# Patient Record
Sex: FEMALE | Race: WHITE | Employment: OTHER | URBAN - METROPOLITAN AREA
[De-identification: names, ages, dates, MRNs, and addresses within clinical notes are randomized per-mention and may not be internally consistent; named-entity substitution may affect disease eponyms.]

---

## 2017-02-06 ENCOUNTER — HOSPITAL ENCOUNTER (OUTPATIENT)
Dept: PAIN MANAGEMENT | Age: 52
Discharge: HOME | End: 2017-02-06
Payer: MEDICAID

## 2017-02-06 VITALS
SYSTOLIC BLOOD PRESSURE: 132 MMHG | DIASTOLIC BLOOD PRESSURE: 81 MMHG | BODY MASS INDEX: 35.82 KG/M2 | HEIGHT: 65 IN | TEMPERATURE: 97.4 F | WEIGHT: 215 LBS | HEART RATE: 76 BPM | RESPIRATION RATE: 16 BRPM | OXYGEN SATURATION: 99 %

## 2017-02-06 DIAGNOSIS — M54.9 BACKACHE, UNSPECIFIED: ICD-10-CM

## 2017-02-06 PROCEDURE — 2580000003 HC RX 258: Performed by: ANESTHESIOLOGY

## 2017-02-06 PROCEDURE — 64635 DESTROY LUMB/SAC FACET JNT: CPT

## 2017-02-06 PROCEDURE — 64636 DESTROY L/S FACET JNT ADDL: CPT

## 2017-02-06 PROCEDURE — 6360000002 HC RX W HCPCS: Performed by: ANESTHESIOLOGY

## 2017-02-06 ASSESSMENT — PAIN - FUNCTIONAL ASSESSMENT: PAIN_FUNCTIONAL_ASSESSMENT: 0-10

## 2017-02-23 ENCOUNTER — HOSPITAL ENCOUNTER (OUTPATIENT)
Dept: PAIN MANAGEMENT | Age: 52
Discharge: HOME | End: 2017-02-23
Payer: MEDICAID

## 2017-02-23 VITALS
SYSTOLIC BLOOD PRESSURE: 165 MMHG | DIASTOLIC BLOOD PRESSURE: 100 MMHG | TEMPERATURE: 97.9 F | RESPIRATION RATE: 12 BRPM | HEART RATE: 70 BPM

## 2017-02-23 PROCEDURE — 99214 OFFICE O/P EST MOD 30 MIN: CPT

## 2017-02-23 RX ORDER — MORPHINE SULFATE 15 MG/1
15 TABLET, FILM COATED, EXTENDED RELEASE ORAL EVERY 12 HOURS SCHEDULED
Qty: 60 TABLET | Refills: 0 | Status: SHIPPED | OUTPATIENT
Start: 2017-02-24 | End: 2017-03-21 | Stop reason: SDUPTHER

## 2017-02-23 RX ORDER — GABAPENTIN 300 MG/1
300 CAPSULE ORAL 3 TIMES DAILY
Qty: 90 CAPSULE | Refills: 0 | Status: SHIPPED | OUTPATIENT
Start: 2017-02-23 | End: 2017-03-21 | Stop reason: SDUPTHER

## 2017-02-23 RX ORDER — TRAMADOL HYDROCHLORIDE 50 MG/1
50 TABLET ORAL 2 TIMES DAILY
Qty: 60 TABLET | Refills: 0 | Status: SHIPPED | OUTPATIENT
Start: 2017-02-24 | End: 2017-03-21 | Stop reason: SDUPTHER

## 2017-02-23 RX ORDER — TIZANIDINE 4 MG/1
4 TABLET ORAL NIGHTLY
Qty: 30 TABLET | Refills: 0 | Status: SHIPPED | OUTPATIENT
Start: 2017-02-24 | End: 2017-03-21 | Stop reason: SDUPTHER

## 2017-02-23 ASSESSMENT — PAIN DESCRIPTION - PROGRESSION: CLINICAL_PROGRESSION: GRADUALLY IMPROVING

## 2017-02-23 ASSESSMENT — PAIN SCALES - GENERAL: PAINLEVEL_OUTOF10: 6

## 2017-02-23 ASSESSMENT — PAIN DESCRIPTION - DESCRIPTORS: DESCRIPTORS: ACHING;DULL

## 2017-02-23 ASSESSMENT — PAIN DESCRIPTION - LOCATION: LOCATION: BACK

## 2017-02-23 ASSESSMENT — PAIN DESCRIPTION - PAIN TYPE: TYPE: CHRONIC PAIN

## 2017-02-23 ASSESSMENT — PAIN DESCRIPTION - ORIENTATION: ORIENTATION: LOWER;RIGHT

## 2017-03-21 ENCOUNTER — HOSPITAL ENCOUNTER (OUTPATIENT)
Dept: PAIN MANAGEMENT | Age: 52
Discharge: HOME | End: 2017-03-21
Payer: MEDICAID

## 2017-03-21 DIAGNOSIS — M54.16 LUMBAR RADICULOPATHY: Primary | Chronic | ICD-10-CM

## 2017-03-21 PROCEDURE — 99214 OFFICE O/P EST MOD 30 MIN: CPT

## 2017-03-21 PROCEDURE — 99213 OFFICE O/P EST LOW 20 MIN: CPT

## 2017-03-21 RX ORDER — MORPHINE SULFATE 15 MG/1
15 TABLET, FILM COATED, EXTENDED RELEASE ORAL EVERY 12 HOURS SCHEDULED
Qty: 60 TABLET | Refills: 0 | Status: SHIPPED | OUTPATIENT
Start: 2017-03-26 | End: 2017-04-18 | Stop reason: SDUPTHER

## 2017-03-21 RX ORDER — TIZANIDINE 4 MG/1
4 TABLET ORAL NIGHTLY
Qty: 30 TABLET | Refills: 0 | Status: SHIPPED | OUTPATIENT
Start: 2017-03-26 | End: 2017-04-18 | Stop reason: SDUPTHER

## 2017-03-21 RX ORDER — GABAPENTIN 300 MG/1
300 CAPSULE ORAL 3 TIMES DAILY
Qty: 90 CAPSULE | Refills: 0 | Status: SHIPPED | OUTPATIENT
Start: 2017-03-26 | End: 2017-04-18 | Stop reason: SDUPTHER

## 2017-03-21 RX ORDER — TRAMADOL HYDROCHLORIDE 50 MG/1
50 TABLET ORAL 2 TIMES DAILY
Qty: 60 TABLET | Refills: 0 | Status: SHIPPED | OUTPATIENT
Start: 2017-03-26 | End: 2017-04-18 | Stop reason: SDUPTHER

## 2017-04-18 ENCOUNTER — HOSPITAL ENCOUNTER (OUTPATIENT)
Dept: PAIN MANAGEMENT | Age: 52
Discharge: HOME | End: 2017-04-18
Payer: MEDICAID

## 2017-04-18 VITALS
TEMPERATURE: 97.5 F | SYSTOLIC BLOOD PRESSURE: 180 MMHG | RESPIRATION RATE: 12 BRPM | DIASTOLIC BLOOD PRESSURE: 94 MMHG | HEART RATE: 85 BPM

## 2017-04-18 DIAGNOSIS — M54.16 LUMBAR RADICULOPATHY: Primary | Chronic | ICD-10-CM

## 2017-04-18 PROCEDURE — 99214 OFFICE O/P EST MOD 30 MIN: CPT

## 2017-04-18 RX ORDER — TIZANIDINE 4 MG/1
4 TABLET ORAL NIGHTLY
Qty: 30 TABLET | Refills: 0 | Status: SHIPPED | OUTPATIENT
Start: 2017-04-26 | End: 2017-04-18 | Stop reason: SDUPTHER

## 2017-04-18 RX ORDER — GABAPENTIN 300 MG/1
300 CAPSULE ORAL 3 TIMES DAILY
Qty: 90 CAPSULE | Refills: 0 | Status: SHIPPED | OUTPATIENT
Start: 2017-04-26 | End: 2017-05-22 | Stop reason: SDUPTHER

## 2017-04-18 RX ORDER — MORPHINE SULFATE 15 MG/1
15 TABLET, FILM COATED, EXTENDED RELEASE ORAL EVERY 12 HOURS SCHEDULED
Qty: 60 TABLET | Refills: 0 | Status: SHIPPED | OUTPATIENT
Start: 2017-04-26 | End: 2017-05-22 | Stop reason: SDUPTHER

## 2017-04-18 RX ORDER — GABAPENTIN 300 MG/1
300 CAPSULE ORAL 3 TIMES DAILY
Qty: 90 CAPSULE | Refills: 0 | Status: SHIPPED | OUTPATIENT
Start: 2017-04-26 | End: 2017-04-18 | Stop reason: SDUPTHER

## 2017-04-18 RX ORDER — TIZANIDINE 4 MG/1
4 TABLET ORAL NIGHTLY
Qty: 30 TABLET | Refills: 0 | Status: SHIPPED | OUTPATIENT
Start: 2017-04-26 | End: 2017-05-22 | Stop reason: SDUPTHER

## 2017-04-18 RX ORDER — TRAMADOL HYDROCHLORIDE 50 MG/1
50 TABLET ORAL 2 TIMES DAILY
Qty: 60 TABLET | Refills: 0 | Status: SHIPPED | OUTPATIENT
Start: 2017-04-26 | End: 2017-05-22 | Stop reason: SDUPTHER

## 2017-04-18 ASSESSMENT — PAIN SCALES - GENERAL: PAINLEVEL_OUTOF10: 6

## 2017-04-18 ASSESSMENT — ENCOUNTER SYMPTOMS
COUGH: 0
BACK PAIN: 1
CONSTIPATION: 0
SHORTNESS OF BREATH: 0

## 2017-04-18 ASSESSMENT — PAIN DESCRIPTION - LOCATION: LOCATION: BACK;LEG

## 2017-04-18 ASSESSMENT — PAIN DESCRIPTION - PROGRESSION: CLINICAL_PROGRESSION: GRADUALLY WORSENING

## 2017-04-18 ASSESSMENT — PAIN DESCRIPTION - ORIENTATION: ORIENTATION: RIGHT

## 2017-04-18 ASSESSMENT — PAIN DESCRIPTION - PAIN TYPE: TYPE: CHRONIC PAIN

## 2017-04-18 ASSESSMENT — PAIN DESCRIPTION - FREQUENCY: FREQUENCY: CONTINUOUS

## 2017-05-22 ENCOUNTER — HOSPITAL ENCOUNTER (OUTPATIENT)
Dept: PAIN MANAGEMENT | Age: 52
Discharge: HOME | End: 2017-05-22
Payer: MEDICAID

## 2017-05-22 VITALS
DIASTOLIC BLOOD PRESSURE: 85 MMHG | BODY MASS INDEX: 35.82 KG/M2 | HEIGHT: 65 IN | HEART RATE: 87 BPM | WEIGHT: 215 LBS | TEMPERATURE: 97.9 F | SYSTOLIC BLOOD PRESSURE: 136 MMHG | RESPIRATION RATE: 18 BRPM

## 2017-05-22 DIAGNOSIS — M54.16 LUMBAR RADICULOPATHY: Primary | Chronic | ICD-10-CM

## 2017-05-22 PROCEDURE — 99214 OFFICE O/P EST MOD 30 MIN: CPT

## 2017-05-22 RX ORDER — GABAPENTIN 300 MG/1
300 CAPSULE ORAL 3 TIMES DAILY
Qty: 90 CAPSULE | Refills: 0 | Status: SHIPPED | OUTPATIENT
Start: 2017-05-26 | End: 2017-06-20 | Stop reason: SDUPTHER

## 2017-05-22 RX ORDER — TRAMADOL HYDROCHLORIDE 50 MG/1
50 TABLET ORAL 2 TIMES DAILY
Qty: 60 TABLET | Refills: 0 | Status: SHIPPED | OUTPATIENT
Start: 2017-05-26 | End: 2017-06-20 | Stop reason: SDUPTHER

## 2017-05-22 RX ORDER — TIZANIDINE 4 MG/1
4 TABLET ORAL NIGHTLY
Qty: 30 TABLET | Refills: 0 | Status: SHIPPED | OUTPATIENT
Start: 2017-05-26 | End: 2017-06-20 | Stop reason: SDUPTHER

## 2017-05-22 RX ORDER — MORPHINE SULFATE 15 MG/1
15 TABLET, FILM COATED, EXTENDED RELEASE ORAL EVERY 12 HOURS SCHEDULED
Qty: 60 TABLET | Refills: 0 | Status: SHIPPED | OUTPATIENT
Start: 2017-05-26 | End: 2017-06-20 | Stop reason: SDUPTHER

## 2017-05-22 RX ORDER — TIZANIDINE 4 MG/1
4 TABLET ORAL NIGHTLY
Qty: 30 TABLET | Refills: 0 | Status: SHIPPED | OUTPATIENT
Start: 2017-05-26 | End: 2017-05-22 | Stop reason: SDUPTHER

## 2017-05-22 ASSESSMENT — PAIN DESCRIPTION - PROGRESSION: CLINICAL_PROGRESSION: GRADUALLY WORSENING

## 2017-05-22 ASSESSMENT — PAIN DESCRIPTION - FREQUENCY: FREQUENCY: CONTINUOUS

## 2017-05-22 ASSESSMENT — PAIN DESCRIPTION - DESCRIPTORS: DESCRIPTORS: CONSTANT;ACHING;SHOOTING;STABBING

## 2017-05-22 ASSESSMENT — ENCOUNTER SYMPTOMS
BACK PAIN: 1
SHORTNESS OF BREATH: 0
CONSTIPATION: 0
COUGH: 0

## 2017-05-22 ASSESSMENT — PAIN DESCRIPTION - ONSET: ONSET: ON-GOING

## 2017-05-22 ASSESSMENT — PAIN DESCRIPTION - PAIN TYPE: TYPE: CHRONIC PAIN

## 2017-05-22 ASSESSMENT — PAIN SCALES - GENERAL: PAINLEVEL_OUTOF10: 8

## 2017-05-22 ASSESSMENT — PAIN DESCRIPTION - ORIENTATION: ORIENTATION: RIGHT;LOWER;LEFT

## 2017-05-22 ASSESSMENT — PAIN DESCRIPTION - LOCATION: LOCATION: BACK;LEG

## 2017-06-19 ENCOUNTER — HOSPITAL ENCOUNTER (OUTPATIENT)
Dept: PAIN MANAGEMENT | Age: 52
Discharge: HOME | End: 2017-06-19
Payer: MEDICAID

## 2017-06-19 VITALS
DIASTOLIC BLOOD PRESSURE: 57 MMHG | TEMPERATURE: 98.2 F | OXYGEN SATURATION: 98 % | BODY MASS INDEX: 35.82 KG/M2 | HEART RATE: 74 BPM | RESPIRATION RATE: 18 BRPM | HEIGHT: 65 IN | WEIGHT: 215 LBS | SYSTOLIC BLOOD PRESSURE: 138 MMHG

## 2017-06-19 DIAGNOSIS — M54.9 BACKACHE, UNSPECIFIED: ICD-10-CM

## 2017-06-19 PROCEDURE — 6360000002 HC RX W HCPCS

## 2017-06-19 PROCEDURE — 64495 INJ PARAVERT F JNT L/S 3 LEV: CPT

## 2017-06-19 PROCEDURE — 64494 INJ PARAVERT F JNT L/S 2 LEV: CPT

## 2017-06-19 PROCEDURE — 2580000003 HC RX 258: Performed by: ANESTHESIOLOGY

## 2017-06-19 PROCEDURE — 64493 INJ PARAVERT F JNT L/S 1 LEV: CPT

## 2017-06-19 PROCEDURE — 6360000002 HC RX W HCPCS: Performed by: ANESTHESIOLOGY

## 2017-06-19 PROCEDURE — 2500000003 HC RX 250 WO HCPCS

## 2017-06-19 PROCEDURE — 2500000003 HC RX 250 WO HCPCS: Performed by: ANESTHESIOLOGY

## 2017-06-19 RX ORDER — MIDAZOLAM HYDROCHLORIDE 1 MG/ML
0.5 INJECTION INTRAMUSCULAR; INTRAVENOUS 3 TIMES DAILY PRN
Status: DISCONTINUED | OUTPATIENT
Start: 2017-06-19 | End: 2017-06-20 | Stop reason: HOSPADM

## 2017-06-19 RX ORDER — BUSPIRONE HYDROCHLORIDE 10 MG/1
10 TABLET ORAL 2 TIMES DAILY
COMMUNITY
End: 2021-01-13

## 2017-06-19 RX ORDER — SODIUM CHLORIDE, SODIUM LACTATE, POTASSIUM CHLORIDE, CALCIUM CHLORIDE 600; 310; 30; 20 MG/100ML; MG/100ML; MG/100ML; MG/100ML
500 INJECTION, SOLUTION INTRAVENOUS CONTINUOUS
Status: DISCONTINUED | OUTPATIENT
Start: 2017-06-19 | End: 2017-06-20 | Stop reason: HOSPADM

## 2017-06-19 RX ORDER — LIDOCAINE HYDROCHLORIDE 10 MG/ML
0.5 INJECTION, SOLUTION EPIDURAL; INFILTRATION; INTRACAUDAL; PERINEURAL ONCE
Status: COMPLETED | OUTPATIENT
Start: 2017-06-19 | End: 2017-06-19

## 2017-06-19 RX ADMIN — LIDOCAINE HYDROCHLORIDE 0.5 ML: 10 INJECTION, SOLUTION EPIDURAL; INFILTRATION; INTRACAUDAL; PERINEURAL at 08:11

## 2017-06-19 RX ADMIN — MIDAZOLAM HYDROCHLORIDE 0.5 MG: 1 INJECTION, SOLUTION INTRAMUSCULAR; INTRAVENOUS at 08:25

## 2017-06-19 RX ADMIN — MIDAZOLAM HYDROCHLORIDE 1 MG: 1 INJECTION, SOLUTION INTRAMUSCULAR; INTRAVENOUS at 08:21

## 2017-06-19 RX ADMIN — SODIUM CHLORIDE, POTASSIUM CHLORIDE, SODIUM LACTATE AND CALCIUM CHLORIDE 500 ML: 600; 310; 30; 20 INJECTION, SOLUTION INTRAVENOUS at 08:11

## 2017-06-19 ASSESSMENT — PAIN - FUNCTIONAL ASSESSMENT
PAIN_FUNCTIONAL_ASSESSMENT: 0-10
PAIN_FUNCTIONAL_ASSESSMENT: 0-10

## 2017-06-19 ASSESSMENT — PAIN DESCRIPTION - DESCRIPTORS: DESCRIPTORS: CONSTANT;STABBING;BURNING

## 2017-06-19 ASSESSMENT — PAIN SCALES - GENERAL: PAINLEVEL_OUTOF10: 0

## 2017-06-20 ENCOUNTER — HOSPITAL ENCOUNTER (OUTPATIENT)
Dept: PAIN MANAGEMENT | Age: 52
Discharge: HOME | End: 2017-06-20
Payer: MEDICAID

## 2017-06-20 VITALS
TEMPERATURE: 98.6 F | DIASTOLIC BLOOD PRESSURE: 85 MMHG | RESPIRATION RATE: 12 BRPM | HEART RATE: 67 BPM | SYSTOLIC BLOOD PRESSURE: 136 MMHG

## 2017-06-20 DIAGNOSIS — M54.16 LUMBAR RADICULOPATHY: Primary | Chronic | ICD-10-CM

## 2017-06-20 PROCEDURE — 99213 OFFICE O/P EST LOW 20 MIN: CPT

## 2017-06-20 PROCEDURE — 80307 DRUG TEST PRSMV CHEM ANLYZR: CPT

## 2017-06-20 PROCEDURE — 99214 OFFICE O/P EST MOD 30 MIN: CPT

## 2017-06-20 RX ORDER — TRAMADOL HYDROCHLORIDE 50 MG/1
50 TABLET ORAL 2 TIMES DAILY
Qty: 60 TABLET | Refills: 0 | Status: SHIPPED | OUTPATIENT
Start: 2017-06-25 | End: 2017-07-21 | Stop reason: SDUPTHER

## 2017-06-20 RX ORDER — TIZANIDINE 4 MG/1
4 TABLET ORAL NIGHTLY
Qty: 30 TABLET | Refills: 0 | Status: SHIPPED | OUTPATIENT
Start: 2017-06-25 | End: 2017-07-21 | Stop reason: SDUPTHER

## 2017-06-20 RX ORDER — MORPHINE SULFATE 15 MG/1
15 TABLET, FILM COATED, EXTENDED RELEASE ORAL EVERY 12 HOURS SCHEDULED
Qty: 60 TABLET | Refills: 0 | Status: SHIPPED | OUTPATIENT
Start: 2017-06-25 | End: 2017-07-21 | Stop reason: SDUPTHER

## 2017-06-20 RX ORDER — GABAPENTIN 300 MG/1
300 CAPSULE ORAL 3 TIMES DAILY
Qty: 90 CAPSULE | Refills: 0 | Status: SHIPPED | OUTPATIENT
Start: 2017-06-25 | End: 2017-07-21 | Stop reason: SDUPTHER

## 2017-06-20 ASSESSMENT — ENCOUNTER SYMPTOMS: BACK PAIN: 1

## 2017-06-20 ASSESSMENT — PAIN DESCRIPTION - ORIENTATION: ORIENTATION: RIGHT

## 2017-06-20 ASSESSMENT — PAIN DESCRIPTION - PAIN TYPE: TYPE: CHRONIC PAIN

## 2017-06-20 ASSESSMENT — PAIN SCALES - GENERAL: PAINLEVEL_OUTOF10: 7

## 2017-06-20 ASSESSMENT — PAIN DESCRIPTION - DESCRIPTORS: DESCRIPTORS: CONSTANT;ACHING;DISCOMFORT

## 2017-06-20 ASSESSMENT — PAIN DESCRIPTION - LOCATION: LOCATION: BACK

## 2017-06-23 LAB
6-ACETYLMORPHINE, UR: NOT DETECTED
7-AMINOCLONAZEPAM, URINE: NOT DETECTED
ALPHA-OH-ALPRAZ, URINE: NOT DETECTED
ALPRAZOLAM, URINE: NOT DETECTED
AMPHETAMINES, URINE: PRESENT
BARBITURATES, URINE: NOT DETECTED
BENZOYLECGONINE, UR: NOT DETECTED
BUPRENORPHINE URINE: NOT DETECTED
CARISOPRODOL, UR: NOT DETECTED
CLONAZEPAM, URINE: NOT DETECTED
CODEINE, URINE: NOT DETECTED
CREATININE URINE: 131.1 MG/DL (ref 20–400)
DIAZEPAM, URINE: NOT DETECTED
EER PAIN MGT DRUG PANEL, HIGH RES/EMIT U: NORMAL
ETHYL GLUCURONIDE UR: NOT DETECTED
FENTANYL URINE: NOT DETECTED
HYDROCODONE, URINE: NOT DETECTED
HYDROMORPHONE, URINE: PRESENT
LORAZEPAM, URINE: NOT DETECTED
MARIJUANA METAB, UR: NOT DETECTED
MDA, UR: NOT DETECTED
MDEA, EVE, UR: NOT DETECTED
MDMA URINE: NOT DETECTED
MEPERIDINE METAB, UR: NOT DETECTED
METHADONE, URINE: NOT DETECTED
METHAMPHETAMINE, URINE: NOT DETECTED
METHYLPHENIDATE: NOT DETECTED
MIDAZOLAM, URINE: NOT DETECTED
MORPHINE URINE: PRESENT
NORBUPRENORPHINE, URINE: NOT DETECTED
NORDIAZEPAM, URINE: NOT DETECTED
NORFENTANYL, URINE: NOT DETECTED
NORHYDROCODONE, URINE: NOT DETECTED
NOROXYCODONE, URINE: NOT DETECTED
NOROXYMORPHONE, URINE: NOT DETECTED
OXAZEPAM, URINE: NOT DETECTED
OXYCODONE URINE: NOT DETECTED
OXYMORPHONE, URINE: NOT DETECTED
PAIN MGT DRUG PANEL, HI RES, UR: NORMAL
PCP,URINE: NOT DETECTED
PHENTERMINE, UR: NOT DETECTED
PROPOXYPHENE, URINE: NOT DETECTED
TAPENTADOL, URINE: NOT DETECTED
TAPENTADOL-O-SULFATE, URINE: NOT DETECTED
TEMAZEPAM, URINE: NOT DETECTED
TRAMADOL, URINE: PRESENT
ZOLPIDEM, URINE: NOT DETECTED

## 2017-07-07 ENCOUNTER — HOSPITAL ENCOUNTER (OUTPATIENT)
Dept: PAIN MANAGEMENT | Age: 52
Discharge: HOME | End: 2017-07-07
Payer: MEDICAID

## 2017-07-07 VITALS
BODY MASS INDEX: 35.82 KG/M2 | WEIGHT: 215 LBS | OXYGEN SATURATION: 99 % | TEMPERATURE: 98.8 F | DIASTOLIC BLOOD PRESSURE: 79 MMHG | SYSTOLIC BLOOD PRESSURE: 134 MMHG | HEART RATE: 63 BPM | HEIGHT: 65 IN | RESPIRATION RATE: 12 BRPM

## 2017-07-07 DIAGNOSIS — M54.6 ACUTE RIGHT-SIDED THORACIC BACK PAIN: ICD-10-CM

## 2017-07-07 DIAGNOSIS — G58.8 INTERCOSTAL NEURALGIA: ICD-10-CM

## 2017-07-07 PROCEDURE — 6360000002 HC RX W HCPCS

## 2017-07-07 PROCEDURE — 2500000003 HC RX 250 WO HCPCS: Performed by: ANESTHESIOLOGY

## 2017-07-07 PROCEDURE — 2580000003 HC RX 258: Performed by: ANESTHESIOLOGY

## 2017-07-07 PROCEDURE — 6360000002 HC RX W HCPCS: Performed by: ANESTHESIOLOGY

## 2017-07-07 PROCEDURE — 64635 DESTROY LUMB/SAC FACET JNT: CPT

## 2017-07-07 PROCEDURE — 64636 DESTROY L/S FACET JNT ADDL: CPT

## 2017-07-07 PROCEDURE — 2500000003 HC RX 250 WO HCPCS

## 2017-07-07 RX ORDER — SODIUM CHLORIDE, SODIUM LACTATE, POTASSIUM CHLORIDE, CALCIUM CHLORIDE 600; 310; 30; 20 MG/100ML; MG/100ML; MG/100ML; MG/100ML
500 INJECTION, SOLUTION INTRAVENOUS CONTINUOUS
Status: DISCONTINUED | OUTPATIENT
Start: 2017-07-07 | End: 2017-07-08 | Stop reason: HOSPADM

## 2017-07-07 RX ORDER — FENTANYL CITRATE 50 UG/ML
25 INJECTION, SOLUTION INTRAMUSCULAR; INTRAVENOUS
Status: DISCONTINUED | OUTPATIENT
Start: 2017-07-07 | End: 2017-07-08 | Stop reason: HOSPADM

## 2017-07-07 RX ORDER — LIDOCAINE HYDROCHLORIDE 10 MG/ML
5 INJECTION, SOLUTION EPIDURAL; INFILTRATION; INTRACAUDAL; PERINEURAL ONCE
Status: COMPLETED | OUTPATIENT
Start: 2017-07-07 | End: 2017-07-07

## 2017-07-07 RX ORDER — MIDAZOLAM HYDROCHLORIDE 1 MG/ML
0.5 INJECTION INTRAMUSCULAR; INTRAVENOUS
Status: COMPLETED | OUTPATIENT
Start: 2017-07-07 | End: 2017-07-07

## 2017-07-07 RX ADMIN — MIDAZOLAM HYDROCHLORIDE 0.5 MG: 1 INJECTION, SOLUTION INTRAMUSCULAR; INTRAVENOUS at 09:48

## 2017-07-07 RX ADMIN — LIDOCAINE HYDROCHLORIDE 5 ML: 10 INJECTION, SOLUTION EPIDURAL; INFILTRATION; INTRACAUDAL; PERINEURAL at 09:32

## 2017-07-07 RX ADMIN — FENTANYL CITRATE 25 MCG: 50 INJECTION, SOLUTION INTRAMUSCULAR; INTRAVENOUS at 09:49

## 2017-07-07 RX ADMIN — SODIUM CHLORIDE, POTASSIUM CHLORIDE, SODIUM LACTATE AND CALCIUM CHLORIDE 500 ML: 600; 310; 30; 20 INJECTION, SOLUTION INTRAVENOUS at 09:32

## 2017-07-07 RX ADMIN — MIDAZOLAM HYDROCHLORIDE 1 MG: 1 INJECTION, SOLUTION INTRAMUSCULAR; INTRAVENOUS at 09:41

## 2017-07-07 RX ADMIN — FENTANYL CITRATE 50 MCG: 50 INJECTION, SOLUTION INTRAMUSCULAR; INTRAVENOUS at 09:41

## 2017-07-07 ASSESSMENT — PAIN SCALES - GENERAL: PAINLEVEL_OUTOF10: 8

## 2017-07-07 ASSESSMENT — PAIN DESCRIPTION - FREQUENCY: FREQUENCY: CONTINUOUS

## 2017-07-07 ASSESSMENT — PAIN - FUNCTIONAL ASSESSMENT
PAIN_FUNCTIONAL_ASSESSMENT: 0-10
PAIN_FUNCTIONAL_ASSESSMENT: 0-10

## 2017-07-07 ASSESSMENT — PAIN DESCRIPTION - PAIN TYPE: TYPE: CHRONIC PAIN

## 2017-07-07 ASSESSMENT — PAIN DESCRIPTION - ONSET: ONSET: ON-GOING

## 2017-07-07 ASSESSMENT — PAIN DESCRIPTION - PROGRESSION: CLINICAL_PROGRESSION: GRADUALLY IMPROVING

## 2017-07-07 ASSESSMENT — PAIN DESCRIPTION - LOCATION: LOCATION: BACK

## 2017-07-07 ASSESSMENT — PAIN DESCRIPTION - ORIENTATION: ORIENTATION: RIGHT;LOWER

## 2017-07-21 ENCOUNTER — HOSPITAL ENCOUNTER (OUTPATIENT)
Dept: PAIN MANAGEMENT | Age: 52
Discharge: HOME | End: 2017-07-21
Payer: MEDICAID

## 2017-07-21 VITALS
HEART RATE: 88 BPM | TEMPERATURE: 98.1 F | RESPIRATION RATE: 20 BRPM | DIASTOLIC BLOOD PRESSURE: 90 MMHG | SYSTOLIC BLOOD PRESSURE: 159 MMHG

## 2017-07-21 DIAGNOSIS — M54.16 LUMBAR RADICULOPATHY: Primary | Chronic | ICD-10-CM

## 2017-07-21 PROCEDURE — 99214 OFFICE O/P EST MOD 30 MIN: CPT

## 2017-07-21 RX ORDER — TIZANIDINE 4 MG/1
4 TABLET ORAL NIGHTLY
Qty: 30 TABLET | Refills: 0 | Status: SHIPPED | OUTPATIENT
Start: 2017-07-26 | End: 2017-08-22 | Stop reason: SDUPTHER

## 2017-07-21 RX ORDER — TRAMADOL HYDROCHLORIDE 50 MG/1
50 TABLET ORAL 2 TIMES DAILY
Qty: 60 TABLET | Refills: 0 | Status: SHIPPED | OUTPATIENT
Start: 2017-07-26 | End: 2017-08-22 | Stop reason: ALTCHOICE

## 2017-07-21 RX ORDER — MORPHINE SULFATE 15 MG/1
15 TABLET, FILM COATED, EXTENDED RELEASE ORAL EVERY 12 HOURS SCHEDULED
Qty: 60 TABLET | Refills: 0 | Status: SHIPPED | OUTPATIENT
Start: 2017-07-26 | End: 2017-08-22 | Stop reason: SDUPTHER

## 2017-07-21 RX ORDER — GABAPENTIN 300 MG/1
300 CAPSULE ORAL 3 TIMES DAILY
Qty: 90 CAPSULE | Refills: 0 | Status: SHIPPED | OUTPATIENT
Start: 2017-07-26 | End: 2017-08-22 | Stop reason: SDUPTHER

## 2017-07-21 ASSESSMENT — ENCOUNTER SYMPTOMS
CONSTIPATION: 0
SHORTNESS OF BREATH: 0
COUGH: 0
BACK PAIN: 1

## 2017-07-21 ASSESSMENT — PAIN DESCRIPTION - ORIENTATION: ORIENTATION: LOWER

## 2017-07-21 ASSESSMENT — PAIN DESCRIPTION - ONSET: ONSET: ON-GOING

## 2017-07-21 ASSESSMENT — PAIN DESCRIPTION - PROGRESSION: CLINICAL_PROGRESSION: GRADUALLY WORSENING

## 2017-07-21 ASSESSMENT — PAIN DESCRIPTION - FREQUENCY: FREQUENCY: CONTINUOUS

## 2017-07-21 ASSESSMENT — PAIN SCALES - GENERAL: PAINLEVEL_OUTOF10: 9

## 2017-07-21 ASSESSMENT — PAIN DESCRIPTION - DESCRIPTORS: DESCRIPTORS: CONSTANT;SQUEEZING

## 2017-07-21 ASSESSMENT — PAIN DESCRIPTION - PAIN TYPE: TYPE: CHRONIC PAIN

## 2017-07-21 ASSESSMENT — PAIN DESCRIPTION - LOCATION: LOCATION: BACK

## 2017-08-22 ENCOUNTER — HOSPITAL ENCOUNTER (OUTPATIENT)
Dept: PAIN MANAGEMENT | Age: 52
Discharge: HOME | End: 2017-08-22
Payer: MEDICAID

## 2017-08-22 VITALS
RESPIRATION RATE: 16 BRPM | DIASTOLIC BLOOD PRESSURE: 94 MMHG | OXYGEN SATURATION: 98 % | BODY MASS INDEX: 36.65 KG/M2 | TEMPERATURE: 98.1 F | HEART RATE: 83 BPM | HEIGHT: 65 IN | WEIGHT: 220 LBS | SYSTOLIC BLOOD PRESSURE: 152 MMHG

## 2017-08-22 PROCEDURE — 99214 OFFICE O/P EST MOD 30 MIN: CPT

## 2017-08-22 RX ORDER — MORPHINE SULFATE 15 MG/1
15 TABLET, FILM COATED, EXTENDED RELEASE ORAL EVERY 12 HOURS SCHEDULED
Qty: 60 TABLET | Refills: 0 | Status: SHIPPED | OUTPATIENT
Start: 2017-08-24 | End: 2017-09-19 | Stop reason: SDUPTHER

## 2017-08-22 RX ORDER — TIZANIDINE 4 MG/1
4 TABLET ORAL NIGHTLY
Qty: 30 TABLET | Refills: 0 | Status: SHIPPED | OUTPATIENT
Start: 2017-08-24 | End: 2017-09-19 | Stop reason: SDUPTHER

## 2017-08-22 RX ORDER — GABAPENTIN 300 MG/1
300 CAPSULE ORAL 3 TIMES DAILY
Qty: 90 CAPSULE | Refills: 0 | Status: CANCELLED | OUTPATIENT
Start: 2017-08-24 | End: 2017-09-23

## 2017-08-22 RX ORDER — GABAPENTIN 300 MG/1
300 CAPSULE ORAL 3 TIMES DAILY
Qty: 90 CAPSULE | Refills: 0 | Status: SHIPPED | OUTPATIENT
Start: 2017-08-24 | End: 2017-09-19 | Stop reason: SDUPTHER

## 2017-08-22 RX ORDER — TIZANIDINE 4 MG/1
4 TABLET ORAL NIGHTLY
Qty: 30 TABLET | Refills: 0 | Status: CANCELLED | OUTPATIENT
Start: 2017-08-24 | End: 2017-09-23

## 2017-08-22 ASSESSMENT — PAIN DESCRIPTION - ORIENTATION: ORIENTATION: LOWER

## 2017-08-22 ASSESSMENT — PAIN DESCRIPTION - PROGRESSION: CLINICAL_PROGRESSION: NOT CHANGED

## 2017-08-22 ASSESSMENT — PAIN DESCRIPTION - FREQUENCY: FREQUENCY: CONTINUOUS

## 2017-08-22 ASSESSMENT — PAIN DESCRIPTION - PAIN TYPE: TYPE: CHRONIC PAIN

## 2017-08-22 ASSESSMENT — PAIN DESCRIPTION - LOCATION: LOCATION: BACK;BUTTOCKS

## 2017-08-22 ASSESSMENT — PAIN SCALES - GENERAL: PAINLEVEL_OUTOF10: 7

## 2017-09-11 ENCOUNTER — HOSPITAL ENCOUNTER (OUTPATIENT)
Dept: PAIN MANAGEMENT | Age: 52
Discharge: HOME | End: 2017-09-11
Payer: MEDICAID

## 2017-09-11 VITALS
TEMPERATURE: 97.1 F | RESPIRATION RATE: 16 BRPM | HEIGHT: 65 IN | BODY MASS INDEX: 36.65 KG/M2 | OXYGEN SATURATION: 97 % | SYSTOLIC BLOOD PRESSURE: 132 MMHG | WEIGHT: 220 LBS | HEART RATE: 83 BPM | DIASTOLIC BLOOD PRESSURE: 72 MMHG

## 2017-09-11 DIAGNOSIS — G89.29 CHRONIC BACK PAIN, UNSPECIFIED BACK LOCATION, UNSPECIFIED BACK PAIN LATERALITY: ICD-10-CM

## 2017-09-11 DIAGNOSIS — M54.9 CHRONIC BACK PAIN, UNSPECIFIED BACK LOCATION, UNSPECIFIED BACK PAIN LATERALITY: ICD-10-CM

## 2017-09-11 PROCEDURE — 6360000002 HC RX W HCPCS

## 2017-09-11 PROCEDURE — 62323 NJX INTERLAMINAR LMBR/SAC: CPT | Performed by: ANESTHESIOLOGY

## 2017-09-11 PROCEDURE — 2580000003 HC RX 258: Performed by: ANESTHESIOLOGY

## 2017-09-11 PROCEDURE — 62323 NJX INTERLAMINAR LMBR/SAC: CPT

## 2017-09-11 PROCEDURE — 6360000002 HC RX W HCPCS: Performed by: ANESTHESIOLOGY

## 2017-09-11 PROCEDURE — 2500000003 HC RX 250 WO HCPCS

## 2017-09-11 PROCEDURE — 2500000003 HC RX 250 WO HCPCS: Performed by: ANESTHESIOLOGY

## 2017-09-11 RX ORDER — FENTANYL CITRATE 50 UG/ML
25 INJECTION, SOLUTION INTRAMUSCULAR; INTRAVENOUS
Status: DISCONTINUED | OUTPATIENT
Start: 2017-09-11 | End: 2017-09-12 | Stop reason: HOSPADM

## 2017-09-11 RX ORDER — SODIUM CHLORIDE, SODIUM LACTATE, POTASSIUM CHLORIDE, CALCIUM CHLORIDE 600; 310; 30; 20 MG/100ML; MG/100ML; MG/100ML; MG/100ML
500 INJECTION, SOLUTION INTRAVENOUS CONTINUOUS
Status: DISCONTINUED | OUTPATIENT
Start: 2017-09-11 | End: 2017-09-12 | Stop reason: HOSPADM

## 2017-09-11 RX ORDER — MIDAZOLAM HYDROCHLORIDE 1 MG/ML
0.5 INJECTION INTRAMUSCULAR; INTRAVENOUS
Status: DISCONTINUED | OUTPATIENT
Start: 2017-09-11 | End: 2017-09-12 | Stop reason: HOSPADM

## 2017-09-11 RX ORDER — LIDOCAINE HYDROCHLORIDE 10 MG/ML
5 INJECTION, SOLUTION EPIDURAL; INFILTRATION; INTRACAUDAL; PERINEURAL ONCE
Status: COMPLETED | OUTPATIENT
Start: 2017-09-11 | End: 2017-09-11

## 2017-09-11 RX ADMIN — MIDAZOLAM HYDROCHLORIDE 0.5 MG: 1 INJECTION, SOLUTION INTRAMUSCULAR; INTRAVENOUS at 10:42

## 2017-09-11 RX ADMIN — FENTANYL CITRATE 25 MCG: 50 INJECTION INTRAMUSCULAR; INTRAVENOUS at 10:42

## 2017-09-11 RX ADMIN — SODIUM CHLORIDE, POTASSIUM CHLORIDE, SODIUM LACTATE AND CALCIUM CHLORIDE 500 ML: 600; 310; 30; 20 INJECTION, SOLUTION INTRAVENOUS at 10:30

## 2017-09-11 RX ADMIN — LIDOCAINE HYDROCHLORIDE 5 ML: 10 INJECTION, SOLUTION EPIDURAL; INFILTRATION; INTRACAUDAL; PERINEURAL at 10:30

## 2017-09-11 ASSESSMENT — PAIN SCALES - GENERAL
PAINLEVEL_OUTOF10: 0
PAINLEVEL_OUTOF10: 0

## 2017-09-11 ASSESSMENT — PAIN - FUNCTIONAL ASSESSMENT
PAIN_FUNCTIONAL_ASSESSMENT: 0-10
PAIN_FUNCTIONAL_ASSESSMENT: 0-10

## 2017-09-11 ASSESSMENT — PAIN DESCRIPTION - DESCRIPTORS: DESCRIPTORS: STABBING;SHARP

## 2017-09-19 ENCOUNTER — HOSPITAL ENCOUNTER (OUTPATIENT)
Dept: PAIN MANAGEMENT | Age: 52
Discharge: HOME | End: 2017-09-19
Payer: MEDICAID

## 2017-09-19 VITALS
WEIGHT: 220 LBS | RESPIRATION RATE: 16 BRPM | BODY MASS INDEX: 36.65 KG/M2 | HEART RATE: 67 BPM | HEIGHT: 65 IN | TEMPERATURE: 98.1 F | DIASTOLIC BLOOD PRESSURE: 80 MMHG | SYSTOLIC BLOOD PRESSURE: 142 MMHG

## 2017-09-19 DIAGNOSIS — G89.29 CHRONIC LOW BACK PAIN WITH SCIATICA, SCIATICA LATERALITY UNSPECIFIED, UNSPECIFIED BACK PAIN LATERALITY: ICD-10-CM

## 2017-09-19 DIAGNOSIS — M54.16 LUMBAR RADICULOPATHY: Primary | Chronic | ICD-10-CM

## 2017-09-19 DIAGNOSIS — M54.40 CHRONIC LOW BACK PAIN WITH SCIATICA, SCIATICA LATERALITY UNSPECIFIED, UNSPECIFIED BACK PAIN LATERALITY: ICD-10-CM

## 2017-09-19 PROCEDURE — 99214 OFFICE O/P EST MOD 30 MIN: CPT

## 2017-09-19 PROCEDURE — 99213 OFFICE O/P EST LOW 20 MIN: CPT | Performed by: NURSE PRACTITIONER

## 2017-09-19 PROCEDURE — 99213 OFFICE O/P EST LOW 20 MIN: CPT

## 2017-09-19 RX ORDER — MORPHINE SULFATE 15 MG/1
15 TABLET, FILM COATED, EXTENDED RELEASE ORAL EVERY 12 HOURS SCHEDULED
Qty: 60 TABLET | Refills: 0 | Status: SHIPPED | OUTPATIENT
Start: 2017-09-23 | End: 2017-10-20 | Stop reason: SDUPTHER

## 2017-09-19 RX ORDER — GABAPENTIN 300 MG/1
300 CAPSULE ORAL 3 TIMES DAILY
Qty: 90 CAPSULE | Refills: 0 | Status: SHIPPED | OUTPATIENT
Start: 2017-09-23 | End: 2017-10-20 | Stop reason: SDUPTHER

## 2017-09-19 RX ORDER — TIZANIDINE 4 MG/1
4 TABLET ORAL NIGHTLY
Qty: 30 TABLET | Refills: 0 | Status: SHIPPED | OUTPATIENT
Start: 2017-09-23 | End: 2017-10-20 | Stop reason: SDUPTHER

## 2017-09-19 ASSESSMENT — PAIN DESCRIPTION - PAIN TYPE: TYPE: CHRONIC PAIN

## 2017-09-19 ASSESSMENT — ENCOUNTER SYMPTOMS
CONSTIPATION: 0
SHORTNESS OF BREATH: 0
BACK PAIN: 1
COUGH: 0
BOWEL INCONTINENCE: 0

## 2017-09-19 ASSESSMENT — PAIN DESCRIPTION - ONSET: ONSET: ON-GOING

## 2017-09-19 ASSESSMENT — PAIN DESCRIPTION - PROGRESSION: CLINICAL_PROGRESSION: GRADUALLY IMPROVING

## 2017-09-19 ASSESSMENT — PAIN DESCRIPTION - FREQUENCY: FREQUENCY: CONTINUOUS

## 2017-09-19 ASSESSMENT — PAIN SCALES - GENERAL: PAINLEVEL_OUTOF10: 5

## 2017-09-19 ASSESSMENT — PAIN DESCRIPTION - ORIENTATION: ORIENTATION: LOWER;RIGHT;LEFT

## 2017-09-19 ASSESSMENT — PAIN DESCRIPTION - DESCRIPTORS: DESCRIPTORS: SQUEEZING

## 2017-09-19 ASSESSMENT — PAIN DESCRIPTION - LOCATION: LOCATION: BACK;HIP

## 2017-10-20 ENCOUNTER — HOSPITAL ENCOUNTER (OUTPATIENT)
Dept: PAIN MANAGEMENT | Age: 52
Discharge: HOME | End: 2017-10-20
Payer: MEDICAID

## 2017-10-20 VITALS
SYSTOLIC BLOOD PRESSURE: 162 MMHG | TEMPERATURE: 98.4 F | RESPIRATION RATE: 16 BRPM | DIASTOLIC BLOOD PRESSURE: 105 MMHG | HEART RATE: 99 BPM

## 2017-10-20 DIAGNOSIS — M54.16 LUMBAR RADICULOPATHY: Primary | Chronic | ICD-10-CM

## 2017-10-20 DIAGNOSIS — G89.29 CHRONIC MIDLINE LOW BACK PAIN WITHOUT SCIATICA: ICD-10-CM

## 2017-10-20 DIAGNOSIS — M54.50 CHRONIC MIDLINE LOW BACK PAIN WITHOUT SCIATICA: ICD-10-CM

## 2017-10-20 PROCEDURE — 80307 DRUG TEST PRSMV CHEM ANLYZR: CPT

## 2017-10-20 PROCEDURE — 99214 OFFICE O/P EST MOD 30 MIN: CPT | Performed by: NURSE PRACTITIONER

## 2017-10-20 PROCEDURE — 99215 OFFICE O/P EST HI 40 MIN: CPT

## 2017-10-20 RX ORDER — GABAPENTIN 300 MG/1
300 CAPSULE ORAL 3 TIMES DAILY
Qty: 90 CAPSULE | Refills: 0 | Status: SHIPPED | OUTPATIENT
Start: 2017-10-23 | End: 2017-11-17 | Stop reason: SDUPTHER

## 2017-10-20 RX ORDER — TIZANIDINE 4 MG/1
4 TABLET ORAL NIGHTLY
Qty: 30 TABLET | Refills: 0 | Status: SHIPPED | OUTPATIENT
Start: 2017-10-23 | End: 2017-11-17 | Stop reason: SDUPTHER

## 2017-10-20 RX ORDER — MORPHINE SULFATE 15 MG/1
15 TABLET, FILM COATED, EXTENDED RELEASE ORAL EVERY 12 HOURS SCHEDULED
Qty: 60 TABLET | Refills: 0 | Status: SHIPPED | OUTPATIENT
Start: 2017-10-23 | End: 2017-11-17 | Stop reason: SDUPTHER

## 2017-10-20 ASSESSMENT — PAIN DESCRIPTION - ORIENTATION: ORIENTATION: LOWER;MID

## 2017-10-20 ASSESSMENT — PAIN DESCRIPTION - DESCRIPTORS: DESCRIPTORS: ACHING;STABBING;CONSTANT

## 2017-10-20 ASSESSMENT — ENCOUNTER SYMPTOMS
SHORTNESS OF BREATH: 0
BACK PAIN: 1
BOWEL INCONTINENCE: 0
COUGH: 0
CONSTIPATION: 0

## 2017-10-20 ASSESSMENT — PAIN DESCRIPTION - LOCATION: LOCATION: BACK;SHOULDER

## 2017-10-20 ASSESSMENT — PAIN SCALES - GENERAL: PAINLEVEL_OUTOF10: 4

## 2017-10-20 ASSESSMENT — PAIN DESCRIPTION - FREQUENCY: FREQUENCY: CONTINUOUS

## 2017-10-20 ASSESSMENT — PAIN DESCRIPTION - PROGRESSION: CLINICAL_PROGRESSION: NOT CHANGED

## 2017-10-20 ASSESSMENT — PAIN DESCRIPTION - PAIN TYPE: TYPE: CHRONIC PAIN

## 2017-10-20 NOTE — PROGRESS NOTES
Patient is here today to review medication contract. Chief Complaint: back pain    PMH: Pt had a sebaceous cyst removed from the mid thoracic spine several years ago and reports intermittent pain in this area that radiates into lumbar and down legs at times. She was referred to the pain clinic by Dr. Myesha Gomez in December of 2015 for epidural steroid injections and RFA. The most recent duramorph injection was done 10/31/16 and the patient reported up to 50% relief for three weeks. She had right lumbar RFA on 7/7/2017 reports 85% ongoing relief in legs but low back pain is increasing. Dr. Monalisa Burns has told her she needs surgery but she is not ready for this yet. Back Pain   This is a chronic problem. The current episode started more than 1 year ago. The problem occurs constantly. The problem is unchanged. The pain is present in the lumbar spine and thoracic spine. The quality of the pain is described as aching and stabbing. Radiates to: into buttocks. The pain is at a severity of 4/10. The pain is moderate. The pain is the same all the time. The symptoms are aggravated by standing and sitting. Pertinent negatives include no bladder incontinence, bowel incontinence, chest pain or fever. She has tried heat, ice, bed rest, analgesics and muscle relaxant (RFA, BESSIE) for the symptoms. The treatment provided mild relief. Patient denies any new neurological symptoms. No bowel or bladder incontinence, no weakness, and no falling. Pill count: appropriate    Morphine equivalent: 30    Controlled Substances Monitoring:     Attestation: The Prescription Monitoring Report for this patient was reviewed today. (Ken Griffiths, CNP)  Documentation: Possible medication side effects, risk of tolerance and/or dependence, and alternative treatments discussed., No signs of potential drug abuse or diversion identified., Existing medication contract. , Random urine drug screen sent today.  Ken Aye, ranitidine (ZANTAC) 150 MG tablet, Take 150 mg by mouth, Disp: , Rfl:     amphetamine-dextroamphetamine (ADDERALL) 20 MG tablet, Take 30 mg by mouth daily , Disp: , Rfl:     lurasidone (LATUDA) 20 MG TABS tablet, Take 40 mg by mouth daily , Disp: , Rfl:     celecoxib (CELEBREX) 200 MG capsule, Take 200 mg by mouth 2 times daily , Disp: , Rfl:     pantoprazole sodium (PROTONIX) 40 MG PACK packet, Take 40 mg by mouth every morning (before breakfast), Disp: , Rfl:     rosuvastatin (CRESTOR) 10 MG tablet, Take 10 mg by mouth daily, Disp: , Rfl:     metoprolol (LOPRESSOR) 25 MG tablet, Take 25 mg by mouth 2 times daily, Disp: , Rfl:     Family History   Problem Relation Age of Onset    Other Mother      ulcers    Heart Disease Father     High Blood Pressure Father     Other Father      blind, pacemaker       Social History     Social History    Marital status:      Spouse name: N/A    Number of children: N/A    Years of education: N/A     Occupational History    Not on file. Social History Main Topics    Smoking status: Current Every Day Smoker    Smokeless tobacco: Never Used    Alcohol use Not on file    Drug use: Unknown    Sexual activity: Not on file     Other Topics Concern    Not on file     Social History Narrative    No narrative on file       Review of Systems:  Review of Systems   Constitution: Negative for chills and fever. Cardiovascular: Negative for chest pain and irregular heartbeat. Respiratory: Negative for cough and shortness of breath. Musculoskeletal: Positive for back pain. Gastrointestinal: Negative for bowel incontinence and constipation. Genitourinary: Negative for bladder incontinence. Neurological: Negative for disturbances in coordination and loss of balance.          Physical Exam:  BP (!) 162/105   Pulse 99   Temp 98.4 °F (36.9 °C)   Resp 16     Physical Exam   Constitutional: She is oriented to person, place, and time and well-developed, well-nourished, and in no distress. HENT:   Head: Normocephalic. Eyes: EOM are normal.   Neck: Normal range of motion. Pulmonary/Chest: Effort normal.   Musculoskeletal:        Lumbar back: She exhibits decreased range of motion, tenderness and pain. Neurological: She is alert and oriented to person, place, and time. Skin: Skin is warm and dry. Psychiatric: Affect normal.       Record/Diagnostics Review:    MRI Thoracic spine 2016 -      The vertebral body vascular malformation (hemangioma) involves the        left side of the T1 vertebral body and pedicle. No evidence for        pathologic fracture. There are multilevel changes of degenerative        disc disease. Some posterior broad-based disc bulging and disc        desiccation is present at T2-T3 without focal posterior disc        herniation, spinal stenosis, nor neural foraminal narrowing. Minimal        posterior broad-based disc bulging is present at T7-T8 without focal        posterior disc herniation, spinal stenosis, nor neural foraminal        narrowing. Similar findings are noted at T8-T9 and T9-T10, again        without focal posterior disc herniation, spinal stenosis, nor neural        foraminal narrowing. Schmorl's nodes are noted involving multiple        endplates throughout the mid and lower thoracic spine. No bone marrow        edema/fracture at any level. There is no spinal stenosis nor neural        foraminal compromise at any level. There is no intradural mass. The        thoracic cord appears within normal limits in morphology and signal        characteristics. No paravertebral soft tissue mass. Incidental note        is made of another vertebral body vascular malformation        posterolaterally involving L1 vertebral body. MRI Lumbar spine 2016-     1. Diffuse disc degenerative disease is seen in the lumbar spine,        worse at L5-S1.  Mild facet arthropathy seen in the lower lumbar     Assessment:  Problem List Items Addressed This Visit     Lumbar radiculopathy - Primary (Chronic)    Relevant Medications    tiZANidine (ZANAFLEX) 4 MG tablet (Start on 10/23/2017)    gabapentin (NEURONTIN) 300 MG capsule (Start on 10/23/2017)    Chronic back pain    Relevant Medications    morphine (MS CONTIN) 15 MG extended release tablet (Start on 10/23/2017)    tiZANidine (ZANAFLEX) 4 MG tablet (Start on 10/23/2017)    gabapentin (NEURONTIN) 300 MG capsule (Start on 10/23/2017)      Other Visit Diagnoses    None. Treatment Plan:  DISCUSSION: Treatment options discussed with patient and all questions answered to patient's satisfaction. TREATMENT OPTIONS:   Repeat MILA - last one was positive for tramadol that is no longer prescribed  Continue current medication  Satisfactory pain management plan. Medications help with personal goals and self-care needs. Follow up appointment scheduled.

## 2017-10-23 LAB
6-ACETYLMORPHINE, UR: NOT DETECTED
7-AMINOCLONAZEPAM, URINE: NOT DETECTED
ALPHA-OH-ALPRAZ, URINE: NOT DETECTED
ALPRAZOLAM, URINE: NOT DETECTED
AMPHETAMINES, URINE: PRESENT
BARBITURATES, URINE: NOT DETECTED
BENZOYLECGONINE, UR: NOT DETECTED
BUPRENORPHINE URINE: NOT DETECTED
CARISOPRODOL, UR: NOT DETECTED
CLONAZEPAM, URINE: NOT DETECTED
CODEINE, URINE: NOT DETECTED
CREATININE URINE: 22.5 MG/DL (ref 20–400)
DIAZEPAM, URINE: NOT DETECTED
EER PAIN MGT DRUG PANEL, HIGH RES/EMIT U: NORMAL
ETHYL GLUCURONIDE UR: NOT DETECTED
FENTANYL URINE: NOT DETECTED
HYDROCODONE, URINE: NOT DETECTED
HYDROMORPHONE, URINE: NOT DETECTED
LORAZEPAM, URINE: NOT DETECTED
MARIJUANA METAB, UR: NOT DETECTED
MDA, UR: NOT DETECTED
MDEA, EVE, UR: NOT DETECTED
MDMA URINE: NOT DETECTED
MEPERIDINE METAB, UR: NOT DETECTED
METHADONE, URINE: NOT DETECTED
METHAMPHETAMINE, URINE: NOT DETECTED
METHYLPHENIDATE: NOT DETECTED
MIDAZOLAM, URINE: NOT DETECTED
MORPHINE URINE: PRESENT
NORBUPRENORPHINE, URINE: NOT DETECTED
NORDIAZEPAM, URINE: NOT DETECTED
NORFENTANYL, URINE: NOT DETECTED
NORHYDROCODONE, URINE: NOT DETECTED
NOROXYCODONE, URINE: NOT DETECTED
NOROXYMORPHONE, URINE: NOT DETECTED
OXAZEPAM, URINE: NOT DETECTED
OXYCODONE URINE: NOT DETECTED
OXYMORPHONE, URINE: NOT DETECTED
PAIN MGT DRUG PANEL, HI RES, UR: NORMAL
PCP,URINE: NOT DETECTED
PHENTERMINE, UR: NOT DETECTED
PROPOXYPHENE, URINE: NOT DETECTED
TAPENTADOL, URINE: NOT DETECTED
TAPENTADOL-O-SULFATE, URINE: NOT DETECTED
TEMAZEPAM, URINE: NOT DETECTED
TRAMADOL, URINE: NOT DETECTED
ZOLPIDEM, URINE: NOT DETECTED

## 2017-11-17 ENCOUNTER — HOSPITAL ENCOUNTER (OUTPATIENT)
Dept: PAIN MANAGEMENT | Age: 52
Discharge: HOME | End: 2017-11-17
Payer: MEDICAID

## 2017-11-17 VITALS
HEART RATE: 71 BPM | WEIGHT: 233 LBS | DIASTOLIC BLOOD PRESSURE: 87 MMHG | TEMPERATURE: 98.1 F | BODY MASS INDEX: 38.82 KG/M2 | RESPIRATION RATE: 16 BRPM | SYSTOLIC BLOOD PRESSURE: 148 MMHG | HEIGHT: 65 IN

## 2017-11-17 DIAGNOSIS — G89.29 CHRONIC BILATERAL LOW BACK PAIN WITHOUT SCIATICA: Primary | ICD-10-CM

## 2017-11-17 DIAGNOSIS — M54.50 CHRONIC BILATERAL LOW BACK PAIN WITHOUT SCIATICA: Primary | ICD-10-CM

## 2017-11-17 PROCEDURE — 99214 OFFICE O/P EST MOD 30 MIN: CPT

## 2017-11-17 PROCEDURE — 99213 OFFICE O/P EST LOW 20 MIN: CPT | Performed by: NURSE PRACTITIONER

## 2017-11-17 RX ORDER — GABAPENTIN 300 MG/1
300 CAPSULE ORAL 3 TIMES DAILY
Qty: 90 CAPSULE | Refills: 0 | Status: SHIPPED | OUTPATIENT
Start: 2017-11-19 | End: 2017-12-12 | Stop reason: SDUPTHER

## 2017-11-17 RX ORDER — MORPHINE SULFATE 15 MG/1
15 TABLET, FILM COATED, EXTENDED RELEASE ORAL EVERY 12 HOURS SCHEDULED
Qty: 60 TABLET | Refills: 0 | Status: SHIPPED | OUTPATIENT
Start: 2017-11-19 | End: 2017-12-12 | Stop reason: SDUPTHER

## 2017-11-17 RX ORDER — TIZANIDINE 4 MG/1
4 TABLET ORAL NIGHTLY
Qty: 30 TABLET | Refills: 0 | Status: SHIPPED | OUTPATIENT
Start: 2017-11-19 | End: 2017-12-12 | Stop reason: SDUPTHER

## 2017-11-17 ASSESSMENT — ENCOUNTER SYMPTOMS
SHORTNESS OF BREATH: 0
CONSTIPATION: 0
BACK PAIN: 1
COUGH: 0

## 2017-11-17 NOTE — PROGRESS NOTES
5 doses, Disp: 5 capsule, Rfl: 0    Multiple Vitamins-Minerals (THERAPEUTIC MULTIVITAMIN-MINERALS) tablet, Take 1 tablet by mouth daily, Disp: , Rfl:     Biotin 5000 MCG CAPS, Take by mouth, Disp: , Rfl:     ranitidine (ZANTAC) 150 MG tablet, Take 150 mg by mouth, Disp: , Rfl:     amphetamine-dextroamphetamine (ADDERALL) 20 MG tablet, Take 30 mg by mouth daily , Disp: , Rfl:     lurasidone (LATUDA) 20 MG TABS tablet, Take 40 mg by mouth daily , Disp: , Rfl:     celecoxib (CELEBREX) 200 MG capsule, Take 200 mg by mouth 2 times daily , Disp: , Rfl:     pantoprazole sodium (PROTONIX) 40 MG PACK packet, Take 40 mg by mouth every morning (before breakfast), Disp: , Rfl:     rosuvastatin (CRESTOR) 10 MG tablet, Take 10 mg by mouth daily, Disp: , Rfl:     metoprolol (LOPRESSOR) 25 MG tablet, Take 50 mg by mouth 2 times daily , Disp: , Rfl:     Family History   Problem Relation Age of Onset    Other Mother      ulcers    Heart Disease Father     High Blood Pressure Father     Other Father      blind, pacemaker       Social History     Social History    Marital status:      Spouse name: N/A    Number of children: N/A    Years of education: N/A     Occupational History    Not on file. Social History Main Topics    Smoking status: Current Every Day Smoker    Smokeless tobacco: Never Used    Alcohol use Not on file    Drug use: Unknown    Sexual activity: Not on file     Other Topics Concern    Not on file     Social History Narrative    No narrative on file       Review of Systems:  Review of Systems   Constitution: Negative for chills and fever. Cardiovascular: Negative for chest pain and irregular heartbeat. Respiratory: Negative for cough and shortness of breath. Musculoskeletal: Positive for back pain. Gastrointestinal: Negative for constipation. Neurological: Positive for numbness. Negative for disturbances in coordination and loss of balance.        Physical Exam:  BP (!)

## 2017-12-12 ENCOUNTER — HOSPITAL ENCOUNTER (OUTPATIENT)
Dept: PAIN MANAGEMENT | Age: 52
Discharge: HOME | End: 2017-12-12
Payer: MEDICARE

## 2017-12-12 VITALS
TEMPERATURE: 98.1 F | RESPIRATION RATE: 16 BRPM | DIASTOLIC BLOOD PRESSURE: 64 MMHG | SYSTOLIC BLOOD PRESSURE: 135 MMHG | HEART RATE: 77 BPM

## 2017-12-12 DIAGNOSIS — M54.50 CHRONIC BILATERAL LOW BACK PAIN WITHOUT SCIATICA: Primary | ICD-10-CM

## 2017-12-12 DIAGNOSIS — G89.29 CHRONIC BILATERAL LOW BACK PAIN WITHOUT SCIATICA: Primary | ICD-10-CM

## 2017-12-12 PROCEDURE — 99213 OFFICE O/P EST LOW 20 MIN: CPT | Performed by: ANESTHESIOLOGY

## 2017-12-12 PROCEDURE — 99213 OFFICE O/P EST LOW 20 MIN: CPT

## 2017-12-12 PROCEDURE — 99214 OFFICE O/P EST MOD 30 MIN: CPT

## 2017-12-12 RX ORDER — GABAPENTIN 300 MG/1
300 CAPSULE ORAL 3 TIMES DAILY
Qty: 90 CAPSULE | Refills: 0 | Status: SHIPPED | OUTPATIENT
Start: 2017-12-19 | End: 2018-01-15 | Stop reason: SDUPTHER

## 2017-12-12 RX ORDER — MORPHINE SULFATE 15 MG/1
15 TABLET, FILM COATED, EXTENDED RELEASE ORAL EVERY 12 HOURS SCHEDULED
Qty: 60 TABLET | Refills: 0 | Status: SHIPPED | OUTPATIENT
Start: 2017-12-19 | End: 2018-01-15 | Stop reason: SDUPTHER

## 2017-12-12 RX ORDER — TIZANIDINE 4 MG/1
4 TABLET ORAL NIGHTLY
Qty: 30 TABLET | Refills: 0 | Status: SHIPPED | OUTPATIENT
Start: 2017-12-12 | End: 2018-01-15 | Stop reason: SDUPTHER

## 2017-12-12 ASSESSMENT — PAIN SCALES - GENERAL: PAINLEVEL_OUTOF10: 5

## 2017-12-12 ASSESSMENT — PAIN DESCRIPTION - PAIN TYPE: TYPE: CHRONIC PAIN

## 2017-12-12 ASSESSMENT — PAIN DESCRIPTION - LOCATION: LOCATION: BACK;HIP;SHOULDER

## 2017-12-12 ASSESSMENT — PAIN DESCRIPTION - ORIENTATION: ORIENTATION: RIGHT;LEFT;LOWER

## 2017-12-12 ASSESSMENT — PAIN DESCRIPTION - DESCRIPTORS: DESCRIPTORS: ACHING;CONSTANT;STABBING

## 2017-12-12 ASSESSMENT — PAIN DESCRIPTION - FREQUENCY: FREQUENCY: CONTINUOUS

## 2018-01-15 ENCOUNTER — HOSPITAL ENCOUNTER (OUTPATIENT)
Dept: PAIN MANAGEMENT | Age: 53
Discharge: HOME | End: 2018-01-15
Payer: MEDICARE

## 2018-01-15 VITALS
TEMPERATURE: 98.1 F | WEIGHT: 204 LBS | HEART RATE: 68 BPM | SYSTOLIC BLOOD PRESSURE: 128 MMHG | DIASTOLIC BLOOD PRESSURE: 75 MMHG | HEIGHT: 65 IN | RESPIRATION RATE: 18 BRPM | BODY MASS INDEX: 33.99 KG/M2

## 2018-01-15 DIAGNOSIS — G89.29 CHRONIC BILATERAL LOW BACK PAIN WITHOUT SCIATICA: ICD-10-CM

## 2018-01-15 DIAGNOSIS — M54.50 CHRONIC BILATERAL LOW BACK PAIN WITHOUT SCIATICA: ICD-10-CM

## 2018-01-15 DIAGNOSIS — Z51.81 MEDICATION MONITORING ENCOUNTER: ICD-10-CM

## 2018-01-15 DIAGNOSIS — M54.16 LUMBAR RADICULOPATHY: Primary | Chronic | ICD-10-CM

## 2018-01-15 PROCEDURE — 99213 OFFICE O/P EST LOW 20 MIN: CPT | Performed by: NURSE PRACTITIONER

## 2018-01-15 PROCEDURE — 99214 OFFICE O/P EST MOD 30 MIN: CPT

## 2018-01-15 RX ORDER — GABAPENTIN 300 MG/1
300 CAPSULE ORAL 3 TIMES DAILY
Qty: 90 CAPSULE | Refills: 0 | Status: SHIPPED | OUTPATIENT
Start: 2018-01-15 | End: 2018-02-23 | Stop reason: SDUPTHER

## 2018-01-15 RX ORDER — TIZANIDINE 4 MG/1
4 TABLET ORAL NIGHTLY
Qty: 30 TABLET | Refills: 0 | Status: SHIPPED | OUTPATIENT
Start: 2018-01-15 | End: 2018-02-23 | Stop reason: SDUPTHER

## 2018-01-15 RX ORDER — MORPHINE SULFATE 15 MG/1
15 TABLET, FILM COATED, EXTENDED RELEASE ORAL EVERY 12 HOURS SCHEDULED
Qty: 60 TABLET | Refills: 0 | Status: SHIPPED | OUTPATIENT
Start: 2018-01-27 | End: 2018-02-23 | Stop reason: SDUPTHER

## 2018-01-15 RX ORDER — PANTOPRAZOLE SODIUM 40 MG/1
40 TABLET, DELAYED RELEASE ORAL 2 TIMES DAILY
COMMUNITY
Start: 2018-01-10

## 2018-01-15 ASSESSMENT — ENCOUNTER SYMPTOMS
COUGH: 0
BACK PAIN: 1
SHORTNESS OF BREATH: 0
CONSTIPATION: 0

## 2018-01-15 NOTE — PROGRESS NOTES
Patient is here today to review medication contract. Chief Complaint:  Low back pain    PMH    Pt had a sebaceous cyst removed from the mid thoracic spine several years ago and reports intermittent pain in this area that radiates into lumbar and down legs at times. She was referred to the pain clinic by Dr. Dereck Sanchez in December of 2015 for epidural steroid injections and RFA. She had right lumbar RFA on 7/7/2017 reports ongoing relief in legs but low back pain is increasing. The most recent duramorph injection was done 9/11/17 and the patient reported up to 50% relief for three weeks. Dr. Taylor Vincent has told her she needs surgery but she is not ready for this yet. Dr. Otis Coon d/c tramadol in August. She feels her pain has increased since that time and she is not sleeping well. She was seen in the ER  for increased back pain and given toradol injection and referral to rheumatology but has not made appt yet. HPI:   Back Pain   This is a chronic problem. The current episode started more than 1 year ago. The problem occurs constantly. The problem is unchanged. The pain is present in the lumbar spine and gluteal (bilateral hips). The quality of the pain is described as burning and shooting. The pain radiates to the right thigh. The pain is at a severity of 6/10. The pain is moderate. The pain is worse during the night. The symptoms are aggravated by position, bending, standing, twisting, lying down and sitting (any prolonged activity). Stiffness is present all day. Associated symptoms include tingling. Pertinent negatives include no chest pain or fever. She has tried analgesics, bed rest, heat and ice for the symptoms. The treatment provided mild relief. Patient denies any new neurological symptoms. No bowel or bladder incontinence, no weakness, and no falling. Review of [place] does not show any aberrant prescription behavior. Medication is helping the patient stay active.  Patient denies any side effects and normal.   Skin: Skin is warm and dry. Psychiatric: Affect normal.       Record/Diagnostics Review:    Last jaydon Oct  and was appropriate    MRI Lumbar 2016 -      Findings:  The vertebral heights are normal. Moderate to severe        degree of for disc degenerative disease is seen in the entire lumbar        spine. There is no evidence of spondylolisthesis. There are reactive        changes are seen in the adjacent endplates from the degenerative        arthritis. There is no evidence of an acute bony pathology.              At L1-L2 and L2-L3, there is no evidence of disc herniation or spinal        stenosis. Note foramina are patent.              At L3-L4, there is no evidence of disc herniation. There is a mild        degree of central canal narrowing. Neural foramina are patent.              At L4-L5, there is no evidence of disc herniation, spinal stenosis or        narrowing of the neural foramina. Mild facet arthropathy seen        bilaterally.              At L5-S1, there is some minimal disc bulging without evidence of        spinal stenosis or narrowing of the neural foramina.              There is disc desiccation at all levels in the lumbar region. Conus        is normal in position and caliber. No intrathecal signal abnormality        is identified.              IMPRESSION:         1. Diffuse disc degenerative disease is seen in the lumbar spine,        worse at L5-S1. Mild facet arthropathy seen in the lower lumbar        +-------------------+                     spine. There is mild degree of spinal stenosis at the level of L4-L5.       2. No evidence of disc herniation or narrowing of the neural foramina        in the lumbar region.       3.  No evidence of malalignment.     Assessment:  Problem List Items Addressed This Visit     Lumbar radiculopathy - Primary (Chronic)    Relevant Medications    gabapentin (NEURONTIN) 300 MG capsule    morphine (MS CONTIN) 15 MG extended release tablet (Start on

## 2018-02-23 ENCOUNTER — HOSPITAL ENCOUNTER (OUTPATIENT)
Dept: PAIN MANAGEMENT | Age: 53
Discharge: HOME | End: 2018-02-23
Payer: MEDICARE

## 2018-02-23 VITALS
HEIGHT: 65 IN | BODY MASS INDEX: 33.99 KG/M2 | DIASTOLIC BLOOD PRESSURE: 63 MMHG | HEART RATE: 68 BPM | WEIGHT: 204 LBS | TEMPERATURE: 98 F | SYSTOLIC BLOOD PRESSURE: 136 MMHG | RESPIRATION RATE: 20 BRPM

## 2018-02-23 DIAGNOSIS — M54.12 CERVICAL RADICULOPATHY: ICD-10-CM

## 2018-02-23 DIAGNOSIS — G89.29 CHRONIC BILATERAL LOW BACK PAIN WITHOUT SCIATICA: ICD-10-CM

## 2018-02-23 DIAGNOSIS — Z51.81 MEDICATION MONITORING ENCOUNTER: ICD-10-CM

## 2018-02-23 DIAGNOSIS — M54.16 LUMBAR RADICULOPATHY: Primary | Chronic | ICD-10-CM

## 2018-02-23 DIAGNOSIS — M54.50 CHRONIC BILATERAL LOW BACK PAIN WITHOUT SCIATICA: ICD-10-CM

## 2018-02-23 PROCEDURE — 99214 OFFICE O/P EST MOD 30 MIN: CPT | Performed by: NURSE PRACTITIONER

## 2018-02-23 PROCEDURE — 99214 OFFICE O/P EST MOD 30 MIN: CPT

## 2018-02-23 RX ORDER — MORPHINE SULFATE 15 MG/1
15 TABLET, FILM COATED, EXTENDED RELEASE ORAL EVERY 12 HOURS SCHEDULED
Qty: 60 TABLET | Refills: 0 | Status: SHIPPED | OUTPATIENT
Start: 2018-02-26 | End: 2018-03-27 | Stop reason: SDUPTHER

## 2018-02-23 RX ORDER — TIZANIDINE 4 MG/1
4 TABLET ORAL NIGHTLY
Qty: 30 TABLET | Refills: 0 | Status: SHIPPED | OUTPATIENT
Start: 2018-02-26 | End: 2018-03-27 | Stop reason: SDUPTHER

## 2018-02-23 RX ORDER — GABAPENTIN 300 MG/1
300 CAPSULE ORAL 3 TIMES DAILY
Qty: 90 CAPSULE | Refills: 0 | Status: SHIPPED | OUTPATIENT
Start: 2018-02-26 | End: 2018-03-27 | Stop reason: SDUPTHER

## 2018-02-23 ASSESSMENT — ENCOUNTER SYMPTOMS
CONSTIPATION: 0
SHORTNESS OF BREATH: 0
BACK PAIN: 1
COUGH: 0

## 2018-02-23 NOTE — PROGRESS NOTES
reviewed today. Joie Collet, APRN)    Possible medication side effects, risk of tolerance/dependence & alternative treatments discussed., Obtaining appropriate analgesic effect of treatment., No signs of potential drug abuse or diversion identified. Joie Collet, APRN)              Existing medication contract. Joie Collet, APRN)    Review of [place]ARRS does not show any aberrant prescription behavior. Medication is helping the patient stay active. Patient denies any side effects and reports adequate analgesia. No sign of misuse/abuse. Past Medical History:   Diagnosis Date    ADHD (attention deficit hyperactivity disorder)     Bipolar 1 disorder (HCC)     Depression with anxiety     GERD (gastroesophageal reflux disease)     Heel spur     left    Hyperlipidemia     Hypertension     PTSD (post-traumatic stress disorder)     Tremor     Trouble swallowing        Past Surgical History:   Procedure Laterality Date    CARPAL TUNNEL RELEASE Bilateral     HYSTERECTOMY      NERVE BLOCK  08/01/2016    duramorph 1mg  celestone 9mg    NERVE BLOCK  10/31/2016    duramorph epidural steroid block decadron 7.5 mg durmoprh 1.5 mg    NERVE BLOCK Left     NO STEROID, LEFT MBNB# 1    NERVE BLOCK Left 01/16/2017    LT MBNB #2   celestone 6mg    NERVE BLOCK Left 02/06/2017    LT lumbar RF    kenalog 40    NERVE BLOCK Right 06/19/2017    right lumbar mbnb #1    NERVE BLOCK  07/07/2017    right radiofrequency kenolog 40mg    NERVE BLOCK  09/11/2017    duramorph 1mg & celestone 9 mg    TONSILLECTOMY         No Known Allergies      Current Outpatient Prescriptions:     pantoprazole (PROTONIX) 40 MG tablet, TAKE 1 TABLET BY MOUTH EVERY DAY, Disp: , Rfl:     gabapentin (NEURONTIN) 300 MG capsule, Take 1 capsule by mouth 3 times daily for 30 days. , Disp: 90 capsule, Rfl: 0    morphine (MS CONTIN) 15 MG extended release tablet, Take 1 tablet by mouth every 12 hours for 30 days. , Disp: 60 tablet, Rfl: 0    busPIRone (BUSPAR) 10 MG tablet, Take 10 mg by mouth 2 times daily, Disp: , Rfl:     zolpidem (AMBIEN) 10 MG tablet, Take 10 mg by mouth nightly as needed for Sleep, Disp: , Rfl:     tamsulosin (FLOMAX) 0.4 MG capsule, Take 1 capsule by mouth daily for 5 doses, Disp: 5 capsule, Rfl: 0    Multiple Vitamins-Minerals (THERAPEUTIC MULTIVITAMIN-MINERALS) tablet, Take 1 tablet by mouth daily, Disp: , Rfl:     Biotin 5000 MCG CAPS, Take by mouth, Disp: , Rfl:     ranitidine (ZANTAC) 150 MG tablet, Take 150 mg by mouth, Disp: , Rfl:     amphetamine-dextroamphetamine (ADDERALL) 20 MG tablet, Take 30 mg by mouth daily , Disp: , Rfl:     lurasidone (LATUDA) 20 MG TABS tablet, Take 40 mg by mouth daily , Disp: , Rfl:     celecoxib (CELEBREX) 200 MG capsule, Take 200 mg by mouth 2 times daily , Disp: , Rfl:     rosuvastatin (CRESTOR) 10 MG tablet, Take 10 mg by mouth daily, Disp: , Rfl:     metoprolol (LOPRESSOR) 25 MG tablet, Take 50 mg by mouth 2 times daily , Disp: , Rfl:     Family History   Problem Relation Age of Onset    Other Mother      ulcers    Heart Disease Father     High Blood Pressure Father     Other Father      blind, pacemaker       Social History     Social History    Marital status:      Spouse name: N/A    Number of children: N/A    Years of education: N/A     Occupational History    Not on file. Social History Main Topics    Smoking status: Current Every Day Smoker    Smokeless tobacco: Never Used    Alcohol use Not on file    Drug use: Unknown    Sexual activity: Not on file     Other Topics Concern    Not on file     Social History Narrative    No narrative on file       Review of Systems:  Review of Systems   Constitution: Positive for weakness. Negative for chills and fever. Cardiovascular: Negative for chest pain. Respiratory: Negative for cough and shortness of breath. Musculoskeletal: Positive for back pain. Gastrointestinal: Negative for constipation.

## 2018-03-27 ENCOUNTER — HOSPITAL ENCOUNTER (OUTPATIENT)
Dept: PAIN MANAGEMENT | Age: 53
Discharge: HOME | End: 2018-03-27
Payer: MEDICARE

## 2018-03-27 VITALS
DIASTOLIC BLOOD PRESSURE: 70 MMHG | RESPIRATION RATE: 18 BRPM | TEMPERATURE: 98.1 F | SYSTOLIC BLOOD PRESSURE: 128 MMHG | HEART RATE: 67 BPM

## 2018-03-27 DIAGNOSIS — G89.29 CHRONIC BILATERAL LOW BACK PAIN WITHOUT SCIATICA: ICD-10-CM

## 2018-03-27 DIAGNOSIS — M54.50 CHRONIC BILATERAL LOW BACK PAIN WITHOUT SCIATICA: ICD-10-CM

## 2018-03-27 DIAGNOSIS — M54.16 LUMBAR RADICULOPATHY: Primary | Chronic | ICD-10-CM

## 2018-03-27 DIAGNOSIS — Z51.81 MEDICATION MONITORING ENCOUNTER: ICD-10-CM

## 2018-03-27 DIAGNOSIS — M54.12 CERVICAL RADICULOPATHY: ICD-10-CM

## 2018-03-27 PROCEDURE — 99214 OFFICE O/P EST MOD 30 MIN: CPT

## 2018-03-27 PROCEDURE — 99213 OFFICE O/P EST LOW 20 MIN: CPT

## 2018-03-27 PROCEDURE — 99213 OFFICE O/P EST LOW 20 MIN: CPT | Performed by: NURSE PRACTITIONER

## 2018-03-27 RX ORDER — MORPHINE SULFATE 15 MG/1
15 TABLET, FILM COATED, EXTENDED RELEASE ORAL EVERY 12 HOURS SCHEDULED
Qty: 60 TABLET | Refills: 0 | Status: SHIPPED | OUTPATIENT
Start: 2018-03-28 | End: 2018-04-25 | Stop reason: SDUPTHER

## 2018-03-27 RX ORDER — GABAPENTIN 300 MG/1
300 CAPSULE ORAL 3 TIMES DAILY
Qty: 90 CAPSULE | Refills: 0 | Status: SHIPPED | OUTPATIENT
Start: 2018-03-28 | End: 2018-04-25 | Stop reason: SDUPTHER

## 2018-03-27 RX ORDER — TIZANIDINE 4 MG/1
4 TABLET ORAL NIGHTLY
Qty: 30 TABLET | Refills: 0 | Status: SHIPPED | OUTPATIENT
Start: 2018-03-28 | End: 2018-04-25 | Stop reason: SDUPTHER

## 2018-03-27 ASSESSMENT — PAIN DESCRIPTION - FREQUENCY: FREQUENCY: CONTINUOUS

## 2018-03-27 ASSESSMENT — PAIN DESCRIPTION - PROGRESSION: CLINICAL_PROGRESSION: NOT CHANGED

## 2018-03-27 ASSESSMENT — ENCOUNTER SYMPTOMS
CONSTIPATION: 0
SHORTNESS OF BREATH: 0
COUGH: 0
BACK PAIN: 1

## 2018-03-27 ASSESSMENT — PAIN DESCRIPTION - ORIENTATION: ORIENTATION: LOWER

## 2018-03-27 ASSESSMENT — PAIN DESCRIPTION - PAIN TYPE: TYPE: CHRONIC PAIN

## 2018-03-27 ASSESSMENT — PAIN SCALES - GENERAL: PAINLEVEL_OUTOF10: 8

## 2018-03-27 ASSESSMENT — PAIN DESCRIPTION - LOCATION: LOCATION: BACK

## 2018-03-27 ASSESSMENT — PAIN DESCRIPTION - ONSET: ONSET: ON-GOING

## 2018-03-27 ASSESSMENT — PAIN DESCRIPTION - DESCRIPTORS: DESCRIPTORS: ACHING;CONSTANT;STABBING;BURNING

## 2018-03-27 NOTE — BH NOTE
pantoprazole (PROTONIX) 40 MG tablet TAKE 1 TABLET BY MOUTH EVERY DAY 1/10/18   Historical Provider, MD   busPIRone (BUSPAR) 10 MG tablet Take 10 mg by mouth 2 times daily    Historical Provider, MD   zolpidem (AMBIEN) 10 MG tablet Take 10 mg by mouth nightly as needed for Sleep    Historical Provider, MD   tamsulosin (FLOMAX) 0.4 MG capsule Take 1 capsule by mouth daily for 5 doses 1/18/17 2/23/18  Nida Fuller MD   Multiple Vitamins-Minerals (THERAPEUTIC MULTIVITAMIN-MINERALS) tablet Take 1 tablet by mouth daily    Historical Provider, MD   Biotin 5000 MCG CAPS Take by mouth    Historical Provider, MD   ranitidine (ZANTAC) 150 MG tablet Take 150 mg by mouth 5/10/16 2/23/18  Historical Provider, MD   amphetamine-dextroamphetamine (ADDERALL) 20 MG tablet Take 30 mg by mouth daily     Historical Provider, MD   lurasidone (LATUDA) 20 MG TABS tablet Take 40 mg by mouth daily     Historical Provider, MD   celecoxib (CELEBREX) 200 MG capsule Take 200 mg by mouth 2 times daily     Historical Provider, MD   rosuvastatin (CRESTOR) 10 MG tablet Take 10 mg by mouth daily    Historical Provider, MD   metoprolol (LOPRESSOR) 25 MG tablet Take 50 mg by mouth 2 times daily     Historical Provider, MD       5) Other substance Use:  Reported today: Described alcohol consumption as occurring rarely; twice per year, 2 drinks. Reported that last alcohol consumption was in December. Patient reported use of nicotine products, current use is: 1/2 pack per day. Caffeine use is 2 cups per day. Patient denied current illicit drug use. Patient denied history of alcohol or drug-related issues. Patient denied history of alcohol or drug-related treatment. The patient states that they do not run out of pain medication early at the end of the month. Patient does not report a desire to take medication in higher doses or more frequently than prescribed, compulsive use, loss of control, and continued use despite harm.   Review of file

## 2018-03-27 NOTE — PLAN OF CARE
Candy Cote was seen for  a follow-up evaluation to determine level of care and assess for mental health concerns. 1. Level of Care will  remain at medium (yellow) due to history of compliance, no reported history of substance use concerns. She is engaged in psychiatry services, we discussed re-initiating counseling. 2. She should be scheduled for their next followup appointment in 3 months. If compliance continues and mental health is stable/improves then plan to lower level of risk at next appt.

## 2018-03-27 NOTE — PROGRESS NOTES
Patient is here today to review medication contract. Chief Complaint: back pain    PMH: Pt had a sebaceous cyst removed from the mid thoracic spine several years ago and reports intermittent pain in this area that radiates into lumbar and down legs at times. She was referred to the pain clinic by Dr. Misael Guidry in December of 2015 for epidural steroid injections and RFA.  She had right lumbar RFA on 7/7/2017 reports ongoing relief in legs but low back pain is increasing. The most recent duramorph injection was done 9/11/17 and the patient reported up to 50% relief for three weeks. Dr. Bernie Baez has told her she needs surgery but she is not ready for this yet. Dr. Jem Yu d/c tramadol in August. She feels her pain has increased since that time and she is not sleeping well. She reports increased weakness to right arm and feels right hand is weaker. EMG was ordered at last visit but patient has not completed yet. Back Pain   This is a chronic problem. The current episode started more than 1 year ago. The problem occurs constantly. The problem is unchanged. The pain is present in the lumbar spine (left hip). The quality of the pain is described as burning and aching (sharp). Radiates to: and in left hip. The pain is at a severity of 8/10. The pain is moderate. The pain is worse during the day. The symptoms are aggravated by standing and sitting. Stiffness is present all day. Associated symptoms include numbness and weakness. Pertinent negatives include no chest pain or fever. Risk factors include obesity, menopause, sedentary lifestyle and lack of exercise. She has tried analgesics, NSAIDs, ice, heat and home exercises for the symptoms. The treatment provided mild relief. Patient denies any new neurological symptoms. No bowel or bladder incontinence, no weakness, and no falling. Pill count: appropriate    Morphine equivalent: 30    Controlled Substances Monitoring:      The Prescription Monitoring Report for this patient was reviewed today. (Catherine العراقي CNP)  Possible medication side effects, risk of tolerance/dependence & alternative treatments discussed., No signs of potential drug abuse or diversion identified. Catherine العراقي CNP)  Existing medication contract. (Catherine العراقي CNP)    Past Medical History:   Diagnosis Date    ADHD (attention deficit hyperactivity disorder)     Bipolar 1 disorder ([facility] [address]     Depression with anxiety     GERD (gastroesophageal reflux disease)     Heel spur     left    Hyperlipidemia     Hypertension     PTSD (post-traumatic stress disorder)     Tremor     Trouble swallowing        Past Surgical History:   Procedure Laterality Date    CARPAL TUNNEL RELEASE Bilateral     HYSTERECTOMY      NERVE BLOCK  08/01/2016    duramorph 1mg  celestone 9mg    NERVE BLOCK  10/31/2016    duramorph epidural steroid block decadron 7.5 mg durmoprh 1.5 mg    NERVE BLOCK Left     NO STEROID, LEFT MBNB# 1    NERVE BLOCK Left 01/16/2017    LT MBNB #2   celestone 6mg    NERVE BLOCK Left 02/06/2017    LT lumbar RF    kenalog 40    NERVE BLOCK Right 06/19/2017    right lumbar mbnb #1    NERVE BLOCK  07/07/2017    right radiofrequency kenolog 40mg    NERVE BLOCK  09/11/2017    duramorph 1mg & celestone 9 mg    TONSILLECTOMY         No Known Allergies      Current Outpatient Prescriptions:     gabapentin (NEURONTIN) 300 MG capsule, Take 1 capsule by mouth 3 times daily for 30 days. , Disp: 90 capsule, Rfl: 0    morphine (MS CONTIN) 15 MG extended release tablet, Take 1 tablet by mouth every 12 hours for 30 days. , Disp: 60 tablet, Rfl: 0    tiZANidine (ZANAFLEX) 4 MG tablet, Take 1 tablet by mouth nightly, Disp: 30 tablet, Rfl: 0    pantoprazole (PROTONIX) 40 MG tablet, TAKE 1 TABLET BY MOUTH EVERY DAY, Disp: , Rfl:     busPIRone (BUSPAR) 10 MG tablet, Take 10 mg by mouth 2 times daily, Disp: , Rfl:     zolpidem (AMBIEN) 10 MG tablet, Take 10 mg by mouth nightly as medication is effectively managing pain, we will continue current medications without changes. As always, we encourage daily stretching and strengthening exercises, and recommend minimizing use of pain medications unless patient cannot get through daily activities due to pain.   Follow up appointment made for 4 weeks

## 2018-04-25 ENCOUNTER — HOSPITAL ENCOUNTER (OUTPATIENT)
Dept: PAIN MANAGEMENT | Age: 53
Discharge: HOME | End: 2018-04-25
Payer: MEDICARE

## 2018-04-25 VITALS
RESPIRATION RATE: 18 BRPM | HEIGHT: 65 IN | TEMPERATURE: 99 F | DIASTOLIC BLOOD PRESSURE: 71 MMHG | BODY MASS INDEX: 33.99 KG/M2 | WEIGHT: 204 LBS | HEART RATE: 61 BPM | SYSTOLIC BLOOD PRESSURE: 147 MMHG

## 2018-04-25 DIAGNOSIS — Z51.81 MEDICATION MONITORING ENCOUNTER: ICD-10-CM

## 2018-04-25 DIAGNOSIS — M54.16 LUMBAR RADICULOPATHY: Chronic | ICD-10-CM

## 2018-04-25 DIAGNOSIS — M54.50 CHRONIC BILATERAL LOW BACK PAIN WITHOUT SCIATICA: Primary | ICD-10-CM

## 2018-04-25 DIAGNOSIS — G89.29 CHRONIC BILATERAL LOW BACK PAIN WITHOUT SCIATICA: Primary | ICD-10-CM

## 2018-04-25 PROCEDURE — 99215 OFFICE O/P EST HI 40 MIN: CPT

## 2018-04-25 PROCEDURE — 99213 OFFICE O/P EST LOW 20 MIN: CPT | Performed by: ANESTHESIOLOGY

## 2018-04-25 PROCEDURE — 80307 DRUG TEST PRSMV CHEM ANLYZR: CPT

## 2018-04-25 RX ORDER — MORPHINE SULFATE 15 MG/1
15 TABLET, FILM COATED, EXTENDED RELEASE ORAL EVERY 12 HOURS SCHEDULED
Qty: 60 TABLET | Refills: 0 | Status: SHIPPED | OUTPATIENT
Start: 2018-04-26 | End: 2018-05-22 | Stop reason: SDUPTHER

## 2018-04-25 RX ORDER — GABAPENTIN 300 MG/1
300 CAPSULE ORAL 3 TIMES DAILY
Qty: 90 CAPSULE | Refills: 0 | Status: SHIPPED | OUTPATIENT
Start: 2018-04-25 | End: 2018-05-22 | Stop reason: SDUPTHER

## 2018-04-25 RX ORDER — TIZANIDINE 4 MG/1
4 TABLET ORAL NIGHTLY
Qty: 30 TABLET | Refills: 0 | Status: SHIPPED | OUTPATIENT
Start: 2018-04-25 | End: 2018-05-22 | Stop reason: SDUPTHER

## 2018-04-25 ASSESSMENT — PAIN SCALES - GENERAL: PAINLEVEL_OUTOF10: 8

## 2018-04-25 ASSESSMENT — PAIN DESCRIPTION - PAIN TYPE: TYPE: CHRONIC PAIN

## 2018-04-25 ASSESSMENT — PAIN DESCRIPTION - LOCATION: LOCATION: BACK;HIP;LEG

## 2018-04-25 ASSESSMENT — PAIN DESCRIPTION - PROGRESSION: CLINICAL_PROGRESSION: GRADUALLY WORSENING

## 2018-04-25 ASSESSMENT — PAIN DESCRIPTION - ORIENTATION: ORIENTATION: RIGHT;LEFT

## 2018-04-25 ASSESSMENT — PAIN DESCRIPTION - ONSET: ONSET: ON-GOING

## 2018-04-29 LAB
6-ACETYLMORPHINE, UR: NOT DETECTED
7-AMINOCLONAZEPAM, URINE: NOT DETECTED
ALPHA-OH-ALPRAZ, URINE: NOT DETECTED
ALPRAZOLAM, URINE: NOT DETECTED
AMPHETAMINES, URINE: PRESENT
BARBITURATES, URINE: NOT DETECTED
BENZOYLECGONINE, UR: NOT DETECTED
BUPRENORPHINE URINE: NOT DETECTED
CARISOPRODOL, UR: NOT DETECTED
CLONAZEPAM, URINE: NOT DETECTED
CODEINE, URINE: NOT DETECTED
CREATININE URINE: 131.1 MG/DL (ref 20–400)
DIAZEPAM, URINE: NOT DETECTED
EER PAIN MGT DRUG PANEL, HIGH RES/EMIT U: NORMAL
ETHYL GLUCURONIDE UR: NOT DETECTED
FENTANYL URINE: NOT DETECTED
HYDROCODONE, URINE: NOT DETECTED
HYDROMORPHONE, URINE: NOT DETECTED
LORAZEPAM, URINE: NOT DETECTED
MARIJUANA METAB, UR: NOT DETECTED
MDA, UR: NOT DETECTED
MDEA, EVE, UR: NOT DETECTED
MDMA URINE: NOT DETECTED
MEPERIDINE METAB, UR: NOT DETECTED
METHADONE, URINE: NOT DETECTED
METHAMPHETAMINE, URINE: NOT DETECTED
METHYLPHENIDATE: NOT DETECTED
MIDAZOLAM, URINE: NOT DETECTED
MORPHINE URINE: PRESENT
NORBUPRENORPHINE, URINE: NOT DETECTED
NORDIAZEPAM, URINE: NOT DETECTED
NORFENTANYL, URINE: NOT DETECTED
NORHYDROCODONE, URINE: NOT DETECTED
NOROXYCODONE, URINE: NOT DETECTED
NOROXYMORPHONE, URINE: NOT DETECTED
OXAZEPAM, URINE: NOT DETECTED
OXYCODONE URINE: NOT DETECTED
OXYMORPHONE, URINE: NOT DETECTED
PAIN MGT DRUG PANEL, HI RES, UR: NORMAL
PCP,URINE: NOT DETECTED
PHENTERMINE, UR: NOT DETECTED
PROPOXYPHENE, URINE: NOT DETECTED
TAPENTADOL, URINE: NOT DETECTED
TAPENTADOL-O-SULFATE, URINE: NOT DETECTED
TEMAZEPAM, URINE: NOT DETECTED
TRAMADOL, URINE: NOT DETECTED
ZOLPIDEM, URINE: NOT DETECTED

## 2018-05-22 ENCOUNTER — HOSPITAL ENCOUNTER (OUTPATIENT)
Dept: GENERAL RADIOLOGY | Age: 53
Discharge: HOME | End: 2018-05-24
Payer: MEDICARE

## 2018-05-22 ENCOUNTER — HOSPITAL ENCOUNTER (OUTPATIENT)
Dept: PAIN MANAGEMENT | Age: 53
Discharge: HOME | End: 2018-05-22
Payer: MEDICARE

## 2018-05-22 ENCOUNTER — HOSPITAL ENCOUNTER (OUTPATIENT)
Age: 53
Discharge: HOME | End: 2018-05-24
Payer: MEDICARE

## 2018-05-22 VITALS
TEMPERATURE: 98 F | DIASTOLIC BLOOD PRESSURE: 80 MMHG | RESPIRATION RATE: 14 BRPM | SYSTOLIC BLOOD PRESSURE: 136 MMHG | HEART RATE: 70 BPM

## 2018-05-22 DIAGNOSIS — M54.16 LUMBAR RADICULOPATHY: Primary | Chronic | ICD-10-CM

## 2018-05-22 DIAGNOSIS — N39.42 URINARY INCONTINENCE WITHOUT SENSORY AWARENESS: ICD-10-CM

## 2018-05-22 DIAGNOSIS — Z51.81 MEDICATION MONITORING ENCOUNTER: ICD-10-CM

## 2018-05-22 DIAGNOSIS — M54.12 CERVICAL RADICULOPATHY: ICD-10-CM

## 2018-05-22 DIAGNOSIS — M54.50 CHRONIC BILATERAL LOW BACK PAIN WITHOUT SCIATICA: ICD-10-CM

## 2018-05-22 DIAGNOSIS — G89.29 CHRONIC BILATERAL LOW BACK PAIN WITHOUT SCIATICA: ICD-10-CM

## 2018-05-22 PROCEDURE — 99214 OFFICE O/P EST MOD 30 MIN: CPT | Performed by: NURSE PRACTITIONER

## 2018-05-22 PROCEDURE — 73502 X-RAY EXAM HIP UNI 2-3 VIEWS: CPT

## 2018-05-22 PROCEDURE — 99214 OFFICE O/P EST MOD 30 MIN: CPT

## 2018-05-22 RX ORDER — GABAPENTIN 300 MG/1
300 CAPSULE ORAL 3 TIMES DAILY
Qty: 90 CAPSULE | Refills: 0 | Status: SHIPPED | OUTPATIENT
Start: 2018-05-25 | End: 2018-06-22 | Stop reason: SDUPTHER

## 2018-05-22 RX ORDER — MORPHINE SULFATE 15 MG/1
15 TABLET, FILM COATED, EXTENDED RELEASE ORAL EVERY 12 HOURS SCHEDULED
Qty: 60 TABLET | Refills: 0 | Status: SHIPPED | OUTPATIENT
Start: 2018-05-25 | End: 2018-06-22 | Stop reason: SDUPTHER

## 2018-05-22 RX ORDER — TIZANIDINE 4 MG/1
4 TABLET ORAL NIGHTLY
Qty: 30 TABLET | Refills: 0 | Status: SHIPPED | OUTPATIENT
Start: 2018-05-25 | End: 2018-06-22 | Stop reason: SDUPTHER

## 2018-05-22 ASSESSMENT — ENCOUNTER SYMPTOMS
BACK PAIN: 1
COUGH: 0
CONSTIPATION: 0
SHORTNESS OF BREATH: 0
BOWEL INCONTINENCE: 0

## 2018-06-22 ENCOUNTER — HOSPITAL ENCOUNTER (OUTPATIENT)
Dept: PAIN MANAGEMENT | Age: 53
Discharge: HOME | End: 2018-06-22
Payer: MEDICARE

## 2018-06-22 DIAGNOSIS — M54.50 CHRONIC BILATERAL LOW BACK PAIN WITHOUT SCIATICA: ICD-10-CM

## 2018-06-22 DIAGNOSIS — M54.12 CERVICAL RADICULOPATHY: Primary | ICD-10-CM

## 2018-06-22 DIAGNOSIS — G89.29 CHRONIC BILATERAL LOW BACK PAIN WITHOUT SCIATICA: ICD-10-CM

## 2018-06-22 DIAGNOSIS — M54.16 LUMBAR RADICULOPATHY: Chronic | ICD-10-CM

## 2018-06-22 DIAGNOSIS — Z51.81 MEDICATION MONITORING ENCOUNTER: ICD-10-CM

## 2018-06-22 PROCEDURE — 99214 OFFICE O/P EST MOD 30 MIN: CPT

## 2018-06-22 PROCEDURE — 99213 OFFICE O/P EST LOW 20 MIN: CPT | Performed by: NURSE PRACTITIONER

## 2018-06-22 RX ORDER — MORPHINE SULFATE 15 MG/1
15 TABLET, FILM COATED, EXTENDED RELEASE ORAL EVERY 12 HOURS SCHEDULED
Qty: 60 TABLET | Refills: 0 | Status: SHIPPED | OUTPATIENT
Start: 2018-06-22 | End: 2018-07-20 | Stop reason: SDUPTHER

## 2018-06-22 RX ORDER — GABAPENTIN 300 MG/1
300 CAPSULE ORAL 3 TIMES DAILY
Qty: 90 CAPSULE | Refills: 0 | Status: SHIPPED | OUTPATIENT
Start: 2018-06-22 | End: 2018-07-20 | Stop reason: SDUPTHER

## 2018-06-22 RX ORDER — TIZANIDINE 4 MG/1
4 TABLET ORAL NIGHTLY
Qty: 30 TABLET | Refills: 0 | Status: SHIPPED | OUTPATIENT
Start: 2018-06-22 | End: 2018-07-20 | Stop reason: SDUPTHER

## 2018-06-22 ASSESSMENT — ENCOUNTER SYMPTOMS
BACK PAIN: 1
CONSTIPATION: 0
COUGH: 0
SHORTNESS OF BREATH: 0

## 2018-07-20 ENCOUNTER — HOSPITAL ENCOUNTER (OUTPATIENT)
Dept: PAIN MANAGEMENT | Age: 53
Discharge: HOME | End: 2018-07-20
Payer: MEDICARE

## 2018-07-20 VITALS
DIASTOLIC BLOOD PRESSURE: 68 MMHG | HEART RATE: 62 BPM | TEMPERATURE: 98 F | RESPIRATION RATE: 16 BRPM | SYSTOLIC BLOOD PRESSURE: 140 MMHG

## 2018-07-20 DIAGNOSIS — Z51.81 MEDICATION MONITORING ENCOUNTER: ICD-10-CM

## 2018-07-20 DIAGNOSIS — M54.16 LUMBAR RADICULOPATHY: Primary | Chronic | ICD-10-CM

## 2018-07-20 DIAGNOSIS — G89.29 CHRONIC BILATERAL LOW BACK PAIN WITHOUT SCIATICA: ICD-10-CM

## 2018-07-20 DIAGNOSIS — M54.50 CHRONIC BILATERAL LOW BACK PAIN WITHOUT SCIATICA: ICD-10-CM

## 2018-07-20 PROBLEM — N39.42 URINARY INCONTINENCE WITHOUT SENSORY AWARENESS: Status: RESOLVED | Noted: 2018-05-22 | Resolved: 2018-07-20

## 2018-07-20 PROCEDURE — 99214 OFFICE O/P EST MOD 30 MIN: CPT

## 2018-07-20 PROCEDURE — 99213 OFFICE O/P EST LOW 20 MIN: CPT | Performed by: NURSE PRACTITIONER

## 2018-07-20 RX ORDER — TIZANIDINE 4 MG/1
4 TABLET ORAL NIGHTLY
Qty: 30 TABLET | Refills: 0 | Status: SHIPPED | OUTPATIENT
Start: 2018-07-22 | End: 2018-08-16 | Stop reason: SDUPTHER

## 2018-07-20 RX ORDER — GABAPENTIN 300 MG/1
300 CAPSULE ORAL 3 TIMES DAILY
Qty: 90 CAPSULE | Refills: 0 | Status: SHIPPED | OUTPATIENT
Start: 2018-07-22 | End: 2018-08-16 | Stop reason: SDUPTHER

## 2018-07-20 RX ORDER — MORPHINE SULFATE 15 MG/1
15 TABLET, FILM COATED, EXTENDED RELEASE ORAL EVERY 12 HOURS SCHEDULED
Qty: 60 TABLET | Refills: 0 | Status: SHIPPED | OUTPATIENT
Start: 2018-07-22 | End: 2018-08-16 | Stop reason: SDUPTHER

## 2018-07-20 ASSESSMENT — ENCOUNTER SYMPTOMS
BACK PAIN: 1
BOWEL INCONTINENCE: 0
SHORTNESS OF BREATH: 0
COUGH: 0
CONSTIPATION: 0

## 2018-08-16 ENCOUNTER — HOSPITAL ENCOUNTER (OUTPATIENT)
Dept: PAIN MANAGEMENT | Age: 53
Discharge: HOME | End: 2018-08-16
Payer: MEDICARE

## 2018-08-16 VITALS
WEIGHT: 225 LBS | RESPIRATION RATE: 16 BRPM | DIASTOLIC BLOOD PRESSURE: 81 MMHG | SYSTOLIC BLOOD PRESSURE: 154 MMHG | HEART RATE: 63 BPM | BODY MASS INDEX: 37.49 KG/M2 | HEIGHT: 65 IN | TEMPERATURE: 98.7 F

## 2018-08-16 DIAGNOSIS — M54.50 CHRONIC BILATERAL LOW BACK PAIN WITHOUT SCIATICA: ICD-10-CM

## 2018-08-16 DIAGNOSIS — Z51.81 MEDICATION MONITORING ENCOUNTER: ICD-10-CM

## 2018-08-16 DIAGNOSIS — M54.16 LUMBAR RADICULOPATHY: Primary | Chronic | ICD-10-CM

## 2018-08-16 DIAGNOSIS — G89.29 CHRONIC BILATERAL LOW BACK PAIN WITHOUT SCIATICA: ICD-10-CM

## 2018-08-16 PROCEDURE — 99214 OFFICE O/P EST MOD 30 MIN: CPT | Performed by: NURSE PRACTITIONER

## 2018-08-16 PROCEDURE — 99214 OFFICE O/P EST MOD 30 MIN: CPT

## 2018-08-16 RX ORDER — MORPHINE SULFATE 15 MG/1
15 TABLET, FILM COATED, EXTENDED RELEASE ORAL EVERY 12 HOURS SCHEDULED
Qty: 60 TABLET | Refills: 0 | Status: SHIPPED | OUTPATIENT
Start: 2018-08-21 | End: 2018-09-18 | Stop reason: SDUPTHER

## 2018-08-16 RX ORDER — GABAPENTIN 300 MG/1
300 CAPSULE ORAL 3 TIMES DAILY
Qty: 90 CAPSULE | Refills: 0 | Status: SHIPPED | OUTPATIENT
Start: 2018-08-21 | End: 2018-09-18 | Stop reason: SDUPTHER

## 2018-08-16 RX ORDER — TIZANIDINE 4 MG/1
4 TABLET ORAL NIGHTLY
Qty: 30 TABLET | Refills: 0 | Status: SHIPPED | OUTPATIENT
Start: 2018-08-21 | End: 2018-09-18 | Stop reason: SDUPTHER

## 2018-08-16 ASSESSMENT — ENCOUNTER SYMPTOMS
CONSTIPATION: 0
SHORTNESS OF BREATH: 0
BACK PAIN: 1
COUGH: 0

## 2018-08-16 NOTE — PROGRESS NOTES
Patient is here today to review medication contract. Chief Complaint:  Back pain    PMH    Pt had a sebaceous cyst removed from the mid thoracic spine several years ago and reports intermittent pain in this area that radiates into lumbar and down legs at times. She was referred to the pain clinic by Dr. Dulce Dent in December of 2015 for epidural steroid injections and RFA.  She had right lumbar RFA on 7/7/2017 reports ongoing relief in legs but low back pain is increasing. The most recent duramorph injection was done 9/11/17 and the patient reported up to 50% relief for three weeks. Dr. Bharat Cardoso has told her she needs surgery but she is not ready for this yet. She reports increased weakness to right arm and feels right hand is weaker. EMG is scheduled for the end of August. PT  was ordered but pt has not scheduled yet stating she lost the order. Referral reprinted today. Lumbar MRI was ordered due to new onset of bladder incontinence and increased weakness in her left leg pt states she had to cancel the appt and has not called to resched. Number given today for pt to call. She also reports worsening left hip pain- XR shows moderate osteoarthritis pt instructed to f/u with PCP if ortho referral needed. HPI:     Back Pain   This is a chronic problem. The current episode started more than 1 year ago. The problem occurs constantly. The problem has been gradually worsening since onset. The pain is present in the lumbar spine and gluteal (worst in left side). The quality of the pain is described as aching, burning, stabbing, shooting and cramping. The pain radiates to the left foot and right knee. The pain is at a severity of 8/10. The pain is moderate. The pain is the same all the time. The symptoms are aggravated by bending, sitting, standing and lying down (walking). Stiffness is present in the morning. Pertinent negatives include no chest pain or fever.  She has tried analgesics, ice, muscle relaxant, home exercises and walking (aquatic therapy ordered- needs to start) for the symptoms. The treatment provided mild relief. Patient denies any new neurological symptoms. No bowel or bladder incontinence, no weakness, and no falling. Pill count: appropriate    Morphine equivalent dose as reported on OARRS:30    Attestation: The Prescription Monitoring Report for this patient was reviewed today. GISELLE Perea CNP)  Documentation: Possible medication side effects, risk of tolerance/dependence & alternative treatments discussed., Obtaining appropriate analgesic effect of treatment., No signs of potential drug abuse or diversion identified. GISELLE Perea CNP)  Medication Contracts: Existing medication contract. GISELLE Perea CNP)  Review of [place]AR[place] does not show any aberrant prescription behavior. Medication is helping the patient stay active. Patient denies any side effects and reports adequate analgesia. No sign of misuse/abuse.     Past Medical History:   Diagnosis Date    ADHD (attention deficit hyperactivity disorder)     Bipolar 1 disorder (HCC)     Depression with anxiety     GERD (gastroesophageal reflux disease)     Heel spur     left    Hyperlipidemia     Hypertension     Left hip pain     PTSD (post-traumatic stress disorder)     Tremor     Trouble swallowing     Weight gain        Past Surgical History:   Procedure Laterality Date    CARPAL TUNNEL RELEASE Bilateral     HYSTERECTOMY      NERVE BLOCK  08/01/2016    duramorph 1mg  celestone 9mg    NERVE BLOCK  10/31/2016    duramorph epidural steroid block decadron 7.5 mg durmoprh 1.5 mg    NERVE BLOCK Left     NO STEROID, LEFT MBNB# 1    NERVE BLOCK Left 01/16/2017    LT MBNB #2   celestone 6mg    NERVE BLOCK Left 02/06/2017    LT lumbar RF    kenalog 40    NERVE BLOCK Right 06/19/2017    right lumbar mbnb #1    NERVE BLOCK  07/07/2017    right radiofrequency kenolog 40mg    NERVE BLOCK  09/11/2017    duramorph 1mg & celestone 9 mg    TONSILLECTOMY         No Known Allergies      Current Outpatient Prescriptions:     gabapentin (NEURONTIN) 300 MG capsule, Take 1 capsule by mouth 3 times daily for 30 days. ., Disp: 90 capsule, Rfl: 0    morphine (MS CONTIN) 15 MG extended release tablet, Take 1 tablet by mouth every 12 hours for 30 days. ., Disp: 60 tablet, Rfl: 0    tiZANidine (ZANAFLEX) 4 MG tablet, Take 1 tablet by mouth nightly, Disp: 30 tablet, Rfl: 0    pantoprazole (PROTONIX) 40 MG tablet, TAKE 1 TABLET BY MOUTH EVERY DAY, Disp: , Rfl:     busPIRone (BUSPAR) 10 MG tablet, Take 10 mg by mouth 2 times daily, Disp: , Rfl:     zolpidem (AMBIEN) 10 MG tablet, Take 10 mg by mouth nightly as needed for Sleep, Disp: , Rfl:     Multiple Vitamins-Minerals (THERAPEUTIC MULTIVITAMIN-MINERALS) tablet, Take 1 tablet by mouth daily, Disp: , Rfl:     Biotin 5000 MCG CAPS, Take by mouth, Disp: , Rfl:     amphetamine-dextroamphetamine (ADDERALL) 20 MG tablet, Take 30 mg by mouth daily , Disp: , Rfl:     lurasidone (LATUDA) 20 MG TABS tablet, Take 40 mg by mouth daily , Disp: , Rfl:     celecoxib (CELEBREX) 200 MG capsule, Take 200 mg by mouth 3 times daily , Disp: , Rfl:     rosuvastatin (CRESTOR) 10 MG tablet, Take 10 mg by mouth daily, Disp: , Rfl:     metoprolol (LOPRESSOR) 25 MG tablet, Take 50 mg by mouth 2 times daily , Disp: , Rfl:     ranitidine (ZANTAC) 150 MG tablet, Take 150 mg by mouth, Disp: , Rfl:     Family History   Problem Relation Age of Onset    Other Mother         ulcers    Heart Disease Father     High Blood Pressure Father     Other Father         blind, pacemaker       Social History     Social History    Marital status:      Spouse name: N/A    Number of children: N/A    Years of education: N/A     Occupational History    Not on file.      Social History Main Topics    Smoking status: Current Every Day Smoker    Smokeless tobacco: Never Used    Alcohol use Not on file    Drug use: Unknown    Sexual activity: Not on file     Other Topics Concern    Not on file     Social History Narrative    No narrative on file       Review of Systems:  Review of Systems   Constitution: Negative for chills and fever. Cardiovascular: Negative for chest pain. Respiratory: Negative for cough and shortness of breath. Musculoskeletal: Positive for arthritis, back pain, joint pain, muscle cramps, myalgias and stiffness. Gastrointestinal: Negative for constipation. Physical Exam:  BP (!) 154/81   Pulse 63   Temp 98.7 °F (37.1 °C) (Oral)   Resp 16   Ht 5' 5\" (1.651 m)   Wt 225 lb (102.1 kg)   BMI 37.44 kg/m²     Physical Exam   Constitutional: She is oriented to person, place, and time and well-developed, well-nourished, and in no distress. Cardiovascular: Normal rate. Pulmonary/Chest: Effort normal.   Musculoskeletal: Normal range of motion. Neurological: She is alert and oriented to person, place, and time. Gait normal.   Skin: Skin is warm and dry. Psychiatric: Affect normal.       Record/Diagnostics Review:    Last jaydon April and was appropriate    XR Lumbar 2017 -   FINDINGS:3 views.  Multilevel endplate suspect right renal calculus.  Spurring.  No acute fracture nor listhesis.  Facet osteoarthritis L5-S1.     IMPRESSION:   Multilevel osteoarthritis without evidence of fracture nor listhesis       Assessment:    Problem List Items Addressed This Visit     Lumbar radiculopathy - Primary (Chronic)    Medication monitoring encounter          Treatment Plan:  DISCUSSION: Treatment options discussed with patient and all questions answered to patient's satisfaction. Patient relates current medications are helping the pain. Patient reports taking pain medications as prescribed, denies obtaining medications from different sources and denies use of illegal drugs. Patient denies side effects from medications like nausea, vomiting, constipation or drowsiness.  Patient reports current activities of daily living are possible due to medications and would like to continue them. As always, we encourage daily stretching and strengthening exercises, and recommend minimizing use of pain medications unless patient cannot get through daily activities due to pain. Discussed with the patient the effect the patients medical condition and opioid medication may have on the patients ability to safely operate a vehicle. Pt verbalized understanding. TREATMENT OPTIONS:     Contract requirements met. Continue opioid therapy. Script written for MS ER tizanidine and gabapentin  Pt lost order for aquatic PT and was reprinted today  Pt had MRI ordered but had to cancel, given number to call and reschedule  Pt is medium risk with stress care.  Due next month  for next evaluation  Follow up appointment made for 4 weeks

## 2018-08-27 ENCOUNTER — HOSPITAL ENCOUNTER (OUTPATIENT)
Dept: NEUROLOGY | Age: 53
Discharge: HOME | End: 2018-08-27
Payer: MEDICARE

## 2018-08-27 PROCEDURE — 95886 MUSC TEST DONE W/N TEST COMP: CPT | Performed by: PHYSICAL MEDICINE & REHABILITATION

## 2018-08-27 PROCEDURE — 95908 NRV CNDJ TST 3-4 STUDIES: CPT | Performed by: PHYSICAL MEDICINE & REHABILITATION

## 2018-08-29 DIAGNOSIS — M54.12 CERVICAL RADICULOPATHY: ICD-10-CM

## 2018-09-06 ENCOUNTER — HOSPITAL ENCOUNTER (OUTPATIENT)
Dept: MRI IMAGING | Age: 53
Discharge: HOME | End: 2018-09-08
Payer: MEDICARE

## 2018-09-06 DIAGNOSIS — M54.50 CHRONIC BILATERAL LOW BACK PAIN WITHOUT SCIATICA: ICD-10-CM

## 2018-09-06 DIAGNOSIS — M54.16 LUMBAR RADICULOPATHY: Chronic | ICD-10-CM

## 2018-09-06 DIAGNOSIS — N39.42 URINARY INCONTINENCE WITHOUT SENSORY AWARENESS: ICD-10-CM

## 2018-09-06 DIAGNOSIS — G89.29 CHRONIC BILATERAL LOW BACK PAIN WITHOUT SCIATICA: ICD-10-CM

## 2018-09-06 PROCEDURE — 72148 MRI LUMBAR SPINE W/O DYE: CPT

## 2018-09-11 NOTE — PROGRESS NOTES
Discussed MRI with Dr. Enoch Le. He would like her to have ESR/CRP levels checked before her visit next week. Called pt and left voicemail for her to call the office.

## 2018-09-18 ENCOUNTER — HOSPITAL ENCOUNTER (OUTPATIENT)
Dept: PAIN MANAGEMENT | Age: 53
Discharge: HOME | End: 2018-09-18
Payer: MEDICARE

## 2018-09-18 ENCOUNTER — HOSPITAL ENCOUNTER (OUTPATIENT)
Age: 53
Discharge: HOME | End: 2018-09-18
Payer: MEDICARE

## 2018-09-18 VITALS
TEMPERATURE: 98.5 F | BODY MASS INDEX: 39.94 KG/M2 | HEART RATE: 71 BPM | DIASTOLIC BLOOD PRESSURE: 63 MMHG | SYSTOLIC BLOOD PRESSURE: 147 MMHG | RESPIRATION RATE: 18 BRPM | WEIGHT: 240 LBS

## 2018-09-18 DIAGNOSIS — G89.29 CHRONIC BILATERAL LOW BACK PAIN WITHOUT SCIATICA: ICD-10-CM

## 2018-09-18 DIAGNOSIS — M54.50 CHRONIC BILATERAL LOW BACK PAIN WITHOUT SCIATICA: ICD-10-CM

## 2018-09-18 DIAGNOSIS — M47.817 LUMBOSACRAL SPONDYLOSIS WITHOUT MYELOPATHY: Chronic | ICD-10-CM

## 2018-09-18 DIAGNOSIS — M54.16 LUMBAR RADICULOPATHY: Chronic | ICD-10-CM

## 2018-09-18 DIAGNOSIS — Z51.81 MEDICATION MONITORING ENCOUNTER: ICD-10-CM

## 2018-09-18 DIAGNOSIS — M54.16 LEFT LUMBAR RADICULITIS: Chronic | ICD-10-CM

## 2018-09-18 LAB
C-REACTIVE PROTEIN: 4.8 MG/L (ref 0–5)
SEDIMENTATION RATE, ERYTHROCYTE: 15 MM (ref 0–20)

## 2018-09-18 PROCEDURE — 36415 COLL VENOUS BLD VENIPUNCTURE: CPT

## 2018-09-18 PROCEDURE — 99214 OFFICE O/P EST MOD 30 MIN: CPT

## 2018-09-18 PROCEDURE — 85651 RBC SED RATE NONAUTOMATED: CPT

## 2018-09-18 PROCEDURE — 86140 C-REACTIVE PROTEIN: CPT

## 2018-09-18 PROCEDURE — 99213 OFFICE O/P EST LOW 20 MIN: CPT

## 2018-09-18 PROCEDURE — 99214 OFFICE O/P EST MOD 30 MIN: CPT | Performed by: ANESTHESIOLOGY

## 2018-09-18 RX ORDER — TIZANIDINE 4 MG/1
4 TABLET ORAL NIGHTLY
Qty: 30 TABLET | Refills: 0 | Status: SHIPPED | OUTPATIENT
Start: 2018-09-20 | End: 2018-10-18 | Stop reason: SDUPTHER

## 2018-09-18 RX ORDER — MORPHINE SULFATE 15 MG/1
15 TABLET, FILM COATED, EXTENDED RELEASE ORAL EVERY 12 HOURS SCHEDULED
Qty: 60 TABLET | Refills: 0 | Status: SHIPPED | OUTPATIENT
Start: 2018-09-27 | End: 2018-10-18 | Stop reason: SDUPTHER

## 2018-09-18 RX ORDER — GABAPENTIN 300 MG/1
300 CAPSULE ORAL 3 TIMES DAILY
Qty: 90 CAPSULE | Refills: 0 | Status: SHIPPED | OUTPATIENT
Start: 2018-09-20 | End: 2018-10-18 | Stop reason: SDUPTHER

## 2018-09-18 ASSESSMENT — ENCOUNTER SYMPTOMS
BLURRED VISION: 0
COUGH: 0

## 2018-09-18 ASSESSMENT — PAIN DESCRIPTION - LOCATION: LOCATION: BACK

## 2018-09-18 ASSESSMENT — PAIN DESCRIPTION - ORIENTATION: ORIENTATION: LOWER

## 2018-09-18 ASSESSMENT — PAIN DESCRIPTION - PROGRESSION: CLINICAL_PROGRESSION: NOT CHANGED

## 2018-09-18 ASSESSMENT — PAIN DESCRIPTION - ONSET: ONSET: ON-GOING

## 2018-09-18 ASSESSMENT — PAIN DESCRIPTION - FREQUENCY: FREQUENCY: CONTINUOUS

## 2018-09-18 ASSESSMENT — PAIN DESCRIPTION - PAIN TYPE: TYPE: CHRONIC PAIN

## 2018-09-18 ASSESSMENT — PAIN SCALES - GENERAL: PAINLEVEL_OUTOF10: 6

## 2018-09-18 ASSESSMENT — PAIN DESCRIPTION - DESCRIPTORS: DESCRIPTORS: CONSTANT;ACHING;SHARP;DULL

## 2018-09-18 NOTE — BH NOTE
Alverto Velasquez was seen today for a follow-up appointment as part of the protocol here at the [facility] for medication contract patients. The focus of today's session was an evaluation of mood disorder symptoms, potential for suicide and potential for chemical dependency or abuse. Diagnosis code:     Psych code: F32.9    FINDINGS:     1) The patient arrived on time for appointment and was cooperative and good eye contact. Overall affect was irritable/depressed. The patient did demonstrate pain behaviors during the session. She was A & O x3.      2) Patient reports current pain level to be 6/10 pain . Patient reports usual pain level is 6/10. Pain is exacerbated on an ascending scale of 0-10, by walking, sitting and standing She states pain is relieved by : heat, certain positions, resting. Yung Brooke 3) Patient states current mood is: \"horrible\" depressed, irritable and anxious. Patient reported feelings of helplessness, hopelessness, and worthlessness. Patient denied current suicidal thoughts. She does not report issues related to sleep. The patient does not report issues related to appetite. Further stated that they receive social support from friend and son and that they find enjoyment in time away, to self. Caring for father with cancer, high stress. During this session, the PROMIS Emotional Distress--Depression--Short Form  interview was administered to further assess for depression symptoms    Please rate the following statements. In the past SEVEN (7) DAYS, how much have you been bothered by. .. 1.I felt worthless. Always (5). 2.I felt that I had nothing to look forward to. Always (5). 3.I felt helpless. Always (5). 4.I felt sad. Sometimes(3). 5.I felt like a failure. Always (5). 6.I felt depressed. Always (5). 7.I felt unhappy. Always (5). 8.I felt hopeless. Always (5). Based on patient's responses, depression severity is rated as Severe. Pain medication is  helpful.   Patient reports functioning is improved by medication. 4) Current Medications:   Prior to Admission medications    Medication Sig Start Date End Date Taking? Authorizing Provider   gabapentin (NEURONTIN) 300 MG capsule Take 1 capsule by mouth 3 times daily for 30 days. . 8/21/18 9/20/18  GISELLE Jack CNP   morphine (MS CONTIN) 15 MG extended release tablet Take 1 tablet by mouth every 12 hours for 30 days. . 8/21/18 9/20/18  GISELLE Jack CNP   tiZANidine (ZANAFLEX) 4 MG tablet Take 1 tablet by mouth nightly 8/21/18 9/20/18  GISELLE Jack CNP   pantoprazole (PROTONIX) 40 MG tablet TAKE 1 TABLET BY MOUTH EVERY DAY 1/10/18   Historical Provider, MD   busPIRone (BUSPAR) 10 MG tablet Take 10 mg by mouth 2 times daily    Historical Provider, MD   zolpidem (AMBIEN) 10 MG tablet Take 10 mg by mouth nightly as needed for Sleep    Historical Provider, MD   Multiple Vitamins-Minerals (THERAPEUTIC MULTIVITAMIN-MINERALS) tablet Take 1 tablet by mouth daily    Historical Provider, MD   Biotin 5000 MCG CAPS Take by mouth    Historical Provider, MD   ranitidine (ZANTAC) 150 MG tablet Take 150 mg by mouth 5/10/16 7/20/18  Historical Provider, MD   amphetamine-dextroamphetamine (ADDERALL) 20 MG tablet Take 30 mg by mouth daily     Historical Provider, MD   lurasidone (LATUDA) 20 MG TABS tablet Take 40 mg by mouth daily     Historical Provider, MD   celecoxib (CELEBREX) 200 MG capsule Take 200 mg by mouth 3 times daily     Historical Provider, MD   rosuvastatin (CRESTOR) 10 MG tablet Take 10 mg by mouth daily    Historical Provider, MD   metoprolol (LOPRESSOR) 25 MG tablet Take 50 mg by mouth 2 times daily     Historical Provider, MD       5) Other substance Use:  Reported today: Described alcohol consumption as occurring rarely; twice per year, 2 drinks. Reported that last alcohol consumption was in December. Patient reported use of nicotine products, current use is: 1 pack per day. Caffeine use is 2 cups per day. Patient denied current illicit drug use. Patient denied history of alcohol or drug-related issues. Patient denied history of alcohol or drug-related treatment. The patient states that they do not run out of pain medication early at the end of the month. Patient does not report a desire to take medication in higher doses or more frequently than prescribed, compulsive use, loss of control, and continued use despite harm. Review of file for past 12 months shows appropriate pill counts and MILA. 6) She reports that they do currently engage in counseling services; sees psychiatrist at [facility] every few months (1.5 years); counseling in past. Feels like needs medication adjustment but psychiatrist not making changes. Further they denied a history of mental health treatment and denied a history of psychiatric hospitalization. 7) Given the information at hand and it should be noted that this evaluation is based only on the patient's self-report. She will be assigned at a care level of medium (yellow) due to overall history of compliance and no reported history of substance use; history of mental health concerns and on-going severe depression. Her high depression levels, lack of agency, and feeling pain is not well managed is concerning. It should be noted that the above is provided by Pt. Self report. RECOMMENDATIONS:      1)  She should be scheduled for their next followup appointment in 3 months. 2) Continue with psychiatry, consider counseling.                Electronically signed by Kamala Naranjo, PhD on 9/18/2018 at 11:24 AM

## 2018-09-18 NOTE — PROGRESS NOTES
[facility]  Patient Pain Assessment  Contract update    Primary Care Physician: GISELLE Dey - CNP    Chief complaint: low back pain  Chief Complaint   Patient presents with    Lower Back Pain   . HISTORY OF PRESENT ILLNESS:  Syed Mcnamara is 47 y.o. female with    HPI    - Chronic lower back pain located in the lumbosacral area across midline affecting both sides left more than right, onset several years ago symptoms have progressively worsened over years. She describes her lower back pain as constant aching throbbing stabbing pain sensation that aggravates with daily physical activity. Patient has been seen in this clinic for 3 years for lower back pain. In past she had lumbar medial branch nerve block and lumbar radiofrequency ablation with good relief of pain. - Patient had a recent aggravation of her left-sided lower back pain with radiation down left leg over outer thigh to the knee level and just below the knee level. Associated with intermittent left leg numbness and paresthesia. She had a recent lumbar spine MRI  The patient is a to review the imaging. Medication management  Patient is currently managed with gabapentin and I tizanidine and MS Contin.   Today she is due for a refill  Patient is taking the medication as prescribed  Urine toxicology of been consistent with prescription  No sign or symptoms of any aberrancy       Current Pain Assessment  Pain Assessment  Pain Assessment: 0-10  Pain Level: 6  Pain Type: Chronic pain  Pain Location: Back  Pain Orientation: Lower  Pain Radiating Towards: pain low back left leg past knee  Pain Descriptors: Constant, Aching, Sharp, Dull  Pain Frequency: Continuous  Pain Onset: On-going  Clinical Progression: Not changed  Effect of Pain on Daily Activities: walking sitting and standing makes the pain worse  Pain Intervention(s): Medication (see eMar), Heat applied, Cold applied (walks)  POSS Score (Patient Ctrl morphine (MS CONTIN) 15 MG extended release tablet Take 1 tablet by mouth every 12 hours for 30 days. . 60 tablet 0    tiZANidine (ZANAFLEX) 4 MG tablet Take 1 tablet by mouth nightly 30 tablet 0    pantoprazole (PROTONIX) 40 MG tablet TAKE 1 TABLET BY MOUTH EVERY DAY      busPIRone (BUSPAR) 10 MG tablet Take 10 mg by mouth 2 times daily      zolpidem (AMBIEN) 10 MG tablet Take 10 mg by mouth nightly as needed for Sleep      Multiple Vitamins-Minerals (THERAPEUTIC MULTIVITAMIN-MINERALS) tablet Take 1 tablet by mouth daily      ranitidine (ZANTAC) 150 MG tablet Take 150 mg by mouth      amphetamine-dextroamphetamine (ADDERALL) 20 MG tablet Take 30 mg by mouth daily       lurasidone (LATUDA) 20 MG TABS tablet Take 40 mg by mouth daily       rosuvastatin (CRESTOR) 10 MG tablet Take 10 mg by mouth daily      metoprolol (LOPRESSOR) 25 MG tablet Take 50 mg by mouth 2 times daily       Biotin 5000 MCG CAPS Take by mouth      celecoxib (CELEBREX) 200 MG capsule Take 200 mg by mouth 3 times daily        No current facility-administered medications for this encounter. Allergies  Patient has no known allergies. Family History  family history includes Heart Disease in her father; High Blood Pressure in her father; Other in her father and mother. Social History  Social History     Social History    Marital status:      Spouse name: N/A    Number of children: N/A    Years of education: N/A     Social History Main Topics    Smoking status: Current Every Day Smoker    Smokeless tobacco: Never Used    Alcohol use Not on file    Drug use: Unknown    Sexual activity: Not on file     Other Topics Concern    Not on file     Social History Narrative    No narrative on file      has no drug history on file. REVIEW OF SYSTEMS:  Review of Systems   Constitutional: Negative for fever. Eyes: Negative for blurred vision. Respiratory: Negative for cough.     Cardiovascular: Negative for chest pain. Skin: Negative for rash. Objective:  General Appearance:  Uncomfortable and in pain. Vital signs: (most recent): Blood pressure (!) 147/63, pulse 71, temperature 98.5 °F (36.9 °C), temperature source Oral, resp. rate 18, weight 240 lb (108.9 kg), not currently breastfeeding. Vital signs are normal.  No fever. HEENT: Normal HEENT exam.    Lungs:  Normal effort and normal respiratory rate. Heart: Normal rate. Neurological: Patient is alert and oriented to person, place and time. Musculoskeletal exam: not examined. Neurological exam reveals alert, oriented, normal speech, no focal findings or movement disorder noted, neck supple without rigidity, cranial nerves II through XII intact, motor and sensory grossly normal bilaterally. Assessment & Plan     DX:  1. Lumbar spondylosis without myelopathy  2. Lumbar facet arthropathy  3. Left lumbar radiculitis  4.   Chronic use of opioid for therapeutic purpose      - MRI lumbar spine July 2018  Report was reviewed  Images were reviewed independently  Findings were discussed with patient  Pertinent findings include multilevel lumbar facet arthropathy  Left-sided lumbar foraminal narrowing at L3-L4 level and bilateral moderate to severe neural foraminal narrowing at L5-S1    Orders Placed This Encounter   Procedures    Ambulatory referral to Physical Therapy     Referral Priority:   Routine     Referral Type:   Physical Medicine     Requested Specialty:   Physical Therapy     Number of Visits Requested:   1    OR INJECT ANES/STEROID FORAMEN LUMBAR/SACRAL W IMG GUIDE ,1 LEVEL     Left L3 and L5 BESSIE     Standing Status:   Future     Standing Expiration Date:   12/18/2018    OR INJ DX/THER AGNT PARAVERT FACET JOINT, LUMBAR/SAC, 1ST LEVEL     BILATERAL LUMBAR FACET STEROID INJECTION L2/3, L3/4 AND L4/5     Standing Status:   Future     Standing Expiration Date:   12/18/2018     Orders Placed This Encounter   Medications    gabapentin

## 2018-10-02 ENCOUNTER — HOSPITAL ENCOUNTER (OUTPATIENT)
Dept: PAIN MANAGEMENT | Age: 53
Discharge: HOME | End: 2018-10-02
Payer: MEDICARE

## 2018-10-02 VITALS
TEMPERATURE: 98.1 F | OXYGEN SATURATION: 96 % | HEART RATE: 74 BPM | DIASTOLIC BLOOD PRESSURE: 70 MMHG | RESPIRATION RATE: 14 BRPM | SYSTOLIC BLOOD PRESSURE: 134 MMHG

## 2018-10-02 DIAGNOSIS — M54.16 LUMBAR RADICULOPATHY: ICD-10-CM

## 2018-10-02 DIAGNOSIS — Z51.81 MEDICATION MONITORING ENCOUNTER: ICD-10-CM

## 2018-10-02 DIAGNOSIS — M47.817 LUMBOSACRAL SPONDYLOSIS WITHOUT MYELOPATHY: ICD-10-CM

## 2018-10-02 DIAGNOSIS — M54.16 LEFT LUMBAR RADICULITIS: ICD-10-CM

## 2018-10-02 PROCEDURE — 6360000002 HC RX W HCPCS

## 2018-10-02 PROCEDURE — 6360000004 HC RX CONTRAST MEDICATION

## 2018-10-02 PROCEDURE — 2500000003 HC RX 250 WO HCPCS

## 2018-10-02 PROCEDURE — 6360000002 HC RX W HCPCS: Performed by: ANESTHESIOLOGY

## 2018-10-02 PROCEDURE — 99152 MOD SED SAME PHYS/QHP 5/>YRS: CPT | Performed by: ANESTHESIOLOGY

## 2018-10-02 PROCEDURE — 64483 NJX AA&/STRD TFRM EPI L/S 1: CPT

## 2018-10-02 PROCEDURE — 64484 NJX AA&/STRD TFRM EPI L/S EA: CPT | Performed by: ANESTHESIOLOGY

## 2018-10-02 PROCEDURE — 64484 NJX AA&/STRD TFRM EPI L/S EA: CPT

## 2018-10-02 PROCEDURE — 64483 NJX AA&/STRD TFRM EPI L/S 1: CPT | Performed by: ANESTHESIOLOGY

## 2018-10-02 RX ORDER — MIDAZOLAM HYDROCHLORIDE 1 MG/ML
INJECTION INTRAMUSCULAR; INTRAVENOUS
Status: COMPLETED | OUTPATIENT
Start: 2018-10-02 | End: 2018-10-02

## 2018-10-02 RX ORDER — FENTANYL CITRATE 50 UG/ML
INJECTION, SOLUTION INTRAMUSCULAR; INTRAVENOUS
Status: COMPLETED | OUTPATIENT
Start: 2018-10-02 | End: 2018-10-02

## 2018-10-02 RX ADMIN — FENTANYL CITRATE 50 MCG: 50 INJECTION INTRAMUSCULAR; INTRAVENOUS at 09:44

## 2018-10-02 RX ADMIN — MIDAZOLAM HYDROCHLORIDE 2 MG: 1 INJECTION, SOLUTION INTRAMUSCULAR; INTRAVENOUS at 09:45

## 2018-10-02 ASSESSMENT — PAIN - FUNCTIONAL ASSESSMENT
PAIN_FUNCTIONAL_ASSESSMENT: 0-10
PAIN_FUNCTIONAL_ASSESSMENT: 0-10

## 2018-10-16 ENCOUNTER — HOSPITAL ENCOUNTER (OUTPATIENT)
Dept: PAIN MANAGEMENT | Age: 53
Discharge: HOME | End: 2018-10-16
Payer: MEDICARE

## 2018-10-16 VITALS
OXYGEN SATURATION: 98 % | HEIGHT: 65 IN | RESPIRATION RATE: 16 BRPM | BODY MASS INDEX: 39.99 KG/M2 | DIASTOLIC BLOOD PRESSURE: 78 MMHG | WEIGHT: 240 LBS | TEMPERATURE: 97.7 F | SYSTOLIC BLOOD PRESSURE: 142 MMHG | HEART RATE: 65 BPM

## 2018-10-16 DIAGNOSIS — M54.16 LEFT LUMBAR RADICULITIS: ICD-10-CM

## 2018-10-16 DIAGNOSIS — M54.9 CHRONIC BACK PAIN, UNSPECIFIED BACK LOCATION, UNSPECIFIED BACK PAIN LATERALITY: ICD-10-CM

## 2018-10-16 DIAGNOSIS — M47.817 LUMBOSACRAL SPONDYLOSIS WITHOUT MYELOPATHY: ICD-10-CM

## 2018-10-16 DIAGNOSIS — G89.29 CHRONIC BACK PAIN, UNSPECIFIED BACK LOCATION, UNSPECIFIED BACK PAIN LATERALITY: ICD-10-CM

## 2018-10-16 DIAGNOSIS — Z51.81 MEDICATION MONITORING ENCOUNTER: ICD-10-CM

## 2018-10-16 PROCEDURE — 2500000003 HC RX 250 WO HCPCS

## 2018-10-16 PROCEDURE — 64494 INJ PARAVERT F JNT L/S 2 LEV: CPT

## 2018-10-16 PROCEDURE — 64493 INJ PARAVERT F JNT L/S 1 LEV: CPT | Performed by: ANESTHESIOLOGY

## 2018-10-16 PROCEDURE — 64495 INJ PARAVERT F JNT L/S 3 LEV: CPT

## 2018-10-16 PROCEDURE — 6360000002 HC RX W HCPCS: Performed by: ANESTHESIOLOGY

## 2018-10-16 PROCEDURE — 64493 INJ PARAVERT F JNT L/S 1 LEV: CPT

## 2018-10-16 PROCEDURE — 6360000004 HC RX CONTRAST MEDICATION

## 2018-10-16 PROCEDURE — 64494 INJ PARAVERT F JNT L/S 2 LEV: CPT | Performed by: ANESTHESIOLOGY

## 2018-10-16 PROCEDURE — 64483 NJX AA&/STRD TFRM EPI L/S 1: CPT

## 2018-10-16 PROCEDURE — 99152 MOD SED SAME PHYS/QHP 5/>YRS: CPT | Performed by: ANESTHESIOLOGY

## 2018-10-16 PROCEDURE — 64495 INJ PARAVERT F JNT L/S 3 LEV: CPT | Performed by: ANESTHESIOLOGY

## 2018-10-16 RX ORDER — MIDAZOLAM HYDROCHLORIDE 1 MG/ML
INJECTION INTRAMUSCULAR; INTRAVENOUS
Status: COMPLETED | OUTPATIENT
Start: 2018-10-16 | End: 2018-10-16

## 2018-10-16 RX ADMIN — MIDAZOLAM HYDROCHLORIDE 2 MG: 1 INJECTION, SOLUTION INTRAMUSCULAR; INTRAVENOUS at 09:21

## 2018-10-16 ASSESSMENT — PAIN DESCRIPTION - ORIENTATION: ORIENTATION: RIGHT;LEFT;LOWER

## 2018-10-16 ASSESSMENT — PAIN DESCRIPTION - PAIN TYPE: TYPE: CHRONIC PAIN

## 2018-10-16 ASSESSMENT — PAIN DESCRIPTION - ONSET: ONSET: ON-GOING

## 2018-10-16 ASSESSMENT — PAIN SCALES - GENERAL: PAINLEVEL_OUTOF10: 8

## 2018-10-16 ASSESSMENT — PAIN DESCRIPTION - PROGRESSION: CLINICAL_PROGRESSION: NOT CHANGED

## 2018-10-16 ASSESSMENT — PAIN - FUNCTIONAL ASSESSMENT: PAIN_FUNCTIONAL_ASSESSMENT: 0-10

## 2018-10-16 ASSESSMENT — PAIN DESCRIPTION - LOCATION: LOCATION: BACK;HIP;LEG

## 2018-10-16 ASSESSMENT — PAIN DESCRIPTION - FREQUENCY: FREQUENCY: CONTINUOUS

## 2018-10-16 NOTE — OP NOTE
Patient Name: Carolina Torres   YOB: 1965  Room/Bed: Room/bed info not found  Medical Record Number: [id number]  Date: 10/16/2018       Sedation/ Anesthesia Plan:   intravenous sedation   as needed. Medications Planned:   midazolam (Versed) / Fentanyl  Intravenously  as needed. Preoperative Diagnosis: Lumbar spondylosis w/o myelopathy or radiculopathy  Postoperative Diagnosis: Lumbar spondylosis w/o myelopathy or radiculopathy    Procedure Performed:  Bilateral Lumbar Medial Branch nerve Blocks at the transverse processes of L3, L4, L5 and sacral  ala under fluoroscopy guidance    Procedure: The Patient was seen in the preop area, chart was reviewed, informed consent was obtained. Patient was taken to procedure room and was placed in prone position. Vital signs were monitored through out the  Procedure. A time out was completed. The skin over the back was prepped and draped in sterile manner. The target point was marked at the junction of Transverse process and superior articular process at the target levels. Skin and deep tissues were anesthetized with 1 % lidocaine. A 25-gauge needlele was advanced to the target spots under fluoroscopy guidance in AP / Lateral and Oblique views. Then after negative aspiration contrast dye was injected with live fluoroscopy in AP views that showed  spread of the contrast with no epidural space and no vascular runoff or intrathecal spread. Finally 0.5 ml of treatment solution of 0.5% bupivacaine  was injected at each level. The needle was removed and a Band-Aid was placed over the needle  insertion site. The patient's vital signs remained stable and the patient tolerated the procedure well.       Post Procedure pain score in PACU was assessed with ambulation 1/10  Preprocedure pain score and lumbar area 6/10    Electronically signed by Rolanda Ruff MD on 10/16/2018 at 9:33 AM

## 2018-10-18 ENCOUNTER — HOSPITAL ENCOUNTER (OUTPATIENT)
Dept: PAIN MANAGEMENT | Age: 53
Discharge: HOME | End: 2018-10-18
Payer: MEDICARE

## 2018-10-18 VITALS
TEMPERATURE: 98.2 F | RESPIRATION RATE: 20 BRPM | HEART RATE: 68 BPM | SYSTOLIC BLOOD PRESSURE: 168 MMHG | DIASTOLIC BLOOD PRESSURE: 99 MMHG

## 2018-10-18 DIAGNOSIS — M54.50 CHRONIC BILATERAL LOW BACK PAIN WITHOUT SCIATICA: ICD-10-CM

## 2018-10-18 DIAGNOSIS — M54.16 LUMBAR RADICULOPATHY: Primary | Chronic | ICD-10-CM

## 2018-10-18 DIAGNOSIS — Z51.81 MEDICATION MONITORING ENCOUNTER: ICD-10-CM

## 2018-10-18 DIAGNOSIS — M54.12 CERVICAL RADICULOPATHY: ICD-10-CM

## 2018-10-18 DIAGNOSIS — G89.29 CHRONIC BILATERAL LOW BACK PAIN WITHOUT SCIATICA: ICD-10-CM

## 2018-10-18 DIAGNOSIS — M47.817 LUMBOSACRAL SPONDYLOSIS WITHOUT MYELOPATHY: Chronic | ICD-10-CM

## 2018-10-18 DIAGNOSIS — M54.16 LEFT LUMBAR RADICULITIS: Chronic | ICD-10-CM

## 2018-10-18 PROCEDURE — 99214 OFFICE O/P EST MOD 30 MIN: CPT | Performed by: NURSE PRACTITIONER

## 2018-10-18 PROCEDURE — 99214 OFFICE O/P EST MOD 30 MIN: CPT

## 2018-10-18 RX ORDER — MORPHINE SULFATE 15 MG/1
15 TABLET, FILM COATED, EXTENDED RELEASE ORAL EVERY 12 HOURS SCHEDULED
Qty: 60 TABLET | Refills: 0 | Status: SHIPPED | OUTPATIENT
Start: 2018-10-27 | End: 2018-11-20 | Stop reason: SDUPTHER

## 2018-10-18 RX ORDER — TIZANIDINE 4 MG/1
4 TABLET ORAL NIGHTLY
Qty: 30 TABLET | Refills: 0 | Status: SHIPPED | OUTPATIENT
Start: 2018-10-27 | End: 2018-11-20 | Stop reason: SDUPTHER

## 2018-10-18 RX ORDER — GABAPENTIN 300 MG/1
300 CAPSULE ORAL 3 TIMES DAILY
Qty: 90 CAPSULE | Refills: 0 | Status: SHIPPED | OUTPATIENT
Start: 2018-10-27 | End: 2018-11-20 | Stop reason: SDUPTHER

## 2018-10-18 ASSESSMENT — PAIN DESCRIPTION - ORIENTATION: ORIENTATION: LEFT

## 2018-10-18 ASSESSMENT — PAIN SCALES - GENERAL: PAINLEVEL_OUTOF10: 3

## 2018-10-18 ASSESSMENT — PAIN DESCRIPTION - PAIN TYPE: TYPE: CHRONIC PAIN

## 2018-10-18 ASSESSMENT — PAIN DESCRIPTION - ONSET: ONSET: ON-GOING

## 2018-10-18 ASSESSMENT — PAIN DESCRIPTION - DESCRIPTORS: DESCRIPTORS: BURNING;STABBING;CONSTANT

## 2018-10-18 ASSESSMENT — PAIN DESCRIPTION - LOCATION: LOCATION: BACK;LEG

## 2018-10-18 ASSESSMENT — ENCOUNTER SYMPTOMS: BACK PAIN: 1

## 2018-10-18 ASSESSMENT — PAIN DESCRIPTION - PROGRESSION: CLINICAL_PROGRESSION: GRADUALLY IMPROVING

## 2018-10-18 ASSESSMENT — PAIN DESCRIPTION - FREQUENCY: FREQUENCY: CONTINUOUS

## 2018-10-18 NOTE — PROGRESS NOTES
Patient is here today to review medication contract and follow up after Lumbar Medial Branch nerve Blocks at the transverse processes of L3, L4, L5     Chief Complaint: back pain    PMH: Pt had a sebaceous cyst removed from the mid thoracic spine several years ago and reports intermittent pain in this area that radiates into lumbar and down legs at times. She was referred to the pain clinic by Dr. Destinee Loaiza in December of 2015 for epidural steroid injections and RFA. She had left and right lumbar RFA last year with good relief. She had MBNB on 10/16/18 and reports ongoing relief today. She would like to schedule the RFA. Back Pain   This is a chronic problem. The current episode started more than 1 year ago. The problem occurs constantly. The problem has been gradually improving since onset. The pain is present in the lumbar spine. Radiates to: pain in left leg from hip down r/t arthritis. The pain is at a severity of 3/10. The pain is mild. The pain is worse during the day. The symptoms are aggravated by standing, sitting and lying down (walking). Stiffness is present all day. Pertinent negatives include no chest pain or fever. Risk factors include menopause and sedentary lifestyle. She has tried analgesics, ice, heat, NSAIDs and muscle relaxant for the symptoms. The treatment provided moderate relief. Patient denies any new neurological symptoms. Nobowel or bladder incontinence, no weakness, and no falling. Pill count: appropriate    Morphine equivalent: 30    Controlled Substances Monitoring:     RX Monitoring 10/18/2018   Attestation The Prescription Monitoring Report for this patient was reviewed today. Documentation Possible medication side effects, risk of tolerance/dependence & alternative treatments discussed. ;No signs of potential drug abuse or diversion identified. Chronic Pain -   Medication Contracts Existing medication contract.        Past Medical History:   Diagnosis Date    ADHD (attention deficit hyperactivity disorder)     Bipolar 1 disorder ([facility] [address]     Depression with anxiety     GERD (gastroesophageal reflux disease)     Heel spur     left    Hyperlipidemia     Hypertension     Left hip pain     PTSD (post-traumatic stress disorder)     Tremor     Trouble swallowing     Weight gain        Past Surgical History:   Procedure Laterality Date    CARPAL TUNNEL RELEASE Bilateral     HYSTERECTOMY      NERVE BLOCK  08/01/2016    duramorph 1mg  celestone 9mg    NERVE BLOCK  10/31/2016    duramorph epidural steroid block decadron 7.5 mg durmoprh 1.5 mg    NERVE BLOCK Left     NO STEROID, LEFT MBNB# 1    NERVE BLOCK Left 01/16/2017    LT MBNB #2   celestone 6mg    NERVE BLOCK Left 02/06/2017    LT lumbar RF    kenalog 40    NERVE BLOCK Right 06/19/2017    right lumbar mbnb #1    NERVE BLOCK  07/07/2017    right radiofrequency kenolog 40mg    NERVE BLOCK  09/11/2017    duramorph 1mg & celestone 9 mg    NERVE BLOCK Left 10/02/2018    LEFT LUMBAR EPIDURAL STEROID BLOCK DECADRON 10MG    NERVE BLOCK  10/16/2018    blanca mbnb, marcaine and xylocaine    TONSILLECTOMY         No Known Allergies      Current Outpatient Prescriptions:     gabapentin (NEURONTIN) 300 MG capsule, Take 1 capsule by mouth 3 times daily for 30 days. ., Disp: 90 capsule, Rfl: 0    tiZANidine (ZANAFLEX) 4 MG tablet, Take 1 tablet by mouth nightly, Disp: 30 tablet, Rfl: 0    morphine (MS CONTIN) 15 MG extended release tablet, Take 1 tablet by mouth every 12 hours for 30 days. [facility] Date: 9/27/18, Disp: 60 tablet, Rfl: 0    pantoprazole (PROTONIX) 40 MG tablet, TAKE 1 TABLET BY MOUTH EVERY DAY, Disp: , Rfl:     busPIRone (BUSPAR) 10 MG tablet, Take 10 mg by mouth 2 times daily, Disp: , Rfl:     zolpidem (AMBIEN) 10 MG tablet, Take 10 mg by mouth nightly as needed for Sleep, Disp: , Rfl:     Multiple Vitamins-Minerals (THERAPEUTIC MULTIVITAMIN-MINERALS) tablet, Take 1 tablet by mouth daily, canal stenosis. Moderate-to-severe bilateral neural foraminal stenosis. Last MILA 4/2018 - appropriate    Assessment:  Problem List Items Addressed This Visit     Lumbar radiculopathy - Primary (Chronic)    Relevant Medications    gabapentin (NEURONTIN) 300 MG capsule (Start on 10/27/2018)    tiZANidine (ZANAFLEX) 4 MG tablet (Start on 10/27/2018)    morphine (MS CONTIN) 15 MG extended release tablet (Start on 10/27/2018)    Left lumbar radiculitis (Chronic)    Relevant Medications    gabapentin (NEURONTIN) 300 MG capsule (Start on 10/27/2018)    tiZANidine (ZANAFLEX) 4 MG tablet (Start on 10/27/2018)    Lumbosacral spondylosis without myelopathy (Chronic)    Relevant Medications    tiZANidine (ZANAFLEX) 4 MG tablet (Start on 10/27/2018)    morphine (MS CONTIN) 15 MG extended release tablet (Start on 10/27/2018)    Chronic bilateral low back pain without sciatica    Relevant Medications    tiZANidine (ZANAFLEX) 4 MG tablet (Start on 10/27/2018)    morphine (MS CONTIN) 15 MG extended release tablet (Start on 10/27/2018)    Medication monitoring encounter    Relevant Medications    morphine (MS CONTIN) 15 MG extended release tablet (Start on 10/27/2018)    Cervical radiculopathy    Relevant Medications    gabapentin (NEURONTIN) 300 MG capsule (Start on 10/27/2018)    tiZANidine (ZANAFLEX) 4 MG tablet (Start on 10/27/2018)            Treatment Plan:  DISCUSSION: Treatment options discussed with patient and allquestions answered to patient's satisfaction. Patient relates current medications are helping the pain. Patient reports taking pain medications as prescribed, denies obtaining medications from different sources and denies use of illegal drugs. Patient denies side effects from medications like nausea, vomiting, constipation or drowsiness. Patient reports current activities of daily living are possible due to medications and would like to continue them.     As always, we encourage daily stretching and strengthening exercises, and recommend minimizing use of pain medications unless patient cannot get through daily activities due to pain. TREATMENT OPTIONS:   Contract requirements met. Continue opioid therapy. Script written for [facility]  Pt is medium risk with stress care. Due 12/2018 for next evaluation  Follow up appointment made for 4 weeks  Left Lumbar RFA  Pre-procedural instructions are reviewed and signed. Procedure is scheduled.

## 2018-10-19 ASSESSMENT — ENCOUNTER SYMPTOMS
SHORTNESS OF BREATH: 0
COUGH: 0
CONSTIPATION: 0

## 2018-10-30 ENCOUNTER — HOSPITAL ENCOUNTER (OUTPATIENT)
Dept: PAIN MANAGEMENT | Age: 53
Discharge: HOME | End: 2018-10-30
Payer: MEDICARE

## 2018-10-30 VITALS
TEMPERATURE: 98.1 F | SYSTOLIC BLOOD PRESSURE: 147 MMHG | DIASTOLIC BLOOD PRESSURE: 53 MMHG | RESPIRATION RATE: 16 BRPM | OXYGEN SATURATION: 99 % | HEART RATE: 73 BPM

## 2018-10-30 DIAGNOSIS — M47.817 LUMBOSACRAL SPONDYLOSIS WITHOUT MYELOPATHY: Chronic | ICD-10-CM

## 2018-10-30 DIAGNOSIS — M54.16 LEFT LUMBAR RADICULITIS: Primary | Chronic | ICD-10-CM

## 2018-10-30 DIAGNOSIS — G89.29 CHRONIC BACK PAIN, UNSPECIFIED BACK LOCATION, UNSPECIFIED BACK PAIN LATERALITY: ICD-10-CM

## 2018-10-30 DIAGNOSIS — M54.9 CHRONIC BACK PAIN, UNSPECIFIED BACK LOCATION, UNSPECIFIED BACK PAIN LATERALITY: ICD-10-CM

## 2018-10-30 PROCEDURE — 2500000003 HC RX 250 WO HCPCS

## 2018-10-30 PROCEDURE — 64636 DESTROY L/S FACET JNT ADDL: CPT

## 2018-10-30 PROCEDURE — 64635 DESTROY LUMB/SAC FACET JNT: CPT | Performed by: ANESTHESIOLOGY

## 2018-10-30 PROCEDURE — 99152 MOD SED SAME PHYS/QHP 5/>YRS: CPT | Performed by: ANESTHESIOLOGY

## 2018-10-30 PROCEDURE — 64635 DESTROY LUMB/SAC FACET JNT: CPT

## 2018-10-30 PROCEDURE — 64636 DESTROY L/S FACET JNT ADDL: CPT | Performed by: ANESTHESIOLOGY

## 2018-10-30 PROCEDURE — 6360000002 HC RX W HCPCS: Performed by: ANESTHESIOLOGY

## 2018-10-30 RX ORDER — MIDAZOLAM HYDROCHLORIDE 1 MG/ML
INJECTION INTRAMUSCULAR; INTRAVENOUS
Status: COMPLETED | OUTPATIENT
Start: 2018-10-30 | End: 2018-10-30

## 2018-10-30 RX ORDER — FENTANYL CITRATE 50 UG/ML
INJECTION, SOLUTION INTRAMUSCULAR; INTRAVENOUS
Status: COMPLETED | OUTPATIENT
Start: 2018-10-30 | End: 2018-10-30

## 2018-10-30 RX ADMIN — FENTANYL CITRATE 100 MCG: 50 INJECTION INTRAMUSCULAR; INTRAVENOUS at 10:13

## 2018-10-30 RX ADMIN — MIDAZOLAM HYDROCHLORIDE 2 MG: 1 INJECTION, SOLUTION INTRAMUSCULAR; INTRAVENOUS at 10:14

## 2018-10-30 ASSESSMENT — ENCOUNTER SYMPTOMS
GASTROINTESTINAL NEGATIVE: 1
BACK PAIN: 1
RESPIRATORY NEGATIVE: 1

## 2018-10-30 ASSESSMENT — PAIN - FUNCTIONAL ASSESSMENT
PAIN_FUNCTIONAL_ASSESSMENT: 0-10
PAIN_FUNCTIONAL_ASSESSMENT: 0-10

## 2018-10-30 NOTE — OP NOTE
Preoperative Diagnosis: Lumbar spondylosis w/o myelopathy, chronic low back pain  Postoperative Diagnosis: Lumbar spondylosis w/o myelopathy, chronic low back pain    Procedure Performed:  :Radiofrequency ablation of median branches at the Transverse processes of L3 / L4 / L5 and S1 on Left under fluoroscopic guidance with IV sedation. Procedure:    After starting an IV, the patient was taken to procedure room. The patient was placed in prone position and skin over the back was prepped and draped in sterile manner. Standard monitors were connected and vitals were monitored during the case and they remained stable during the procedure. A meaningful communication was kept up with the patient throughout the procedure. Then using fluoroscopy the junction of the transverse process of the target vertebra with the superior process of the facet joint was observed and the view was optimized. The skin and deep tissues were infiltrated with 2 ml of  1 % lidocaine. The RF canula with the 10 mm active tip was introduced through the skin wheal under fluoroscopy guidance such that the tip of the needle lies in the groove of the transverse process with the superior processes of the facet joint. Then a lateral and AP view of the lumbar spine was obtained to make sure the tip of needle is not in the neural foramen. Then electric impedence was checked to make sure it is acceptable. Then a sensory stimulus was applied at 50 Hz up to 0.5 volt and concordant pain symptoms were reproduced. Then a motor stimulus was applied at 2 Hz up to 2 volts or 3 x times the sensory stimulus and no motor stimulation was seen in lower extremities. Some multifidus stimulus was seen. Then after negative aspiration 1 ml of 4% lidocaine was injected through the needle at each level. The radiofrequency lesion was done at 85 degrees centigrade for 90 seconds.       For L5 median branch block the junction of the ala of  the sacrum with the

## 2018-10-30 NOTE — H&P
 ranitidine (ZANTAC) 150 MG tablet Take 150 mg by mouth       No current facility-administered medications for this encounter. Review of Systems   Constitutional: Negative. Respiratory: Negative. Cardiovascular: Negative. Gastrointestinal: Negative. Genitourinary: Negative. Musculoskeletal: Positive for back pain. Neurological: Negative. Psychiatric/Behavioral: Negative. Objective:  General Appearance:  Uncomfortable and in pain. Vital signs: (most recent): Blood pressure (!) 126/53, pulse 64, temperature 98.1 °F (36.7 °C), resp. rate 14, SpO2 96 %, not currently breastfeeding. Vital signs are normal.    HEENT: Normal HEENT exam.    Lungs:  Normal effort and normal respiratory rate. Heart: Normal rate. Extremities: Normal range of motion. Neurological: Patient is alert and oriented to person, place and time. Normal strength.          Assessment & Plan     DX:  Lumbar spondylosis without myelopathy or radiculopathy    PLAN:  Will proceed with left-sided lumbar radiofrequency ablation from L2-L5 nerve  Risk of procedure discussed with patient  Risk of sedation discussed with patient  Informed consent signed  ASA classification 2  Mallampati classification 3    Electronically signed by Linus Mosher MD on 10/30/2018 at 9:58 AM

## 2018-11-13 ENCOUNTER — HOSPITAL ENCOUNTER (OUTPATIENT)
Dept: PAIN MANAGEMENT | Age: 53
Discharge: HOME | End: 2018-11-13
Payer: MEDICARE

## 2018-11-13 VITALS
HEART RATE: 65 BPM | OXYGEN SATURATION: 97 % | WEIGHT: 240 LBS | BODY MASS INDEX: 39.99 KG/M2 | DIASTOLIC BLOOD PRESSURE: 77 MMHG | TEMPERATURE: 97.9 F | SYSTOLIC BLOOD PRESSURE: 141 MMHG | RESPIRATION RATE: 16 BRPM | HEIGHT: 65 IN

## 2018-11-13 DIAGNOSIS — M54.16 LEFT LUMBAR RADICULITIS: ICD-10-CM

## 2018-11-13 DIAGNOSIS — G89.29 CHRONIC BACK PAIN, UNSPECIFIED BACK LOCATION, UNSPECIFIED BACK PAIN LATERALITY: ICD-10-CM

## 2018-11-13 DIAGNOSIS — M54.9 CHRONIC BACK PAIN, UNSPECIFIED BACK LOCATION, UNSPECIFIED BACK PAIN LATERALITY: ICD-10-CM

## 2018-11-13 DIAGNOSIS — Z51.81 MEDICATION MONITORING ENCOUNTER: ICD-10-CM

## 2018-11-13 DIAGNOSIS — M47.817 LUMBOSACRAL SPONDYLOSIS WITHOUT MYELOPATHY: ICD-10-CM

## 2018-11-13 PROCEDURE — 2500000003 HC RX 250 WO HCPCS

## 2018-11-13 PROCEDURE — 99152 MOD SED SAME PHYS/QHP 5/>YRS: CPT | Performed by: ANESTHESIOLOGY

## 2018-11-13 PROCEDURE — 64636 DESTROY L/S FACET JNT ADDL: CPT

## 2018-11-13 PROCEDURE — 6360000002 HC RX W HCPCS: Performed by: ANESTHESIOLOGY

## 2018-11-13 PROCEDURE — 64636 DESTROY L/S FACET JNT ADDL: CPT | Performed by: ANESTHESIOLOGY

## 2018-11-13 PROCEDURE — 64635 DESTROY LUMB/SAC FACET JNT: CPT

## 2018-11-13 PROCEDURE — 64635 DESTROY LUMB/SAC FACET JNT: CPT | Performed by: ANESTHESIOLOGY

## 2018-11-13 RX ORDER — FENTANYL CITRATE 50 UG/ML
INJECTION, SOLUTION INTRAMUSCULAR; INTRAVENOUS
Status: COMPLETED | OUTPATIENT
Start: 2018-11-13 | End: 2018-11-13

## 2018-11-13 RX ORDER — MIDAZOLAM HYDROCHLORIDE 1 MG/ML
INJECTION INTRAMUSCULAR; INTRAVENOUS
Status: COMPLETED | OUTPATIENT
Start: 2018-11-13 | End: 2018-11-13

## 2018-11-13 RX ADMIN — MIDAZOLAM HYDROCHLORIDE 2 MG: 1 INJECTION, SOLUTION INTRAMUSCULAR; INTRAVENOUS at 10:14

## 2018-11-13 RX ADMIN — FENTANYL CITRATE 100 MCG: 50 INJECTION INTRAMUSCULAR; INTRAVENOUS at 10:14

## 2018-11-13 ASSESSMENT — PAIN - FUNCTIONAL ASSESSMENT
PAIN_FUNCTIONAL_ASSESSMENT: 0-10
PAIN_FUNCTIONAL_ASSESSMENT: 0-10

## 2018-11-13 ASSESSMENT — PAIN DESCRIPTION - DESCRIPTORS: DESCRIPTORS: ACHING;BURNING;CONSTANT;SQUEEZING

## 2018-11-20 ENCOUNTER — HOSPITAL ENCOUNTER (OUTPATIENT)
Dept: PAIN MANAGEMENT | Age: 53
Discharge: HOME | End: 2018-11-20
Payer: MEDICARE

## 2018-11-20 VITALS
RESPIRATION RATE: 16 BRPM | HEART RATE: 67 BPM | DIASTOLIC BLOOD PRESSURE: 67 MMHG | TEMPERATURE: 98.1 F | SYSTOLIC BLOOD PRESSURE: 138 MMHG

## 2018-11-20 DIAGNOSIS — M54.50 CHRONIC BILATERAL LOW BACK PAIN WITHOUT SCIATICA: Primary | ICD-10-CM

## 2018-11-20 DIAGNOSIS — Z51.81 MEDICATION MONITORING ENCOUNTER: ICD-10-CM

## 2018-11-20 DIAGNOSIS — G89.29 CHRONIC BILATERAL LOW BACK PAIN WITHOUT SCIATICA: Primary | ICD-10-CM

## 2018-11-20 DIAGNOSIS — M54.16 LEFT LUMBAR RADICULITIS: ICD-10-CM

## 2018-11-20 DIAGNOSIS — M54.16 LUMBAR RADICULOPATHY: Chronic | ICD-10-CM

## 2018-11-20 DIAGNOSIS — M47.817 LUMBOSACRAL SPONDYLOSIS WITHOUT MYELOPATHY: ICD-10-CM

## 2018-11-20 PROCEDURE — 80307 DRUG TEST PRSMV CHEM ANLYZR: CPT

## 2018-11-20 PROCEDURE — 99214 OFFICE O/P EST MOD 30 MIN: CPT

## 2018-11-20 PROCEDURE — 99214 OFFICE O/P EST MOD 30 MIN: CPT | Performed by: NURSE PRACTITIONER

## 2018-11-20 RX ORDER — TIZANIDINE 4 MG/1
4 TABLET ORAL NIGHTLY
Qty: 30 TABLET | Refills: 0 | Status: SHIPPED | OUTPATIENT
Start: 2018-11-27 | End: 2018-12-19 | Stop reason: SDUPTHER

## 2018-11-20 RX ORDER — GABAPENTIN 300 MG/1
300 CAPSULE ORAL 3 TIMES DAILY
Qty: 90 CAPSULE | Refills: 0 | Status: SHIPPED | OUTPATIENT
Start: 2018-11-27 | End: 2018-12-19 | Stop reason: SDUPTHER

## 2018-11-20 RX ORDER — MELOXICAM 15 MG/1
15 TABLET ORAL DAILY
Qty: 30 TABLET | Refills: 3 | Status: SHIPPED | OUTPATIENT
Start: 2018-11-27 | End: 2018-12-19

## 2018-11-20 RX ORDER — MORPHINE SULFATE 15 MG/1
15 TABLET, FILM COATED, EXTENDED RELEASE ORAL EVERY 12 HOURS SCHEDULED
Qty: 60 TABLET | Refills: 0 | Status: SHIPPED | OUTPATIENT
Start: 2018-11-27 | End: 2018-12-19 | Stop reason: SDUPTHER

## 2018-11-20 ASSESSMENT — ENCOUNTER SYMPTOMS
BACK PAIN: 1
COUGH: 0
SHORTNESS OF BREATH: 0
CONSTIPATION: 0

## 2018-11-20 NOTE — PROGRESS NOTES
Neurological: Negative for disturbances in coordination and loss of balance. Physical Exam:  /67   Pulse 67   Temp 98.1 °F (36.7 °C) (Oral)   Resp 16     Physical Exam   Constitutional: She is oriented to person, place, and time. HENT:   Head: Normocephalic. Eyes: EOM are normal.   Neck: Normal range of motion. Pulmonary/Chest: Effort normal.   Musculoskeletal: Normal range of motion. Lumbar back: She exhibits tenderness and pain. Neurological: She is alert and oriented to person, place, and time. Skin: Skin is warm and dry. Record/Diagnostics Review:    MRI Lumbar 2018 -     FINDINGS:  BONES/ALIGNMENT:     There are degenerative endplate changes throughout the lumbar spine.  There  is a small amount of fluid within L3-L4 intervertebral disc with  intervertebral disc height loss.  No significant endplate destruction is  identified.  There is 16 mm T1 and T2 hyperintense lesion within L1 vertebral  body which partially suppresses on FLAIR sequences.  This is most compatible  with hemangioma.     SPINAL CORD:     The conus terminates normally.     SOFT TISSUES:     The paraspinal structures are unremarkable.     L1-L2: Posterior disc osteophyte complex without significant spinal canal  stenosis or neural foraminal stenosis.  Bilateral facet degenerative changes.     L2-L3: Posterior disc osteophyte complex which extends into bilateral neural  foraminal stenosis. Bilateral facet degenerative changes. No significant  spinal canal stenosis or neural foraminal stenosis.     L3-L4: There is intervertebral disc height loss.  There is posterior disc  osteophyte complex which extends into bilateral neural foramina.  This is  slightly greater on the left.  Moderate bilateral facet degenerative changes.   Moderate left neural foraminal stenosis.  No significant right neural  foraminal stenosis.  Minimal spinal canal stenosis.     L4-L5: Posterior disc osteophyte complex which extends into

## 2018-11-23 LAB
6-ACETYLMORPHINE, UR: NOT DETECTED
7-AMINOCLONAZEPAM, URINE: NOT DETECTED
ALPHA-OH-ALPRAZ, URINE: NOT DETECTED
ALPRAZOLAM, URINE: NOT DETECTED
AMPHETAMINES, URINE: PRESENT
BARBITURATES, URINE: NOT DETECTED
BENZOYLECGONINE, UR: NOT DETECTED
BUPRENORPHINE URINE: NOT DETECTED
CARISOPRODOL, UR: NOT DETECTED
CLONAZEPAM, URINE: NOT DETECTED
CODEINE, URINE: NOT DETECTED
CREATININE URINE: 97.9 MG/DL (ref 20–400)
DIAZEPAM, URINE: NOT DETECTED
EER PAIN MGT DRUG PANEL, HIGH RES/EMIT U: NORMAL
ETHYL GLUCURONIDE UR: NOT DETECTED
FENTANYL URINE: NOT DETECTED
HYDROCODONE, URINE: NOT DETECTED
HYDROMORPHONE, URINE: NOT DETECTED
LORAZEPAM, URINE: NOT DETECTED
MARIJUANA METAB, UR: NOT DETECTED
MDA, UR: NOT DETECTED
MDEA, EVE, UR: NOT DETECTED
MDMA URINE: NOT DETECTED
MEPERIDINE METAB, UR: NOT DETECTED
METHADONE, URINE: NOT DETECTED
METHAMPHETAMINE, URINE: NOT DETECTED
METHYLPHENIDATE: NOT DETECTED
MIDAZOLAM, URINE: NOT DETECTED
MORPHINE URINE: PRESENT
NORBUPRENORPHINE, URINE: NOT DETECTED
NORDIAZEPAM, URINE: NOT DETECTED
NORFENTANYL, URINE: NOT DETECTED
NORHYDROCODONE, URINE: NOT DETECTED
NOROXYCODONE, URINE: NOT DETECTED
NOROXYMORPHONE, URINE: NOT DETECTED
OXAZEPAM, URINE: NOT DETECTED
OXYCODONE URINE: NOT DETECTED
OXYMORPHONE, URINE: NOT DETECTED
PAIN MGT DRUG PANEL, HI RES, UR: NORMAL
PCP,URINE: NOT DETECTED
PHENTERMINE, UR: NOT DETECTED
PROPOXYPHENE, URINE: NOT DETECTED
TAPENTADOL, URINE: NOT DETECTED
TAPENTADOL-O-SULFATE, URINE: NOT DETECTED
TEMAZEPAM, URINE: NOT DETECTED
TRAMADOL, URINE: NOT DETECTED
ZOLPIDEM, URINE: NOT DETECTED

## 2018-12-19 ENCOUNTER — HOSPITAL ENCOUNTER (OUTPATIENT)
Dept: PAIN MANAGEMENT | Age: 53
Discharge: HOME | End: 2018-12-19
Payer: MEDICARE

## 2018-12-19 VITALS
RESPIRATION RATE: 14 BRPM | HEART RATE: 75 BPM | SYSTOLIC BLOOD PRESSURE: 150 MMHG | TEMPERATURE: 98.9 F | DIASTOLIC BLOOD PRESSURE: 82 MMHG

## 2018-12-19 DIAGNOSIS — G89.29 CHRONIC BILATERAL LOW BACK PAIN WITHOUT SCIATICA: ICD-10-CM

## 2018-12-19 DIAGNOSIS — M47.817 LUMBOSACRAL SPONDYLOSIS WITHOUT MYELOPATHY: Chronic | ICD-10-CM

## 2018-12-19 DIAGNOSIS — M54.16 LUMBAR RADICULOPATHY: Chronic | ICD-10-CM

## 2018-12-19 DIAGNOSIS — M54.12 CERVICAL RADICULOPATHY: Primary | ICD-10-CM

## 2018-12-19 DIAGNOSIS — M54.50 CHRONIC BILATERAL LOW BACK PAIN WITHOUT SCIATICA: ICD-10-CM

## 2018-12-19 DIAGNOSIS — Z51.81 MEDICATION MONITORING ENCOUNTER: ICD-10-CM

## 2018-12-19 DIAGNOSIS — M54.16 LEFT LUMBAR RADICULITIS: Chronic | ICD-10-CM

## 2018-12-19 PROCEDURE — 99213 OFFICE O/P EST LOW 20 MIN: CPT | Performed by: NURSE PRACTITIONER

## 2018-12-19 PROCEDURE — 99214 OFFICE O/P EST MOD 30 MIN: CPT

## 2018-12-19 PROCEDURE — 99213 OFFICE O/P EST LOW 20 MIN: CPT

## 2018-12-19 RX ORDER — GABAPENTIN 300 MG/1
300 CAPSULE ORAL 3 TIMES DAILY
Qty: 90 CAPSULE | Refills: 0 | Status: SHIPPED | OUTPATIENT
Start: 2018-12-26 | End: 2019-01-22 | Stop reason: SDUPTHER

## 2018-12-19 RX ORDER — TIZANIDINE 4 MG/1
4 TABLET ORAL NIGHTLY
Qty: 30 TABLET | Refills: 0 | Status: SHIPPED | OUTPATIENT
Start: 2018-12-26 | End: 2019-01-22 | Stop reason: SDUPTHER

## 2018-12-19 RX ORDER — MORPHINE SULFATE 15 MG/1
15 TABLET, FILM COATED, EXTENDED RELEASE ORAL EVERY 12 HOURS SCHEDULED
Qty: 60 TABLET | Refills: 0 | Status: SHIPPED | OUTPATIENT
Start: 2018-12-26 | End: 2019-01-22 | Stop reason: SDUPTHER

## 2018-12-19 ASSESSMENT — ENCOUNTER SYMPTOMS
CONSTIPATION: 0
BOWEL INCONTINENCE: 0
BACK PAIN: 1
COUGH: 0
SHORTNESS OF BREATH: 0

## 2018-12-19 NOTE — BH NOTE
APRN - CNP   meloxicam (MOBIC) 15 MG tablet Take 1 tablet by mouth daily 11/27/18   Garrett Lam Meyers, APRN - CNP   pantoprazole (PROTONIX) 40 MG tablet TAKE 1 TABLET BY MOUTH EVERY DAY 1/10/18   Historical Provider, MD   busPIRone (BUSPAR) 10 MG tablet Take 10 mg by mouth 2 times daily    Historical Provider, MD   zolpidem (AMBIEN) 10 MG tablet Take 10 mg by mouth nightly as needed for Sleep    Historical Provider, MD   Multiple Vitamins-Minerals (THERAPEUTIC MULTIVITAMIN-MINERALS) tablet Take 1 tablet by mouth daily    Historical Provider, MD   Biotin 5000 MCG CAPS Take by mouth    Historical Provider, MD   ranitidine (ZANTAC) 150 MG tablet Take 150 mg by mouth 5/10/16 11/20/18  Historical Provider, MD   amphetamine-dextroamphetamine (ADDERALL) 20 MG tablet Take 30 mg by mouth daily     Historical Provider, MD   lurasidone (LATUDA) 20 MG TABS tablet Take 40 mg by mouth daily     Historical Provider, MD   rosuvastatin (CRESTOR) 10 MG tablet Take 10 mg by mouth daily    Historical Provider, MD   metoprolol (LOPRESSOR) 25 MG tablet Take 50 mg by mouth 2 times daily     Historical Provider, MD       5) Other substance Use:  Reported today: Described alcohol consumption as occurring rarely; twice per year, 2 drinks.  Reported that last alcohol consumption was in December 2017.  Patient reported use of nicotine products, current use is: 1 pack per day.  Caffeine use is 2 cups per day. Patient denied current illicit drug use. Patient denied history of alcohol or drug-related issues.  Patient denied history of alcohol or drug-related treatment.  The patient states that they do not run out of pain medication early at the end of the month. Patient does not report a desire to take medication in higher doses or more frequently than prescribed, compulsive use, loss of control, and continued use despite harm. Review of file for past 12 months shows appropriate pill counts and MILA.     6) She reports that they do currently

## 2019-01-22 ENCOUNTER — HOSPITAL ENCOUNTER (OUTPATIENT)
Dept: PAIN MANAGEMENT | Age: 54
Discharge: HOME | End: 2019-01-22
Payer: MEDICARE

## 2019-01-22 VITALS
RESPIRATION RATE: 14 BRPM | HEART RATE: 63 BPM | DIASTOLIC BLOOD PRESSURE: 87 MMHG | SYSTOLIC BLOOD PRESSURE: 166 MMHG | TEMPERATURE: 98.9 F

## 2019-01-22 DIAGNOSIS — M54.50 CHRONIC BILATERAL LOW BACK PAIN WITHOUT SCIATICA: Primary | ICD-10-CM

## 2019-01-22 DIAGNOSIS — M47.817 LUMBOSACRAL SPONDYLOSIS WITHOUT MYELOPATHY: Chronic | ICD-10-CM

## 2019-01-22 DIAGNOSIS — G89.29 CHRONIC BILATERAL LOW BACK PAIN WITHOUT SCIATICA: Primary | ICD-10-CM

## 2019-01-22 DIAGNOSIS — M54.16 LEFT LUMBAR RADICULITIS: Chronic | ICD-10-CM

## 2019-01-22 DIAGNOSIS — Z51.81 MEDICATION MONITORING ENCOUNTER: ICD-10-CM

## 2019-01-22 DIAGNOSIS — M54.16 LUMBAR RADICULOPATHY: Chronic | ICD-10-CM

## 2019-01-22 PROCEDURE — 99214 OFFICE O/P EST MOD 30 MIN: CPT

## 2019-01-22 PROCEDURE — 99213 OFFICE O/P EST LOW 20 MIN: CPT | Performed by: NURSE PRACTITIONER

## 2019-01-22 RX ORDER — MORPHINE SULFATE 15 MG/1
15 TABLET, FILM COATED, EXTENDED RELEASE ORAL EVERY 12 HOURS SCHEDULED
Qty: 60 TABLET | Refills: 0 | Status: SHIPPED | OUTPATIENT
Start: 2019-01-25 | End: 2019-02-19 | Stop reason: SDUPTHER

## 2019-01-22 RX ORDER — GABAPENTIN 300 MG/1
300 CAPSULE ORAL 2 TIMES DAILY
Qty: 60 CAPSULE | Refills: 0 | Status: SHIPPED | OUTPATIENT
Start: 2019-01-25 | End: 2019-02-19 | Stop reason: SDUPTHER

## 2019-01-22 RX ORDER — TIZANIDINE 4 MG/1
4 TABLET ORAL NIGHTLY
Qty: 30 TABLET | Refills: 0 | Status: SHIPPED | OUTPATIENT
Start: 2019-01-25 | End: 2019-02-19 | Stop reason: SDUPTHER

## 2019-01-22 ASSESSMENT — PAIN SCALES - GENERAL: PAINLEVEL_OUTOF10: 4

## 2019-01-22 ASSESSMENT — ENCOUNTER SYMPTOMS
COUGH: 0
SHORTNESS OF BREATH: 0
BACK PAIN: 1
CONSTIPATION: 0

## 2019-02-19 ENCOUNTER — HOSPITAL ENCOUNTER (OUTPATIENT)
Dept: PAIN MANAGEMENT | Age: 54
Discharge: HOME | End: 2019-02-19
Payer: MEDICARE

## 2019-02-19 VITALS
SYSTOLIC BLOOD PRESSURE: 151 MMHG | TEMPERATURE: 98 F | RESPIRATION RATE: 16 BRPM | HEART RATE: 70 BPM | DIASTOLIC BLOOD PRESSURE: 70 MMHG

## 2019-02-19 DIAGNOSIS — Z51.81 MEDICATION MONITORING ENCOUNTER: ICD-10-CM

## 2019-02-19 DIAGNOSIS — M47.817 LUMBOSACRAL SPONDYLOSIS WITHOUT MYELOPATHY: Chronic | ICD-10-CM

## 2019-02-19 DIAGNOSIS — M54.16 LEFT LUMBAR RADICULITIS: Chronic | ICD-10-CM

## 2019-02-19 DIAGNOSIS — M54.16 LUMBAR RADICULOPATHY: Chronic | ICD-10-CM

## 2019-02-19 DIAGNOSIS — M54.12 CERVICAL RADICULOPATHY: Primary | ICD-10-CM

## 2019-02-19 DIAGNOSIS — G89.29 CHRONIC BILATERAL LOW BACK PAIN WITHOUT SCIATICA: ICD-10-CM

## 2019-02-19 DIAGNOSIS — M54.50 CHRONIC BILATERAL LOW BACK PAIN WITHOUT SCIATICA: ICD-10-CM

## 2019-02-19 PROCEDURE — 99213 OFFICE O/P EST LOW 20 MIN: CPT | Performed by: NURSE PRACTITIONER

## 2019-02-19 PROCEDURE — 99214 OFFICE O/P EST MOD 30 MIN: CPT

## 2019-02-19 RX ORDER — GABAPENTIN 300 MG/1
300 CAPSULE ORAL 2 TIMES DAILY
Qty: 60 CAPSULE | Refills: 0 | Status: SHIPPED | OUTPATIENT
Start: 2019-02-24 | End: 2019-03-20 | Stop reason: SDUPTHER

## 2019-02-19 RX ORDER — MORPHINE SULFATE 15 MG/1
15 TABLET, FILM COATED, EXTENDED RELEASE ORAL EVERY 12 HOURS SCHEDULED
Qty: 60 TABLET | Refills: 0 | Status: SHIPPED | OUTPATIENT
Start: 2019-02-24 | End: 2019-03-20 | Stop reason: SDUPTHER

## 2019-02-19 RX ORDER — TIZANIDINE 4 MG/1
4 TABLET ORAL NIGHTLY
Qty: 30 TABLET | Refills: 0 | Status: SHIPPED | OUTPATIENT
Start: 2019-02-24 | End: 2019-03-20 | Stop reason: SDUPTHER

## 2019-02-19 ASSESSMENT — ENCOUNTER SYMPTOMS
COUGH: 0
BACK PAIN: 1
SHORTNESS OF BREATH: 0
CONSTIPATION: 0

## 2019-02-19 ASSESSMENT — PAIN DESCRIPTION - PAIN TYPE: TYPE: CHRONIC PAIN

## 2019-02-19 ASSESSMENT — PAIN DESCRIPTION - DESCRIPTORS: DESCRIPTORS: STABBING;CONSTANT;BURNING

## 2019-02-19 ASSESSMENT — PAIN DESCRIPTION - FREQUENCY: FREQUENCY: CONTINUOUS

## 2019-02-19 ASSESSMENT — PAIN DESCRIPTION - LOCATION: LOCATION: BACK;HIP

## 2019-02-19 ASSESSMENT — PAIN - FUNCTIONAL ASSESSMENT: PAIN_FUNCTIONAL_ASSESSMENT: PREVENTS OR INTERFERES SOME ACTIVE ACTIVITIES AND ADLS

## 2019-02-19 ASSESSMENT — PAIN SCALES - GENERAL: PAINLEVEL_OUTOF10: 3

## 2019-02-19 ASSESSMENT — PAIN DESCRIPTION - ORIENTATION: ORIENTATION: LEFT

## 2019-02-19 ASSESSMENT — PAIN DESCRIPTION - ONSET: ONSET: ON-GOING

## 2019-02-19 ASSESSMENT — PAIN DESCRIPTION - PROGRESSION: CLINICAL_PROGRESSION: NOT CHANGED

## 2019-03-20 ENCOUNTER — HOSPITAL ENCOUNTER (OUTPATIENT)
Dept: PAIN MANAGEMENT | Age: 54
Discharge: HOME | End: 2019-03-20
Payer: MEDICARE

## 2019-03-20 VITALS
DIASTOLIC BLOOD PRESSURE: 94 MMHG | TEMPERATURE: 98 F | SYSTOLIC BLOOD PRESSURE: 155 MMHG | HEART RATE: 64 BPM | RESPIRATION RATE: 16 BRPM

## 2019-03-20 DIAGNOSIS — M54.16 LEFT LUMBAR RADICULITIS: Chronic | ICD-10-CM

## 2019-03-20 DIAGNOSIS — M54.12 CERVICAL RADICULOPATHY: Primary | ICD-10-CM

## 2019-03-20 DIAGNOSIS — M54.50 CHRONIC BILATERAL LOW BACK PAIN WITHOUT SCIATICA: ICD-10-CM

## 2019-03-20 DIAGNOSIS — M54.16 LUMBAR RADICULOPATHY: Chronic | ICD-10-CM

## 2019-03-20 DIAGNOSIS — Z51.81 MEDICATION MONITORING ENCOUNTER: ICD-10-CM

## 2019-03-20 DIAGNOSIS — M47.817 LUMBOSACRAL SPONDYLOSIS WITHOUT MYELOPATHY: Chronic | ICD-10-CM

## 2019-03-20 DIAGNOSIS — G89.29 CHRONIC BILATERAL LOW BACK PAIN WITHOUT SCIATICA: ICD-10-CM

## 2019-03-20 DIAGNOSIS — M25.552 LEFT HIP PAIN: Chronic | ICD-10-CM

## 2019-03-20 PROCEDURE — 99214 OFFICE O/P EST MOD 30 MIN: CPT | Performed by: NURSE PRACTITIONER

## 2019-03-20 PROCEDURE — 99214 OFFICE O/P EST MOD 30 MIN: CPT

## 2019-03-20 RX ORDER — MORPHINE SULFATE 15 MG/1
15 TABLET, FILM COATED, EXTENDED RELEASE ORAL EVERY 12 HOURS SCHEDULED
Qty: 60 TABLET | Refills: 0 | Status: SHIPPED | OUTPATIENT
Start: 2019-03-26 | End: 2019-04-23 | Stop reason: SDUPTHER

## 2019-03-20 RX ORDER — GABAPENTIN 300 MG/1
300 CAPSULE ORAL 2 TIMES DAILY
Qty: 60 CAPSULE | Refills: 0 | Status: SHIPPED | OUTPATIENT
Start: 2019-03-26 | End: 2019-04-23 | Stop reason: SDUPTHER

## 2019-03-20 RX ORDER — HYDROXYZINE PAMOATE 50 MG/1
50 CAPSULE ORAL 2 TIMES DAILY
Status: ON HOLD | COMMUNITY
End: 2020-10-12

## 2019-03-20 RX ORDER — TIZANIDINE 4 MG/1
4 TABLET ORAL NIGHTLY
Qty: 30 TABLET | Refills: 0 | Status: SHIPPED | OUTPATIENT
Start: 2019-03-26 | End: 2019-04-23 | Stop reason: SDUPTHER

## 2019-03-20 ASSESSMENT — ENCOUNTER SYMPTOMS
COUGH: 0
SHORTNESS OF BREATH: 0
BACK PAIN: 1
CONSTIPATION: 0

## 2019-03-26 ENCOUNTER — HOSPITAL ENCOUNTER (OUTPATIENT)
Dept: PAIN MANAGEMENT | Age: 54
Discharge: HOME | End: 2019-03-26
Payer: MEDICARE

## 2019-03-26 VITALS
DIASTOLIC BLOOD PRESSURE: 54 MMHG | SYSTOLIC BLOOD PRESSURE: 130 MMHG | BODY MASS INDEX: 39.99 KG/M2 | TEMPERATURE: 98.1 F | RESPIRATION RATE: 16 BRPM | OXYGEN SATURATION: 96 % | HEIGHT: 65 IN | WEIGHT: 240 LBS | HEART RATE: 67 BPM

## 2019-03-26 DIAGNOSIS — M47.817 LUMBOSACRAL SPONDYLOSIS WITHOUT MYELOPATHY: ICD-10-CM

## 2019-03-26 DIAGNOSIS — Z51.81 MEDICATION MONITORING ENCOUNTER: ICD-10-CM

## 2019-03-26 DIAGNOSIS — M54.16 LEFT LUMBAR RADICULITIS: ICD-10-CM

## 2019-03-26 DIAGNOSIS — M25.551 HIP PAIN, RIGHT: ICD-10-CM

## 2019-03-26 DIAGNOSIS — M16.10 HIP ARTHRITIS: Chronic | ICD-10-CM

## 2019-03-26 PROCEDURE — 77002 NEEDLE LOCALIZATION BY XRAY: CPT | Performed by: ANESTHESIOLOGY

## 2019-03-26 PROCEDURE — 77002 NEEDLE LOCALIZATION BY XRAY: CPT

## 2019-03-26 PROCEDURE — 20610 DRAIN/INJ JOINT/BURSA W/O US: CPT | Performed by: ANESTHESIOLOGY

## 2019-03-26 PROCEDURE — 99152 MOD SED SAME PHYS/QHP 5/>YRS: CPT | Performed by: ANESTHESIOLOGY

## 2019-03-26 PROCEDURE — 6360000002 HC RX W HCPCS: Performed by: ANESTHESIOLOGY

## 2019-03-26 PROCEDURE — 2500000003 HC RX 250 WO HCPCS: Performed by: ANESTHESIOLOGY

## 2019-03-26 PROCEDURE — 20610 DRAIN/INJ JOINT/BURSA W/O US: CPT

## 2019-03-26 PROCEDURE — 2500000003 HC RX 250 WO HCPCS

## 2019-03-26 RX ORDER — BUPIVACAINE HYDROCHLORIDE AND EPINEPHRINE 5; 5 MG/ML; UG/ML
INJECTION, SOLUTION EPIDURAL; INTRACAUDAL; PERINEURAL
Status: COMPLETED | OUTPATIENT
Start: 2019-03-26 | End: 2019-03-26

## 2019-03-26 RX ORDER — MIDAZOLAM HYDROCHLORIDE 1 MG/ML
INJECTION INTRAMUSCULAR; INTRAVENOUS
Status: COMPLETED | OUTPATIENT
Start: 2019-03-26 | End: 2019-03-26

## 2019-03-26 RX ORDER — FENTANYL CITRATE 50 UG/ML
INJECTION, SOLUTION INTRAMUSCULAR; INTRAVENOUS
Status: COMPLETED | OUTPATIENT
Start: 2019-03-26 | End: 2019-03-26

## 2019-03-26 RX ORDER — DEXAMETHASONE SODIUM PHOSPHATE 10 MG/ML
INJECTION, SOLUTION INTRAMUSCULAR; INTRAVENOUS
Status: COMPLETED | OUTPATIENT
Start: 2019-03-26 | End: 2019-03-26

## 2019-03-26 RX ADMIN — MIDAZOLAM HYDROCHLORIDE 2 MG: 1 INJECTION, SOLUTION INTRAMUSCULAR; INTRAVENOUS at 11:36

## 2019-03-26 RX ADMIN — DEXAMETHASONE SODIUM PHOSPHATE 10 MG: 10 INJECTION, SOLUTION INTRAMUSCULAR; INTRAVENOUS at 11:36

## 2019-03-26 RX ADMIN — BUPIVACAINE HYDROCHLORIDE AND EPINEPHRINE BITARTRATE 1 ML: 5; .005 INJECTION, SOLUTION EPIDURAL; INTRACAUDAL; PERINEURAL at 11:36

## 2019-03-26 RX ADMIN — FENTANYL CITRATE 50 MCG: 50 INJECTION INTRAMUSCULAR; INTRAVENOUS at 11:35

## 2019-03-26 ASSESSMENT — PAIN - FUNCTIONAL ASSESSMENT
PAIN_FUNCTIONAL_ASSESSMENT: 0-10
PAIN_FUNCTIONAL_ASSESSMENT: 0-10
PAIN_FUNCTIONAL_ASSESSMENT: PREVENTS OR INTERFERES SOME ACTIVE ACTIVITIES AND ADLS

## 2019-04-23 ENCOUNTER — HOSPITAL ENCOUNTER (OUTPATIENT)
Dept: PAIN MANAGEMENT | Age: 54
Discharge: HOME | End: 2019-04-23
Payer: MEDICARE

## 2019-04-23 VITALS
TEMPERATURE: 98.3 F | SYSTOLIC BLOOD PRESSURE: 140 MMHG | RESPIRATION RATE: 16 BRPM | DIASTOLIC BLOOD PRESSURE: 76 MMHG | HEART RATE: 76 BPM

## 2019-04-23 DIAGNOSIS — M54.12 CERVICAL RADICULOPATHY: ICD-10-CM

## 2019-04-23 DIAGNOSIS — M54.16 LUMBAR RADICULOPATHY: Primary | Chronic | ICD-10-CM

## 2019-04-23 DIAGNOSIS — M54.50 CHRONIC BILATERAL LOW BACK PAIN WITHOUT SCIATICA: ICD-10-CM

## 2019-04-23 DIAGNOSIS — M54.16 LEFT LUMBAR RADICULITIS: Chronic | ICD-10-CM

## 2019-04-23 DIAGNOSIS — M25.552 LEFT HIP PAIN: Chronic | ICD-10-CM

## 2019-04-23 DIAGNOSIS — M47.817 LUMBOSACRAL SPONDYLOSIS WITHOUT MYELOPATHY: Chronic | ICD-10-CM

## 2019-04-23 DIAGNOSIS — M16.10 HIP ARTHRITIS: Chronic | ICD-10-CM

## 2019-04-23 DIAGNOSIS — G89.29 CHRONIC BILATERAL LOW BACK PAIN WITHOUT SCIATICA: ICD-10-CM

## 2019-04-23 DIAGNOSIS — Z51.81 MEDICATION MONITORING ENCOUNTER: ICD-10-CM

## 2019-04-23 PROCEDURE — 99213 OFFICE O/P EST LOW 20 MIN: CPT | Performed by: NURSE PRACTITIONER

## 2019-04-23 PROCEDURE — 99214 OFFICE O/P EST MOD 30 MIN: CPT

## 2019-04-23 RX ORDER — MORPHINE SULFATE 15 MG/1
15 TABLET, FILM COATED, EXTENDED RELEASE ORAL EVERY 12 HOURS SCHEDULED
Qty: 60 TABLET | Refills: 0 | Status: SHIPPED | OUTPATIENT
Start: 2019-04-29 | End: 2019-05-23 | Stop reason: SDUPTHER

## 2019-04-23 RX ORDER — TIZANIDINE 4 MG/1
4 TABLET ORAL NIGHTLY
Qty: 30 TABLET | Refills: 0 | Status: SHIPPED | OUTPATIENT
Start: 2019-04-29 | End: 2019-05-23 | Stop reason: SDUPTHER

## 2019-04-23 RX ORDER — GABAPENTIN 300 MG/1
300 CAPSULE ORAL 2 TIMES DAILY
Qty: 60 CAPSULE | Refills: 0 | Status: SHIPPED | OUTPATIENT
Start: 2019-04-29 | End: 2019-05-23 | Stop reason: SDUPTHER

## 2019-04-23 ASSESSMENT — ENCOUNTER SYMPTOMS
COUGH: 0
CONSTIPATION: 0
BACK PAIN: 1
SHORTNESS OF BREATH: 0

## 2019-04-23 NOTE — PROGRESS NOTES
Patient is here today to review medication contract. Chief Complaint: back pain    PMH: Pt had a sebaceous cyst removed from the mid thoracic spine several years ago and reports intermittent pain in this area that radiates into lumbar and down legs at times. She was referred to the pain clinic by Dr. Nemo Dennis in December of 2015 for epidural steroid injections and RFA. She had Radiofrequency ablation of median branches at the Transverse processes of L3 / L4 / L5 and S1 on Left on 10/30/18 and Radiofrequency ablation of median branches at the Transverse processes of L3 / L4 / L5 and S1 on Right on 11/13/18 and reports ongoing relief.  She had left hip injection with Dr. Duran Hightower on 3/26/19 and reports significant relief - \"I have no pain, only some stiffness\". Discussed PT but she is not interested at this time. Back Pain   This is a chronic problem. The current episode started more than 1 year ago. The problem occurs constantly. The problem has been rapidly improving since onset. The pain is present in the lumbar spine. The quality of the pain is described as stabbing and aching. The pain radiates to the left knee and right knee (right shin and top of right foot are numb). The pain is at a severity of 4/10. The pain is mild. The pain is worse during the day. The symptoms are aggravated by sitting, standing, position and lying down. Stiffness is present: left hip is stiff but pain is gone. Pertinent negatives include no chest pain or fever. Risk factors include menopause, obesity, lack of exercise and sedentary lifestyle. She has tried analgesics, heat and ice for the symptoms. The treatment provided moderate relief. Patient denies any new neurological symptoms. No bowel or bladder incontinence, no weakness, and no falling.     Pill count: appropriate    Morphine equivalent: 30    Controlled Substances Monitoring:     RX Monitoring 4/23/2019   Attestation The Prescription Monitoring Report for this patient was reviewed today. Acute Pain Prescriptions Prescription exceeds daily limit for a specific reason. See comments or note. ;Severe pain not adequately treated with lower dose. Chronic Pain Routine Monitoring Possible medication side effects, risk of tolerance/dependence & alternative treatments discussed. ;Obtaining appropriate analgesic effect of treatment. ;No signs of potential drug abuse or diversion identified: otherwise, see note documentation   Chronic Pain > 80 MEDD -       Past Medical History:   Diagnosis Date    ADHD (attention deficit hyperactivity disorder)     Bipolar 1 disorder ([facility])     Depression with anxiety     GERD (gastroesophageal reflux disease)     Heel spur     left    Hyperlipidemia     Hypertension     Left hip pain     PTSD (post-traumatic stress disorder)     Tremor     Trouble swallowing     Weight gain        Past Surgical History:   Procedure Laterality Date    CARPAL TUNNEL RELEASE Bilateral     HYSTERECTOMY      NERVE BLOCK  08/01/2016    duramorph 1mg  celestone 9mg    NERVE BLOCK  10/31/2016    duramorph epidural steroid block decadron 7.5 mg durmoprh 1.5 mg    NERVE BLOCK Left     NO STEROID, LEFT MBNB# 1    NERVE BLOCK Left 01/16/2017    LT MBNB #2   celestone 6mg    NERVE BLOCK Left 02/06/2017    LT lumbar RF    kenalog 40    NERVE BLOCK Right 06/19/2017    right lumbar mbnb #1    NERVE BLOCK  07/07/2017    right radiofrequency kenolog 40mg    NERVE BLOCK  09/11/2017    duramorph 1mg & celestone 9 mg    NERVE BLOCK Left 10/02/2018    LEFT LUMBAR EPIDURAL STEROID BLOCK DECADRON 10MG    NERVE BLOCK  10/16/2018    blanca mbnb, marcaine and xylocaine    NERVE BLOCK Left 10/30/2018    left lumbar rf- no steroid    NERVE BLOCK Right 11/13/2018    RT lumbar RFA no steroid    NERVE BLOCK  03/26/2019    left hip decadron 10mg, marcaine . 5 %    TONSILLECTOMY         No Known Allergies      Current Outpatient Medications:     hydrOXYzine (VISTARIL) 50 MG capsule, Take 50 mg by mouth 3 times daily as needed for Itching, Disp: , Rfl:     morphine (MS CONTIN) 15 MG extended release tablet, Take 1 tablet by mouth every 12 hours for 30 days. , Disp: 60 tablet, Rfl: 0    gabapentin (NEURONTIN) 300 MG capsule, Take 1 capsule by mouth 2 times daily for 30 days. , Disp: 60 capsule, Rfl: 0    tiZANidine (ZANAFLEX) 4 MG tablet, Take 1 tablet by mouth nightly, Disp: 30 tablet, Rfl: 0    pantoprazole (PROTONIX) 40 MG tablet, TAKE 1 TABLET BY MOUTH EVERY DAY, Disp: , Rfl:     busPIRone (BUSPAR) 10 MG tablet, Take 10 mg by mouth 2 times daily, Disp: , Rfl:     zolpidem (AMBIEN) 10 MG tablet, Take 10 mg by mouth nightly as needed for Sleep, Disp: , Rfl:     Multiple Vitamins-Minerals (THERAPEUTIC MULTIVITAMIN-MINERALS) tablet, Take 1 tablet by mouth daily, Disp: , Rfl:     Biotin 5000 MCG CAPS, Take by mouth, Disp: , Rfl:     ranitidine (ZANTAC) 150 MG tablet, Take 150 mg by mouth, Disp: , Rfl:     amphetamine-dextroamphetamine (ADDERALL) 20 MG tablet, Take 30 mg by mouth daily , Disp: , Rfl:     lurasidone (LATUDA) 20 MG TABS tablet, Take 40 mg by mouth daily , Disp: , Rfl:     rosuvastatin (CRESTOR) 10 MG tablet, Take 10 mg by mouth daily, Disp: , Rfl:     metoprolol (LOPRESSOR) 25 MG tablet, Take 50 mg by mouth 2 times daily , Disp: , Rfl:     Family History   Problem Relation Age of Onset    Other Mother         ulcers    Heart Disease Father     High Blood Pressure Father     Other Father         blind, pacemaker       Social History     Socioeconomic History    Marital status:      Spouse name: Not on file    Number of children: Not on file    Years of education: Not on file    Highest education level: Not on file   Occupational History    Not on file   Social Needs    Financial resource strain: Not on file    Food insecurity:     Worry: Not on file     Inability: Not on file    Transportation needs:     Medical: Not on file     Non-medical: Not on file   Tobacco Use    Smoking status: Current Every Day Smoker    Smokeless tobacco: Never Used   Substance and Sexual Activity    Alcohol use: Not on file    Drug use: Not on file    Sexual activity: Not on file   Lifestyle    Physical activity:     Days per week: Not on file     Minutes per session: Not on file    Stress: Not on file   Relationships    Social connections:     Talks on phone: Not on file     Gets together: Not on file     Attends [employer] service: Not on file     Active member of club or organization: Not on file     Attends meetings of clubs or organizations: Not on file     Relationship status: Not on file    Intimate partner violence:     Fear of current or ex partner: Not on file     Emotionally abused: Not on file     Physically abused: Not on file     Forced sexual activity: Not on file   Other Topics Concern    Not on file   Social History Narrative    Not on file       Review of Systems:  Review of Systems   Constitution: Negative for chills and fever. Cardiovascular: Negative for chest pain and irregular heartbeat. Respiratory: Negative for cough and shortness of breath. Musculoskeletal: Positive for back pain. Gastrointestinal: Negative for constipation. Neurological: Negative for disturbances in coordination and loss of balance. Physical Exam:  BP (!) 140/76   Pulse 76   Temp 98.3 °F (36.8 °C) (Oral)   Resp 16     Physical Exam   Constitutional: She is oriented to person, place, and time. HENT:   Head: Normocephalic. Eyes: EOM are normal.   Neck: Normal range of motion. Pulmonary/Chest: Effort normal.   Musculoskeletal: Normal range of motion. Lumbar back: She exhibits tenderness and pain. Neurological: She is alert and oriented to person, place, and time. Skin: Skin is warm and dry.        Record/Diagnostics Review:    Last jaydon 11/2018 and was appropriate     MRI Lumbar 2018 -      FINDINGS:  BONES/ALIGNMENT:     There are degenerative endplate changes throughout the lumbar spine.  There  is a small amount of fluid within L3-L4 intervertebral disc with  intervertebral disc height loss.  No significant endplate destruction is  identified.  There is 16 mm T1 and T2 hyperintense lesion within L1 vertebral  body which partially suppresses on FLAIR sequences.  This is most compatible  with hemangioma.     SPINAL CORD:     The conus terminates normally.     SOFT TISSUES:     The paraspinal structures are unremarkable.     L1-L2: Posterior disc osteophyte complex without significant spinal canal  stenosis or neural foraminal stenosis.  Bilateral facet degenerative changes.     L2-L3: Posterior disc osteophyte complex which extends into bilateral neural  foraminal stenosis. Bilateral facet degenerative changes. No significant  spinal canal stenosis or neural foraminal stenosis.     L3-L4: There is intervertebral disc height loss.  There is posterior disc  osteophyte complex which extends into bilateral neural foramina.  This is  slightly greater on the left.  Moderate bilateral facet degenerative changes. Moderate left neural foraminal stenosis.  No significant right neural  foraminal stenosis.  Minimal spinal canal stenosis.     L4-L5: Posterior disc osteophyte complex which extends into bilateral neural  foramina.  Moderate bilateral facet degenerative changes.  Mild ligamentum  flavum thickening.  Minimal spinal canal stenosis.  Mild-to-moderate right  neural foraminal stenosis, and no significant left neural foraminal stenosis.     L5-S1: Posterior disc osteophyte complex with mild bilateral facet  degenerative changes.  No significant spinal canal stenosis. Moderate-to-severe bilateral neural foraminal stenosis.         EXAMINATION:  2 VIEWS OF THE LEFT HIP     5/22/2018 3:41 pm     COMPARISON:  None.     HISTORY:  ORDERING SYSTEM PROVIDED HISTORY: Chronic bilateral low back pain without  sciatica     FINDINGS:  There is moderate narrowing and

## 2019-05-23 ENCOUNTER — HOSPITAL ENCOUNTER (OUTPATIENT)
Dept: PAIN MANAGEMENT | Age: 54
Discharge: HOME | End: 2019-05-23
Payer: MEDICARE

## 2019-05-23 VITALS — HEART RATE: 99 BPM | TEMPERATURE: 98.5 F | DIASTOLIC BLOOD PRESSURE: 92 MMHG | SYSTOLIC BLOOD PRESSURE: 171 MMHG

## 2019-05-23 DIAGNOSIS — Z51.81 MEDICATION MONITORING ENCOUNTER: ICD-10-CM

## 2019-05-23 DIAGNOSIS — M54.50 CHRONIC BILATERAL LOW BACK PAIN WITHOUT SCIATICA: ICD-10-CM

## 2019-05-23 DIAGNOSIS — M54.16 LUMBAR RADICULOPATHY: Primary | Chronic | ICD-10-CM

## 2019-05-23 DIAGNOSIS — M47.817 LUMBOSACRAL SPONDYLOSIS WITHOUT MYELOPATHY: ICD-10-CM

## 2019-05-23 DIAGNOSIS — G89.29 CHRONIC BILATERAL LOW BACK PAIN WITHOUT SCIATICA: ICD-10-CM

## 2019-05-23 PROCEDURE — 80307 DRUG TEST PRSMV CHEM ANLYZR: CPT

## 2019-05-23 PROCEDURE — 99214 OFFICE O/P EST MOD 30 MIN: CPT

## 2019-05-23 PROCEDURE — 99214 OFFICE O/P EST MOD 30 MIN: CPT | Performed by: NURSE PRACTITIONER

## 2019-05-23 RX ORDER — MORPHINE SULFATE 15 MG/1
15 TABLET, FILM COATED, EXTENDED RELEASE ORAL EVERY 12 HOURS SCHEDULED
Qty: 60 TABLET | Refills: 0 | Status: SHIPPED | OUTPATIENT
Start: 2019-05-29 | End: 2019-06-27 | Stop reason: SDUPTHER

## 2019-05-23 RX ORDER — GABAPENTIN 300 MG/1
300 CAPSULE ORAL 2 TIMES DAILY
Qty: 60 CAPSULE | Refills: 0 | Status: SHIPPED | OUTPATIENT
Start: 2019-05-23 | End: 2019-06-27 | Stop reason: SDUPTHER

## 2019-05-23 RX ORDER — BENZTROPINE MESYLATE 0.5 MG/1
TABLET ORAL
Refills: 1 | Status: ON HOLD | COMMUNITY
Start: 2019-05-14 | End: 2019-08-01 | Stop reason: ALTCHOICE

## 2019-05-23 RX ORDER — TIZANIDINE 4 MG/1
4 TABLET ORAL NIGHTLY
Qty: 30 TABLET | Refills: 0 | Status: SHIPPED | OUTPATIENT
Start: 2019-05-23 | End: 2019-06-27 | Stop reason: SDUPTHER

## 2019-05-23 ASSESSMENT — ENCOUNTER SYMPTOMS
SHORTNESS OF BREATH: 0
CONSTIPATION: 0
COUGH: 0
BACK PAIN: 1

## 2019-05-23 NOTE — PROGRESS NOTES
Patient is here today to review medication contract. Chief Complaint:  Back pain    PMH     Pt had a sebaceous cyst removed from the mid thoracic spine several years ago and reports intermittent pain in this area that radiates into lumbar and down legs at times. She was referred to the pain clinic by Dr. Nemo Dennis in December of 2015 for epidural steroid injections and RFA. She had Radiofrequency ablation of median branches at the Transverse processes of L3 / L4 / L5 and S1 on Left on 10/30/18 and Radiofrequency ablation of median branches at the Transverse processes of L3 / L4 / L5 and S1 on Right on 11/13/18 and reports ongoing relief.  She had left hip injection with Dr. Duran Hightower on 3/26/19 and reports significant relief - \"I have no pain, only some stiffness\". Discussed PT but she is not interested at this time. Back Pain   This is a chronic problem. The current episode started more than 1 year ago. The problem occurs constantly. The problem is unchanged. The pain is present in the lumbar spine and gluteal (left groin through the buttock). The quality of the pain is described as aching, burning and stabbing. The pain does not radiate. The pain is at a severity of 5/10. The pain is moderate. The pain is the same all the time. The symptoms are aggravated by position, lying down, sitting, standing, bending and twisting. Stiffness is present all day. Associated symptoms include leg pain and numbness. Pertinent negatives include no chest pain or fever. (Mild pain on occasion down back of both thighs. Numbness in right lower leg and foot.) Risk factors include obesity, lack of exercise, sedentary lifestyle and menopause. She has tried muscle relaxant, ice, heat and bed rest for the symptoms. The treatment provided moderate relief. Patient denies any new neurological symptoms. No bowel or bladder incontinence, no weakness, and no falling.     Pill count: not appropriate short 3 tabs  Morphine equivalent: 30    Attestation: The Prescription Monitoring Report for this patient was reviewed today. Ahswini Mallory, APRN - CNP)  Chronic Pain Routine Monitoring: Possible medication side effects, risk of tolerance/dependence & alternative treatments discussed., Obtaining appropriate analgesic effect of treatment., No signs of potential drug abuse or diversion identified: otherwise, see note documentation Ashwini Mallory, APRN - CNP)      Past Medical History:   Diagnosis Date    ADHD (attention deficit hyperactivity disorder)     Bipolar 1 disorder ([facility] [address]     Depression with anxiety     GERD (gastroesophageal reflux disease)     Heel spur     left    Hyperlipidemia     Hypertension     Left hip pain     PTSD (post-traumatic stress disorder)     Tremor     Trouble swallowing     Weight gain        Past Surgical History:   Procedure Laterality Date    CARPAL TUNNEL RELEASE Bilateral     HYSTERECTOMY      NERVE BLOCK  08/01/2016    duramorph 1mg  celestone 9mg    NERVE BLOCK  10/31/2016    duramorph epidural steroid block decadron 7.5 mg durmoprh 1.5 mg    NERVE BLOCK Left     NO STEROID, LEFT MBNB# 1    NERVE BLOCK Left 01/16/2017    LT MBNB #2   celestone 6mg    NERVE BLOCK Left 02/06/2017    LT lumbar RF    kenalog 40    NERVE BLOCK Right 06/19/2017    right lumbar mbnb #1    NERVE BLOCK  07/07/2017    right radiofrequency kenolog 40mg    NERVE BLOCK  09/11/2017    duramorph 1mg & celestone 9 mg    NERVE BLOCK Left 10/02/2018    LEFT LUMBAR EPIDURAL STEROID BLOCK DECADRON 10MG    NERVE BLOCK  10/16/2018    blanca mbnb, marcaine and xylocaine    NERVE BLOCK Left 10/30/2018    left lumbar rf- no steroid    NERVE BLOCK Right 11/13/2018    RT lumbar RFA no steroid    NERVE BLOCK  03/26/2019    left hip decadron 10mg, marcaine . 5 %    TONSILLECTOMY         No Known Allergies      Current Outpatient Medications:     morphine (MS CONTIN) 15 MG extended release tablet, Take 1 tablet by mouth every 12 hours for 30 days. , Disp: 60 tablet, Rfl: 0    gabapentin (NEURONTIN) 300 MG capsule, Take 1 capsule by mouth 2 times daily for 30 days. , Disp: 60 capsule, Rfl: 0    tiZANidine (ZANAFLEX) 4 MG tablet, Take 1 tablet by mouth nightly, Disp: 30 tablet, Rfl: 0    hydrOXYzine (VISTARIL) 50 MG capsule, Take 50 mg by mouth 3 times daily as needed for Itching, Disp: , Rfl:     pantoprazole (PROTONIX) 40 MG tablet, TAKE 1 TABLET BY MOUTH EVERY DAY, Disp: , Rfl:     busPIRone (BUSPAR) 10 MG tablet, Take 10 mg by mouth 2 times daily, Disp: , Rfl:     zolpidem (AMBIEN) 10 MG tablet, Take 10 mg by mouth nightly as needed for Sleep, Disp: , Rfl:     Multiple Vitamins-Minerals (THERAPEUTIC MULTIVITAMIN-MINERALS) tablet, Take 1 tablet by mouth daily, Disp: , Rfl:     Biotin 5000 MCG CAPS, Take by mouth, Disp: , Rfl:     ranitidine (ZANTAC) 150 MG tablet, Take 150 mg by mouth, Disp: , Rfl:     amphetamine-dextroamphetamine (ADDERALL) 20 MG tablet, Take 30 mg by mouth daily , Disp: , Rfl:     rosuvastatin (CRESTOR) 10 MG tablet, Take 10 mg by mouth daily, Disp: , Rfl:     metoprolol (LOPRESSOR) 25 MG tablet, Take 50 mg by mouth 2 times daily , Disp: , Rfl:     Family History   Problem Relation Age of Onset    Other Mother         ulcers    Heart Disease Father     High Blood Pressure Father     Other Father         blind, pacemaker       Social History     Socioeconomic History    Marital status:      Spouse name: Not on file    Number of children: Not on file    Years of education: Not on file    Highest education level: Not on file   Occupational History    Not on file   Social Needs    Financial resource strain: Not on file    Food insecurity:     Worry: Not on file     Inability: Not on file    Transportation needs:     Medical: Not on file     Non-medical: Not on file   Tobacco Use    Smoking status: Current Every Day Smoker    Smokeless tobacco: Never Used   Substance and Sexual Activity    Alcohol use: Not on file    Drug use: Not on file    Sexual activity: Not on file   Lifestyle    Physical activity:     Days per week: Not on file     Minutes per session: Not on file    Stress: Not on file   Relationships    Social connections:     Talks on phone: Not on file     Gets together: Not on file     Attends Denominational service: Not on file     Active member of club or organization: Not on file     Attends meetings of clubs or organizations: Not on file     Relationship status: Not on file    Intimate partner violence:     Fear of current or ex partner: Not on file     Emotionally abused: Not on file     Physically abused: Not on file     Forced sexual activity: Not on file   Other Topics Concern    Not on file   Social History Narrative    Not on file       Review of Systems:  Review of Systems   Constitution: Negative for chills and fever. Cardiovascular: Negative for chest pain. Respiratory: Negative for cough and shortness of breath. Musculoskeletal: Positive for back pain. Gastrointestinal: Negative for constipation. Neurological: Positive for numbness. Physical Exam:  BP (!) 167/99   Pulse 99   Temp 98.5 °F (36.9 °C) (Oral)     Physical Exam   Constitutional: She is oriented to person, place, and time. Cardiovascular: Normal rate. Pulmonary/Chest: Effort normal.   Musculoskeletal:        Left hip: She exhibits tenderness. Lumbar back: She exhibits decreased range of motion and tenderness. Antalgic gait    Neurological: She is alert and oriented to person, place, and time. Skin: Skin is warm and dry.        Record/Diagnostics Review:    Last jaydon Nov  and was appropriate     MRI Lumbar 2018 -      FINDINGS:  BONES/ALIGNMENT:     There are degenerative endplate changes throughout the lumbar spine.  There  is a small amount of fluid within L3-L4 intervertebral disc with  intervertebral disc height loss.  No significant endplate destruction is  identified. Jim Ng is 16 mm T1 and T2 hyperintense lesion within L1 vertebral  body which partially suppresses on FLAIR sequences.  This is most compatible  with hemangioma.     SPINAL CORD:     The conus terminates normally.     SOFT TISSUES:     The paraspinal structures are unremarkable.     L1-L2: Posterior disc osteophyte complex without significant spinal canal  stenosis or neural foraminal stenosis.  Bilateral facet degenerative changes.     L2-L3: Posterior disc osteophyte complex which extends into bilateral neural  foraminal stenosis. Bilateral facet degenerative changes. No significant  spinal canal stenosis or neural foraminal stenosis.     L3-L4: There is intervertebral disc height loss.  There is posterior disc  osteophyte complex which extends into bilateral neural foramina.  This is  slightly greater on the left.  Moderate bilateral facet degenerative changes. Moderate left neural foraminal stenosis.  No significant right neural  foraminal stenosis.  Minimal spinal canal stenosis.     L4-L5: Posterior disc osteophyte complex which extends into bilateral neural  foramina.  Moderate bilateral facet degenerative changes.  Mild ligamentum  flavum thickening.  Minimal spinal canal stenosis.  Mild-to-moderate right  neural foraminal stenosis, and no significant left neural foraminal stenosis.     L5-S1: Posterior disc osteophyte complex with mild bilateral facet  degenerative changes.  No significant spinal canal stenosis. Moderate-to-severe bilateral neural foraminal stenosis.         EXAMINATION:  2 VIEWS OF THE LEFT HIP     5/22/2018 3:41 pm     COMPARISON:  None.     HISTORY:  ORDERING SYSTEM PROVIDED HISTORY: Chronic bilateral low back pain without sciatica     FINDINGS:  There is moderate narrowing and subchondral sclerosis of the superior joint space.  Cortical margins are intact.  Alignment is anatomic.  Soft tissues are unremarkable.        Impression  Moderate osteoarthrosis      Assessment:  Problem List Items

## 2019-05-26 LAB
6-ACETYLMORPHINE, UR: NOT DETECTED
7-AMINOCLONAZEPAM, URINE: NOT DETECTED
ALPHA-OH-ALPRAZ, URINE: NOT DETECTED
ALPRAZOLAM, URINE: NOT DETECTED
AMPHETAMINES, URINE: PRESENT
BARBITURATES, URINE: NOT DETECTED
BENZOYLECGONINE, UR: NOT DETECTED
BUPRENORPHINE URINE: NOT DETECTED
CARISOPRODOL, UR: NOT DETECTED
CLONAZEPAM, URINE: NOT DETECTED
CODEINE, URINE: NOT DETECTED
CREATININE URINE: 248.5 MG/DL (ref 20–400)
DIAZEPAM, URINE: NOT DETECTED
EER PAIN MGT DRUG PANEL, HIGH RES/EMIT U: NORMAL
ETHYL GLUCURONIDE UR: NOT DETECTED
FENTANYL URINE: NOT DETECTED
HYDROCODONE, URINE: NOT DETECTED
HYDROMORPHONE, URINE: PRESENT
LORAZEPAM, URINE: NOT DETECTED
MARIJUANA METAB, UR: NOT DETECTED
MDA, UR: NOT DETECTED
MDEA, EVE, UR: NOT DETECTED
MDMA URINE: NOT DETECTED
MEPERIDINE METAB, UR: NOT DETECTED
METHADONE, URINE: NOT DETECTED
METHAMPHETAMINE, URINE: NOT DETECTED
METHYLPHENIDATE: NOT DETECTED
MIDAZOLAM, URINE: NOT DETECTED
MORPHINE URINE: PRESENT
NORBUPRENORPHINE, URINE: NOT DETECTED
NORDIAZEPAM, URINE: NOT DETECTED
NORFENTANYL, URINE: NOT DETECTED
NORHYDROCODONE, URINE: NOT DETECTED
NOROXYCODONE, URINE: NOT DETECTED
NOROXYMORPHONE, URINE: NOT DETECTED
OXAZEPAM, URINE: NOT DETECTED
OXYCODONE URINE: NOT DETECTED
OXYMORPHONE, URINE: NOT DETECTED
PAIN MGT DRUG PANEL, HI RES, UR: NORMAL
PCP,URINE: NOT DETECTED
PHENTERMINE, UR: NOT DETECTED
PROPOXYPHENE, URINE: NOT DETECTED
TAPENTADOL, URINE: NOT DETECTED
TAPENTADOL-O-SULFATE, URINE: NOT DETECTED
TEMAZEPAM, URINE: NOT DETECTED
TRAMADOL, URINE: NOT DETECTED
ZOLPIDEM, URINE: NOT DETECTED

## 2019-06-27 ENCOUNTER — HOSPITAL ENCOUNTER (OUTPATIENT)
Dept: PAIN MANAGEMENT | Age: 54
Discharge: HOME | End: 2019-06-27
Payer: MEDICARE

## 2019-06-27 VITALS
TEMPERATURE: 98.6 F | SYSTOLIC BLOOD PRESSURE: 138 MMHG | HEART RATE: 68 BPM | DIASTOLIC BLOOD PRESSURE: 72 MMHG | RESPIRATION RATE: 14 BRPM

## 2019-06-27 DIAGNOSIS — Z51.81 MEDICATION MONITORING ENCOUNTER: ICD-10-CM

## 2019-06-27 DIAGNOSIS — G89.29 CHRONIC BILATERAL LOW BACK PAIN WITH BILATERAL SCIATICA: Primary | ICD-10-CM

## 2019-06-27 DIAGNOSIS — M54.41 CHRONIC BILATERAL LOW BACK PAIN WITH BILATERAL SCIATICA: Primary | ICD-10-CM

## 2019-06-27 DIAGNOSIS — G89.29 CHRONIC BILATERAL LOW BACK PAIN WITHOUT SCIATICA: ICD-10-CM

## 2019-06-27 DIAGNOSIS — M54.42 CHRONIC BILATERAL LOW BACK PAIN WITH BILATERAL SCIATICA: Primary | ICD-10-CM

## 2019-06-27 DIAGNOSIS — M54.16 LUMBAR RADICULOPATHY: Chronic | ICD-10-CM

## 2019-06-27 DIAGNOSIS — M54.50 CHRONIC BILATERAL LOW BACK PAIN WITHOUT SCIATICA: ICD-10-CM

## 2019-06-27 PROCEDURE — 99214 OFFICE O/P EST MOD 30 MIN: CPT

## 2019-06-27 PROCEDURE — 99214 OFFICE O/P EST MOD 30 MIN: CPT | Performed by: NURSE PRACTITIONER

## 2019-06-27 RX ORDER — GABAPENTIN 300 MG/1
300 CAPSULE ORAL 2 TIMES DAILY
Qty: 60 CAPSULE | Refills: 0 | Status: SHIPPED | OUTPATIENT
Start: 2019-06-27 | End: 2019-07-26 | Stop reason: SDUPTHER

## 2019-06-27 RX ORDER — MORPHINE SULFATE 15 MG/1
15 TABLET, FILM COATED, EXTENDED RELEASE ORAL EVERY 12 HOURS SCHEDULED
Qty: 60 TABLET | Refills: 0 | Status: SHIPPED | OUTPATIENT
Start: 2019-06-28 | End: 2019-07-26 | Stop reason: SDUPTHER

## 2019-06-27 RX ORDER — TIZANIDINE 4 MG/1
4 TABLET ORAL NIGHTLY
Qty: 30 TABLET | Refills: 0 | Status: SHIPPED | OUTPATIENT
Start: 2019-06-27 | End: 2019-07-26 | Stop reason: SDUPTHER

## 2019-06-27 ASSESSMENT — ENCOUNTER SYMPTOMS
SHORTNESS OF BREATH: 0
BACK PAIN: 1
BOWEL INCONTINENCE: 0
COUGH: 0
CONSTIPATION: 0

## 2019-06-27 NOTE — PROGRESS NOTES
Patient is here today to review medication contract. Chief Complaint:  Back pain    PMH     Pt had a sebaceous cyst removed from the mid thoracic spine several years ago and reports intermittent pain in this area that radiates into lumbar and down legs at times. She was referred to the pain clinic by Dr. Sudarshan Pro in December of 2015 for epidural steroid injections and RFA. She had Radiofrequency ablation of median branches at the Transverse processes of L3 / L4 / L5 and S1 on Left on 10/30/18 and Radiofrequency ablation of median branches at the Transverse processes of L3 / L4 / L5 and S1 on Right on 11/13/18 and reported significant relief but pain has now returned. Discussed PT but she is not interested at this time      HPI:     Back Pain   This is a chronic problem. The current episode started more than 1 year ago. The problem occurs constantly. The problem is unchanged. The pain is present in the lumbar spine. The quality of the pain is described as aching. The pain radiates to the left knee. The pain is at a severity of 5/10. The pain is moderate. The symptoms are aggravated by standing, sitting and bending (walking). Associated symptoms include leg pain, numbness, paresthesias and weakness. Pertinent negatives include no bladder incontinence, bowel incontinence, chest pain or fever. Risk factors include sedentary lifestyle. She has tried NSAIDs, ice, muscle relaxant, heat and home exercises for the symptoms. The treatment provided mild relief. Patient denies any new neurological symptoms. No bowel or bladder incontinence, no weakness, and no falling. Pill count: appropriate    Morphine equivalent: 30    Periodic Controlled Substance Monitoring: Possible medication side effects, risk of tolerance/dependence & alternative treatments discussed., No signs of potential drug abuse or diversion identified. , Assessed functional status., Obtaining appropriate analgesic effect of treatment.  Trinh Mcgrath Jennifer Boo, APRN - CNP)      Past Medical History:   Diagnosis Date    ADHD (attention deficit hyperactivity disorder)     Bipolar 1 disorder ([facility])     Depression with anxiety     GERD (gastroesophageal reflux disease)     Heel spur     left    Hyperlipidemia     Hypertension     Left hip pain     PTSD (post-traumatic stress disorder)     Tremor     Trouble swallowing     Weight gain        Past Surgical History:   Procedure Laterality Date    CARPAL TUNNEL RELEASE Bilateral     HYSTERECTOMY      NERVE BLOCK  08/01/2016    duramorph 1mg  celestone 9mg    NERVE BLOCK  10/31/2016    duramorph epidural steroid block decadron 7.5 mg durmoprh 1.5 mg    NERVE BLOCK Left     NO STEROID, LEFT MBNB# 1    NERVE BLOCK Left 01/16/2017    LT MBNB #2   celestone 6mg    NERVE BLOCK Left 02/06/2017    LT lumbar RF    kenalog 40    NERVE BLOCK Right 06/19/2017    right lumbar mbnb #1    NERVE BLOCK  07/07/2017    right radiofrequency kenolog 40mg    NERVE BLOCK  09/11/2017    duramorph 1mg & celestone 9 mg    NERVE BLOCK Left 10/02/2018    LEFT LUMBAR EPIDURAL STEROID BLOCK DECADRON 10MG    NERVE BLOCK  10/16/2018    blanca mbnb, marcaine and xylocaine    NERVE BLOCK Left 10/30/2018    left lumbar rf- no steroid    NERVE BLOCK Right 11/13/2018    RT lumbar RFA no steroid    NERVE BLOCK  03/26/2019    left hip decadron 10mg, marcaine . 5 %    TONSILLECTOMY         No Known Allergies      Current Outpatient Medications:     [START ON 6/28/2019] morphine (MS CONTIN) 15 MG extended release tablet, Take 1 tablet by mouth every 12 hours for 30 days. , Disp: 60 tablet, Rfl: 0    gabapentin (NEURONTIN) 300 MG capsule, Take 1 capsule by mouth 2 times daily for 30 days. , Disp: 60 capsule, Rfl: 0    tiZANidine (ZANAFLEX) 4 MG tablet, Take 1 tablet by mouth nightly, Disp: 30 tablet, Rfl: 0    benztropine (COGENTIN) 0.5 MG tablet, TK 1 T PO BID, Disp: , Rfl: 1    hydrOXYzine (VISTARIL) 50 MG capsule, Take 50 mg by mouth 3 times daily as needed for Itching, Disp: , Rfl:     pantoprazole (PROTONIX) 40 MG tablet, TAKE 1 TABLET BY MOUTH EVERY DAY, Disp: , Rfl:     busPIRone (BUSPAR) 10 MG tablet, Take 10 mg by mouth 2 times daily, Disp: , Rfl:     zolpidem (AMBIEN) 10 MG tablet, Take 10 mg by mouth nightly as needed for Sleep, Disp: , Rfl:     Multiple Vitamins-Minerals (THERAPEUTIC MULTIVITAMIN-MINERALS) tablet, Take 1 tablet by mouth daily, Disp: , Rfl:     Biotin 5000 MCG CAPS, Take by mouth, Disp: , Rfl:     ranitidine (ZANTAC) 150 MG tablet, Take 150 mg by mouth, Disp: , Rfl:     amphetamine-dextroamphetamine (ADDERALL) 20 MG tablet, Take 30 mg by mouth daily , Disp: , Rfl:     rosuvastatin (CRESTOR) 10 MG tablet, Take 10 mg by mouth daily, Disp: , Rfl:     metoprolol (LOPRESSOR) 25 MG tablet, Take 50 mg by mouth 2 times daily , Disp: , Rfl:     Family History   Problem Relation Age of Onset    Other Mother         ulcers    Heart Disease Father     High Blood Pressure Father     Other Father         blind, pacemaker       Social History     Socioeconomic History    Marital status:      Spouse name: Not on file    Number of children: Not on file    Years of education: Not on file    Highest education level: Not on file   Occupational History    Not on file   Social Needs    Financial resource strain: Not on file    Food insecurity:     Worry: Not on file     Inability: Not on file    Transportation needs:     Medical: Not on file     Non-medical: Not on file   Tobacco Use    Smoking status: Current Every Day Smoker    Smokeless tobacco: Never Used   Substance and Sexual Activity    Alcohol use: Not on file    Drug use: Not on file    Sexual activity: Not on file   Lifestyle    Physical activity:     Days per week: Not on file     Minutes per session: Not on file    Stress: Not on file   Relationships    Social connections:     Talks on phone: Not on file     Gets together: Not on file     Attends Yazidi service: Not on file     Active member of club or organization: Not on file     Attends meetings of clubs or organizations: Not on file     Relationship status: Not on file    Intimate partner violence:     Fear of current or ex partner: Not on file     Emotionally abused: Not on file     Physically abused: Not on file     Forced sexual activity: Not on file   Other Topics Concern    Not on file   Social History Narrative    Not on file       Review of Systems:  Review of Systems   Constitution: Negative for chills and fever. Cardiovascular: Negative for chest pain. Respiratory: Negative for cough and shortness of breath. Musculoskeletal: Positive for back pain, muscle cramps, muscle weakness and myalgias. Negative for falls. Gastrointestinal: Negative for bowel incontinence and constipation. Genitourinary: Negative for bladder incontinence. Neurological: Positive for numbness, paresthesias and weakness. Physical Exam:  /72   Pulse 68   Temp 98.6 °F (37 °C)   Resp 14     Physical Exam   Constitutional: She is oriented to person, place, and time. Cardiovascular: Normal rate. Pulmonary/Chest: Effort normal.   Musculoskeletal:        Lumbar back: She exhibits decreased range of motion and tenderness. Antalgic gait    Neurological: She is alert and oriented to person, place, and time. Skin: Skin is warm and dry.        Record/Diagnostics Review:    Last jaydon Nov and was appropriate     MRI Lumbar 2018 -      FINDINGS:  BONES/ALIGNMENT:     There are degenerative endplate changes throughout the lumbar spine.  There  is a small amount of fluid within L3-L4 intervertebral disc with  intervertebral disc height loss.  No significant endplate destruction is  identified.  There is 16 mm T1 and T2 hyperintense lesion within L1 vertebral  body which partially suppresses on FLAIR sequences.  This is most compatible  with hemangioma.     SPINAL CORD:     The conus terminates normally.     SOFT TISSUES:     The paraspinal structures are unremarkable.     L1-L2: Posterior disc osteophyte complex without significant spinal canal  stenosis or neural foraminal stenosis.  Bilateral facet degenerative changes.     L2-L3: Posterior disc osteophyte complex which extends into bilateral neural  foraminal stenosis. Bilateral facet degenerative changes. No significant  spinal canal stenosis or neural foraminal stenosis.     L3-L4: There is intervertebral disc height loss.  There is posterior disc  osteophyte complex which extends into bilateral neural foramina.  This is  slightly greater on the left.  Moderate bilateral facet degenerative changes. Moderate left neural foraminal stenosis.  No significant right neural  foraminal stenosis.  Minimal spinal canal stenosis.     L4-L5: Posterior disc osteophyte complex which extends into bilateral neural  foramina.  Moderate bilateral facet degenerative changes.  Mild ligamentum  flavum thickening.  Minimal spinal canal stenosis.  Mild-to-moderate right  neural foraminal stenosis, and no significant left neural foraminal stenosis.     L5-S1: Posterior disc osteophyte complex with mild bilateral facet  degenerative changes.  No significant spinal canal stenosis. Moderate-to-severe bilateral neural foraminal stenosis.         EXAMINATION:  2 VIEWS OF THE LEFT HIP     5/22/2018 3:41 pm     COMPARISON:  None.     HISTORY:  ORDERING SYSTEM PROVIDED HISTORY: Chronic bilateral low back pain without sciatica     FINDINGS:  There is moderate narrowing and subchondral sclerosis of the superior joint space.  Cortical margins are intact.  Alignment is anatomic.  Soft tissues are unremarkable.        Impression  Moderate osteoarthrosis  Assessment:  Problem List Items Addressed This Visit     Lumbar radiculopathy (Chronic)    Relevant Medications    morphine (MS CONTIN) 15 MG extended release tablet (Start on 6/28/2019)    gabapentin (NEURONTIN) 300 MG capsule tiZANidine (ZANAFLEX) 4 MG tablet    Other Relevant Orders    Destruction by neurolytic agent    Destruction by neurolytic agent    Chronic back pain - Primary    Relevant Medications    morphine (MS CONTIN) 15 MG extended release tablet (Start on 6/28/2019)    gabapentin (NEURONTIN) 300 MG capsule    tiZANidine (ZANAFLEX) 4 MG tablet    Other Relevant Orders    Destruction by neurolytic agent    Destruction by neurolytic agent    Chronic bilateral low back pain without sciatica    Relevant Medications    morphine (MS CONTIN) 15 MG extended release tablet (Start on 6/28/2019)    tiZANidine (ZANAFLEX) 4 MG tablet    Medication monitoring encounter    Relevant Medications    morphine (MS CONTIN) 15 MG extended release tablet (Start on 6/28/2019)             Treatment Plan:  Patient relates current medications are helping the pain. Patient reports taking pain medications as prescribed, denies obtaining medications from different sources and denies use of illegal drugs. Patient denies side effects from medications like nausea, vomiting, constipation or drowsiness. Patient reports current activities of daily living are possible due to medications and would like to continue them. As always, we encourage daily stretching and strengthening exercises, and recommend minimizing use of pain medications unless patient cannot get through daily activities due to pain. Contract requirements met. Continue opioid therapy.  Script written for [facility] gabapentin and tizanidine  Bilat lumbar RFA ordered - pt instructed to call [place] office to [facility]ard  Follow up appointment made for 4 weeks

## 2019-07-26 ENCOUNTER — OFFICE VISIT (OUTPATIENT)
Dept: PAIN MANAGEMENT | Age: 54
End: 2019-07-26
Payer: MEDICARE

## 2019-07-26 VITALS
HEIGHT: 65 IN | BODY MASS INDEX: 43.32 KG/M2 | DIASTOLIC BLOOD PRESSURE: 78 MMHG | SYSTOLIC BLOOD PRESSURE: 117 MMHG | HEART RATE: 67 BPM | WEIGHT: 260 LBS | OXYGEN SATURATION: 96 %

## 2019-07-26 DIAGNOSIS — Z51.81 MEDICATION MONITORING ENCOUNTER: ICD-10-CM

## 2019-07-26 DIAGNOSIS — M54.16 LUMBAR RADICULOPATHY: Chronic | ICD-10-CM

## 2019-07-26 DIAGNOSIS — G89.29 CHRONIC BILATERAL LOW BACK PAIN WITH LEFT-SIDED SCIATICA: ICD-10-CM

## 2019-07-26 DIAGNOSIS — M54.42 CHRONIC BILATERAL LOW BACK PAIN WITH LEFT-SIDED SCIATICA: ICD-10-CM

## 2019-07-26 PROCEDURE — 99213 OFFICE O/P EST LOW 20 MIN: CPT | Performed by: NURSE PRACTITIONER

## 2019-07-26 RX ORDER — TIZANIDINE 4 MG/1
4 TABLET ORAL NIGHTLY
Qty: 30 TABLET | Refills: 0 | Status: SHIPPED | OUTPATIENT
Start: 2019-07-26 | End: 2019-08-26 | Stop reason: SDUPTHER

## 2019-07-26 RX ORDER — MORPHINE SULFATE 15 MG/1
15 TABLET, FILM COATED, EXTENDED RELEASE ORAL EVERY 12 HOURS SCHEDULED
Qty: 60 TABLET | Refills: 0 | Status: SHIPPED | OUTPATIENT
Start: 2019-07-28 | End: 2019-08-26 | Stop reason: SDUPTHER

## 2019-07-26 RX ORDER — GABAPENTIN 300 MG/1
300 CAPSULE ORAL 2 TIMES DAILY
Qty: 60 CAPSULE | Refills: 0 | Status: SHIPPED | OUTPATIENT
Start: 2019-07-26 | End: 2019-08-26 | Stop reason: SDUPTHER

## 2019-07-26 ASSESSMENT — ENCOUNTER SYMPTOMS
BACK PAIN: 1
RESPIRATORY NEGATIVE: 1

## 2019-07-26 NOTE — PROGRESS NOTES
Patient is here today to review medication contract. Chief Complaint   Patient presents with    Back Pain    Medication Refill       PMH     Pt had a sebaceous cyst removed from the mid thoracic spine several years ago and reports intermittent pain in this area that radiates into lumbar and down legs at times. She was referred to the pain clinic by Dr. Lesley Lui in December of 2015 for epidural steroid injections and RFA. She had Radiofrequency ablation of median branches at the Transverse processes of L3 / L4 / L5 and S1 on Left on 10/30/18 and Radiofrequency ablation of median branches at the Transverse processes of L3 / L4 / L5 and S1 on Right on 11/13/18 and reported significant relief but pain has now returned and will schedule. Discussed PT but she is not interested at this time    HPI:   Back Pain   This is a chronic problem. The current episode started more than 1 year ago. The problem occurs constantly. The pain is present in the lumbar spine. The quality of the pain is described as aching. The pain radiates to the left knee. The pain is at a severity of 7/10. The pain is moderate. The pain is the same all the time. The symptoms are aggravated by bending, sitting and standing. Associated symptoms include leg pain, numbness and paresthesias. Risk factors include lack of exercise, menopause and obesity. She has tried analgesics for the symptoms. The treatment provided mild relief.      Medication Refill: Morphine, Zanaflex, gabapentin    Pain score Today:  7  Adverse effects (Constipation / Nausea / Sedation / sexual Dysfunction / others) : none  Mood: fair  Sleep pattern and quality: poor  Activity level: poor    Pill count Today:#6   Last dose taken 7/25/2019  [place] report reviewed today: yes  ER/Hospitalizations/PCP visit related to pain since last visit:no   Any legal problems e.g. DUI etc.:No  Satisfied with current management: Yes    Opioid Contract: 7/26/2019  Last Urine Dug screen dated:/5/23/2019      Past Medical History, Past Surgical History, Social History, Allergies and Medications reviewed and updated in EPIC as indicated    Family History reviewed and is noncontributory. Periodic Controlled Substance Monitoring: Possible medication side effects, risk of tolerance/dependence & alternative treatments discussed., No signs of potential drug abuse or diversion identified. , Assessed functional status., Obtaining appropriate analgesic effect of treatment. Malick Ho, APRN - CNP)      Past Medical History:   Diagnosis Date    ADHD (attention deficit hyperactivity disorder)     Bipolar 1 disorder (HCC)     Depression with anxiety     GERD (gastroesophageal reflux disease)     Heel spur     left    Hyperlipidemia     Hypertension     Left hip pain     PTSD (post-traumatic stress disorder)     Tremor     Trouble swallowing     Weight gain        Past Surgical History:   Procedure Laterality Date    CARPAL TUNNEL RELEASE Bilateral     HYSTERECTOMY      NERVE BLOCK  08/01/2016    duramorph 1mg  celestone 9mg    NERVE BLOCK  10/31/2016    duramorph epidural steroid block decadron 7.5 mg durmoprh 1.5 mg    NERVE BLOCK Left     NO STEROID, LEFT MBNB# 1    NERVE BLOCK Left 01/16/2017    LT MBNB #2   celestone 6mg    NERVE BLOCK Left 02/06/2017    LT lumbar RF    kenalog 40    NERVE BLOCK Right 06/19/2017    right lumbar mbnb #1    NERVE BLOCK  07/07/2017    right radiofrequency kenolog 40mg    NERVE BLOCK  09/11/2017    duramorph 1mg & celestone 9 mg    NERVE BLOCK Left 10/02/2018    LEFT LUMBAR EPIDURAL STEROID BLOCK DECADRON 10MG    NERVE BLOCK  10/16/2018    blanca mbnb, marcaine and xylocaine    NERVE BLOCK Left 10/30/2018    left lumbar rf- no steroid    NERVE BLOCK Right 11/13/2018    RT lumbar RFA no steroid    NERVE BLOCK  03/26/2019    left hip decadron 10mg, marcaine . 5 %    TONSILLECTOMY         No Known Allergies      Current Outpatient Medications:    Medical: Not on file     Non-medical: Not on file   Tobacco Use    Smoking status: Current Every Day Smoker    Smokeless tobacco: Never Used   Substance and Sexual Activity    Alcohol use: Not on file    Drug use: Not on file    Sexual activity: Not on file   Lifestyle    Physical activity:     Days per week: Not on file     Minutes per session: Not on file    Stress: Not on file   Relationships    Social connections:     Talks on phone: Not on file     Gets together: Not on file     Attends Denominational service: Not on file     Active member of club or organization: Not on file     Attends meetings of clubs or organizations: Not on file     Relationship status: Not on file    Intimate partner violence:     Fear of current or ex partner: Not on file     Emotionally abused: Not on file     Physically abused: Not on file     Forced sexual activity: Not on file   Other Topics Concern    Not on file   Social History Narrative    Not on file       Review of Systems:  Review of Systems   Constitution: Negative. Respiratory: Negative. Musculoskeletal: Positive for arthritis, back pain, muscle cramps, muscle weakness and myalgias. Negative for falls, gout, joint pain, joint swelling, neck pain and stiffness. Genitourinary: Negative. Neurological: Positive for numbness, paresthesias and tremors (hands). Physical Exam:  /78 (Site: Left Upper Arm, Position: Sitting, Cuff Size: Large Adult)   Pulse 67   Ht 5' 5\" (1.651 m)   Wt 260 lb (117.9 kg)   SpO2 96%   BMI 43.27 kg/m²     Physical Exam   Constitutional: She is oriented to person, place, and time. Obese BMI 43.27   Cardiovascular: Normal rate. Pulmonary/Chest: Effort normal.   Musculoskeletal:        Lumbar back: She exhibits decreased range of motion, tenderness and pain. Neurological: She is alert and oriented to person, place, and time. Skin: Skin is warm and dry.        Assessment:  Problem List Items Addressed This Visit

## 2019-08-01 ENCOUNTER — HOSPITAL ENCOUNTER (OUTPATIENT)
Age: 54
Setting detail: OUTPATIENT SURGERY
Discharge: HOME | End: 2019-08-01
Attending: ANESTHESIOLOGY | Admitting: ANESTHESIOLOGY
Payer: MEDICARE

## 2019-08-01 ENCOUNTER — APPOINTMENT (OUTPATIENT)
Dept: GENERAL RADIOLOGY | Age: 54
End: 2019-08-01
Attending: ANESTHESIOLOGY
Payer: MEDICARE

## 2019-08-01 VITALS
DIASTOLIC BLOOD PRESSURE: 89 MMHG | OXYGEN SATURATION: 100 % | HEART RATE: 77 BPM | TEMPERATURE: 97.9 F | HEIGHT: 65 IN | SYSTOLIC BLOOD PRESSURE: 166 MMHG | WEIGHT: 264.19 LBS | RESPIRATION RATE: 16 BRPM | BODY MASS INDEX: 44.01 KG/M2

## 2019-08-01 PROCEDURE — 3600000055 HC PAIN LEVEL 3 ADDL 15 MIN: Performed by: ANESTHESIOLOGY

## 2019-08-01 PROCEDURE — 64636 DESTROY L/S FACET JNT ADDL: CPT | Performed by: ANESTHESIOLOGY

## 2019-08-01 PROCEDURE — 2709999900 HC NON-CHARGEABLE SUPPLY: Performed by: ANESTHESIOLOGY

## 2019-08-01 PROCEDURE — 6360000002 HC RX W HCPCS: Performed by: ANESTHESIOLOGY

## 2019-08-01 PROCEDURE — 3209999900 FLUORO FOR SURGICAL PROCEDURES

## 2019-08-01 PROCEDURE — 99152 MOD SED SAME PHYS/QHP 5/>YRS: CPT | Performed by: ANESTHESIOLOGY

## 2019-08-01 PROCEDURE — 99153 MOD SED SAME PHYS/QHP EA: CPT | Performed by: ANESTHESIOLOGY

## 2019-08-01 PROCEDURE — 2580000003 HC RX 258: Performed by: ANESTHESIOLOGY

## 2019-08-01 PROCEDURE — 3600000054 HC PAIN LEVEL 3 BASE: Performed by: ANESTHESIOLOGY

## 2019-08-01 PROCEDURE — 64635 DESTROY LUMB/SAC FACET JNT: CPT | Performed by: ANESTHESIOLOGY

## 2019-08-01 PROCEDURE — 7100000010 HC PHASE II RECOVERY - FIRST 15 MIN: Performed by: ANESTHESIOLOGY

## 2019-08-01 PROCEDURE — 7100000011 HC PHASE II RECOVERY - ADDTL 15 MIN: Performed by: ANESTHESIOLOGY

## 2019-08-01 RX ORDER — KETOROLAC TROMETHAMINE 30 MG/ML
INJECTION, SOLUTION INTRAMUSCULAR; INTRAVENOUS PRN
Status: DISCONTINUED | OUTPATIENT
Start: 2019-08-01 | End: 2019-08-01 | Stop reason: ALTCHOICE

## 2019-08-01 RX ORDER — MIDAZOLAM HYDROCHLORIDE 1 MG/ML
INJECTION INTRAMUSCULAR; INTRAVENOUS PRN
Status: DISCONTINUED | OUTPATIENT
Start: 2019-08-01 | End: 2019-08-01 | Stop reason: ALTCHOICE

## 2019-08-01 RX ORDER — SODIUM CHLORIDE 0.9 % (FLUSH) 0.9 %
10 SYRINGE (ML) INJECTION EVERY 12 HOURS SCHEDULED
Status: DISCONTINUED | OUTPATIENT
Start: 2019-08-01 | End: 2019-08-01 | Stop reason: HOSPADM

## 2019-08-01 RX ORDER — FENTANYL CITRATE 50 UG/ML
INJECTION, SOLUTION INTRAMUSCULAR; INTRAVENOUS PRN
Status: DISCONTINUED | OUTPATIENT
Start: 2019-08-01 | End: 2019-08-01 | Stop reason: ALTCHOICE

## 2019-08-01 RX ORDER — SODIUM CHLORIDE 0.9 % (FLUSH) 0.9 %
10 SYRINGE (ML) INJECTION PRN
Status: DISCONTINUED | OUTPATIENT
Start: 2019-08-01 | End: 2019-08-01 | Stop reason: HOSPADM

## 2019-08-01 RX ADMIN — Medication 10 ML: at 15:23

## 2019-08-01 SDOH — HEALTH STABILITY: MENTAL HEALTH: HOW OFTEN DO YOU HAVE A DRINK CONTAINING ALCOHOL?: NEVER

## 2019-08-01 ASSESSMENT — PAIN SCALES - GENERAL
PAINLEVEL_OUTOF10: 8
PAINLEVEL_OUTOF10: 6
PAINLEVEL_OUTOF10: 2
PAINLEVEL_OUTOF10: 8

## 2019-08-01 ASSESSMENT — PAIN DESCRIPTION - DESCRIPTORS: DESCRIPTORS: ACHING;BURNING;STABBING

## 2019-08-01 ASSESSMENT — PAIN DESCRIPTION - LOCATION: LOCATION: BACK

## 2019-08-01 ASSESSMENT — PAIN DESCRIPTION - PAIN TYPE: TYPE: CHRONIC PAIN

## 2019-08-01 ASSESSMENT — PAIN - FUNCTIONAL ASSESSMENT: PAIN_FUNCTIONAL_ASSESSMENT: 0-10

## 2019-08-01 NOTE — H&P
(VISTARIL) 50 MG capsule, Take 50 mg by mouth 3 times daily as needed for Itching, Disp: , Rfl:     pantoprazole (PROTONIX) 40 MG tablet, TAKE 1 TABLET BY MOUTH EVERY DAY, Disp: , Rfl:     busPIRone (BUSPAR) 10 MG tablet, Take 10 mg by mouth 2 times daily, Disp: , Rfl:     zolpidem (AMBIEN) 10 MG tablet, Take 10 mg by mouth nightly as needed for Sleep, Disp: , Rfl:     Multiple Vitamins-Minerals (THERAPEUTIC MULTIVITAMIN-MINERALS) tablet, Take 1 tablet by mouth daily, Disp: , Rfl:     Biotin 5000 MCG CAPS, Take by mouth, Disp: , Rfl:     ranitidine (ZANTAC) 150 MG tablet, Take 150 mg by mouth, Disp: , Rfl:     amphetamine-dextroamphetamine (ADDERALL) 20 MG tablet, Take 30 mg by mouth daily , Disp: , Rfl:     rosuvastatin (CRESTOR) 10 MG tablet, Take 10 mg by mouth daily, Disp: , Rfl:     metoprolol (LOPRESSOR) 25 MG tablet, Take 50 mg by mouth 2 times daily , Disp: , Rfl:         Family History         Family History   Problem Relation Age of Onset    Other Mother           ulcers    Heart Disease Father      High Blood Pressure Father      Other Father           blind, pacemaker            Social History               Socioeconomic History    Marital status:        Spouse name: Not on file    Number of children: Not on file    Years of education: Not on file    Highest education level: Not on file   Occupational History    Not on file   Social Needs    Financial resource strain: Not on file    Food insecurity:       Worry: Not on file       Inability: Not on file    Transportation needs:       Medical: Not on file       Non-medical: Not on file   Tobacco Use    Smoking status: Current Every Day Smoker    Smokeless tobacco: Never Used   Substance and Sexual Activity    Alcohol use: Not on file    Drug use: Not on file    Sexual activity: Not on file   Lifestyle    Physical activity:       Days per week: Not on file       Minutes per session: Not on file    Stress: Not on file Relationships    Social connections:       Talks on phone: Not on file       Gets together: Not on file       Attends Latter-day service: Not on file       Active member of club or organization: Not on file       Attends meetings of clubs or organizations: Not on file       Relationship status: Not on file    Intimate partner violence:       Fear of current or ex partner: Not on file       Emotionally abused: Not on file       Physically abused: Not on file       Forced sexual activity: Not on file   Other Topics Concern    Not on file   Social History Narrative    Not on file            Review of Systems:  Review of Systems   Constitution: Negative. Respiratory: Negative. Musculoskeletal: Positive for arthritis, back pain, muscle cramps, muscle weakness and myalgias. Negative for falls, gout, joint pain, joint swelling, neck pain and stiffness. Genitourinary: Negative. Neurological: Positive for numbness, paresthesias and tremors (hands). Physical Exam:  /78 (Site: Left Upper Arm, Position: Sitting, Cuff Size: Large Adult)   Pulse 67   Ht 5' 5\" (1.651 m)   Wt 260 lb (117.9 kg)   SpO2 96%   BMI 43.27 kg/m²      Physical Exam   Constitutional: She is oriented to person, place, and time. Obese BMI 43.27   Cardiovascular: Normal rate. Pulmonary/Chest: Effort normal.   Musculoskeletal:        Lumbar back: She exhibits decreased range of motion, tenderness and pain. Neurological: She is alert and oriented to person, place, and time. Skin: Skin is warm and dry.          Assessment:       Problem List Items Addressed This Visit           Lumbar radiculopathy (Chronic)      Relevant Medications      morphine (MS CONTIN) 15 MG extended release tablet (Start on 7/28/2019)      gabapentin (NEURONTIN) 300 MG capsule      tiZANidine (ZANAFLEX) 4 MG tablet      Chronic bilateral low back pain without sciatica      Relevant Medications      morphine (MS CONTIN) 15 MG extended release tablet

## 2019-08-01 NOTE — OP NOTE
impedence was checked to make sure it is acceptable. Then a sensory stimulus was applied at 50 Hz up to 0.5 volt and concordant pain symptoms were reproduced. Then a motor stimulus was applied at 2 Hz up to 2 volts or 3 x times the sensory stimulus and no motor stimulation was seen in lower extremities. Some multifidus stimulus was seen. Then after negative aspiration 1 ml of 4% lidocaine was injected through the needle at each level. The radiofrequency lesion was done at 85 degrees centigrade for 110 seconds. For L5 median branch block the junction of the ala of  the sacrum with the superior articular process of the facet joint was taken as a reference point. For the other levels median branch nerves the junction of the transverse process with the superolateral possible facet joint was taken as a reference point    Patient's vital signs and neurological status remained stable throughout the procedure and post procedural period. The patient tolerated the procedure well and was discharged home in stable condition.

## 2019-08-15 ENCOUNTER — APPOINTMENT (OUTPATIENT)
Dept: GENERAL RADIOLOGY | Age: 54
End: 2019-08-15
Attending: ANESTHESIOLOGY
Payer: MEDICARE

## 2019-08-15 ENCOUNTER — HOSPITAL ENCOUNTER (OUTPATIENT)
Age: 54
Setting detail: OUTPATIENT SURGERY
Discharge: HOME | End: 2019-08-15
Attending: ANESTHESIOLOGY | Admitting: ANESTHESIOLOGY
Payer: MEDICARE

## 2019-08-15 VITALS
TEMPERATURE: 97.7 F | DIASTOLIC BLOOD PRESSURE: 79 MMHG | SYSTOLIC BLOOD PRESSURE: 152 MMHG | OXYGEN SATURATION: 96 % | HEIGHT: 65 IN | HEART RATE: 64 BPM | RESPIRATION RATE: 15 BRPM | WEIGHT: 262.6 LBS | BODY MASS INDEX: 43.75 KG/M2

## 2019-08-15 PROCEDURE — 2500000003 HC RX 250 WO HCPCS: Performed by: ANESTHESIOLOGY

## 2019-08-15 PROCEDURE — 7100000010 HC PHASE II RECOVERY - FIRST 15 MIN: Performed by: ANESTHESIOLOGY

## 2019-08-15 PROCEDURE — 6360000002 HC RX W HCPCS: Performed by: ANESTHESIOLOGY

## 2019-08-15 PROCEDURE — 64635 DESTROY LUMB/SAC FACET JNT: CPT | Performed by: ANESTHESIOLOGY

## 2019-08-15 PROCEDURE — 64636 DESTROY L/S FACET JNT ADDL: CPT | Performed by: ANESTHESIOLOGY

## 2019-08-15 PROCEDURE — 7100000011 HC PHASE II RECOVERY - ADDTL 15 MIN: Performed by: ANESTHESIOLOGY

## 2019-08-15 PROCEDURE — 3600000055 HC PAIN LEVEL 3 ADDL 15 MIN: Performed by: ANESTHESIOLOGY

## 2019-08-15 PROCEDURE — 3209999900 FLUORO FOR SURGICAL PROCEDURES

## 2019-08-15 PROCEDURE — 2709999900 HC NON-CHARGEABLE SUPPLY: Performed by: ANESTHESIOLOGY

## 2019-08-15 PROCEDURE — 99153 MOD SED SAME PHYS/QHP EA: CPT | Performed by: ANESTHESIOLOGY

## 2019-08-15 PROCEDURE — 99152 MOD SED SAME PHYS/QHP 5/>YRS: CPT | Performed by: ANESTHESIOLOGY

## 2019-08-15 PROCEDURE — 3600000054 HC PAIN LEVEL 3 BASE: Performed by: ANESTHESIOLOGY

## 2019-08-15 RX ORDER — FENTANYL CITRATE 50 UG/ML
INJECTION, SOLUTION INTRAMUSCULAR; INTRAVENOUS PRN
Status: DISCONTINUED | OUTPATIENT
Start: 2019-08-15 | End: 2019-08-15 | Stop reason: ALTCHOICE

## 2019-08-15 RX ORDER — SODIUM CHLORIDE 0.9 % (FLUSH) 0.9 %
10 SYRINGE (ML) INJECTION PRN
Status: DISCONTINUED | OUTPATIENT
Start: 2019-08-15 | End: 2019-08-15 | Stop reason: HOSPADM

## 2019-08-15 RX ORDER — LIDOCAINE HYDROCHLORIDE 10 MG/ML
INJECTION, SOLUTION INFILTRATION; PERINEURAL PRN
Status: DISCONTINUED | OUTPATIENT
Start: 2019-08-15 | End: 2019-08-15 | Stop reason: ALTCHOICE

## 2019-08-15 RX ORDER — SODIUM CHLORIDE 0.9 % (FLUSH) 0.9 %
10 SYRINGE (ML) INJECTION EVERY 12 HOURS SCHEDULED
Status: DISCONTINUED | OUTPATIENT
Start: 2019-08-15 | End: 2019-08-15 | Stop reason: HOSPADM

## 2019-08-15 RX ORDER — MIDAZOLAM HYDROCHLORIDE 1 MG/ML
INJECTION INTRAMUSCULAR; INTRAVENOUS PRN
Status: DISCONTINUED | OUTPATIENT
Start: 2019-08-15 | End: 2019-08-15 | Stop reason: ALTCHOICE

## 2019-08-15 RX ORDER — LIDOCAINE HYDROCHLORIDE 40 MG/ML
INJECTION, SOLUTION RETROBULBAR; TOPICAL PRN
Status: DISCONTINUED | OUTPATIENT
Start: 2019-08-15 | End: 2019-08-15 | Stop reason: ALTCHOICE

## 2019-08-15 ASSESSMENT — PAIN SCALES - GENERAL
PAINLEVEL_OUTOF10: 6
PAINLEVEL_OUTOF10: 2
PAINLEVEL_OUTOF10: 5
PAINLEVEL_OUTOF10: 0

## 2019-08-15 ASSESSMENT — PAIN DESCRIPTION - LOCATION
LOCATION: BACK
LOCATION: BACK

## 2019-08-15 ASSESSMENT — PAIN DESCRIPTION - DIRECTION: RADIATING_TOWARDS: BILATERAL LEGS

## 2019-08-15 ASSESSMENT — PAIN DESCRIPTION - DESCRIPTORS: DESCRIPTORS: BURNING;STABBING;THROBBING

## 2019-08-15 ASSESSMENT — PAIN DESCRIPTION - ORIENTATION: ORIENTATION: LOWER

## 2019-08-15 ASSESSMENT — PAIN DESCRIPTION - PAIN TYPE: TYPE: CHRONIC PAIN

## 2019-08-15 NOTE — H&P
Drug use: Not on file    Sexual activity: Not on file   Lifestyle    Physical activity:       Days per week: Not on file       Minutes per session: Not on file    Stress: Not on file   Relationships    Social connections:       Talks on phone: Not on file       Gets together: Not on file       Attends Advent service: Not on file       Active member of club or organization: Not on file       Attends meetings of clubs or organizations: Not on file       Relationship status: Not on file    Intimate partner violence:       Fear of current or ex partner: Not on file       Emotionally abused: Not on file       Physically abused: Not on file       Forced sexual activity: Not on file   Other Topics Concern    Not on file   Social History Narrative    Not on file            Review of Systems:  Review of Systems   Constitution: Negative. Respiratory: Negative. Musculoskeletal: Positive for arthritis, back pain, muscle cramps, muscle weakness and myalgias. Negative for falls, gout, joint pain, joint swelling, neck pain and stiffness. Genitourinary: Negative. Neurological: Positive for numbness, paresthesias and tremors (hands). Physical Exam:  /78 (Site: Left Upper Arm, Position: Sitting, Cuff Size: Large Adult)   Pulse 67   Ht 5' 5\" (1.651 m)   Wt 260 lb (117.9 kg)   SpO2 96%   BMI 43.27 kg/m²      Physical Exam   Constitutional: She is oriented to person, place, and time. Obese BMI 43.27   Cardiovascular: Normal rate. Pulmonary/Chest: Effort normal.   Musculoskeletal:        Lumbar back: She exhibits decreased range of motion, tenderness and pain. Neurological: She is alert and oriented to person, place, and time. Skin: Skin is warm and dry.          Assessment:       Problem List Items Addressed This Visit           Lumbar radiculopathy (Chronic)      Relevant Medications      morphine (MS CONTIN) 15 MG extended release tablet (Start on 7/28/2019)      gabapentin (NEURONTIN) 300 MG capsule      tiZANidine (ZANAFLEX) 4 MG tablet      Chronic bilateral low back pain without sciatica      Relevant Medications      morphine (MS CONTIN) 15 MG extended release tablet (Start on 2019)      tiZANidine (ZANAFLEX) 4 MG tablet      Medication monitoring encounter      Relevant Medications      morphine (MS CONTIN) 15 MG extended release tablet (Start on 2019)                 Treatment Plan:  Patient relates current medications are helping the pain. Patient reports taking pain medications as prescribed, denies obtaining medications from different sources and denies use of illegal drugs. Patient denies side effects from medications like nausea, vomiting, constipation or drowsiness. Patient reports current activities of daily living are possible due to medications and would like to continue them. As always, we encourage daily stretching and strengthening exercises, and recommend minimizing use of pain medications unless patient cannot get through daily activities due to pain. Contract requirements met. Continue opioid therapy. Script written for [facility] gabapentin and tizandine  Pt to schedule RFA that was discussed at last visit - she had to go out of town for   Follow up appointment made for 4 weeks with MD     Controlled Substance Monitoring:     Acute and Chronic Pain Monitoring:   RX Monitoring 2019   Attestation -   Acute Pain Prescriptions -   Periodic Controlled Substance Monitoring Possible medication side effects, risk of tolerance/dependence & alternative treatments discussed. ;No signs of potential drug abuse or diversion identified. ;Assessed functional status. ;Obtaining appropriate analgesic effect of treatment.    Chronic Pain > 80 MEDD -                      ·   Office Visit on 2019   ·   ·   Revision History   ·   ·   Detailed Report   ·   Progress Notes Info     Author Note Status Last Update User   GISELLE Valentine CNP Signed GISELLE Valentine CNP

## 2019-08-26 ENCOUNTER — OFFICE VISIT (OUTPATIENT)
Dept: PAIN MANAGEMENT | Age: 54
End: 2019-08-26
Payer: MEDICARE

## 2019-08-26 VITALS
WEIGHT: 262 LBS | HEART RATE: 71 BPM | DIASTOLIC BLOOD PRESSURE: 81 MMHG | HEIGHT: 65 IN | OXYGEN SATURATION: 96 % | BODY MASS INDEX: 43.65 KG/M2 | SYSTOLIC BLOOD PRESSURE: 131 MMHG

## 2019-08-26 DIAGNOSIS — M47.817 LUMBOSACRAL SPONDYLOSIS WITHOUT MYELOPATHY: Primary | Chronic | ICD-10-CM

## 2019-08-26 DIAGNOSIS — Z51.81 MEDICATION MONITORING ENCOUNTER: ICD-10-CM

## 2019-08-26 DIAGNOSIS — G89.29 CHRONIC BILATERAL LOW BACK PAIN WITH LEFT-SIDED SCIATICA: ICD-10-CM

## 2019-08-26 DIAGNOSIS — M54.42 CHRONIC BILATERAL LOW BACK PAIN WITH LEFT-SIDED SCIATICA: ICD-10-CM

## 2019-08-26 DIAGNOSIS — M54.16 LUMBAR RADICULOPATHY: Chronic | ICD-10-CM

## 2019-08-26 DIAGNOSIS — M25.552 LEFT HIP PAIN: Chronic | ICD-10-CM

## 2019-08-26 DIAGNOSIS — M16.10 HIP ARTHRITIS: Chronic | ICD-10-CM

## 2019-08-26 PROCEDURE — 99214 OFFICE O/P EST MOD 30 MIN: CPT | Performed by: NURSE PRACTITIONER

## 2019-08-26 RX ORDER — GABAPENTIN 300 MG/1
300 CAPSULE ORAL 2 TIMES DAILY
Qty: 60 CAPSULE | Refills: 0 | Status: SHIPPED | OUTPATIENT
Start: 2019-08-26 | End: 2019-09-24 | Stop reason: SDUPTHER

## 2019-08-26 RX ORDER — BENZTROPINE MESYLATE 0.5 MG/1
0.5 TABLET ORAL 2 TIMES DAILY
COMMUNITY

## 2019-08-26 RX ORDER — MORPHINE SULFATE 15 MG/1
15 TABLET, FILM COATED, EXTENDED RELEASE ORAL EVERY 12 HOURS SCHEDULED
Qty: 60 TABLET | Refills: 0 | Status: SHIPPED | OUTPATIENT
Start: 2019-08-30 | End: 2019-09-24 | Stop reason: SDUPTHER

## 2019-08-26 RX ORDER — TIZANIDINE 4 MG/1
4 TABLET ORAL NIGHTLY
Qty: 30 TABLET | Refills: 0 | Status: SHIPPED | OUTPATIENT
Start: 2019-08-26 | End: 2019-09-24 | Stop reason: SDUPTHER

## 2019-08-26 ASSESSMENT — ENCOUNTER SYMPTOMS
CONSTIPATION: 0
BACK PAIN: 1

## 2019-09-12 ENCOUNTER — APPOINTMENT (OUTPATIENT)
Dept: GENERAL RADIOLOGY | Age: 54
End: 2019-09-12
Attending: ANESTHESIOLOGY
Payer: MEDICARE

## 2019-09-12 ENCOUNTER — HOSPITAL ENCOUNTER (OUTPATIENT)
Age: 54
Setting detail: OUTPATIENT SURGERY
Discharge: HOME | End: 2019-09-12
Attending: ANESTHESIOLOGY | Admitting: ANESTHESIOLOGY
Payer: MEDICARE

## 2019-09-12 VITALS
TEMPERATURE: 97.3 F | HEART RATE: 77 BPM | SYSTOLIC BLOOD PRESSURE: 134 MMHG | DIASTOLIC BLOOD PRESSURE: 59 MMHG | HEIGHT: 65 IN | BODY MASS INDEX: 43.65 KG/M2 | RESPIRATION RATE: 18 BRPM | WEIGHT: 262 LBS | OXYGEN SATURATION: 97 %

## 2019-09-12 PROBLEM — M51.26 LUMBAR DISC HERNIATION: Chronic | Status: ACTIVE | Noted: 2019-09-12

## 2019-09-12 PROBLEM — M54.16 LEFT LUMBAR RADICULITIS: Chronic | Status: ACTIVE | Noted: 2019-09-12

## 2019-09-12 PROBLEM — M16.12 PRIMARY OSTEOARTHRITIS OF LEFT HIP: Chronic | Status: ACTIVE | Noted: 2019-09-12

## 2019-09-12 PROCEDURE — 3600000051 HC PAIN LEVEL 1 ADDL 15 MIN: Performed by: ANESTHESIOLOGY

## 2019-09-12 PROCEDURE — 99152 MOD SED SAME PHYS/QHP 5/>YRS: CPT | Performed by: ANESTHESIOLOGY

## 2019-09-12 PROCEDURE — 7100000011 HC PHASE II RECOVERY - ADDTL 15 MIN: Performed by: ANESTHESIOLOGY

## 2019-09-12 PROCEDURE — 2709999900 HC NON-CHARGEABLE SUPPLY: Performed by: ANESTHESIOLOGY

## 2019-09-12 PROCEDURE — 2580000003 HC RX 258: Performed by: ANESTHESIOLOGY

## 2019-09-12 PROCEDURE — 20610 DRAIN/INJ JOINT/BURSA W/O US: CPT | Performed by: ANESTHESIOLOGY

## 2019-09-12 PROCEDURE — 64483 NJX AA&/STRD TFRM EPI L/S 1: CPT | Performed by: ANESTHESIOLOGY

## 2019-09-12 PROCEDURE — 2500000003 HC RX 250 WO HCPCS: Performed by: ANESTHESIOLOGY

## 2019-09-12 PROCEDURE — 3600000050 HC PAIN LEVEL 1 BASE: Performed by: ANESTHESIOLOGY

## 2019-09-12 PROCEDURE — 6360000004 HC RX CONTRAST MEDICATION: Performed by: ANESTHESIOLOGY

## 2019-09-12 PROCEDURE — 7100000010 HC PHASE II RECOVERY - FIRST 15 MIN: Performed by: ANESTHESIOLOGY

## 2019-09-12 PROCEDURE — 99153 MOD SED SAME PHYS/QHP EA: CPT | Performed by: ANESTHESIOLOGY

## 2019-09-12 PROCEDURE — 77002 NEEDLE LOCALIZATION BY XRAY: CPT | Performed by: ANESTHESIOLOGY

## 2019-09-12 PROCEDURE — 3209999900 FLUORO FOR SURGICAL PROCEDURES

## 2019-09-12 PROCEDURE — 6360000002 HC RX W HCPCS: Performed by: ANESTHESIOLOGY

## 2019-09-12 RX ORDER — FENTANYL CITRATE 50 UG/ML
INJECTION, SOLUTION INTRAMUSCULAR; INTRAVENOUS PRN
Status: DISCONTINUED | OUTPATIENT
Start: 2019-09-12 | End: 2019-09-12 | Stop reason: ALTCHOICE

## 2019-09-12 RX ORDER — MIDAZOLAM HYDROCHLORIDE 1 MG/ML
INJECTION INTRAMUSCULAR; INTRAVENOUS PRN
Status: DISCONTINUED | OUTPATIENT
Start: 2019-09-12 | End: 2019-09-12 | Stop reason: ALTCHOICE

## 2019-09-12 RX ORDER — SODIUM CHLORIDE 0.9 % (FLUSH) 0.9 %
10 SYRINGE (ML) INJECTION 2 TIMES DAILY
Status: DISCONTINUED | OUTPATIENT
Start: 2019-09-12 | End: 2019-09-12 | Stop reason: HOSPADM

## 2019-09-12 RX ORDER — LIDOCAINE HYDROCHLORIDE 10 MG/ML
INJECTION, SOLUTION INFILTRATION; PERINEURAL PRN
Status: DISCONTINUED | OUTPATIENT
Start: 2019-09-12 | End: 2019-09-12 | Stop reason: ALTCHOICE

## 2019-09-12 RX ORDER — DEXAMETHASONE SODIUM PHOSPHATE 10 MG/ML
INJECTION INTRAMUSCULAR; INTRAVENOUS PRN
Status: DISCONTINUED | OUTPATIENT
Start: 2019-09-12 | End: 2019-09-12 | Stop reason: ALTCHOICE

## 2019-09-12 RX ORDER — CELECOXIB 100 MG/1
100 CAPSULE ORAL 2 TIMES DAILY
COMMUNITY
End: 2021-03-17 | Stop reason: ALTCHOICE

## 2019-09-12 RX ADMIN — SODIUM CHLORIDE, PRESERVATIVE FREE 10 ML: 5 INJECTION INTRAVENOUS at 10:31

## 2019-09-12 ASSESSMENT — PAIN SCALES - GENERAL
PAINLEVEL_OUTOF10: 0
PAINLEVEL_OUTOF10: 8
PAINLEVEL_OUTOF10: 0
PAINLEVEL_OUTOF10: 8

## 2019-09-12 ASSESSMENT — PAIN DESCRIPTION - DESCRIPTORS: DESCRIPTORS: BURNING

## 2019-09-12 ASSESSMENT — PAIN DESCRIPTION - LOCATION: LOCATION: HIP

## 2019-09-12 ASSESSMENT — PAIN DESCRIPTION - PAIN TYPE: TYPE: CHRONIC PAIN

## 2019-09-12 ASSESSMENT — PAIN DESCRIPTION - ORIENTATION: ORIENTATION: LEFT

## 2019-09-12 ASSESSMENT — PAIN DESCRIPTION - DIRECTION: RADIATING_TOWARDS: LEFT LEG

## 2019-09-12 NOTE — H&P
urination, burning with urination, frequency   INTEGUMENT:  negative for rash, skin lesions, easy bruising   HEMATOLOGIC/LYMPHATIC:  negative for swelling/edema   ALLERGIC/IMMUNOLOGIC:  negative for urticaria , itching  ENDOCRINE:  negative increase in drinking, increase in urination, hot or cold intolerance  MUSCULOSKELETAL:  See HPI, muscle aches, swelling of joints  NEUROLOGICAL:  negative for headaches, dizziness, lightheadedness, numbness, pain, tingling extremities  BEHAVIOR/PSYCH:  negative for depression, anxiety    Physical Exam:   /72   Pulse 58   Temp 98.2 °F (36.8 °C) (Oral)   Resp 20   SpO2 97%   No LMP recorded. Patient has had a hysterectomy. General Appearance:  alert, well appearing, and in no acute distress  Mental status: oriented to person, place, and time with normal affect  Head:  normocephalic, atraumatic. Eye: no icterus, redness, pupils equal and reactive, extraocular eye movements intact, conjunctiva clear  Ear: normal external ear, no discharge, hearing intact  Nose:  no drainage noted  Mouth: mucous membranes moist  Neck: supple, no carotid bruits, thyroid not palpable  Lungs: Bilateral equal air entry, clear to ausculation, no wheezing, rales or rhonchi, normal effort  Cardiovascular: normal rate, regular rhythm, no murmur, gallop, rub.   Abdomen: Soft, nontender, nondistended, normal bowel sounds, no hepatomegaly or splenomegaly  Neurologic: There are no new focal motor or sensory deficits, normal muscle tone and bulk, no abnormal sensation, normal speech, cranial nerves II through XII grossly intact  Skin: No gross lesions, rashes, bruising or bleeding on exposed skin area  Extremities:  peripheral pulses palpable, no pedal edema or calf pain with palpation  Psych: normal affect         Diagnosis:    Chronic left hip pain due to Colten Chirinos [address], APRN - CNP  9/12/2019  10:07 AM

## 2019-09-24 ENCOUNTER — OFFICE VISIT (OUTPATIENT)
Dept: PAIN MANAGEMENT | Age: 54
End: 2019-09-24
Payer: MEDICARE

## 2019-09-24 VITALS
HEIGHT: 65 IN | SYSTOLIC BLOOD PRESSURE: 121 MMHG | WEIGHT: 263 LBS | DIASTOLIC BLOOD PRESSURE: 73 MMHG | BODY MASS INDEX: 43.82 KG/M2 | HEART RATE: 68 BPM

## 2019-09-24 DIAGNOSIS — Z79.891 CHRONIC USE OF OPIATE FOR THERAPEUTIC PURPOSE: ICD-10-CM

## 2019-09-24 DIAGNOSIS — E66.01 MORBID OBESITY WITH BMI OF 40.0-44.9, ADULT (HCC): ICD-10-CM

## 2019-09-24 DIAGNOSIS — M16.10 HIP ARTHRITIS: Chronic | ICD-10-CM

## 2019-09-24 DIAGNOSIS — M47.817 LUMBOSACRAL SPONDYLOSIS WITHOUT MYELOPATHY: Primary | Chronic | ICD-10-CM

## 2019-09-24 PROCEDURE — G8427 DOCREV CUR MEDS BY ELIG CLIN: HCPCS | Performed by: ANESTHESIOLOGY

## 2019-09-24 PROCEDURE — 4004F PT TOBACCO SCREEN RCVD TLK: CPT | Performed by: ANESTHESIOLOGY

## 2019-09-24 PROCEDURE — 3017F COLORECTAL CA SCREEN DOC REV: CPT | Performed by: ANESTHESIOLOGY

## 2019-09-24 PROCEDURE — G8417 CALC BMI ABV UP PARAM F/U: HCPCS | Performed by: ANESTHESIOLOGY

## 2019-09-24 PROCEDURE — 99214 OFFICE O/P EST MOD 30 MIN: CPT | Performed by: ANESTHESIOLOGY

## 2019-09-24 RX ORDER — MORPHINE SULFATE 15 MG/1
15 TABLET, FILM COATED, EXTENDED RELEASE ORAL EVERY 12 HOURS SCHEDULED
Qty: 60 TABLET | Refills: 0 | Status: SHIPPED | OUTPATIENT
Start: 2019-09-29 | End: 2019-10-22 | Stop reason: SDUPTHER

## 2019-09-24 RX ORDER — GABAPENTIN 300 MG/1
300 CAPSULE ORAL 2 TIMES DAILY
Qty: 60 CAPSULE | Refills: 0 | Status: SHIPPED | OUTPATIENT
Start: 2019-09-24 | End: 2019-10-22 | Stop reason: SDUPTHER

## 2019-09-24 RX ORDER — TIZANIDINE 4 MG/1
4 TABLET ORAL NIGHTLY
Qty: 30 TABLET | Refills: 0 | Status: SHIPPED | OUTPATIENT
Start: 2019-09-24 | End: 2019-10-22 | Stop reason: SDUPTHER

## 2019-09-24 ASSESSMENT — ENCOUNTER SYMPTOMS
SHORTNESS OF BREATH: 1
EYE PAIN: 0
ABDOMINAL PAIN: 0

## 2019-09-24 NOTE — PROGRESS NOTES
ABLATION NERVES Right 8/15/2019    NERVE RADIOFREQUENCY ABLATION MEDIAN BRANCHES AT TRANSVERSE PROCESSES OF L3/L4/L5 AND S1 RIGHT performed by Dony Bond MD at [address] History     Socioeconomic History    Marital status:      Spouse name: None    Number of children: None    Years of education: None    Highest education level: None   Occupational History    None   Social Needs    Financial resource strain: None    Food insecurity:     Worry: None     Inability: None    Transportation needs:     Medical: None     Non-medical: None   Tobacco Use    Smoking status: Current Every Day Smoker     Packs/day: 0.50     Types: Cigarettes    Smokeless tobacco: Never Used   Substance and Sexual Activity    Alcohol use: Never     Alcohol/week: 0.0 standard drinks     Frequency: Never    Drug use: Never    Sexual activity: None   Lifestyle    Physical activity:     Days per week: None     Minutes per session: None    Stress: None   Relationships    Social connections:     Talks on phone: None     Gets together: None     Attends Mormonism service: None     Active member of club or organization: None     Attends meetings of clubs or organizations: None     Relationship status: None    Intimate partner violence:     Fear of current or ex partner: None     Emotionally abused: None     Physically abused: None     Forced sexual activity: None   Other Topics Concern    None   Social History Narrative    None     Family History   Problem Relation Age of Onset    Other Mother         ulcers    Heart Disease Father     High Blood Pressure Father     Other Father         blind, pacemaker     No Known Allergies  Patient has no known allergies. Vitals:    09/24/19 1242   BP: 121/73   Pulse: 68     Current Outpatient Medications   Medication Sig Dispense Refill    gabapentin (NEURONTIN) 300 MG capsule Take 1 capsule by mouth 2 times daily for 30 days.  60 capsule 0    [START

## 2019-10-04 DIAGNOSIS — M47.817 LUMBOSACRAL SPONDYLOSIS WITHOUT MYELOPATHY: Chronic | ICD-10-CM

## 2019-10-07 ENCOUNTER — HOSPITAL ENCOUNTER (OUTPATIENT)
Age: 54
Setting detail: OUTPATIENT SURGERY
Discharge: HOME | End: 2019-10-07
Attending: ANESTHESIOLOGY | Admitting: ANESTHESIOLOGY
Payer: MEDICARE

## 2019-10-07 ENCOUNTER — APPOINTMENT (OUTPATIENT)
Dept: GENERAL RADIOLOGY | Age: 54
End: 2019-10-07
Attending: ANESTHESIOLOGY
Payer: MEDICARE

## 2019-10-07 VITALS
WEIGHT: 262 LBS | SYSTOLIC BLOOD PRESSURE: 121 MMHG | OXYGEN SATURATION: 98 % | RESPIRATION RATE: 18 BRPM | DIASTOLIC BLOOD PRESSURE: 80 MMHG | TEMPERATURE: 97.5 F | HEART RATE: 71 BPM | HEIGHT: 65 IN | BODY MASS INDEX: 43.65 KG/M2

## 2019-10-07 PROCEDURE — 2580000003 HC RX 258: Performed by: ANESTHESIOLOGY

## 2019-10-07 PROCEDURE — 6360000004 HC RX CONTRAST MEDICATION: Performed by: ANESTHESIOLOGY

## 2019-10-07 PROCEDURE — 99152 MOD SED SAME PHYS/QHP 5/>YRS: CPT | Performed by: ANESTHESIOLOGY

## 2019-10-07 PROCEDURE — 6360000002 HC RX W HCPCS: Performed by: ANESTHESIOLOGY

## 2019-10-07 PROCEDURE — 64450 NJX AA&/STRD OTHER PN/BRANCH: CPT | Performed by: ANESTHESIOLOGY

## 2019-10-07 PROCEDURE — 7100000011 HC PHASE II RECOVERY - ADDTL 15 MIN: Performed by: ANESTHESIOLOGY

## 2019-10-07 PROCEDURE — 3600000051 HC PAIN LEVEL 1 ADDL 15 MIN: Performed by: ANESTHESIOLOGY

## 2019-10-07 PROCEDURE — 3209999900 FLUORO FOR SURGICAL PROCEDURES

## 2019-10-07 PROCEDURE — 7100000010 HC PHASE II RECOVERY - FIRST 15 MIN: Performed by: ANESTHESIOLOGY

## 2019-10-07 PROCEDURE — 2709999900 HC NON-CHARGEABLE SUPPLY: Performed by: ANESTHESIOLOGY

## 2019-10-07 PROCEDURE — 2500000003 HC RX 250 WO HCPCS: Performed by: ANESTHESIOLOGY

## 2019-10-07 PROCEDURE — 3600000050 HC PAIN LEVEL 1 BASE: Performed by: ANESTHESIOLOGY

## 2019-10-07 RX ORDER — FENTANYL CITRATE 50 UG/ML
INJECTION, SOLUTION INTRAMUSCULAR; INTRAVENOUS PRN
Status: DISCONTINUED | OUTPATIENT
Start: 2019-10-07 | End: 2019-10-07 | Stop reason: ALTCHOICE

## 2019-10-07 RX ORDER — BUPIVACAINE HYDROCHLORIDE 5 MG/ML
INJECTION, SOLUTION EPIDURAL; INTRACAUDAL PRN
Status: DISCONTINUED | OUTPATIENT
Start: 2019-10-07 | End: 2019-10-07 | Stop reason: ALTCHOICE

## 2019-10-07 RX ORDER — SODIUM CHLORIDE 0.9 % (FLUSH) 0.9 %
10 SYRINGE (ML) INJECTION EVERY 12 HOURS SCHEDULED
Status: DISCONTINUED | OUTPATIENT
Start: 2019-10-07 | End: 2019-10-07 | Stop reason: HOSPADM

## 2019-10-07 RX ORDER — SODIUM CHLORIDE 0.9 % (FLUSH) 0.9 %
10 SYRINGE (ML) INJECTION PRN
Status: DISCONTINUED | OUTPATIENT
Start: 2019-10-07 | End: 2019-10-07 | Stop reason: HOSPADM

## 2019-10-07 RX ORDER — MIDAZOLAM HYDROCHLORIDE 1 MG/ML
INJECTION INTRAMUSCULAR; INTRAVENOUS PRN
Status: DISCONTINUED | OUTPATIENT
Start: 2019-10-07 | End: 2019-10-07 | Stop reason: ALTCHOICE

## 2019-10-07 RX ADMIN — SODIUM CHLORIDE, PRESERVATIVE FREE 10 ML: 5 INJECTION INTRAVENOUS at 14:04

## 2019-10-07 ASSESSMENT — PAIN SCALES - GENERAL
PAINLEVEL_OUTOF10: 6
PAINLEVEL_OUTOF10: 0

## 2019-10-07 ASSESSMENT — PAIN DESCRIPTION - DESCRIPTORS: DESCRIPTORS: BURNING;SHOOTING

## 2019-10-07 ASSESSMENT — PAIN - FUNCTIONAL ASSESSMENT: PAIN_FUNCTIONAL_ASSESSMENT: 0-10

## 2019-10-22 ENCOUNTER — OFFICE VISIT (OUTPATIENT)
Dept: PAIN MANAGEMENT | Age: 54
End: 2019-10-22
Payer: MEDICARE

## 2019-10-22 VITALS
DIASTOLIC BLOOD PRESSURE: 82 MMHG | HEIGHT: 65 IN | BODY MASS INDEX: 43.65 KG/M2 | HEART RATE: 84 BPM | WEIGHT: 262 LBS | OXYGEN SATURATION: 97 % | SYSTOLIC BLOOD PRESSURE: 128 MMHG

## 2019-10-22 DIAGNOSIS — M16.10 HIP ARTHRITIS: Primary | Chronic | ICD-10-CM

## 2019-10-22 DIAGNOSIS — M47.817 LUMBOSACRAL SPONDYLOSIS WITHOUT MYELOPATHY: Chronic | ICD-10-CM

## 2019-10-22 DIAGNOSIS — M54.16 LUMBAR RADICULOPATHY: ICD-10-CM

## 2019-10-22 PROCEDURE — 99214 OFFICE O/P EST MOD 30 MIN: CPT | Performed by: ANESTHESIOLOGY

## 2019-10-22 PROCEDURE — 4004F PT TOBACCO SCREEN RCVD TLK: CPT | Performed by: ANESTHESIOLOGY

## 2019-10-22 PROCEDURE — G8417 CALC BMI ABV UP PARAM F/U: HCPCS | Performed by: ANESTHESIOLOGY

## 2019-10-22 PROCEDURE — G8484 FLU IMMUNIZE NO ADMIN: HCPCS | Performed by: ANESTHESIOLOGY

## 2019-10-22 PROCEDURE — G8427 DOCREV CUR MEDS BY ELIG CLIN: HCPCS | Performed by: ANESTHESIOLOGY

## 2019-10-22 PROCEDURE — 3017F COLORECTAL CA SCREEN DOC REV: CPT | Performed by: ANESTHESIOLOGY

## 2019-10-22 RX ORDER — GABAPENTIN 300 MG/1
300 CAPSULE ORAL 2 TIMES DAILY
Qty: 60 CAPSULE | Refills: 0 | Status: SHIPPED | OUTPATIENT
Start: 2019-10-23 | End: 2019-11-25 | Stop reason: SDUPTHER

## 2019-10-22 RX ORDER — MORPHINE SULFATE 15 MG/1
15 TABLET, FILM COATED, EXTENDED RELEASE ORAL EVERY 12 HOURS SCHEDULED
Qty: 60 TABLET | Refills: 0 | Status: SHIPPED | OUTPATIENT
Start: 2019-10-27 | End: 2019-11-25 | Stop reason: SDUPTHER

## 2019-10-22 RX ORDER — TIZANIDINE 4 MG/1
4 TABLET ORAL NIGHTLY
Qty: 30 TABLET | Refills: 0 | Status: SHIPPED | OUTPATIENT
Start: 2019-10-23 | End: 2019-12-26

## 2019-10-22 ASSESSMENT — ENCOUNTER SYMPTOMS
RESPIRATORY NEGATIVE: 1
BACK PAIN: 1

## 2019-10-24 ENCOUNTER — APPOINTMENT (OUTPATIENT)
Dept: GENERAL RADIOLOGY | Age: 54
End: 2019-10-24
Attending: ANESTHESIOLOGY
Payer: MEDICARE

## 2019-10-24 ENCOUNTER — HOSPITAL ENCOUNTER (OUTPATIENT)
Age: 54
Setting detail: OUTPATIENT SURGERY
Discharge: HOME | End: 2019-10-24
Attending: ANESTHESIOLOGY | Admitting: ANESTHESIOLOGY
Payer: MEDICARE

## 2019-10-24 VITALS
HEIGHT: 65 IN | BODY MASS INDEX: 43.65 KG/M2 | HEART RATE: 71 BPM | SYSTOLIC BLOOD PRESSURE: 120 MMHG | TEMPERATURE: 97.9 F | WEIGHT: 262 LBS | OXYGEN SATURATION: 97 % | RESPIRATION RATE: 18 BRPM | DIASTOLIC BLOOD PRESSURE: 93 MMHG

## 2019-10-24 PROCEDURE — 99153 MOD SED SAME PHYS/QHP EA: CPT | Performed by: ANESTHESIOLOGY

## 2019-10-24 PROCEDURE — 64640 INJECTION TREATMENT OF NERVE: CPT | Performed by: ANESTHESIOLOGY

## 2019-10-24 PROCEDURE — 3209999900 FLUORO FOR SURGICAL PROCEDURES

## 2019-10-24 PROCEDURE — 3600000054 HC PAIN LEVEL 3 BASE: Performed by: ANESTHESIOLOGY

## 2019-10-24 PROCEDURE — 2500000003 HC RX 250 WO HCPCS: Performed by: ANESTHESIOLOGY

## 2019-10-24 PROCEDURE — 7100000010 HC PHASE II RECOVERY - FIRST 15 MIN: Performed by: ANESTHESIOLOGY

## 2019-10-24 PROCEDURE — 99152 MOD SED SAME PHYS/QHP 5/>YRS: CPT | Performed by: ANESTHESIOLOGY

## 2019-10-24 PROCEDURE — 6360000002 HC RX W HCPCS: Performed by: ANESTHESIOLOGY

## 2019-10-24 PROCEDURE — 3600000055 HC PAIN LEVEL 3 ADDL 15 MIN: Performed by: ANESTHESIOLOGY

## 2019-10-24 PROCEDURE — 2580000003 HC RX 258: Performed by: ANESTHESIOLOGY

## 2019-10-24 PROCEDURE — 2709999900 HC NON-CHARGEABLE SUPPLY: Performed by: ANESTHESIOLOGY

## 2019-10-24 PROCEDURE — C1894 INTRO/SHEATH, NON-LASER: HCPCS | Performed by: ANESTHESIOLOGY

## 2019-10-24 PROCEDURE — 7100000011 HC PHASE II RECOVERY - ADDTL 15 MIN: Performed by: ANESTHESIOLOGY

## 2019-10-24 RX ORDER — MIDAZOLAM HYDROCHLORIDE 1 MG/ML
INJECTION INTRAMUSCULAR; INTRAVENOUS PRN
Status: DISCONTINUED | OUTPATIENT
Start: 2019-10-24 | End: 2019-10-24 | Stop reason: ALTCHOICE

## 2019-10-24 RX ORDER — SODIUM CHLORIDE 0.9 % (FLUSH) 0.9 %
10 SYRINGE (ML) INJECTION PRN
Status: DISCONTINUED | OUTPATIENT
Start: 2019-10-24 | End: 2019-10-24 | Stop reason: HOSPADM

## 2019-10-24 RX ORDER — LIDOCAINE HYDROCHLORIDE 40 MG/ML
INJECTION, SOLUTION RETROBULBAR; TOPICAL PRN
Status: DISCONTINUED | OUTPATIENT
Start: 2019-10-24 | End: 2019-10-24 | Stop reason: ALTCHOICE

## 2019-10-24 RX ORDER — SODIUM CHLORIDE 0.9 % (FLUSH) 0.9 %
10 SYRINGE (ML) INJECTION EVERY 12 HOURS SCHEDULED
Status: DISCONTINUED | OUTPATIENT
Start: 2019-10-24 | End: 2019-10-24 | Stop reason: HOSPADM

## 2019-10-24 RX ORDER — LIDOCAINE HYDROCHLORIDE 10 MG/ML
INJECTION, SOLUTION INFILTRATION; PERINEURAL PRN
Status: DISCONTINUED | OUTPATIENT
Start: 2019-10-24 | End: 2019-10-24 | Stop reason: ALTCHOICE

## 2019-10-24 RX ORDER — FENTANYL CITRATE 50 UG/ML
INJECTION, SOLUTION INTRAMUSCULAR; INTRAVENOUS PRN
Status: DISCONTINUED | OUTPATIENT
Start: 2019-10-24 | End: 2019-10-24 | Stop reason: ALTCHOICE

## 2019-10-24 RX ADMIN — SODIUM CHLORIDE, PRESERVATIVE FREE 10 ML: 5 INJECTION INTRAVENOUS at 10:48

## 2019-10-24 ASSESSMENT — PAIN SCALES - GENERAL
PAINLEVEL_OUTOF10: 6
PAINLEVEL_OUTOF10: 6
PAINLEVEL_OUTOF10: 4
PAINLEVEL_OUTOF10: 6

## 2019-10-24 ASSESSMENT — PAIN DESCRIPTION - LOCATION: LOCATION: HIP

## 2019-10-24 ASSESSMENT — PAIN - FUNCTIONAL ASSESSMENT: PAIN_FUNCTIONAL_ASSESSMENT: 0-10

## 2019-10-24 ASSESSMENT — PAIN DESCRIPTION - ORIENTATION: ORIENTATION: LEFT

## 2019-10-24 ASSESSMENT — PAIN DESCRIPTION - PAIN TYPE: TYPE: CHRONIC PAIN

## 2019-11-25 ENCOUNTER — OFFICE VISIT (OUTPATIENT)
Dept: PAIN MANAGEMENT | Age: 54
End: 2019-11-25
Payer: MEDICARE

## 2019-11-25 VITALS
SYSTOLIC BLOOD PRESSURE: 131 MMHG | BODY MASS INDEX: 43.65 KG/M2 | WEIGHT: 262 LBS | HEIGHT: 65 IN | HEART RATE: 70 BPM | OXYGEN SATURATION: 98 % | DIASTOLIC BLOOD PRESSURE: 76 MMHG

## 2019-11-25 DIAGNOSIS — M16.12 PRIMARY OSTEOARTHRITIS OF LEFT HIP: Chronic | ICD-10-CM

## 2019-11-25 DIAGNOSIS — M47.817 LUMBOSACRAL SPONDYLOSIS WITHOUT MYELOPATHY: Chronic | ICD-10-CM

## 2019-11-25 DIAGNOSIS — Z51.81 MEDICATION MONITORING ENCOUNTER: ICD-10-CM

## 2019-11-25 DIAGNOSIS — G89.29 CHRONIC BILATERAL LOW BACK PAIN WITHOUT SCIATICA: Primary | ICD-10-CM

## 2019-11-25 DIAGNOSIS — M54.50 CHRONIC BILATERAL LOW BACK PAIN WITHOUT SCIATICA: Primary | ICD-10-CM

## 2019-11-25 PROCEDURE — 99214 OFFICE O/P EST MOD 30 MIN: CPT | Performed by: NURSE PRACTITIONER

## 2019-11-25 RX ORDER — MORPHINE SULFATE 15 MG/1
15 TABLET, FILM COATED, EXTENDED RELEASE ORAL EVERY 12 HOURS SCHEDULED
Qty: 60 TABLET | Refills: 0 | Status: SHIPPED | OUTPATIENT
Start: 2019-11-27 | End: 2019-12-26 | Stop reason: SDUPTHER

## 2019-11-25 RX ORDER — TIZANIDINE 4 MG/1
4 TABLET ORAL NIGHTLY
Qty: 30 TABLET | Refills: 0 | Status: SHIPPED | OUTPATIENT
Start: 2019-11-25 | End: 2019-12-26 | Stop reason: SDUPTHER

## 2019-11-25 RX ORDER — GABAPENTIN 300 MG/1
300 CAPSULE ORAL 2 TIMES DAILY
Qty: 60 CAPSULE | Refills: 0 | Status: SHIPPED | OUTPATIENT
Start: 2019-11-25 | End: 2019-12-26 | Stop reason: SDUPTHER

## 2019-11-25 ASSESSMENT — ENCOUNTER SYMPTOMS
BACK PAIN: 1
RESPIRATORY NEGATIVE: 1
CONSTIPATION: 0
APHONIA: 0

## 2019-12-26 ENCOUNTER — OFFICE VISIT (OUTPATIENT)
Dept: PAIN MANAGEMENT | Age: 54
End: 2019-12-26
Payer: MEDICARE

## 2019-12-26 VITALS
HEART RATE: 68 BPM | DIASTOLIC BLOOD PRESSURE: 76 MMHG | WEIGHT: 252 LBS | SYSTOLIC BLOOD PRESSURE: 122 MMHG | HEIGHT: 65 IN | BODY MASS INDEX: 41.99 KG/M2 | OXYGEN SATURATION: 97 %

## 2019-12-26 DIAGNOSIS — G89.29 CHRONIC BILATERAL LOW BACK PAIN WITHOUT SCIATICA: Primary | ICD-10-CM

## 2019-12-26 DIAGNOSIS — M54.12 CERVICAL RADICULOPATHY: ICD-10-CM

## 2019-12-26 DIAGNOSIS — Z79.891 CHRONIC USE OF OPIATE FOR THERAPEUTIC PURPOSE: ICD-10-CM

## 2019-12-26 DIAGNOSIS — M47.817 LUMBOSACRAL SPONDYLOSIS WITHOUT MYELOPATHY: Chronic | ICD-10-CM

## 2019-12-26 DIAGNOSIS — M54.50 CHRONIC BILATERAL LOW BACK PAIN WITHOUT SCIATICA: Primary | ICD-10-CM

## 2019-12-26 DIAGNOSIS — M54.16 LEFT LUMBAR RADICULITIS: Chronic | ICD-10-CM

## 2019-12-26 PROCEDURE — 99213 OFFICE O/P EST LOW 20 MIN: CPT | Performed by: NURSE PRACTITIONER

## 2019-12-26 RX ORDER — GABAPENTIN 300 MG/1
300 CAPSULE ORAL 2 TIMES DAILY
Qty: 60 CAPSULE | Refills: 0 | Status: SHIPPED | OUTPATIENT
Start: 2019-12-26 | End: 2020-01-20 | Stop reason: SDUPTHER

## 2019-12-26 RX ORDER — TIZANIDINE 4 MG/1
4 TABLET ORAL NIGHTLY
Qty: 30 TABLET | Refills: 0 | Status: SHIPPED | OUTPATIENT
Start: 2019-12-26 | End: 2020-01-20 | Stop reason: SDUPTHER

## 2019-12-26 RX ORDER — FENOFIBRATE 145 MG/1
145 TABLET, COATED ORAL 2 TIMES DAILY
Status: ON HOLD | COMMUNITY
End: 2020-07-27

## 2019-12-26 RX ORDER — MORPHINE SULFATE 15 MG/1
15 TABLET, FILM COATED, EXTENDED RELEASE ORAL EVERY 12 HOURS SCHEDULED
Qty: 60 TABLET | Refills: 0 | Status: SHIPPED | OUTPATIENT
Start: 2019-12-27 | End: 2020-01-20 | Stop reason: SDUPTHER

## 2019-12-26 ASSESSMENT — ENCOUNTER SYMPTOMS
COUGH: 0
SHORTNESS OF BREATH: 0
BACK PAIN: 1
RESPIRATORY NEGATIVE: 1
CONSTIPATION: 0

## 2020-01-20 ENCOUNTER — OFFICE VISIT (OUTPATIENT)
Dept: PAIN MANAGEMENT | Age: 55
End: 2020-01-20
Payer: MEDICARE

## 2020-01-20 VITALS
SYSTOLIC BLOOD PRESSURE: 139 MMHG | WEIGHT: 250 LBS | HEART RATE: 71 BPM | OXYGEN SATURATION: 95 % | HEIGHT: 65 IN | BODY MASS INDEX: 41.65 KG/M2 | DIASTOLIC BLOOD PRESSURE: 84 MMHG

## 2020-01-20 PROCEDURE — 99213 OFFICE O/P EST LOW 20 MIN: CPT | Performed by: NURSE PRACTITIONER

## 2020-01-20 RX ORDER — TIZANIDINE 4 MG/1
4 TABLET ORAL NIGHTLY
Qty: 30 TABLET | Refills: 0 | Status: SHIPPED | OUTPATIENT
Start: 2020-01-25 | End: 2020-02-21 | Stop reason: SDUPTHER

## 2020-01-20 RX ORDER — MORPHINE SULFATE 15 MG/1
15 TABLET, FILM COATED, EXTENDED RELEASE ORAL EVERY 12 HOURS SCHEDULED
Qty: 60 TABLET | Refills: 0 | Status: SHIPPED | OUTPATIENT
Start: 2020-01-26 | End: 2020-02-21 | Stop reason: SDUPTHER

## 2020-01-20 RX ORDER — GABAPENTIN 300 MG/1
300 CAPSULE ORAL 2 TIMES DAILY
Qty: 60 CAPSULE | Refills: 0 | Status: SHIPPED | OUTPATIENT
Start: 2020-01-25 | End: 2020-02-21 | Stop reason: SDUPTHER

## 2020-01-20 ASSESSMENT — ENCOUNTER SYMPTOMS
RESPIRATORY NEGATIVE: 1
CONSTIPATION: 0
BACK PAIN: 1

## 2020-01-20 NOTE — PROGRESS NOTES
Patient is here today to review medication contract. Chief Complaint   Patient presents with    Medication Refill    Back Pain    Leg Pain     Left         PMH     Chronic axial lumbar spinal pain  Diagnosed with lumbar facet arthropathy  Had lumbar radiofrequency ablation with moderate improvement in lower back pain     Dx with sleep apnea but can't tolerate mask    -Hip pain  Predominantly involve left hip  Had previous imaging that showed hip arthritis  Failed conservative measures in past     Procedures  10/24/2019  Fluoroscopy guidance radiofrequency ablation of LEFT hip articular branches of femoral and obturator nerve 90% relief       HPI:   Back Pain   This is a chronic problem. The current episode started more than 1 year ago. The problem occurs constantly. The problem is unchanged. The pain is present in the lumbar spine. The quality of the pain is described as aching. The pain radiates to the left foot. Associated symptoms include a fever. Pertinent negatives include no chest pain. Risk factors include lack of exercise, menopause and obesity. She has tried analgesics for the symptoms. Medication Refill: Morphine, Gabapentin, Tizanidine    Pain score Today:  6  Adverse effects (Constipation / Nausea / Sedation / sexual Dysfunction / others) : dry mouth  Mood: poor  Sleep pattern and quality: poor  Activity level: poor    Pill count Today:# 12 Morphine   Last dose taken  1/19/20  OARRS report reviewed today: yes  Morphine equivalent: 30    ER/Hospitalizations/PCP visit related to pain since last visit:no   Any legal problems e.g. DUI etc.:No  Satisfied with current management: Yes    Opioid Contract:7/26/19  Last Urine Dug screen dated:11/25/19    Past Medical History, Past Surgical History, Social History, Allergies and Medications reviewed and updated in EPIC as indicated    Family History reviewed and is noncontributory.         Periodic Controlled Substance Monitoring: Possible medication side effects, risk of tolerance/dependence & alternative treatments discussed., No signs of potential drug abuse or diversion identified. , Assessed functional status., Obtaining appropriate analgesic effect of treatment.  Alexis Gonzalez, GISELLE - CNP)      Past Medical History:   Diagnosis Date    ADHD (attention deficit hyperactivity disorder)     Bipolar 1 disorder ([facility] [address]     Depression with anxiety     GERD (gastroesophageal reflux disease)     Heel spur     left    Hyperlipidemia     Hypertension     Left hip pain     PTSD (post-traumatic stress disorder)     Tremor     Trouble swallowing     Weight gain        Past Surgical History:   Procedure Laterality Date    ANESTHESIA NERVE BLOCK Left 10/7/2019    NERVE BLOCK LEFT HIP OBTURATOR AND FEMORAL performed by Celeste Gifford MD at [address] [facility],  [address] Bilateral     EPIDURAL STEROID INJECTION Left 9/12/2019    EPIDURAL STEROID INJECTION performed by Celeste Gifford MD at [address] [facility] NERV Left 9/12/2019    INJECTION MEDICATION LEFT HIP performed by Celeste Gifford MD at [facility]  08/01/2016    duramorph 1mg  celestone 9mg    NERVE BLOCK  10/31/2016    duramorph epidural steroid block decadron 7.5 mg durmoprh 1.5 mg    NERVE BLOCK Left     NO STEROID, LEFT MBNB# 1    NERVE BLOCK Left 01/16/2017    LT MBNB #2   celestone 6mg    NERVE BLOCK Left 02/06/2017    LT lumbar RF    kenalog 40    NERVE BLOCK Right 06/19/2017    right lumbar mbnb #1    NERVE BLOCK  07/07/2017    right radiofrequency kenolog 40mg    NERVE BLOCK  09/11/2017    duramorph 1mg & celestone 9 mg    NERVE BLOCK Left 10/02/2018    LEFT LUMBAR EPIDURAL STEROID BLOCK DECADRON 10MG    NERVE BLOCK  10/16/2018    blanca mbnb, marcaine and xylocaine    NERVE BLOCK Left 10/30/2018    left lumbar rf- no steroid    NERVE BLOCK Right 11/13/2018    RT lumbar RFA no steroid    NERVE BLOCK  03/26/2019    left hip decadron 10mg, micah . 5 %    OTHER SURGICAL HISTORY  08/01/2019    radiofrequency ablation L2-5    OTHER SURGICAL HISTORY Right 08/15/2019    lumbar radiofrequency ablation    OTHER SURGICAL HISTORY  09/12/2019     INJECTION MEDICATION LEFT HIP (Left Hip) EPIDURAL STEROID INJECTION (left back)    OTHER SURGICAL HISTORY Left 10/24/2019    nerve radiofrequency ablation    RADIOFREQUENCY ABLATION NERVES Left 8/1/2019    NERVE RADIOFREQUENCY ABLATION MEDIAN BRANCHES AT TRANSVERSE PROCESSES L3/L4/L5 AND S1 LEFT performed by Garret Vu MD at [address] Right 8/15/2019    NERVE RADIOFREQUENCY ABLATION MEDIAN BRANCHES AT TRANSVERSE PROCESSES OF L3/L4/L5 AND S1 RIGHT performed by Garret Vu MD at [address] Left 10/24/2019    NERVE RADIOFREQUENCY ABLATION LEFT HIP FEMORAL AND OBTURATOR NERVE performed by Garret Vu MD at [place] [address]         No Known Allergies      Current Outpatient Medications:     [START ON 1/25/2020] tiZANidine (ZANAFLEX) 4 MG tablet, Take 1 tablet by mouth nightly, Disp: 30 tablet, Rfl: 0    [START ON 1/25/2020] gabapentin (NEURONTIN) 300 MG capsule, Take 1 capsule by mouth 2 times daily for 30 days. , Disp: 60 capsule, Rfl: 0    [START ON 1/26/2020] morphine (MS CONTIN) 15 MG extended release tablet, Take 1 tablet by mouth every 12 hours for 30 days. , Disp: 60 tablet, Rfl: 0    fenofibrate (TRICOR) 145 MG tablet, Take 145 mg by mouth 2 times daily, Disp: , Rfl:     celecoxib (CELEBREX) 100 MG capsule, Take 100 mg by mouth 2 times daily, Disp: , Rfl:     benztropine (COGENTIN) 0.5 MG tablet, Take 0.5 mg by mouth 2 times daily, Disp: , Rfl:     Acetaminophen (TYLENOL ARTHRITIS PAIN PO), Take 2 tablets by mouth daily, Disp: , Rfl:     hydrOXYzine (VISTARIL) 50 MG capsule, Take 100 mg by mouth 3 times daily as needed for Itching , Disp: , Rfl:     pantoprazole (PROTONIX) 40 MG tablet, TAKE 1 TABLET BY MOUTH EVERY DAY, Active member of club or organization: Not on file     Attends meetings of clubs or organizations: Not on file     Relationship status: Not on file    Intimate partner violence:     Fear of current or ex partner: Not on file     Emotionally abused: Not on file     Physically abused: Not on file     Forced sexual activity: Not on file   Other Topics Concern    Not on file   Social History Narrative    Not on file       Review of Systems:  Review of Systems   Constitution: Positive for fever. Negative for chills. Cardiovascular: Negative for chest pain. Respiratory: Negative. Musculoskeletal: Positive for arthritis, back pain and joint pain. Negative for myalgias. Gastrointestinal: Negative for constipation. Neurological: Positive for dizziness and tremors. Physical Exam:  /84   Pulse 71   Ht 5' 5\" (1.651 m)   Wt 250 lb (113.4 kg)   SpO2 95%   BMI 41.60 kg/m²     Physical Exam  Constitutional:       Comments: Obese BMI 41.6   Cardiovascular:      Rate and Rhythm: Normal rate. Pulmonary:      Effort: Pulmonary effort is normal.   Musculoskeletal:      Lumbar back: She exhibits decreased range of motion and tenderness. Comments: Antalgic gait    Skin:     General: Skin is warm and dry. Neurological:      Mental Status: She is alert and oriented to person, place, and time.              Assessment:  Problem List Items Addressed This Visit     Lumbar radiculopathy (Chronic)    Relevant Medications    tiZANidine (ZANAFLEX) 4 MG tablet (Start on 1/25/2020)    gabapentin (NEURONTIN) 300 MG capsule (Start on 1/25/2020)    Lumbosacral spondylosis without myelopathy (Chronic)    Relevant Medications    tiZANidine (ZANAFLEX) 4 MG tablet (Start on 1/25/2020)    gabapentin (NEURONTIN) 300 MG capsule (Start on 1/25/2020)    morphine (MS CONTIN) 15 MG extended release tablet (Start on 1/26/2020)    Primary osteoarthritis of left hip - Primary (Chronic)    Relevant Medications    tiZANidine

## 2020-02-21 ENCOUNTER — OFFICE VISIT (OUTPATIENT)
Dept: PAIN MANAGEMENT | Age: 55
End: 2020-02-21
Payer: MEDICARE

## 2020-02-21 VITALS
BODY MASS INDEX: 41.65 KG/M2 | HEIGHT: 65 IN | SYSTOLIC BLOOD PRESSURE: 116 MMHG | DIASTOLIC BLOOD PRESSURE: 66 MMHG | WEIGHT: 250 LBS | OXYGEN SATURATION: 98 % | HEART RATE: 67 BPM

## 2020-02-21 PROCEDURE — 99214 OFFICE O/P EST MOD 30 MIN: CPT | Performed by: NURSE PRACTITIONER

## 2020-02-21 RX ORDER — MORPHINE SULFATE 15 MG/1
15 TABLET, FILM COATED, EXTENDED RELEASE ORAL EVERY 12 HOURS SCHEDULED
Qty: 60 TABLET | Refills: 0 | Status: SHIPPED | OUTPATIENT
Start: 2020-02-25 | End: 2020-03-20 | Stop reason: SDUPTHER

## 2020-02-21 RX ORDER — GABAPENTIN 300 MG/1
300 CAPSULE ORAL 2 TIMES DAILY
Qty: 60 CAPSULE | Refills: 0 | Status: SHIPPED | OUTPATIENT
Start: 2020-02-21 | End: 2020-03-20 | Stop reason: SDUPTHER

## 2020-02-21 RX ORDER — TIZANIDINE 4 MG/1
4 TABLET ORAL NIGHTLY
Qty: 30 TABLET | Refills: 0 | Status: SHIPPED | OUTPATIENT
Start: 2020-02-21 | End: 2020-03-20 | Stop reason: SDUPTHER

## 2020-02-21 ASSESSMENT — ENCOUNTER SYMPTOMS
SHORTNESS OF BREATH: 0
COUGH: 0
CONSTIPATION: 0
WHEEZING: 0
BACK PAIN: 1

## 2020-02-21 NOTE — PROGRESS NOTES
01/16/2017    LT MBNB #2   celestone 6mg    NERVE BLOCK Left 02/06/2017    LT lumbar RF    kenalog 40    NERVE BLOCK Right 06/19/2017    right lumbar mbnb #1    NERVE BLOCK  07/07/2017    right radiofrequency kenolog 40mg    NERVE BLOCK  09/11/2017    duramorph 1mg & celestone 9 mg    NERVE BLOCK Left 10/02/2018    LEFT LUMBAR EPIDURAL STEROID BLOCK DECADRON 10MG    NERVE BLOCK  10/16/2018    blanca mbnb, marcaine and xylocaine    NERVE BLOCK Left 10/30/2018    left lumbar rf- no steroid    NERVE BLOCK Right 11/13/2018    RT lumbar RFA no steroid    NERVE BLOCK  03/26/2019    left hip decadron 10mg, marcaine . 5 %    OTHER SURGICAL HISTORY  08/01/2019    radiofrequency ablation L2-5    OTHER SURGICAL HISTORY Right 08/15/2019    lumbar radiofrequency ablation    OTHER SURGICAL HISTORY  09/12/2019     INJECTION MEDICATION LEFT HIP (Left Hip) EPIDURAL STEROID INJECTION (left back)    OTHER SURGICAL HISTORY Left 10/24/2019    nerve radiofrequency ablation    RADIOFREQUENCY ABLATION NERVES Left 8/1/2019    NERVE RADIOFREQUENCY ABLATION MEDIAN BRANCHES AT TRANSVERSE PROCESSES L3/L4/L5 AND S1 LEFT performed by Orlando Mckeon MD at [address] [place] [address] Right 8/15/2019    NERVE RADIOFREQUENCY ABLATION MEDIAN BRANCHES AT TRANSVERSE PROCESSES OF L3/L4/L5 AND S1 RIGHT performed by Orlando Mckeon MD at [address] [place] [address] Left 10/24/2019    NERVE RADIOFREQUENCY ABLATION LEFT HIP FEMORAL AND OBTURATOR NERVE performed by Orlando Mckeon MD at [facility]         No Known Allergies      Current Outpatient Medications:     tiZANidine (ZANAFLEX) 4 MG tablet, Take 1 tablet by mouth nightly, Disp: 30 tablet, Rfl: 0    gabapentin (NEURONTIN) 300 MG capsule, Take 1 capsule by mouth 2 times daily for 30 days. , Disp: 60 capsule, Rfl: 0    morphine (MS CONTIN) 15 MG extended release tablet, Take 1 tablet by mouth every 12 hours for 30 days. , Disp: 60 tablet, Rfl: 0   fenofibrate (TRICOR) 145 MG tablet, Take 145 mg by mouth 2 times daily, Disp: , Rfl:     celecoxib (CELEBREX) 100 MG capsule, Take 100 mg by mouth 2 times daily, Disp: , Rfl:     benztropine (COGENTIN) 0.5 MG tablet, Take 0.5 mg by mouth 2 times daily, Disp: , Rfl:     Acetaminophen (TYLENOL ARTHRITIS PAIN PO), Take 2 tablets by mouth daily, Disp: , Rfl:     hydrOXYzine (VISTARIL) 50 MG capsule, Take 100 mg by mouth 3 times daily as needed for Itching , Disp: , Rfl:     pantoprazole (PROTONIX) 40 MG tablet, TAKE 1 TABLET BY MOUTH EVERY DAY, Disp: , Rfl:     busPIRone (BUSPAR) 10 MG tablet, Take 10 mg by mouth 2 times daily, Disp: , Rfl:     zolpidem (AMBIEN) 10 MG tablet, Take 10 mg by mouth nightly as needed for Sleep, Disp: , Rfl:     Multiple Vitamins-Minerals (THERAPEUTIC MULTIVITAMIN-MINERALS) tablet, Take 1 tablet by mouth daily, Disp: , Rfl:     Biotin 5000 MCG CAPS, Take by mouth, Disp: , Rfl:     ranitidine (ZANTAC) 150 MG tablet, Take 150 mg by mouth, Disp: , Rfl:     amphetamine-dextroamphetamine (ADDERALL) 20 MG tablet, Take 30 mg by mouth daily , Disp: , Rfl:     rosuvastatin (CRESTOR) 10 MG tablet, Take 20 mg by mouth daily , Disp: , Rfl:     metoprolol (LOPRESSOR) 25 MG tablet, Take 50 mg by mouth 2 times daily , Disp: , Rfl:     Family History   Problem Relation Age of Onset    Other Mother         ulcers    Heart Disease Father     High Blood Pressure Father     Other Father         blind, pacemaker       Social History     Socioeconomic History    Marital status:      Spouse name: Not on file    Number of children: Not on file    Years of education: Not on file    Highest education level: Not on file   Occupational History    Not on file   Social Needs    Financial resource strain: Not on file    Food insecurity:     Worry: Not on file     Inability: Not on file    Transportation needs:     Medical: Not on file     Non-medical: Not on file   Tobacco Use    Smoking status: Current Every Day Smoker     Packs/day: 0.50     Types: Cigarettes    Smokeless tobacco: Never Used   Substance and Sexual Activity    Alcohol use: Never     Alcohol/week: 0.0 standard drinks     Frequency: Never    Drug use: Never    Sexual activity: Not on file   Lifestyle    Physical activity:     Days per week: Not on file     Minutes per session: Not on file    Stress: Not on file   Relationships    Social connections:     Talks on phone: Not on file     Gets together: Not on file     Attends Buddhist service: Not on file     Active member of club or organization: Not on file     Attends meetings of clubs or organizations: Not on file     Relationship status: Not on file    Intimate partner violence:     Fear of current or ex partner: Not on file     Emotionally abused: Not on file     Physically abused: Not on file     Forced sexual activity: Not on file   Other Topics Concern    Not on file   Social History Narrative    Not on file       Review of Systems:  Review of Systems   Constitution: Negative for chills and fever. Cardiovascular: Negative for chest pain. Respiratory: Negative for cough, shortness of breath and wheezing. Musculoskeletal: Positive for back pain, joint pain, muscle cramps and muscle weakness. Negative for neck pain and stiffness. Gastrointestinal: Negative for constipation. Neurological: Positive for numbness and paresthesias. Physical Exam:  /66   Pulse 67   Ht 5' 5\" (1.651 m)   Wt 250 lb (113.4 kg)   SpO2 98%   BMI 41.60 kg/m²     Physical Exam  Cardiovascular:      Rate and Rhythm: Normal rate. Pulmonary:      Effort: Pulmonary effort is normal.   Musculoskeletal:      Left hip: She exhibits decreased range of motion, decreased strength and tenderness. Comments: Antalgic gait    Skin:     General: Skin is warm and dry. Neurological:      Mental Status: She is alert and oriented to person, place, and time.

## 2020-03-19 RX ORDER — GABAPENTIN 300 MG/1
300 CAPSULE ORAL 2 TIMES DAILY
Qty: 60 CAPSULE | Refills: 0 | OUTPATIENT
Start: 2020-03-19 | End: 2020-04-18

## 2020-03-19 RX ORDER — TIZANIDINE 4 MG/1
4 TABLET ORAL NIGHTLY
Qty: 30 TABLET | Refills: 0 | OUTPATIENT
Start: 2020-03-19 | End: 2020-04-18

## 2020-03-19 RX ORDER — MORPHINE SULFATE 15 MG/1
15 TABLET, FILM COATED, EXTENDED RELEASE ORAL EVERY 12 HOURS SCHEDULED
Qty: 60 TABLET | Refills: 0 | OUTPATIENT
Start: 2020-03-19 | End: 2020-04-18

## 2020-03-20 RX ORDER — MORPHINE SULFATE 15 MG/1
15 TABLET, FILM COATED, EXTENDED RELEASE ORAL EVERY 12 HOURS SCHEDULED
Qty: 60 TABLET | Refills: 0 | Status: SHIPPED | OUTPATIENT
Start: 2020-03-20 | End: 2020-04-28 | Stop reason: SDUPTHER

## 2020-03-20 RX ORDER — MORPHINE SULFATE 15 MG/1
15 TABLET, FILM COATED, EXTENDED RELEASE ORAL EVERY 12 HOURS SCHEDULED
Qty: 60 TABLET | Refills: 0 | Status: CANCELLED | OUTPATIENT
Start: 2020-03-20 | End: 2020-04-19

## 2020-03-20 RX ORDER — TIZANIDINE 4 MG/1
4 TABLET ORAL NIGHTLY
Qty: 30 TABLET | Refills: 0 | Status: SHIPPED | OUTPATIENT
Start: 2020-03-20 | End: 2020-04-28 | Stop reason: SDUPTHER

## 2020-03-20 RX ORDER — GABAPENTIN 300 MG/1
300 CAPSULE ORAL 2 TIMES DAILY
Qty: 60 CAPSULE | Refills: 0 | Status: CANCELLED | OUTPATIENT
Start: 2020-03-20 | End: 2020-04-19

## 2020-03-20 RX ORDER — GABAPENTIN 300 MG/1
300 CAPSULE ORAL 2 TIMES DAILY
Qty: 60 CAPSULE | Refills: 0 | Status: SHIPPED | OUTPATIENT
Start: 2020-03-20 | End: 2020-04-28 | Stop reason: SDUPTHER

## 2020-03-20 RX ORDER — TIZANIDINE 4 MG/1
4 TABLET ORAL NIGHTLY
Qty: 30 TABLET | Refills: 0 | Status: CANCELLED | OUTPATIENT
Start: 2020-03-20 | End: 2020-04-19

## 2020-03-20 NOTE — TELEPHONE ENCOUNTER
[facility] is requesting a refill on the following medications:   Requested Prescriptions     Pending Prescriptions Disp Refills    morphine (MS CONTIN) 15 MG extended release tablet 60 tablet 0     Sig: Take 1 tablet by mouth every 12 hours for 30 days.  gabapentin (NEURONTIN) 300 MG capsule 60 capsule 0     Sig: Take 1 capsule by mouth 2 times daily for 30 days.  tiZANidine (ZANAFLEX) 4 MG tablet 30 tablet 0     Sig: Take 1 tablet by mouth nightly       Last OV 2/21/2020   Opioid Contract:07/16/2019    Last Urine Dug screen dated:11/25/2019  See message below, patient states she is not feeling well    Future Appointments   Date Time Provider Ani Benavidez   4/21/2020  2:40 Janelle Primrose, MD [facility]       OARRS report sent to Dr. Rohini Sanchez through alternative route for review.

## 2020-04-21 ENCOUNTER — TELEPHONE (OUTPATIENT)
Dept: PAIN MANAGEMENT | Age: 55
End: 2020-04-21

## 2020-04-28 ENCOUNTER — VIRTUAL VISIT (OUTPATIENT)
Dept: PAIN MANAGEMENT | Age: 55
End: 2020-04-28
Payer: MEDICARE

## 2020-04-28 PROCEDURE — 99442 PR PHYS/QHP TELEPHONE EVALUATION 11-20 MIN: CPT | Performed by: ANESTHESIOLOGY

## 2020-04-28 RX ORDER — GABAPENTIN 300 MG/1
300 CAPSULE ORAL 2 TIMES DAILY
Qty: 60 CAPSULE | Refills: 0 | Status: SHIPPED | OUTPATIENT
Start: 2020-04-28 | End: 2020-05-27 | Stop reason: SDUPTHER

## 2020-04-28 RX ORDER — TIZANIDINE 4 MG/1
4 TABLET ORAL NIGHTLY
Qty: 30 TABLET | Refills: 0 | Status: SHIPPED | OUTPATIENT
Start: 2020-04-28 | End: 2020-05-27 | Stop reason: SDUPTHER

## 2020-04-28 RX ORDER — MORPHINE SULFATE 15 MG/1
15 TABLET, FILM COATED, EXTENDED RELEASE ORAL EVERY 12 HOURS SCHEDULED
Qty: 60 TABLET | Refills: 0 | Status: SHIPPED | OUTPATIENT
Start: 2020-04-28 | End: 2020-05-27 | Stop reason: SDUPTHER

## 2020-05-27 ENCOUNTER — VIRTUAL VISIT (OUTPATIENT)
Dept: PAIN MANAGEMENT | Age: 55
End: 2020-05-27
Payer: MEDICARE

## 2020-05-27 PROCEDURE — 99443 PR PHYS/QHP TELEPHONE EVALUATION 21-30 MIN: CPT | Performed by: ANESTHESIOLOGY

## 2020-05-27 RX ORDER — TIZANIDINE 4 MG/1
4 TABLET ORAL NIGHTLY
Qty: 30 TABLET | Refills: 0 | Status: SHIPPED | OUTPATIENT
Start: 2020-05-27 | End: 2020-06-29 | Stop reason: SDUPTHER

## 2020-05-27 RX ORDER — GABAPENTIN 300 MG/1
300 CAPSULE ORAL 2 TIMES DAILY
Qty: 60 CAPSULE | Refills: 0 | Status: SHIPPED | OUTPATIENT
Start: 2020-05-27 | End: 2020-06-29 | Stop reason: SDUPTHER

## 2020-05-27 RX ORDER — MORPHINE SULFATE 15 MG/1
15 TABLET, FILM COATED, EXTENDED RELEASE ORAL EVERY 12 HOURS SCHEDULED
Qty: 60 TABLET | Refills: 0 | Status: SHIPPED | OUTPATIENT
Start: 2020-05-27 | End: 2020-06-29 | Stop reason: SDUPTHER

## 2020-05-27 NOTE — PROGRESS NOTES
Jennifer Mcduffie is a 53 y.o. female evaluated via telephone on 5/27/2020. Consent:  She and/or health care decision maker is aware that that she may receive a bill for this telephone service, depending on her insurance coverage, and has provided verbal consent to proceed: Yes      Documentation:  I communicated with the patient and/or health care decision maker about     1. Encounter for chronic pain management    2. Chronic use of opiate for therapeutic purpose    3. Morbid obesity with BMI of 40.0-44.9, adult ([facility] [address]    4. Chronic bilateral low back pain with left-sided sciatica    5. Lumbosacral spondylosis without myelopathy    6. Primary osteoarthritis of left hip        1. Back pain  Chronic onset more than a year ago located in the lumbar area  Radiation down left leg to the ankle  Associated with intermittent left leg numbness and tingling  Pain aggravated with excessive activity and interfere with daily activities  She had lumbar epidural injection 9 months ago with improvement in her sciatica by 70% lasting for several months    For her chronic axial back pain she had lumbar radiofrequency ablation with more than 80% improvement in her axial back pain      2. Hip pain  Located over the left hip  Chronic onset more than a year ago  Diagnosed with hip arthritis  Not a candidate for surgery considering obesity  patient had hip articular branches radiofrequency ablation 9 months ago with 80% plus relief lasting for more than 6 months     3.   Medication management  Patient has been on chronic opioid therapy for her chronic pain issues  Stable on low-dose opioid  Denies any significant side effect from the medication  Finds the medication helpful  No history of illicit substance use    Automated prescription report reviewed  No sign or symptoms of any aberrancy    Will schedule patient for lumbar epidural steroid injection  Consider for lumbar radiofrequency ablation and hip articular branches ablation in

## 2020-05-28 ENCOUNTER — TELEPHONE (OUTPATIENT)
Dept: PAIN MANAGEMENT | Age: 55
End: 2020-05-28

## 2020-05-29 ENCOUNTER — TELEPHONE (OUTPATIENT)
Dept: PAIN MANAGEMENT | Age: 55
End: 2020-05-29

## 2020-06-14 ENCOUNTER — HOSPITAL ENCOUNTER (OUTPATIENT)
Dept: PREADMISSION TESTING | Age: 55
Setting detail: SPECIMEN
Discharge: HOME | End: 2020-06-18
Payer: MEDICARE

## 2020-06-14 PROCEDURE — U0004 COV-19 TEST NON-CDC HGH THRU: HCPCS

## 2020-06-16 LAB
SARS-COV-2, PCR: NOT DETECTED
SARS-COV-2, RAPID: NORMAL
SARS-COV-2: NORMAL
SOURCE: NORMAL

## 2020-06-17 ENCOUNTER — TELEPHONE (OUTPATIENT)
Dept: PRIMARY CARE CLINIC | Age: 55
End: 2020-06-17

## 2020-06-18 ENCOUNTER — HOSPITAL ENCOUNTER (OUTPATIENT)
Age: 55
Setting detail: OUTPATIENT SURGERY
Discharge: HOME | End: 2020-06-18
Attending: ANESTHESIOLOGY | Admitting: ANESTHESIOLOGY
Payer: MEDICARE

## 2020-06-18 ENCOUNTER — APPOINTMENT (OUTPATIENT)
Dept: GENERAL RADIOLOGY | Age: 55
End: 2020-06-18
Attending: ANESTHESIOLOGY
Payer: MEDICARE

## 2020-06-18 VITALS
RESPIRATION RATE: 15 BRPM | OXYGEN SATURATION: 99 % | BODY MASS INDEX: 41.65 KG/M2 | TEMPERATURE: 97 F | WEIGHT: 250 LBS | HEIGHT: 65 IN | SYSTOLIC BLOOD PRESSURE: 114 MMHG | DIASTOLIC BLOOD PRESSURE: 70 MMHG | HEART RATE: 73 BPM

## 2020-06-18 PROCEDURE — 64484 NJX AA&/STRD TFRM EPI L/S EA: CPT | Performed by: ANESTHESIOLOGY

## 2020-06-18 PROCEDURE — 3209999900 FLUORO FOR SURGICAL PROCEDURES

## 2020-06-18 PROCEDURE — 6360000002 HC RX W HCPCS: Performed by: ANESTHESIOLOGY

## 2020-06-18 PROCEDURE — 7100000011 HC PHASE II RECOVERY - ADDTL 15 MIN: Performed by: ANESTHESIOLOGY

## 2020-06-18 PROCEDURE — 6360000004 HC RX CONTRAST MEDICATION: Performed by: ANESTHESIOLOGY

## 2020-06-18 PROCEDURE — 2500000003 HC RX 250 WO HCPCS: Performed by: ANESTHESIOLOGY

## 2020-06-18 PROCEDURE — 3600000050 HC PAIN LEVEL 1 BASE: Performed by: ANESTHESIOLOGY

## 2020-06-18 PROCEDURE — 99152 MOD SED SAME PHYS/QHP 5/>YRS: CPT | Performed by: ANESTHESIOLOGY

## 2020-06-18 PROCEDURE — 2709999900 HC NON-CHARGEABLE SUPPLY: Performed by: ANESTHESIOLOGY

## 2020-06-18 PROCEDURE — 64483 NJX AA&/STRD TFRM EPI L/S 1: CPT | Performed by: ANESTHESIOLOGY

## 2020-06-18 PROCEDURE — 3600000051 HC PAIN LEVEL 1 ADDL 15 MIN: Performed by: ANESTHESIOLOGY

## 2020-06-18 PROCEDURE — 7100000010 HC PHASE II RECOVERY - FIRST 15 MIN: Performed by: ANESTHESIOLOGY

## 2020-06-18 PROCEDURE — 2580000003 HC RX 258: Performed by: ANESTHESIOLOGY

## 2020-06-18 RX ORDER — SODIUM CHLORIDE 0.9 % (FLUSH) 0.9 %
10 SYRINGE (ML) INJECTION PRN
Status: DISCONTINUED | OUTPATIENT
Start: 2020-06-18 | End: 2020-06-18 | Stop reason: HOSPADM

## 2020-06-18 RX ORDER — MIDAZOLAM HYDROCHLORIDE 1 MG/ML
INJECTION INTRAMUSCULAR; INTRAVENOUS PRN
Status: DISCONTINUED | OUTPATIENT
Start: 2020-06-18 | End: 2020-06-18 | Stop reason: ALTCHOICE

## 2020-06-18 RX ORDER — FENTANYL CITRATE 50 UG/ML
INJECTION, SOLUTION INTRAMUSCULAR; INTRAVENOUS PRN
Status: DISCONTINUED | OUTPATIENT
Start: 2020-06-18 | End: 2020-06-18 | Stop reason: ALTCHOICE

## 2020-06-18 RX ORDER — SODIUM CHLORIDE 0.9 % (FLUSH) 0.9 %
10 SYRINGE (ML) INJECTION EVERY 12 HOURS SCHEDULED
Status: DISCONTINUED | OUTPATIENT
Start: 2020-06-18 | End: 2020-06-18 | Stop reason: HOSPADM

## 2020-06-18 RX ORDER — DEXAMETHASONE SODIUM PHOSPHATE 10 MG/ML
INJECTION INTRAMUSCULAR; INTRAVENOUS PRN
Status: DISCONTINUED | OUTPATIENT
Start: 2020-06-18 | End: 2020-06-18 | Stop reason: ALTCHOICE

## 2020-06-18 RX ORDER — DULOXETIN HYDROCHLORIDE 30 MG/1
30 CAPSULE, DELAYED RELEASE ORAL DAILY
COMMUNITY

## 2020-06-18 RX ORDER — LIDOCAINE HYDROCHLORIDE 10 MG/ML
INJECTION, SOLUTION INFILTRATION; PERINEURAL PRN
Status: DISCONTINUED | OUTPATIENT
Start: 2020-06-18 | End: 2020-06-18 | Stop reason: ALTCHOICE

## 2020-06-18 RX ADMIN — SODIUM CHLORIDE, PRESERVATIVE FREE 10 ML: 5 INJECTION INTRAVENOUS at 09:46

## 2020-06-18 ASSESSMENT — PAIN DESCRIPTION - PAIN TYPE: TYPE: CHRONIC PAIN

## 2020-06-18 ASSESSMENT — PAIN DESCRIPTION - ORIENTATION: ORIENTATION: RIGHT;LEFT

## 2020-06-18 ASSESSMENT — PAIN DESCRIPTION - FREQUENCY: FREQUENCY: CONTINUOUS

## 2020-06-18 ASSESSMENT — PAIN DESCRIPTION - LOCATION
LOCATION: BACK
LOCATION: BACK
LOCATION: LEG
LOCATION: BACK

## 2020-06-18 ASSESSMENT — PAIN SCALES - GENERAL
PAINLEVEL_OUTOF10: 0
PAINLEVEL_OUTOF10: 9
PAINLEVEL_OUTOF10: 0
PAINLEVEL_OUTOF10: 9
PAINLEVEL_OUTOF10: 0
PAINLEVEL_OUTOF10: 9

## 2020-06-18 ASSESSMENT — PAIN DESCRIPTION - DESCRIPTORS: DESCRIPTORS: RADIATING

## 2020-06-18 NOTE — OP NOTE
Operative Note      Patient: oJse Najera  YOB: 1965  MRN: [id number]    Date of Procedure: 6/18/2020    Pre-Op Diagnosis: DX CHRONIC JAD LOW BACK PAIN WITH LEFT SIDED SCIATICA    Post-Op Diagnosis: Same       Procedure(s):  EPIDURAL STEROID INJECTION LEFT L4,L5    Surgeon(s):  Mireya Watters MD    Assistant:   * No surgical staff found *    Anesthesia: IV Sedation    Estimated Blood Loss (mL): Minimal    Complications: None    Specimens:   * No specimens in log *    Implants:  * No implants in log *      Drains: * No LDAs found *    Findings: N/A    Detailed Description of Procedure:     Patient Name: Jose Najera   YOB: 1965  Room/Bed: STAZ OR Pool/NONE  Medical Record Number: [id number]  Date: 6/18/2020       Preoperative Diagnosis:    Left lumbar radiculitis  Lumbar disc herniation    Postoperative diagnosis:  Lumbar radiculitis  Lumbar disc herniation    Blood Loss: none    Procedure Performed:  Transforaminal lumbar epidural steroid injection at the levels of L4 and L5 on the Left side under fluoroscopic guidance. Procedure: The Patient was seen in the preop area, chart was reviewed, informed consent was obtained. Patient was taken to procedure room and was placed in prone position. Vital signs were monitored through out the  Procedure. A time out was completed. The skin over the back was prepped and draped in sterile manner. The target point was marked in ipsilateral obliques just below the pedicle at the target levels. Skin and deep tissues were anesthetized with 1 % lidocaine. A 20-gauge, spinal needle was advanced  under fluoroscopy guidance in AP / Oblique and lateral views, such that the tip of the needle lies in the neuroforamina at about the 6 o'clock position under the pedicle of the target vertebra. This was confirmed with AP and lateral views of the fluoroscopy.      Then after negative aspiration contrast dye Omnipaque-180 was injected with live fluoroscopy

## 2020-06-18 NOTE — H&P
History and Physical Service   [facility]IENT    HISTORY AND PHYSICAL EXAMINATION            Date of Evaluation: 6/18/2020  Patient name:  Leandro Briggs  MRN:   [id number]  YOB: 1965  PCP:    GISELLE Barbour CNP    History Obtained From:     Patient, medical records    History of Present Illness: This is Leandro Briggs a 53 y.o. female who presents today for a Epidural steroid injection left L4, L5 by Dr. Andreea Null for Chronic bilateral lower back pain with left sided sciatica. Hx chronic lower back pain with left sided sciatica  Follows with Dr Yong Saeed for pain management with multiple past BESSIE, nerve blocks and RFA. Last procedure 10/24/19 for Left RFA femoral and obturator nerve with 90% improved comfort and did well for several months. During the past 3 months lower back pain returned with increased pain shooting down the anterior/posterior left leg rated 9-10/10  Was \"delayed due to COVID\" and now returns for an injection. Today denies fever, chills, bowel or bladder incontinence, wheezing or SOB.    No DM   Last Celebrex 6/17/20     Past Medical History:     Past Medical History:   Diagnosis Date    ADHD (attention deficit hyperactivity disorder)     Bipolar 1 disorder (HCC)     Depression with anxiety     GERD (gastroesophageal reflux disease)     Heel spur     left    Hyperlipidemia     Hypertension     Left hip pain     PTSD (post-traumatic stress disorder)     Tremor     Trouble swallowing     Weight gain         Past Surgical History:     Past Surgical History:   Procedure Laterality Date    ANESTHESIA NERVE BLOCK Left 10/7/2019    NERVE BLOCK LEFT HIP OBTURATOR AND FEMORAL performed by Andreea Null MD at [place] [address] Bilateral     EPIDURAL STEROID INJECTION Left 9/12/2019    EPIDURAL STEROID INJECTION performed by nAdreea Null MD at [address] [place] [address] NERV Left 9/12/2019    INJECTION MEDICATION LEFT She has been smoking about 0.50 packs per day. She has never used smokeless tobacco.  Alcohol:      reports no history of alcohol use. Drug Use:  reports no history of drug use. Family History:     Family History   Problem Relation Age of Onset    Other Mother         ulcers    Heart Disease Father     High Blood Pressure Father     Other Father         blind, pacemaker       Review of Systems:     Positive and Negative as described in HPI. CONSTITUTIONAL:  negative for fevers, chills, sweats, fatigue, weight loss  HEENT:  negative for vision, hearing changes, runny nose, throat pain  RESPIRATORY:  negative for shortness of breath, cough, congestion, wheezing. CARDIOVASCULAR:  negative for chest pain, palpitations. GASTROINTESTINAL:  negative for nausea, vomiting, diarrhea, constipation, change in bowel habits, abdominal pain   GENITOURINARY:  negative for difficulty of urination, burning with urination, frequency   INTEGUMENT:  negative for rash, skin lesions, easy bruising   HEMATOLOGIC/LYMPHATIC:  negative for swelling/edema   ALLERGIC/IMMUNOLOGIC:  negative for urticaria , itching  ENDOCRINE:  negative increase in drinking, increase in urination, hot or cold intolerance  MUSCULOSKELETAL: See HPI  Trouble walking due to pain negative joint pains, muscle aches, swelling of joints  NEUROLOGICAL:  negative for headaches, dizziness, lightheadedness, numbness, pain, tingling extremities  BEHAVIOR/PSYCH:  negative for depression, anxiety    Physical Exam:   BP (!) 158/73   Pulse 77   Temp 97.6 °F (36.4 °C) (Temporal)   Resp 20   SpO2 97%   No LMP recorded. Patient has had a hysterectomy. No results for input(s): POCGLU in the last 72 hours. General Appearance: In pain, uncomfortable  alert, well appearing  Mental status: oriented to person, place, and time with normal affect  Head:  normocephalic, atraumatic.   Eye: no icterus, redness, pupils equal and reactive, extraocular eye movements intact,

## 2020-06-29 RX ORDER — GABAPENTIN 300 MG/1
300 CAPSULE ORAL 2 TIMES DAILY
Qty: 60 CAPSULE | Refills: 0 | Status: SHIPPED | OUTPATIENT
Start: 2020-06-29 | End: 2020-08-03 | Stop reason: SDUPTHER

## 2020-06-29 RX ORDER — MORPHINE SULFATE 15 MG/1
15 TABLET, FILM COATED, EXTENDED RELEASE ORAL EVERY 12 HOURS SCHEDULED
Qty: 60 TABLET | Refills: 0 | Status: SHIPPED | OUTPATIENT
Start: 2020-06-29 | End: 2020-08-03 | Stop reason: SDUPTHER

## 2020-06-29 RX ORDER — TIZANIDINE 4 MG/1
4 TABLET ORAL NIGHTLY
Qty: 30 TABLET | Refills: 0 | Status: SHIPPED | OUTPATIENT
Start: 2020-06-29 | End: 2020-08-03 | Stop reason: SDUPTHER

## 2020-07-08 ENCOUNTER — OFFICE VISIT (OUTPATIENT)
Dept: PAIN MANAGEMENT | Age: 55
End: 2020-07-08
Payer: MEDICARE

## 2020-07-08 VITALS
BODY MASS INDEX: 42.49 KG/M2 | WEIGHT: 255 LBS | OXYGEN SATURATION: 97 % | HEIGHT: 65 IN | HEART RATE: 109 BPM | DIASTOLIC BLOOD PRESSURE: 102 MMHG | TEMPERATURE: 96.3 F | SYSTOLIC BLOOD PRESSURE: 168 MMHG

## 2020-07-08 PROCEDURE — 4004F PT TOBACCO SCREEN RCVD TLK: CPT | Performed by: ANESTHESIOLOGY

## 2020-07-08 PROCEDURE — G8427 DOCREV CUR MEDS BY ELIG CLIN: HCPCS | Performed by: ANESTHESIOLOGY

## 2020-07-08 PROCEDURE — G8417 CALC BMI ABV UP PARAM F/U: HCPCS | Performed by: ANESTHESIOLOGY

## 2020-07-08 PROCEDURE — 99214 OFFICE O/P EST MOD 30 MIN: CPT | Performed by: ANESTHESIOLOGY

## 2020-07-08 PROCEDURE — 3017F COLORECTAL CA SCREEN DOC REV: CPT | Performed by: ANESTHESIOLOGY

## 2020-07-08 ASSESSMENT — ENCOUNTER SYMPTOMS
RESPIRATORY NEGATIVE: 1
BACK PAIN: 1

## 2020-07-08 NOTE — PROGRESS NOTES
The patient is a 53 y. o. Non-/non  female. Chief Complaint   Patient presents with    Back Pain    Hip Pain     bilateral hip pain radiates down both legs    Follow Up After Procedure          HPI    79-year-old woman with history of chronic multiple site body pain  Back pain  Pain is chronic onset many years ago located in the lumbar spine across midline affecting both side  She is diagnosed with lumbar facet mediated pain and had lumbar radiofrequency ablation with more than 70% improvement lasting for more than 6 months  Her procedure was in August 2019  Pain is now back to the baseline    She had a flareup of chronic lumbar radicular pain on left side for which she had an epidural injection  Today report 90% improvement in her lumbar left lumbar radicular pain    Also history of chronic bilateral hip pain    Patient is currently on gabapentin, MS Contin 15 mg twice a day and Zanaflex  Denies any side effect from the medication  Finds the medication helpful allowing her to do daily life activities  No history of illicit substance use  Last urine toxicology was more than 6 months ago  S/P:EPIDURAL STEROID INJECTION LEFT L4,L5 DOS 6/18/20      Outcome   Any improvement of activity?   No   Any side effects (appetite,leg cramping,facial fleshing):none   Increase of pain:  No  Pain score Today:  Back pain rated 2/10, bilateral hips and leg pain rated 8/10   % of pain relief:90%  Pain diary (medial branch block): No      Past Medical History, Past Surgical History, Social History, Allergies and Medications, reviewed and updated in EPIC as indicated      Past Medical History:   Diagnosis Date    ADHD (attention deficit hyperactivity disorder)     Bipolar 1 disorder ([facility] [address]     Depression with anxiety     GERD (gastroesophageal reflux disease)     Heel spur     left    Hyperlipidemia     Hypertension     Left hip pain     PTSD (post-traumatic stress disorder)     Tremor     Trouble swallowing allergies. Vitals:    07/08/20 1541   BP: (!) 168/102   Pulse: 109   Temp: 96.3 °F (35.7 °C)   SpO2: 97%     Current Outpatient Medications   Medication Sig Dispense Refill    morphine (MS CONTIN) 15 MG extended release tablet Take 1 tablet by mouth every 12 hours for 30 days. 60 tablet 0    gabapentin (NEURONTIN) 300 MG capsule Take 1 capsule by mouth 2 times daily for 30 days. 60 capsule 0    tiZANidine (ZANAFLEX) 4 MG tablet Take 1 tablet by mouth nightly 30 tablet 0    DULoxetine (CYMBALTA) 30 MG extended release capsule Take 30 mg by mouth daily      fenofibrate (TRICOR) 145 MG tablet Take 145 mg by mouth 2 times daily      celecoxib (CELEBREX) 100 MG capsule Take 100 mg by mouth 2 times daily      benztropine (COGENTIN) 0.5 MG tablet Take 0.5 mg by mouth 2 times daily      Acetaminophen (TYLENOL ARTHRITIS PAIN PO) Take 2 tablets by mouth daily      hydrOXYzine (VISTARIL) 50 MG capsule Take 100 mg by mouth 3 times daily as needed for Itching       pantoprazole (PROTONIX) 40 MG tablet TAKE 1 TABLET BY MOUTH EVERY DAY      busPIRone (BUSPAR) 10 MG tablet Take 10 mg by mouth 2 times daily      zolpidem (AMBIEN) 10 MG tablet Take 10 mg by mouth nightly as needed for Sleep      Multiple Vitamins-Minerals (THERAPEUTIC MULTIVITAMIN-MINERALS) tablet Take 1 tablet by mouth daily      Biotin 5000 MCG CAPS Take by mouth      ranitidine (ZANTAC) 150 MG tablet Take 150 mg by mouth      amphetamine-dextroamphetamine (ADDERALL) 20 MG tablet Take 30 mg by mouth daily       rosuvastatin (CRESTOR) 10 MG tablet Take 20 mg by mouth daily       metoprolol (LOPRESSOR) 25 MG tablet Take 50 mg by mouth 2 times daily        No current facility-administered medications for this visit. Review of Systems   Constitutional: Positive for fatigue. Respiratory: Negative. Musculoskeletal: Positive for arthralgias, back pain and myalgias. Neurological: Positive for tremors and weakness.        Objective:  General Appearance:  Well-appearing and in no acute distress. Vital signs: (most recent): Blood pressure (!) 168/102, pulse 109, temperature 96.3 °F (35.7 °C), temperature source Temporal, height 5' 5\" (1.651 m), weight 255 lb (115.7 kg), SpO2 97 %, not currently breastfeeding. Output: Producing urine and producing stool. HEENT: (No visible masses in neck  Range of motion appears normal on cervical spine  External ears appears normal)    Lungs:  Normal effort. She is not in respiratory distress. Neurological: Patient is alert and oriented to person, place and time.  (Able to follow command    Psych  Mood is good  Affect is normal). Skin:  No rash or cyanosis. Assessment & Plan  1. Chronic use of opiate for therapeutic purpose    2. Lumbosacral spondylosis without myelopathy    3. Morbid obesity with BMI of 40.0-44.9, adult ([facility] [address]    4. Hip arthritis        improved left lumbar radicular pain after epidural injection    Chronic axial lumbar spinal pain, onset many years ago progressively worsened over time describes it as aching nagging sensation, refractory to conservative measures, affecting daily life activity  Diagnosed with lumbar facet mediated pain  Had excellent response 70% relief lasting for more than 6 months with radiofrequency ablation of lumbar medial branch nerve  Last procedure was August 2019  Will schedule patient for repeat procedure    Automated prescription report reviewed  No sign or symptoms of any aberrancy  Stable on current medication regimen  We will collect urine for toxicology  Last screening was more than 6 months ago    Consider for hip radiofrequency ablation in future if hip pain flareup    Orders Placed This Encounter   Procedures    FACET JOINT L/S SINGLE    FACET JOINT L/S SINGLE    Urine Drug Screen, Comprehensive      No orders of the defined types were placed in this encounter.      Controlled Substance Monitoring:    Acute and Chronic Pain Monitoring:   RX Monitoring 7/8/2020   Attestation -   Acute Pain Prescriptions -   Periodic Controlled Substance Monitoring No signs of potential drug abuse or diversion identified. ;Possible medication side effects, risk of tolerance/dependence & alternative treatments discussed. ;Assessed functional status. ;Random urine drug screen sent today. Chronic Pain > 50 MEDD Obtained or confirmed \"Consent for Opioid Use\" on file.    Chronic Pain > 80 MEDD -         Electronically signed by Mike Rodriguez MD on 7/8/2020 at 4:02 PM       Urine toxicology July 8, 2020  Positive for morphine  Positive for amphetamine  Validity testing okay  Also have a small amount of hydromorphone which is likely metabolite from morphine  Consistent with prescription history

## 2020-07-23 ENCOUNTER — HOSPITAL ENCOUNTER (OUTPATIENT)
Dept: PREADMISSION TESTING | Age: 55
Setting detail: SPECIMEN
Discharge: HOME | End: 2020-07-27
Payer: MEDICARE

## 2020-07-23 PROCEDURE — U0003 INFECTIOUS AGENT DETECTION BY NUCLEIC ACID (DNA OR RNA); SEVERE ACUTE RESPIRATORY SYNDROME CORONAVIRUS 2 (SARS-COV-2) (CORONAVIRUS DISEASE [COVID-19]), AMPLIFIED PROBE TECHNIQUE, MAKING USE OF HIGH THROUGHPUT TECHNOLOGIES AS DESCRIBED BY CMS-2020-01-R: HCPCS

## 2020-07-26 LAB — SARS-COV-2, NAA: NOT DETECTED

## 2020-07-27 ENCOUNTER — HOSPITAL ENCOUNTER (OUTPATIENT)
Age: 55
Setting detail: OUTPATIENT SURGERY
Discharge: HOME | End: 2020-07-27
Attending: ANESTHESIOLOGY | Admitting: ANESTHESIOLOGY
Payer: MEDICARE

## 2020-07-27 ENCOUNTER — APPOINTMENT (OUTPATIENT)
Dept: GENERAL RADIOLOGY | Age: 55
End: 2020-07-27
Attending: ANESTHESIOLOGY
Payer: MEDICARE

## 2020-07-27 VITALS
BODY MASS INDEX: 40.98 KG/M2 | HEART RATE: 64 BPM | TEMPERATURE: 97.5 F | SYSTOLIC BLOOD PRESSURE: 125 MMHG | OXYGEN SATURATION: 97 % | DIASTOLIC BLOOD PRESSURE: 75 MMHG | HEIGHT: 65 IN | WEIGHT: 246 LBS | RESPIRATION RATE: 16 BRPM

## 2020-07-27 PROCEDURE — 2500000003 HC RX 250 WO HCPCS: Performed by: ANESTHESIOLOGY

## 2020-07-27 PROCEDURE — 99153 MOD SED SAME PHYS/QHP EA: CPT | Performed by: ANESTHESIOLOGY

## 2020-07-27 PROCEDURE — 3209999900 FLUORO FOR SURGICAL PROCEDURES

## 2020-07-27 PROCEDURE — 6360000002 HC RX W HCPCS: Performed by: ANESTHESIOLOGY

## 2020-07-27 PROCEDURE — 64635 DESTROY LUMB/SAC FACET JNT: CPT | Performed by: ANESTHESIOLOGY

## 2020-07-27 PROCEDURE — 3600000055 HC PAIN LEVEL 3 ADDL 15 MIN: Performed by: ANESTHESIOLOGY

## 2020-07-27 PROCEDURE — 99152 MOD SED SAME PHYS/QHP 5/>YRS: CPT | Performed by: ANESTHESIOLOGY

## 2020-07-27 PROCEDURE — 3600000054 HC PAIN LEVEL 3 BASE: Performed by: ANESTHESIOLOGY

## 2020-07-27 PROCEDURE — 7100000010 HC PHASE II RECOVERY - FIRST 15 MIN: Performed by: ANESTHESIOLOGY

## 2020-07-27 PROCEDURE — 7100000011 HC PHASE II RECOVERY - ADDTL 15 MIN: Performed by: ANESTHESIOLOGY

## 2020-07-27 PROCEDURE — 64636 DESTROY L/S FACET JNT ADDL: CPT | Performed by: ANESTHESIOLOGY

## 2020-07-27 RX ORDER — FENTANYL CITRATE 50 UG/ML
INJECTION, SOLUTION INTRAMUSCULAR; INTRAVENOUS PRN
Status: DISCONTINUED | OUTPATIENT
Start: 2020-07-27 | End: 2020-07-27 | Stop reason: ALTCHOICE

## 2020-07-27 RX ORDER — LIDOCAINE HYDROCHLORIDE 10 MG/ML
INJECTION, SOLUTION INFILTRATION; PERINEURAL PRN
Status: DISCONTINUED | OUTPATIENT
Start: 2020-07-27 | End: 2020-07-27 | Stop reason: ALTCHOICE

## 2020-07-27 RX ORDER — MIDAZOLAM HYDROCHLORIDE 1 MG/ML
INJECTION INTRAMUSCULAR; INTRAVENOUS PRN
Status: DISCONTINUED | OUTPATIENT
Start: 2020-07-27 | End: 2020-07-27 | Stop reason: ALTCHOICE

## 2020-07-27 RX ORDER — LIDOCAINE HYDROCHLORIDE 40 MG/ML
INJECTION, SOLUTION RETROBULBAR; TOPICAL PRN
Status: DISCONTINUED | OUTPATIENT
Start: 2020-07-27 | End: 2020-07-27 | Stop reason: ALTCHOICE

## 2020-07-27 ASSESSMENT — PAIN SCALES - GENERAL
PAINLEVEL_OUTOF10: 3
PAINLEVEL_OUTOF10: 3
PAINLEVEL_OUTOF10: 8
PAINLEVEL_OUTOF10: 8
PAINLEVEL_OUTOF10: 9
PAINLEVEL_OUTOF10: 4
PAINLEVEL_OUTOF10: 3
PAINLEVEL_OUTOF10: 9

## 2020-07-27 ASSESSMENT — PAIN DESCRIPTION - DESCRIPTORS
DESCRIPTORS: ACHING;BURNING
DESCRIPTORS: BURNING;RADIATING

## 2020-07-27 ASSESSMENT — PAIN DESCRIPTION - LOCATION: LOCATION: BACK;LEG

## 2020-07-27 ASSESSMENT — PAIN DESCRIPTION - PAIN TYPE
TYPE: CHRONIC PAIN

## 2020-07-27 ASSESSMENT — PAIN DESCRIPTION - FREQUENCY: FREQUENCY: CONTINUOUS

## 2020-07-27 ASSESSMENT — PAIN DESCRIPTION - ORIENTATION: ORIENTATION: RIGHT;LEFT

## 2020-07-27 NOTE — H&P
History and Physical Update    Pt Name: Charley Clarke  MRN: [id number]  YOB: 1965  Date of evaluation: 7/27/2020      [x] I have reviewed the Pain Management Progress Note by Dr Aiden Abreu dated 7/8/20 in epic which meets the criteria for an Interval History and Physical note and is attached below. [x] I have examined  Charley Clarke  There are no changes to the patient who is scheduled for a left medial branch nerve radiofrequency ablation L2-L5 by Dr Zulema Mo for lumbosacral spondylosis w/o myelopathy  Pain rated 4/10 on right and 9/10 on left radiating down entire leg The patient denies health changes, fever, chills, wheezing, cough, increased SOB, chest pain, open sores or wounds. No DM  Last Celebrex 7/26/20    Vital signs: BP (!) 131/98   Pulse 71   Temp 96.6 °F (35.9 °C) (Oral)   Resp 18   SpO2 98%     Allergies:  Patient has no known allergies. Medications:    Prior to Admission medications    Medication Sig Start Date End Date Taking? Authorizing Provider   morphine (MS CONTIN) 15 MG extended release tablet Take 1 tablet by mouth every 12 hours for 30 days. 6/29/20 7/29/20  Aiden Abreu MD   gabapentin (NEURONTIN) 300 MG capsule Take 1 capsule by mouth 2 times daily for 30 days.  6/29/20 7/29/20  Aiden Abreu MD   tiZANidine (ZANAFLEX) 4 MG tablet Take 1 tablet by mouth nightly 6/29/20 7/29/20  Aiden Abreu MD   DULoxetine (CYMBALTA) 30 MG extended release capsule Take 30 mg by mouth daily    Historical Provider, MD   fenofibrate (TRICOR) 145 MG tablet Take 145 mg by mouth 2 times daily    Historical Provider, MD   celecoxib (CELEBREX) 100 MG capsule Take 100 mg by mouth 2 times daily    Historical Provider, MD   benztropine (COGENTIN) 0.5 MG tablet Take 0.5 mg by mouth 2 times daily    Historical Provider, MD   Acetaminophen (TYLENOL ARTHRITIS PAIN PO) Take 2 tablets by mouth daily    Historical Provider, MD   hydrOXYzine (VISTARIL) 50 MG capsule Take 100 mg by mouth 3 times daily as needed for Itching     Historical Provider, MD   pantoprazole (PROTONIX) 40 MG tablet TAKE 1 TABLET BY MOUTH EVERY DAY 1/10/18   Historical Provider, MD   busPIRone (BUSPAR) 10 MG tablet Take 10 mg by mouth 2 times daily    Historical Provider, MD   zolpidem (AMBIEN) 10 MG tablet Take 10 mg by mouth nightly as needed for Sleep    Historical Provider, MD   Multiple Vitamins-Minerals (THERAPEUTIC MULTIVITAMIN-MINERALS) tablet Take 1 tablet by mouth daily    Historical Provider, MD   Biotin 5000 MCG CAPS Take by mouth    Historical Provider, MD   ranitidine (ZANTAC) 150 MG tablet Take 150 mg by mouth 5/10/16 2/21/20  Historical Provider, MD   amphetamine-dextroamphetamine (ADDERALL) 20 MG tablet Take 30 mg by mouth daily     Historical Provider, MD   rosuvastatin (CRESTOR) 10 MG tablet Take 20 mg by mouth daily     Historical Provider, MD   metoprolol (LOPRESSOR) 25 MG tablet Take 50 mg by mouth 2 times daily     Historical Provider, MD         This is a 53 y.o. female who is pleasant, cooperative, alert and oriented x3, in no acute distress. Heart: Heart sounds are normal.  HR 71 regular rate and rhythm without murmur, gallop or rub. Lungs: Normal respiratory effort with equal expansion, good air exchange, unlabored and clear to auscultation without wheezes or rales bilaterally   Abdomen: obese, round, soft, nontender, nondistended with bowel sounds. Labs:  No results for input(s): HGB, HCT, WBC, MCV, PLT, NA, K, CL, CO2, BUN, CREATININE, GLUCOSE, INR, PROTIME, APTT, AST, ALT, LABALBU, HCG in the last 720 hours.     Recent Labs     07/23/20  1310   COVID19 Not Detected       Marisela DESAI, ANP-BC  Electronically signed 7/27/2020 at 8:49 Alba Tompkins MD    Physician    Specialty:  Pain Management    Progress Notes      Addendum    Encounter Date:  7/8/2020          Related encounter: Office Visit from 7/8/2020 in [facility] [place]          Expand All Collapse All The patient is a 53 y. o. Non-/non  female. Chief Complaint   Patient presents with    Back Pain    Hip Pain       bilateral hip pain radiates down both legs    Follow Up After Procedure           HPI     53-year-old woman with history of chronic multiple site body pain  Back pain  Pain is chronic onset many years ago located in the lumbar spine across midline affecting both side  She is diagnosed with lumbar facet mediated pain and had lumbar radiofrequency ablation with more than 70% improvement lasting for more than 6 months  Her procedure was in August 2019  Pain is now back to the baseline     She had a flareup of chronic lumbar radicular pain on left side for which she had an epidural injection  Today report 90% improvement in her lumbar left lumbar radicular pain     Also history of chronic bilateral hip pain     Patient is currently on gabapentin, MS Contin 15 mg twice a day and Zanaflex  Denies any side effect from the medication  Finds the medication helpful allowing her to do daily life activities  No history of illicit substance use  Last urine toxicology was more than 6 months ago  S/P:EPIDURAL STEROID INJECTION LEFT L4,L5 DOS 6/18/20        Outcome              Any improvement of activity?   No              Any side effects (appetite,leg cramping,facial fleshing):none              Increase of pain:  No  Pain score Today:  Back pain rated 2/10, bilateral hips and leg pain rated 8/10   % of pain relief:90%  Pain diary (medial branch block): No        Past Medical History, Past Surgical History, Social History, Allergies and Medications, reviewed and updated in EPIC as indicated        Past Medical History        Past Medical History:   Diagnosis Date    ADHD (attention deficit hyperactivity disorder)      B[facility] 1 disorder ([facility] [address]      Depression with anxiety      GERD (gastroesophageal reflux disease)      Heel spur       left    Hyperlipidemia      Hypertension      Left hip pain      PTSD (post-traumatic stress disorder)      Tremor      Trouble swallowing      Weight gain           Past Surgical History         Past Surgical History:   Procedure Laterality Date    ANESTHESIA NERVE BLOCK Left 10/7/2019     NERVE BLOCK LEFT HIP OBTURATOR AND FEMORAL [facility]      EPIDURAL STEROID INJECTION Left 9/12/2019     EPIDURAL STEROID INJECTION performed by Gabe Lennox, MD at [facility] NERV Left 9/12/2019     INJECTION MEDICATION LEFT H[facility]   08/01/2016     duramorph 1mg  celestone 9mg    NERVE BLOCK   10/31/2016     duramorph epidural steroid block decadron 7.5 mg durmoprh 1.5 mg    NERVE BLOCK Left       NO STEROID, LEFT MBNB# 1    NERVE BLOCK Left 01/16/2017     LT MBNB #2   celestone 6mg    NERVE BLOCK Left 02/06/2017     LT lumbar RF    kenalog 40    NERVE BLOCK Right 06/19/2017     right lumbar mbnb #1    NERVE BLOCK   07/07/2017     right radiofrequency kenolog 40mg    NERVE BLOCK   09/11/2017     duramorph 1mg & celestone 9 mg    NERVE BLOCK Left 10/02/2018     LEFT LUMBAR EPIDURAL STEROID BLOCK DECADRON 10MG    NERVE BLOCK   10/16/2018     blanca mbnb, marcaine and xylocaine    NERVE BLOCK Left 10/30/2018     left lumbar rf- no steroid    NERVE BLOCK Right 11/13/2018     RT lumbar RFA no steroid    NERVE BLOCK   03/26/2019     left hip decadron 10mg, marcaine . 5 %    OTHER SURGICAL HISTORY   08/01/2019     radiofrequency ablation L2-5    OTHER SURGICAL HISTORY Right 08/15/2019     lumbar radiofrequency ablation    OTHER SURGICAL HISTORY   09/12/2019      INJECTION MEDICATION LEFT HIP (Left Hip) EPIDURAL STEROID INJECTION (left back)    OTHER SURGICAL HISTORY Left 10/24/2019     nerve radiofrequency ablation    PAIN MANAGEMENT PROCEDURE Left 6/18/2020     EPIDURAL STEROID INJECTION LEFT L4,L5 Family History         Family History   Problem Relation Age of Onset    Other Mother           ulcers    Heart Disease Father      High Blood Pressure Father      Other Father           blind, pacemaker        No Known Allergies  Patient has no known allergies. Vitals:     07/08/20 1541   BP: (!) 168/102   Pulse: 109   Temp: 96.3 °F (35.7 °C)   SpO2: 97%     Current Facility-Administered Medications          Current Outpatient Medications   Medication Sig Dispense Refill    morphine (MS CONTIN) 15 MG extended release tablet Take 1 tablet by mouth every 12 hours for 30 days. 60 tablet 0    gabapentin (NEURONTIN) 300 MG capsule Take 1 capsule by mouth 2 times daily for 30 days.  60 capsule 0    tiZANidine (ZANAFLEX) 4 MG tablet Take 1 tablet by mouth nightly 30 tablet 0    DULoxetine (CYMBALTA) 30 MG extended release capsule Take 30 mg by mouth daily        fenofibrate (TRICOR) 145 MG tablet Take 145 mg by mouth 2 times daily        celecoxib (CELEBREX) 100 MG capsule Take 100 mg by mouth 2 times daily        benztropine (COGENTIN) 0.5 MG tablet Take 0.5 mg by mouth 2 times daily        Acetaminophen (TYLENOL ARTHRITIS PAIN PO) Take 2 tablets by mouth daily        hydrOXYzine (VISTARIL) 50 MG capsule Take 100 mg by mouth 3 times daily as needed for Itching         pantoprazole (PROTONIX) 40 MG tablet TAKE 1 TABLET BY MOUTH EVERY DAY        busPIRone (BUSPAR) 10 MG tablet Take 10 mg by mouth 2 times daily        zolpidem (AMBIEN) 10 MG tablet Take 10 mg by mouth nightly as needed for Sleep        Multiple Vitamins-Minerals (THERAPEUTIC MULTIVITAMIN-MINERALS) tablet Take 1 tablet by mouth daily        Biotin 5000 MCG CAPS Take by mouth        ranitidine (ZANTAC) 150 MG tablet Take 150 mg by mouth        amphetamine-dextroamphetamine (ADDERALL) 20 MG tablet Take 30 mg by mouth daily         rosuvastatin (CRESTOR) 10 MG tablet Take 20 mg by mouth daily         metoprolol (LOPRESSOR) 25 MG tablet Take 50 mg by mouth 2 times daily           No current facility-administered medications for this visit. Review of Systems   Constitutional: Positive for fatigue. Respiratory: Negative. Musculoskeletal: Positive for arthralgias, back pain and myalgias. Neurological: Positive for tremors and weakness. Objective:  General Appearance:  Well-appearing and in no acute distress. Vital signs: (most recent): Blood pressure (!) 168/102, pulse 109, temperature 96.3 °F (35.7 °C), temperature source Temporal, height 5' 5\" (1.651 m), weight 255 lb (115.7 kg), SpO2 97 %, not currently breastfeeding. Output: Producing urine and producing stool. HEENT: (No visible masses in neck  Range of motion appears normal on cervical spine  External ears appears normal)    Lungs:  Normal effort. She is not in respiratory distress. Neurological: Patient is alert and oriented to person, place and time.  (Able to follow command     Psych  Mood is good  Affect is normal). Skin:  No rash or cyanosis. Assessment & Plan  1. Chronic use of opiate for therapeutic purpose    2. Lumbosacral spondylosis without myelopathy    3. Morbid obesity with BMI of 40.0-44.9, adult ([facility] [address]    4.  Hip arthritis        improved left lumbar radicular pain after epidural injection     Chronic axial lumbar spinal pain, onset many years ago progressively worsened over time describes it as aching nagging sensation, refractory to conservative measures, affecting daily life activity  Diagnosed with lumbar facet mediated pain  Had excellent response 70% relief lasting for more than 6 months with radiofrequency ablation of lumbar medial branch nerve  Last procedure was August 2019  Will schedule patient for repeat procedure     Automated prescription report reviewed  No sign or symptoms of any aberrancy  Stable on current medication regimen  We will collect urine for toxicology  Last screening was more than 6 months ago

## 2020-07-30 NOTE — TELEPHONE ENCOUNTER
Natalia Rosales is requesting a refill on the following medications:   Requested Prescriptions     Pending Prescriptions Disp Refills    morphine (MS CONTIN) 15 MG extended release tablet 60 tablet 0     Sig: Take 1 tablet by mouth every 12 hours for 30 days.  gabapentin (NEURONTIN) 300 MG capsule 60 capsule 0     Sig: Take 1 capsule by mouth 2 times daily for 30 days.  tiZANidine (ZANAFLEX) 4 MG tablet 30 tablet 0     Sig: Take 1 tablet by mouth nightly       Last OV  7/8/2020, Procedure was 7/27/2020    Future Appointments   Date Time Provider Ani Benavidez   8/20/2020 10:30 AM [facility] RM [address] [facility]   9/23/2020 10:00 AM Cherise Veronica MD [facility]       OAR[facility] report sent to Dr. Alonzo Brewster through alternative route for review.

## 2020-07-31 ENCOUNTER — TELEPHONE (OUTPATIENT)
Dept: PAIN MANAGEMENT | Age: 55
End: 2020-07-31

## 2020-08-03 RX ORDER — GABAPENTIN 300 MG/1
300 CAPSULE ORAL 2 TIMES DAILY
Qty: 60 CAPSULE | Refills: 0 | Status: SHIPPED | OUTPATIENT
Start: 2020-08-03 | End: 2020-09-08 | Stop reason: SDUPTHER

## 2020-08-03 RX ORDER — TIZANIDINE 4 MG/1
4 TABLET ORAL NIGHTLY
Qty: 30 TABLET | Refills: 0 | Status: SHIPPED | OUTPATIENT
Start: 2020-08-03 | End: 2020-09-08 | Stop reason: SDUPTHER

## 2020-08-03 RX ORDER — MORPHINE SULFATE 15 MG/1
15 TABLET, FILM COATED, EXTENDED RELEASE ORAL EVERY 12 HOURS SCHEDULED
Qty: 60 TABLET | Refills: 0 | Status: SHIPPED | OUTPATIENT
Start: 2020-08-03 | End: 2020-09-08 | Stop reason: SDUPTHER

## 2020-08-20 ENCOUNTER — HOSPITAL ENCOUNTER (OUTPATIENT)
Dept: PREADMISSION TESTING | Age: 55
Setting detail: SPECIMEN
Discharge: HOME | End: 2020-08-24
Payer: MEDICARE

## 2020-08-20 PROCEDURE — U0003 INFECTIOUS AGENT DETECTION BY NUCLEIC ACID (DNA OR RNA); SEVERE ACUTE RESPIRATORY SYNDROME CORONAVIRUS 2 (SARS-COV-2) (CORONAVIRUS DISEASE [COVID-19]), AMPLIFIED PROBE TECHNIQUE, MAKING USE OF HIGH THROUGHPUT TECHNOLOGIES AS DESCRIBED BY CMS-2020-01-R: HCPCS

## 2020-08-23 LAB — SARS-COV-2, NAA: NOT DETECTED

## 2020-08-24 ENCOUNTER — HOSPITAL ENCOUNTER (OUTPATIENT)
Age: 55
Setting detail: OUTPATIENT SURGERY
Discharge: HOME | End: 2020-08-24
Attending: ANESTHESIOLOGY | Admitting: ANESTHESIOLOGY
Payer: MEDICARE

## 2020-08-24 ENCOUNTER — APPOINTMENT (OUTPATIENT)
Dept: GENERAL RADIOLOGY | Age: 55
End: 2020-08-24
Attending: ANESTHESIOLOGY
Payer: MEDICARE

## 2020-08-24 VITALS
HEART RATE: 71 BPM | SYSTOLIC BLOOD PRESSURE: 124 MMHG | HEIGHT: 65 IN | RESPIRATION RATE: 114 BRPM | DIASTOLIC BLOOD PRESSURE: 69 MMHG | OXYGEN SATURATION: 96 % | TEMPERATURE: 97.7 F | BODY MASS INDEX: 40.48 KG/M2 | WEIGHT: 243 LBS

## 2020-08-24 PROCEDURE — 64636 DESTROY L/S FACET JNT ADDL: CPT | Performed by: ANESTHESIOLOGY

## 2020-08-24 PROCEDURE — 3209999900 FLUORO FOR SURGICAL PROCEDURES

## 2020-08-24 PROCEDURE — 99153 MOD SED SAME PHYS/QHP EA: CPT | Performed by: ANESTHESIOLOGY

## 2020-08-24 PROCEDURE — 99152 MOD SED SAME PHYS/QHP 5/>YRS: CPT | Performed by: ANESTHESIOLOGY

## 2020-08-24 PROCEDURE — 6360000002 HC RX W HCPCS: Performed by: ANESTHESIOLOGY

## 2020-08-24 PROCEDURE — 2720000010 HC SURG SUPPLY STERILE: Performed by: ANESTHESIOLOGY

## 2020-08-24 PROCEDURE — 3600000055 HC PAIN LEVEL 3 ADDL 15 MIN: Performed by: ANESTHESIOLOGY

## 2020-08-24 PROCEDURE — 2709999900 HC NON-CHARGEABLE SUPPLY: Performed by: ANESTHESIOLOGY

## 2020-08-24 PROCEDURE — 3600000054 HC PAIN LEVEL 3 BASE: Performed by: ANESTHESIOLOGY

## 2020-08-24 PROCEDURE — 2500000003 HC RX 250 WO HCPCS: Performed by: ANESTHESIOLOGY

## 2020-08-24 PROCEDURE — 7100000011 HC PHASE II RECOVERY - ADDTL 15 MIN: Performed by: ANESTHESIOLOGY

## 2020-08-24 PROCEDURE — 7100000010 HC PHASE II RECOVERY - FIRST 15 MIN: Performed by: ANESTHESIOLOGY

## 2020-08-24 PROCEDURE — 64635 DESTROY LUMB/SAC FACET JNT: CPT | Performed by: ANESTHESIOLOGY

## 2020-08-24 RX ORDER — FENTANYL CITRATE 50 UG/ML
INJECTION, SOLUTION INTRAMUSCULAR; INTRAVENOUS PRN
Status: DISCONTINUED | OUTPATIENT
Start: 2020-08-24 | End: 2020-08-24 | Stop reason: ALTCHOICE

## 2020-08-24 RX ORDER — MIDAZOLAM HYDROCHLORIDE 1 MG/ML
INJECTION INTRAMUSCULAR; INTRAVENOUS PRN
Status: DISCONTINUED | OUTPATIENT
Start: 2020-08-24 | End: 2020-08-24 | Stop reason: ALTCHOICE

## 2020-08-24 RX ORDER — SODIUM CHLORIDE 0.9 % (FLUSH) 0.9 %
10 SYRINGE (ML) INJECTION EVERY 12 HOURS SCHEDULED
Status: DISCONTINUED | OUTPATIENT
Start: 2020-08-24 | End: 2020-08-24 | Stop reason: HOSPADM

## 2020-08-24 RX ORDER — SODIUM CHLORIDE 0.9 % (FLUSH) 0.9 %
10 SYRINGE (ML) INJECTION PRN
Status: DISCONTINUED | OUTPATIENT
Start: 2020-08-24 | End: 2020-08-24 | Stop reason: HOSPADM

## 2020-08-24 RX ORDER — LIDOCAINE HYDROCHLORIDE 10 MG/ML
INJECTION, SOLUTION INFILTRATION; PERINEURAL PRN
Status: DISCONTINUED | OUTPATIENT
Start: 2020-08-24 | End: 2020-08-24 | Stop reason: ALTCHOICE

## 2020-08-24 RX ORDER — LIDOCAINE HYDROCHLORIDE 40 MG/ML
INJECTION, SOLUTION RETROBULBAR; TOPICAL PRN
Status: DISCONTINUED | OUTPATIENT
Start: 2020-08-24 | End: 2020-08-24 | Stop reason: ALTCHOICE

## 2020-08-24 ASSESSMENT — PAIN SCALES - GENERAL
PAINLEVEL_OUTOF10: 8
PAINLEVEL_OUTOF10: 4
PAINLEVEL_OUTOF10: 8

## 2020-08-24 ASSESSMENT — PAIN DESCRIPTION - PROGRESSION
CLINICAL_PROGRESSION: NOT CHANGED
CLINICAL_PROGRESSION: GRADUALLY IMPROVING
CLINICAL_PROGRESSION: NOT CHANGED

## 2020-08-24 ASSESSMENT — PAIN - FUNCTIONAL ASSESSMENT
PAIN_FUNCTIONAL_ASSESSMENT: PREVENTS OR INTERFERES SOME ACTIVE ACTIVITIES AND ADLS
PAIN_FUNCTIONAL_ASSESSMENT: PREVENTS OR INTERFERES SOME ACTIVE ACTIVITIES AND ADLS
PAIN_FUNCTIONAL_ASSESSMENT: 0-10

## 2020-08-24 ASSESSMENT — PAIN DESCRIPTION - LOCATION: LOCATION: BACK

## 2020-08-24 ASSESSMENT — PAIN DESCRIPTION - FREQUENCY: FREQUENCY: CONTINUOUS

## 2020-08-24 ASSESSMENT — PAIN DESCRIPTION - PAIN TYPE: TYPE: CHRONIC PAIN

## 2020-08-24 ASSESSMENT — PAIN DESCRIPTION - ORIENTATION: ORIENTATION: LOWER

## 2020-08-24 ASSESSMENT — PAIN DESCRIPTION - ONSET: ONSET: ON-GOING

## 2020-08-24 ASSESSMENT — PAIN DESCRIPTION - DESCRIPTORS: DESCRIPTORS: TINGLING;SHOOTING

## 2020-08-24 NOTE — H&P
History and Physical Service   [employer]    HISTORY AND PHYSICAL EXAMINATION            Date of Evaluation: 8/24/2020  Patient name:  Ofelia Gimenez  MRN:   [id number]  YOB: 1965  PCP:    GISELLE Chavarria CNP    History Obtained From:     Patient, medical records    History of Present Illness: This is Ofelia Gimenez a 53 y.o. female who presents today for a right sided medial branch radiofrequency ablation L2-L5 by Dr. Clarke Torrez for Lumbosacral spondylosis w/o myelopathy. Hx chronic lower back pain with left sided sciatica. Follows with Dr Constance Heart for pain management  Hx multiple nerved blocks, lumbar BESSIE and RFA. Last lumbar RFA left medial branch L2-L5 7/27/20 with improved comfort. Returns today for right side RFA. Continues to have greater pain on left than right but did improve past her procedure. Pain 4-6on right  Denies bowel or bladder incontinence, fever, chills, cough, wheezing, or SOB. No DM or use of anticoagulants.     Past Medical History:     Past Medical History:   Diagnosis Date    ADHD (attention deficit hyperactivity disorder)     Bipolar 1 disorder ([facility] [address]     Depression with anxiety     GERD (gastroesophageal reflux disease)     Heel spur     left    Hyperlipidemia     Hypertension     Left hip pain     PTSD (post-traumatic stress disorder)     Tremor     Trouble swallowing     Weight gain         Past Surgical History:     Past Surgical History:   Procedure Laterality Date    ANESTHESIA NERVE BLOCK Left 10/7/2019    NERVE BLOCK LEFT HIP OBTURATOR AND FEMORAL performed by Clarke Torrez MD at [place] [address] Bilateral     EPIDURAL STEROID INJECTION Left 9/12/2019    EPIDURAL STEROID INJECTION performed by Clarke Torrez MD at [address] [place] [address] NERV Left 9/12/2019    INJECTION MEDICATION LEFT HIP performed by Clarke Torrez MD at [facility]  08/01/2016 duramorph 1mg  celestone 9mg    NERVE BLOCK  10/31/2016    duramorph epidural steroid block decadron 7.5 mg durmoprh 1.5 mg    NERVE BLOCK Left     NO STEROID, LEFT MBNB# 1    NERVE BLOCK Left 01/16/2017    LT MBNB #2   celestone 6mg    NERVE BLOCK Left 02/06/2017    LT lumbar RF    kenalog 40    NERVE BLOCK Right 06/19/2017    right lumbar mbnb #1    NERVE BLOCK  07/07/2017    right radiofrequency kenolog 40mg    NERVE BLOCK  09/11/2017    duramorph 1mg & celestone 9 mg    NERVE BLOCK Left 10/02/2018    LEFT LUMBAR EPIDURAL STEROID BLOCK DECADRON 10MG    NERVE BLOCK  10/16/2018    blanca mbnb, marcaine and xylocaine    NERVE BLOCK Left 10/30/2018    left lumbar rf- no steroid    NERVE BLOCK Right 11/13/2018    RT lumbar RFA no steroid    NERVE BLOCK  03/26/2019    left hip decadron 10mg, marcaine . 5 %    OTHER SURGICAL HISTORY  08/01/2019    radiofrequency ablation L2-5    OTHER SURGICAL HISTORY Right 08/15/2019    lumbar radiofrequency ablation    OTHER SURGICAL HISTORY  09/12/2019     INJECTION MEDICATION LEFT HIP (Left Hip) EPIDURAL STEROID INJECTION (left back)    OTHER SURGICAL HISTORY Left 10/24/2019    nerve radiofrequency ablation    PAIN MANAGEMENT PROCEDURE Left 6/18/2020    EPIDURAL STEROID INJECTION LEFT L4,L5 performed by Colin Fritz MD at [address] [facility] Right 7/27/2020    RIGHT MEDIAL [address] [facility] L2 - L5 performed by Colin Fritz MD at [address] [place] [address] Left 8/1/2019    NERVE RADIOFREQUENCY ABLATION MEDIAN BRANCHES AT TRANSVERSE PROCESSES L3/L4/L5 AND S1 LEFT performed by Colin Fritz MD at [address] [place] [address] Right 8/15/2019    NERVE RADIOFREQUENCY ABLATION MEDIAN BRANCHES AT TRANSVERSE PROCESSES OF L3/L4/L5 AND S1 RIGHT [facility] Left 10/24/2019    NERVE RADIOFREQUENCY ABLATION LEFT HIP FEMORAL AND OBTURATOR NERVE no known allergies. Social History:     Tobacco:    reports that she has been smoking cigarettes. She has been smoking about 0.50 packs per day. She has never used smokeless tobacco.  Alcohol:      reports no history of alcohol use. Drug Use:  reports no history of drug use. Family History:     Family History   Problem Relation Age of Onset    Other Mother         ulcers    Heart Disease Father     High Blood Pressure Father     Other Father         blind, pacemaker       Review of Systems:     Positive and Negative as described in HPI. CONSTITUTIONAL:  negative for fevers, chills, sweats, fatigue, weight loss  HEENT:  negative for vision, hearing changes, runny nose, throat pain  RESPIRATORY:  negative for shortness of breath, cough, congestion, wheezing. CARDIOVASCULAR:  negative for chest pain, palpitations. GASTROINTESTINAL:  negative for nausea, vomiting, diarrhea, constipation, change in bowel habits, abdominal pain   GENITOURINARY:  negative for difficulty of urination, burning with urination, frequency   INTEGUMENT:  negative for rash, skin lesions, easy bruising   HEMATOLOGIC/LYMPHATIC:  negative for swelling/edema   ALLERGIC/IMMUNOLOGIC:  negative for urticaria , itching  ENDOCRINE:  negative increase in drinking, increase in urination, hot or cold intolerance  MUSCULOSKELETAL: See HPI  negative muscle aches, swelling of joints  NEUROLOGICAL:  negative for headaches, dizziness, lightheadedness, numbness, pain, tingling extremities  BEHAVIOR/PSYCH:  negative for depression, anxiety    Physical Exam:   BP (!) 159/76   Pulse 81   Temp 95.3 °F (35.2 °C) (Temporal)   Resp 18   Ht 5' 5\" (1.651 m)   Wt 243 lb (110.2 kg)   SpO2 98%   BMI 40.44 kg/m²   No LMP recorded. Patient has had a hysterectomy. No obstetric history on file. No results for input(s): POCGLU in the last 72 hours.     General Appearance:  alert, well appearing, and in no acute distress  Mental status: oriented to person, place, and time with normal affect  Head:  normocephalic, atraumatic. Eye: no icterus, redness, pupils equal and reactive, extraocular eye movements intact, conjunctiva clear  Ear: normal external ear, no discharge, hearing intact  Nose:  no drainage noted  Mouth: mucous membranes moist  Neck: supple, no carotid bruits, thyroid not palpable  Lungs: Bilateral equal air entry, clear to ausculation, no wheezing, rales or rhonchi, normal effort  Cardiovascular: HR 81 normal rate, regular rhythm, no murmur, gallop, rub. Abdomen: Obese, round and soft, nontender, nondistended, normal bowel sounds  Neurologic: There are no new focal motor or sensory deficits, normal muscle tone and bulk, no abnormal sensation, normal speech, cranial nerves II through XII grossly intact Equal bilateral strength   Skin: No gross lesions, rashes, bruising or bleeding on exposed skin area  Extremities: antalgic gait  peripheral pulses palpable, no pedal edema or calf pain with palpation  Psych: normal affect     Investigations:      Laboratory Testing:  No results found for this or any previous visit (from the past 24 hour(s)). No results for input(s): HGB, HCT, WBC, MCV, PLATELET, NA, K, CL, CO2, BUN, CREATININE, GLUCOSE, INR, PROTIME, APTT, AST, ALT, LABALBU, HCG in the last 720 hours. Recent Labs     08/20/20  1008   COVID19 Not Detected     Imaging/Diagnostics:    Diagnosis:      1.  Lumbosacral spondylosis w/o myelopathy    Plans:     1. right sided medial branch radiofrequency ablation L2-L5      Alex Kenney, GISELLE - CNP  8/24/2020  9:04 AM

## 2020-08-24 NOTE — OP NOTE
Operative Note      Patient: Jason Davidson  YOB: 1965  MRN: [id number]    Date of Procedure: 8/24/2020    Pre-Op Diagnosis: DX LUMBOSACRAL SPONDYLOSIS W/O MYELOPATHY    Post-Op Diagnosis: Same       Procedure(s):  RIGHT SIDED MEDIAL BRANCH NERVE RADIOFREQUENCY ABLATION   L 2 - L 5    Surgeon(s):  Gabe Lennox, MD    Assistant:   * No surgical staff found *    Anesthesia: IV Sedation    Estimated Blood Loss (mL): Minimal    Complications: None    Specimens:   * No specimens in log *    Implants:  * No implants in log *      Drains: * No LDAs found *    Findings: n/a    Detailed Description of Procedure:   Preoperative Diagnosis: Lumbar spondylosis w/o myelopathy, chronic low back pain  Postoperative Diagnosis: Lumbar spondylosis w/o myelopathy, chronic low back pain  SEDATION: SEE SEDATION NOTE  BLOOD LOSS: NONE    Procedure Performed:  :Radiofrequency ablation of median branches at the Transverse processes of L3 / L4 / L5 and S1 on Right under fluoroscopic guidance with IV sedation. Procedure:    After starting an IV, the patient was taken to procedure room. The patient was placed in prone position and skin over the back was prepped and draped in sterile manner. Standard monitors were connected and vitals were monitored during the case and they remained stable during the procedure. A meaningful communication was kept up with the patient throughout the procedure. Then using fluoroscopy the junction of the transverse process of the target vertebra with the superior process of the facet joint was observed and the view was optimized. The skin and deep tissues were infiltrated with 2 ml of  1 % lidocaine. The RF canula with the 10 mm active tip was introduced through the skin wheal under fluoroscopy guidance such that the tip of the needle lies in the groove of the transverse process with the superior processes of the facet joint.   Then a lateral and AP view of the lumbar spine was obtained

## 2020-09-04 NOTE — TELEPHONE ENCOUNTER
Bertha Loves is requesting a refill on the following medications:   Requested Prescriptions     Pending Prescriptions Disp Refills    gabapentin (NEURONTIN) 300 MG capsule 60 capsule 0     Sig: Take 1 capsule by mouth 2 times daily for 30 days.  morphine (MS CONTIN) 15 MG extended release tablet 60 tablet 0     Sig: Take 1 tablet by mouth every 12 hours for 30 days.  tiZANidine (ZANAFLEX) 4 MG tablet 30 tablet 0     Sig: Take 1 tablet by mouth nightly       Last OV 5/27/20. Pt had Lumbar MBN RFA DOS 8/24/20    Future Appointments   Date Time Provider Ani Benavidez   9/23/2020 10:00 AM Jose Guadalupe Yen MD [facility]       OARRS report sent to Dr. Nery Riojas through alternative route for review.

## 2020-09-08 RX ORDER — MORPHINE SULFATE 15 MG/1
15 TABLET, FILM COATED, EXTENDED RELEASE ORAL EVERY 12 HOURS SCHEDULED
Qty: 60 TABLET | Refills: 0 | Status: SHIPPED | OUTPATIENT
Start: 2020-09-08 | End: 2020-09-16 | Stop reason: SDUPTHER

## 2020-09-08 RX ORDER — GABAPENTIN 300 MG/1
300 CAPSULE ORAL 2 TIMES DAILY
Qty: 60 CAPSULE | Refills: 0 | Status: SHIPPED | OUTPATIENT
Start: 2020-09-08 | End: 2020-10-16 | Stop reason: SDUPTHER

## 2020-09-08 RX ORDER — TIZANIDINE 4 MG/1
4 TABLET ORAL NIGHTLY
Qty: 30 TABLET | Refills: 0 | Status: SHIPPED | OUTPATIENT
Start: 2020-09-08 | End: 2020-10-16 | Stop reason: SDUPTHER

## 2020-09-16 RX ORDER — MORPHINE SULFATE 15 MG/1
15 TABLET, FILM COATED, EXTENDED RELEASE ORAL EVERY 12 HOURS SCHEDULED
Qty: 60 TABLET | Refills: 0 | Status: SHIPPED | OUTPATIENT
Start: 2020-09-16 | End: 2020-10-15 | Stop reason: SDUPTHER

## 2020-09-16 NOTE — TELEPHONE ENCOUNTER
Pt states she was only given a 7 day supply of Morphine due to her insurance and needs PA. Writer called [facility]  pharmacist Cesia Rhoades and confirmed that pt was only given a 7 day supply. Pt needs a new Rx sent to pharmacy. Shanae Garcia is requesting a refill on the following medications:   Requested Prescriptions     Pending Prescriptions Disp Refills    morphine (MS CONTIN) 15 MG extended release tablet 60 tablet 0     Sig: Take 1 tablet by mouth every 12 hours for 30 days. Last OV 7/8/20    Future Appointments   Date Time Provider Ani Benavidez   9/23/2020 10:00 AM Madhu Sierra MD [facility]       OARRS report sent to Dr. Joann Ramirez through alternative route for review.  \

## 2020-09-21 ENCOUNTER — TELEPHONE (OUTPATIENT)
Dept: PAIN MANAGEMENT | Age: 55
End: 2020-09-21

## 2020-09-23 ENCOUNTER — OFFICE VISIT (OUTPATIENT)
Dept: PAIN MANAGEMENT | Age: 55
End: 2020-09-23
Payer: MEDICARE

## 2020-09-23 VITALS
HEART RATE: 92 BPM | BODY MASS INDEX: 41.48 KG/M2 | DIASTOLIC BLOOD PRESSURE: 90 MMHG | HEIGHT: 65 IN | OXYGEN SATURATION: 98 % | WEIGHT: 249 LBS | SYSTOLIC BLOOD PRESSURE: 150 MMHG | TEMPERATURE: 97.6 F

## 2020-09-23 PROCEDURE — 4004F PT TOBACCO SCREEN RCVD TLK: CPT | Performed by: ANESTHESIOLOGY

## 2020-09-23 PROCEDURE — G8427 DOCREV CUR MEDS BY ELIG CLIN: HCPCS | Performed by: ANESTHESIOLOGY

## 2020-09-23 PROCEDURE — 3017F COLORECTAL CA SCREEN DOC REV: CPT | Performed by: ANESTHESIOLOGY

## 2020-09-23 PROCEDURE — 99213 OFFICE O/P EST LOW 20 MIN: CPT | Performed by: ANESTHESIOLOGY

## 2020-09-23 PROCEDURE — G8417 CALC BMI ABV UP PARAM F/U: HCPCS | Performed by: ANESTHESIOLOGY

## 2020-09-23 ASSESSMENT — ENCOUNTER SYMPTOMS
RESPIRATORY NEGATIVE: 1
BACK PAIN: 1

## 2020-09-23 NOTE — PROGRESS NOTES
The patient is a 53 y. o. Non-/non  female. Chief Complaint   Patient presents with    Back Pain    Medication Refill        HPI  Back pain  Pain is chronic onset many years ago  Radiates down left leg  Extending from hip over outer thigh all the way to the foot  Pain aggravated with lifting bending reaching  Associated symptoms include left leg numbness and tingling  Rates intensity is 8/10  Denies any loss of bladder or bowel control    Patient is on chronic medication management with morphine 15 mg twice a day  Denies any side effect from the medication  Finds medication helpful  No history of illicit substance use  Also taking gabapentin tizanidine and Cymbalta for pain issues      Pain score Today:  8  Adverse effects (Constipation / Nausea / Sedation / sexual Dysfunction / others) : no  Mood: fair  Sleep pattern and quality: poor  Activity level: poor    Pill count Today:45  Last dose taken  9/23/20  OARRS report reviewed today: yes  ER/Hospitalizations/PCP visit related to pain since last visit:no   Any legal problems e.g. DUI etc.:No  Satisfied with current management: Yes    Opioid Contract:07/26/19  Last Urine Dug screen dated:07/11/20    No results found for: LABA1C  No results found for: EAG    Past Medical History, Past Surgical History, Social History, Allergies and Medications reviewed and updated in EPIC as indicated    Family History reviewed and is noncontributory.           Past Medical History:   Diagnosis Date    ADHD (attention deficit hyperactivity disorder)     Bipolar 1 disorder (HCC)     Depression with anxiety     GERD (gastroesophageal reflux disease)     Heel spur     left    Hyperlipidemia     Hypertension     Left hip pain     PTSD (post-traumatic stress disorder)     Tremor     Trouble swallowing     Weight gain       Past Surgical History:   Procedure Laterality Date    ANESTHESIA NERVE BLOCK Left 10/7/2019    NERVE BLOCK LEFT HIP OBTURATOR AND FEMORAL performed by Zunilda Khan MD at [address] [place] [address] Bilateral     EPIDURAL STEROID INJECTION Left 9/12/2019    EPIDURAL STEROID INJECTION performed by Zunilda Khan MD at [address] [facility] NERV Left 9/12/2019    INJECTION MEDICATION LEFT HIP performed by Zunilda Khan MD at [facility]  08/01/2016    duramorph 1mg  celestone 9mg    NERVE BLOCK  10/31/2016    duramorph epidural steroid block decadron 7.5 mg durmoprh 1.5 mg    NERVE BLOCK Left     NO STEROID, LEFT MBNB# 1    NERVE BLOCK Left 01/16/2017    LT MBNB #2   celestone 6mg    NERVE BLOCK Left 02/06/2017    LT lumbar RF    kenalog 40    NERVE BLOCK Right 06/19/2017    right lumbar mbnb #1    NERVE BLOCK  07/07/2017    right radiofrequency kenolog 40mg    NERVE BLOCK  09/11/2017    duramorph 1mg & celestone 9 mg    NERVE BLOCK Left 10/02/2018    LEFT LUMBAR EPIDURAL STEROID BLOCK DECADRON 10MG    NERVE BLOCK  10/16/2018    blanca mbnb, marcaine and xylocaine    NERVE BLOCK Left 10/30/2018    left lumbar rf- no steroid    NERVE BLOCK Right 11/13/2018    RT lumbar RFA no steroid    NERVE BLOCK  03/26/2019    left hip decadron 10mg, marcaine . 5 %    OTHER SURGICAL HISTORY  08/01/2019    radiofrequency ablation L2-5    OTHER SURGICAL HISTORY Right 08/15/2019    lumbar radiofrequency ablation    OTHER SURGICAL HISTORY  09/12/2019     INJECTION MEDICATION LEFT HIP (Left Hip) EPIDURAL STEROID INJECTION (left back)    OTHER SURGICAL HISTORY Left 10/24/2019    nerve radiofrequency ablation    PAIN MANAGEMENT PROCEDURE Left 6/18/2020    EPIDURAL STEROID INJECTION LEFT L4,L5 performed by Zunilda Khan MD at [address] [place] [address] Right 7/27/2020    RIGHT MEDIAL BRANCH NERVE RADIOFREQUENCY ABLATION L2 - L5 performed by Zunilda Khan MD at [address] [place] [address] Right 8/24/2020    RIGHT SIDED MEDIAL BRANCH NERVE RADIOFREQUENCY ABLATION   L 2 - L 5 performed by Kimberly Amador MD at [address] [place] [address] Left 8/1/2019    NERVE RADIOFREQUENCY ABLATION MEDIAN BRANCHES AT TRANSVERSE PROCESSES L3/L4/L5 AND S1 LEFT performed by Kimberly Amador MD at [address] [place] [address] Right 8/15/2019    NERVE RADIOFREQUENCY ABLATION MEDIAN BRANCHES AT TRANSVERSE PROCESSES OF L3/L4/L5 AND S1 RIGHT performed by Kimberly Amador MD at [address] [place] [address] Left 10/24/2019    NERVE RADIOFREQUENCY ABLATION LEFT HIP FEMORAL AND OBTURATOR NERVE performed by Kimberly Amador MD at [address] [facility] Marital status:      Spouse name: Not on file    Number of children: Not on file    Years of education: Not on file    Highest education level: Not on file   Occupational History    Not on file   Social Needs    Financial resource strain: Not on file    Food insecurity     Worry: Not on file     Inability: Not on file    Transportation needs     Medical: Not on file     Non-medical: Not on file   Tobacco Use    Smoking status: Current Every Day Smoker     Packs/day: 0.50     Types: Cigarettes    Smokeless tobacco: Never Used   Substance and Sexual Activity    Alcohol use: Never     Alcohol/week: 0.0 standard drinks     Frequency: Never    Drug use: Never    Sexual activity: Not on file   Lifestyle    Physical activity     Days per week: Not on file     Minutes per session: Not on file    Stress: Not on file   Relationships    Social connections     Talks on phone: Not on file     Gets together: Not on file     Attends Anabaptist service: Not on file     Active member of club or organization: Not on file     Attends meetings of clubs or organizations: Not on file     Relationship status: Not on file    Intimate partner violence     Fear of current or ex partner: Not on file     Emotionally abused: Not on file     Physically abused: Not on file     Forced sexual fever.   Respiratory: Negative. Cardiovascular: Negative. Musculoskeletal: Positive for back pain and gait problem. Neurological: Positive for weakness and numbness. Objective:  General Appearance:  Well-appearing and in no acute distress. Vital signs: (most recent): Blood pressure (!) 150/90, pulse 92, temperature 97.6 °F (36.4 °C), height 5' 5\" (1.651 m), weight 249 lb (112.9 kg), SpO2 98 %, not currently breastfeeding. Vital signs are normal.  No fever. Output: Producing urine and producing stool. HEENT: Normal HEENT exam.    Lungs:  Normal effort and normal respiratory rate. Breath sounds clear to auscultation. She is not in respiratory distress. Heart: Normal rate. Extremities: Normal range of motion. There is no deformity. Neurological: Patient is alert and oriented to person, place and time. Patient has normal coordination. Pupils:  Pupils are equal, round, and reactive to light. Pupils are equal.   Skin:  Warm and dry. No rash or cyanosis. Assessment & Plan     Urine toxicology July 8, 2020  Positive for morphine  Positive for amphetamine  Validity testing okay  Also have a small amount of hydromorphone which is likely metabolite from morphine  Consistent with prescription history    1. Left lumbar radiculitis    2. Chronic use of opiate for therapeutic purpose    3.  Lumbar disc herniation        With flareup of left lumbar radiculitis  In past epidural injection has been helpful  We will schedule for a repeat injection  Last injection was 3 months ago    Automated prescription report reviewed  Safe use of medication discussed  Refill due on October 15    Patient will collect a strip when she comes for her procedure    Urine toxicology collected on previous visit report reviewed  Consistent with treatment    Orders Placed This Encounter   Procedures    DE INJECT ANES/STEROID FORAMEN LUMBAR/SACRAL W IMG GUIDE ,1 LEVEL      No orders of the defined types were placed in this encounter. Controlled Substance Monitoring:    Acute and Chronic Pain Monitoring:   RX Monitoring 9/23/2020   Attestation -   Acute Pain Prescriptions -   Periodic Controlled Substance Monitoring Possible medication side effects, risk of tolerance/dependence & alternative treatments discussed. ;No signs of potential drug abuse or diversion identified. ;Assessed functional status. ;Obtaining appropriate analgesic effect of treatment. Chronic Pain > 50 MEDD Obtained or confirmed \"Consent for Opioid Use\" on file.    Chronic Pain > 80 MEDD -           Electronically signed by Buffy Powell MD on 9/23/2020 at 10:16 AM

## 2020-10-12 ENCOUNTER — APPOINTMENT (OUTPATIENT)
Dept: GENERAL RADIOLOGY | Age: 55
End: 2020-10-12
Attending: ANESTHESIOLOGY
Payer: MEDICARE

## 2020-10-12 ENCOUNTER — HOSPITAL ENCOUNTER (OUTPATIENT)
Age: 55
Setting detail: OUTPATIENT SURGERY
Discharge: HOME | End: 2020-10-12
Attending: ANESTHESIOLOGY | Admitting: ANESTHESIOLOGY
Payer: MEDICARE

## 2020-10-12 VITALS
HEART RATE: 70 BPM | DIASTOLIC BLOOD PRESSURE: 68 MMHG | SYSTOLIC BLOOD PRESSURE: 147 MMHG | OXYGEN SATURATION: 99 % | BODY MASS INDEX: 40.82 KG/M2 | RESPIRATION RATE: 17 BRPM | HEIGHT: 65 IN | WEIGHT: 245 LBS | TEMPERATURE: 97.5 F

## 2020-10-12 PROCEDURE — 99152 MOD SED SAME PHYS/QHP 5/>YRS: CPT | Performed by: ANESTHESIOLOGY

## 2020-10-12 PROCEDURE — 6360000004 HC RX CONTRAST MEDICATION: Performed by: ANESTHESIOLOGY

## 2020-10-12 PROCEDURE — 2500000003 HC RX 250 WO HCPCS: Performed by: ANESTHESIOLOGY

## 2020-10-12 PROCEDURE — 7100000011 HC PHASE II RECOVERY - ADDTL 15 MIN: Performed by: ANESTHESIOLOGY

## 2020-10-12 PROCEDURE — 2709999900 HC NON-CHARGEABLE SUPPLY: Performed by: ANESTHESIOLOGY

## 2020-10-12 PROCEDURE — 64484 NJX AA&/STRD TFRM EPI L/S EA: CPT | Performed by: ANESTHESIOLOGY

## 2020-10-12 PROCEDURE — 7100000010 HC PHASE II RECOVERY - FIRST 15 MIN: Performed by: ANESTHESIOLOGY

## 2020-10-12 PROCEDURE — 3600000050 HC PAIN LEVEL 1 BASE: Performed by: ANESTHESIOLOGY

## 2020-10-12 PROCEDURE — 6360000002 HC RX W HCPCS: Performed by: ANESTHESIOLOGY

## 2020-10-12 PROCEDURE — 64483 NJX AA&/STRD TFRM EPI L/S 1: CPT | Performed by: ANESTHESIOLOGY

## 2020-10-12 PROCEDURE — 3209999900 FLUORO FOR SURGICAL PROCEDURES

## 2020-10-12 PROCEDURE — 2580000003 HC RX 258: Performed by: ANESTHESIOLOGY

## 2020-10-12 RX ORDER — SODIUM CHLORIDE 0.9 % (FLUSH) 0.9 %
10 SYRINGE (ML) INJECTION PRN
Status: DISCONTINUED | OUTPATIENT
Start: 2020-10-12 | End: 2020-10-12 | Stop reason: HOSPADM

## 2020-10-12 RX ORDER — SODIUM CHLORIDE 0.9 % (FLUSH) 0.9 %
10 SYRINGE (ML) INJECTION EVERY 12 HOURS SCHEDULED
Status: DISCONTINUED | OUTPATIENT
Start: 2020-10-12 | End: 2020-10-12 | Stop reason: HOSPADM

## 2020-10-12 RX ORDER — FENTANYL CITRATE 50 UG/ML
INJECTION, SOLUTION INTRAMUSCULAR; INTRAVENOUS PRN
Status: DISCONTINUED | OUTPATIENT
Start: 2020-10-12 | End: 2020-10-12 | Stop reason: ALTCHOICE

## 2020-10-12 RX ORDER — MIDAZOLAM HYDROCHLORIDE 1 MG/ML
INJECTION INTRAMUSCULAR; INTRAVENOUS PRN
Status: DISCONTINUED | OUTPATIENT
Start: 2020-10-12 | End: 2020-10-12 | Stop reason: ALTCHOICE

## 2020-10-12 RX ORDER — DEXAMETHASONE SODIUM PHOSPHATE 10 MG/ML
INJECTION INTRAMUSCULAR; INTRAVENOUS PRN
Status: DISCONTINUED | OUTPATIENT
Start: 2020-10-12 | End: 2020-10-12 | Stop reason: ALTCHOICE

## 2020-10-12 RX ORDER — LIDOCAINE HYDROCHLORIDE 10 MG/ML
INJECTION, SOLUTION INFILTRATION; PERINEURAL PRN
Status: DISCONTINUED | OUTPATIENT
Start: 2020-10-12 | End: 2020-10-12 | Stop reason: ALTCHOICE

## 2020-10-12 RX ADMIN — SODIUM CHLORIDE, PRESERVATIVE FREE 10 ML: 5 INJECTION INTRAVENOUS at 14:42

## 2020-10-12 ASSESSMENT — PAIN - FUNCTIONAL ASSESSMENT: PAIN_FUNCTIONAL_ASSESSMENT: 0-10

## 2020-10-12 ASSESSMENT — PAIN DESCRIPTION - PAIN TYPE: TYPE: CHRONIC PAIN

## 2020-10-12 ASSESSMENT — PAIN SCALES - GENERAL
PAINLEVEL_OUTOF10: 7
PAINLEVEL_OUTOF10: 7
PAINLEVEL_OUTOF10: 0
PAINLEVEL_OUTOF10: 7
PAINLEVEL_OUTOF10: 1

## 2020-10-12 ASSESSMENT — PAIN DESCRIPTION - FREQUENCY: FREQUENCY: CONTINUOUS

## 2020-10-12 ASSESSMENT — PAIN DESCRIPTION - LOCATION: LOCATION: LEG

## 2020-10-12 ASSESSMENT — PAIN DESCRIPTION - ORIENTATION: ORIENTATION: LEFT

## 2020-10-12 ASSESSMENT — PAIN DESCRIPTION - ONSET: ONSET: ON-GOING

## 2020-10-12 ASSESSMENT — PAIN DESCRIPTION - DESCRIPTORS
DESCRIPTORS: STABBING;BURNING
DESCRIPTORS: ACHING;CONSTANT;RADIATING

## 2020-10-12 ASSESSMENT — PAIN DESCRIPTION - PROGRESSION: CLINICAL_PROGRESSION: RAPIDLY IMPROVING

## 2020-10-12 NOTE — OP NOTE
Operative Note      Patient: Manuelito Ruiz  YOB: 1965  MRN: [id number]    Date of Procedure: 10/12/2020    Pre-Op Diagnosis: DX LEFT LUMBAR RADICULITIS   LUMBAR [address] HERNIATION    Post-Op Diagnosis: Same       Procedure(s):  LEFT L4 L5 EPIDURAL STEROID INJECTION    Surgeon(s):  Cayla Valerio MD    Assistant:   * No surgical staff found *    Anesthesia: IV Sedation    Estimated Blood Loss (mL): Minimal    Complications: None    Specimens:   * No specimens in log *    Implants:  * No implants in log *      Drains: * No LDAs found *    Findings: N/A    Detailed Description of Procedure:       Patient Name: Manuelito Ruiz   YOB: 1965  Room/Bed: STAZ OR Pool/NONE  Medical Record Number: [id number]  Date: 10/12/2020       Preoperative Diagnosis:    Left lumbar radicular    Postoperative diagnosis:   Left lumbar radicular    Blood Loss: none    Procedure Performed:  Transforaminal lumbar epidural steroid injection at the levels of L4 and S1 on the Left side under fluoroscopic guidance. Procedure: The Patient was seen in the preop area, chart was reviewed, informed consent was obtained. Patient was taken to procedure room and was placed in prone position. Vital signs were monitored through out the  Procedure. A time out was completed. The skin over the back was prepped and draped in sterile manner. The target point was marked in ipsilateral obliques just below the pedicle at the target levels. Skin and deep tissues were anesthetized with 1 % lidocaine. A 20-gauge, spinal needle was advanced  under fluoroscopy guidance in AP / Oblique and lateral views, such that the tip of the needle lies in the neuroforamina at about the 6 o'clock position under the pedicle of the target vertebra. This was confirmed with AP and lateral views of the fluoroscopy.      Then after negative aspiration contrast dye Omnipaque-180 was injected with live fluoroscopy in AP views that showed  spread of the contrast in the epidural space  and no vascular runoff or intrathecal spread. Finally 3 ml of treatment solution containing 5 ml of  1 % PF lidocaine and 1 ml of dexamethasone 10 mg / ml was injected. The needle was removed and a Band-Aid was placed over the needle  insertion site. The patient's vital signs remained stable and the patient tolerated the procedure well. The same procedure was then repeated at left at L 4level with same technique. The remaining 3 ml of treatment solution was injected at that. The total dose of steroid injected today was dexamethasone 10 mg. Electronically signed by Margorie Crigler, MD on 10/12/2020 at 4:18 PM  SEDATION NOTE:    ASA CLASSIFICATION  2  MP   CLASSIFICATION  2    · Moderate intravenous conscious sedation was supervised by Dr. Annabel Michel  . The patient was independently monitored by a Registered Nurse assigned to the Procedure Room  . Monitoring included automated blood pressure, continuous EKG, Capnography and continuous pulse oximetry. . The detailed Conscious Record is permanently stored in the [place] [id number].      The following is the conscious sedation record;  Start Time:  1603  End times:  1614  Duration:  11 minutes  MEDS GIVEN 2 MG VERSED AND 50 [address] 43          Electronically signed by Margorie Crigler, MD on 10/12/2020 at 4:18 PM

## 2020-10-12 NOTE — H&P
by mouth daily   Yes Historical Provider, MD   pantoprazole (PROTONIX) 40 MG tablet TAKE 1 TABLET BY MOUTH EVERY DAY 1/10/18  Yes Historical Provider, MD   busPIRone (BUSPAR) 10 MG tablet Take 10 mg by mouth 2 times daily   Yes Historical Provider, MD   zolpidem (AMBIEN) 10 MG tablet Take 10 mg by mouth nightly as needed for Sleep   Yes Historical Provider, MD   Multiple Vitamins-Minerals (THERAPEUTIC MULTIVITAMIN-MINERALS) tablet Take 1 tablet by mouth daily   Yes Historical Provider, MD   Biotin 5000 MCG CAPS Take by mouth   Yes Historical Provider, MD   amphetamine-dextroamphetamine (ADDERALL) 20 MG tablet Take 30 mg by mouth daily    Yes Historical Provider, MD   rosuvastatin (CRESTOR) 10 MG tablet Take 20 mg by mouth daily    Yes Historical Provider, MD   metoprolol (LOPRESSOR) 25 MG tablet Take 50 mg by mouth 2 times daily    Yes Historical Provider, MD         This is a 53 y. o.morbidly obese female who is pleasant, cooperative, alert and oriented x3, in no acute distress. Heart: Heart sounds are normal.  HR 71 regular rate and rhythm without murmur, gallop or rub. Lungs: Normal respiratory effort with equal expansion, unlabored at rest without wheezes or rales bilaterally   Abdomen: obese, round, soft, nontender, nondistended with bowel sounds     Labs:  No results for input(s): HGB, HCT, WBC, MCV, PLT, NA, K, CL, CO2, BUN, CREATININE, GLUCOSE, INR, PROTIME, APTT, AST, ALT, LABALBU, HCG in the last 720 hours. No results for input(s): COVID19 in the last 720 hours. Royal DESAI, ANP-BC  Electronically signed 10/12/2020 at 2:36 PM        Judie Elizabeth MD    Physician    Specialty:  Pain Management    Progress Notes      Signed    Encounter Date:  9/23/2020          Related encounter: Office Visit from 9/23/2020 in [facility] [place]          Signed        Expand All Collapse All      The patient is a 53 y. o. Non-/non  female.      Chief Complaint   Patient presents with    Back Pain    Medication Refill         HPI  Back pain  Pain is chronic onset many years ago  Radiates down left leg  Extending from hip over outer thigh all the way to the foot  Pain aggravated with lifting bending reaching  Associated symptoms include left leg numbness and tingling  Rates intensity is 8/10  Denies any loss of bladder or bowel control     Patient is on chronic medication management with morphine 15 mg twice a day  Denies any side effect from the medication  Finds medication helpful  No history of illicit substance use  Also taking gabapentin tizanidine and Cymbalta for pain issues        Pain score Today:  8  Adverse effects (Constipation / Nausea / Sedation / sexual Dysfunction / others) : no  Mood: fair  Sleep pattern and quality: poor  Activity level: poor     Pill count Today:45  Last dose taken  9/23/20  OARRS report reviewed today: yes  ER/Hospitalizations/PCP visit related to pain since last visit:no   Any legal problems e.g. DUI etc.:No  Satisfied with current management: Yes     Opioid Contract:07/26/19  Last Urine Dug screen dated:07/11/20     No results found for: LABA1C  No results found for: EAG     Past Medical History, Past Surgical History, Social History, Allergies and Medications reviewed and updated in EPIC as indicated     Family History reviewed and is noncontributory.               Past Medical History        Past Medical History:   Diagnosis Date    ADHD (attention deficit hyperactivity disorder)      Bipolar 1 disorder ([facility] [address]      Depression with anxiety      GERD (gastroesophageal reflux disease)      Heel spur       left    Hyperlipidemia      Hypertension      Left hip pain      PTSD (post-traumatic stress disorder)      Tremor      Trouble swallowing      Weight gain           Past Surgical History         Past Surgical History:   Procedure Laterality Date    ANESTHESIA NERVE BLOCK Left 10/7/2019     NERVE BLOCK LEFT HIP OBTURATOR AND FEMORAL performed by Maria D Meier MD at [address] Bilateral      EPIDURAL STEROID INJECTION Left 9/12/2019     EPIDURAL STEROID INJECTION performed by Maria D Meier MD at [address] [facility] NERV Left 9/12/2019     INJECTION MEDICATION LEFT [facility]IP [facility]   08/01/2016     duramorph 1mg  celestone 9mg    NERVE BLOCK   10/31/2016     duramorph epidural steroid block decadron 7.5 mg durmoprh 1.5 mg    NERVE BLOCK Left       NO STEROID, LEFT MBNB# 1    NERVE BLOCK Left 01/16/2017     LT MBNB #2   celestone 6mg    NERVE BLOCK Left 02/06/2017     LT lumbar RF    kenalog 40    NERVE BLOCK Right 06/19/2017     right lumbar mbnb #1    NERVE BLOCK   07/07/2017     right radiofrequency kenolog 40mg    NERVE BLOCK   09/11/2017     duramorph 1mg & celestone 9 mg    NERVE BLOCK Left 10/02/2018     LEFT LUMBAR EPIDURAL STEROID BLOCK DECADRON 10MG    NERVE BLOCK   10/16/2018     blanca mbnb, marcaine and xylocaine    NERVE BLOCK Left 10/30/2018     left lumbar rf- no steroid    NERVE BLOCK Right 11/13/2018     RT lumbar RFA no steroid    NERVE BLOCK   03/26/2019     left hip decadron 10mg, marcaine . 5 %    OTHER SURGICAL HISTORY   08/01/2019     radiofrequency ablation L2-5    OTHER SURGICAL HISTORY Right 08/15/2019     lumbar radiofrequency ablation    OTHER SURGICAL HISTORY   09/12/2019      INJECTION MEDICATION LEFT HIP (Left Hip) EPIDURAL STEROID INJECTION (left back)    OTHER SURGICAL HISTORY Left 10/24/2019     nerve radiofrequency ablation    PAIN MANAGEMENT PROCEDURE Left 6/18/2020     EPIDURAL STEROID INJECTION LEFT L4,L5 performed by Maria D Meier MD at [address] [facility] Right 7/27/2020     RIGHT MEDIAL BRANCH NERVE RADIOFREQUENCY ABLATION L2 - L5 performed by Maria D Meier MD at [address] [place] [address] Right 8/24/2020     RIGHT SIDED MEDIAL [facility] ABLATION   L 2 - L 5 performed by Mika Traore MD at [address] [place] [address] Left 8/1/2019     NERVE RADIOFREQUENCY ABLATION MEDIAN BRANCHES AT TRANSVERSE PROCESSES L3/L4/L5 AND S1 LEFT performed by Mika Traore MD at [address] [place] [address] Right 8/15/2019     NERVE RADIOFREQUENCY ABLATION MEDIAN BRANCHES AT TRANSVERSE PROCESSES OF L3/L4/L5 AND S1 RIGHT performed by Mika Traore MD at [address] [place] [address] Left 10/24/2019     NERVE RADIOFREQUENCY ABLATION LEFT HIP FEMORAL AND OBTURATOR NERVE performed by Mika Traore MD at [address] [place] [address] History            Socioeconomic History    Marital status:        Spouse name: Not on file    Number of children: Not on file    Years of education: Not on file    Highest education level: Not on file   Occupational History    Not on file   Social Needs    Financial resource strain: Not on file    Food insecurity       Worry: Not on file       Inability: Not on file    Transportation needs       Medical: Not on file       Non-medical: Not on file   Tobacco Use    Smoking status: Current Every Day Smoker       Packs/day: 0.50       Types: Cigarettes    Smokeless tobacco: Never Used   Substance and Sexual Activity    Alcohol use: Never       Alcohol/week: 0.0 standard drinks       Frequency: Never    Drug use: Never    Sexual activity: Not on file   Lifestyle    Physical activity       Days per week: Not on file       Minutes per session: Not on file    Stress: Not on file   Relationships    Social connections       Talks on phone: Not on file       Gets together: Not on file       Attends [employer] service: Not on file       Active member of club or organization: Not on file       Attends meetings of clubs or organizations: Not on file       Relationship status: Not on file    Intimate partner violence       Fear of current or ex partner: Not on file       Emotionally abused: Not on file       Physically abused: Not on file       Forced sexual activity: Not on file   Other Topics Concern    Not on file   Social History Narrative    Not on file        Family History         Family History   Problem Relation Age of Onset    Other Mother           ulcers    Heart Disease Father      High Blood Pressure Father      Other Father           blind, pacemaker        No Known Allergies  Patient has no known allergies. Vitals:     09/23/20 1004   BP: (!) 150/90   Pulse: 92   Temp: 97.6 °F (36.4 °C)   SpO2: 98%      Current Facility-Administered Medications          Current Outpatient Medications   Medication Sig Dispense Refill    morphine (MS CONTIN) 15 MG extended release tablet Take 1 tablet by mouth every 12 hours for 30 days. 60 tablet 0    gabapentin (NEURONTIN) 300 MG capsule Take 1 capsule by mouth 2 times daily for 30 days.  60 capsule 0    tiZANidine (ZANAFLEX) 4 MG tablet Take 1 tablet by mouth nightly 30 tablet 0    DULoxetine (CYMBALTA) 30 MG extended release capsule Take 30 mg by mouth daily        celecoxib (CELEBREX) 100 MG capsule Take 100 mg by mouth 2 times daily        benztropine (COGENTIN) 0.5 MG tablet Take 0.5 mg by mouth 2 times daily        Acetaminophen (TYLENOL ARTHRITIS PAIN PO) Take 2 tablets by mouth daily        hydrOXYzine (VISTARIL) 50 MG capsule Take 50 mg by mouth 2 times daily         pantoprazole (PROTONIX) 40 MG tablet TAKE 1 TABLET BY MOUTH EVERY DAY        busPIRone (BUSPAR) 10 MG tablet Take 10 mg by mouth 2 times daily        zolpidem (AMBIEN) 10 MG tablet Take 10 mg by mouth nightly as needed for Sleep        Multiple Vitamins-Minerals (THERAPEUTIC MULTIVITAMIN-MINERALS) tablet Take 1 tablet by mouth daily        Biotin 5000 MCG CAPS Take by mouth        amphetamine-dextroamphetamine (ADDERALL) 20 MG tablet Take 30 mg by mouth daily         rosuvastatin (CRESTOR) 10 MG tablet Take 20 mg by mouth daily  metoprolol (LOPRESSOR) 25 MG tablet Take 50 mg by mouth 2 times daily           No current facility-administered medications for this visit. Review of Systems   Constitutional: Negative. Negative for fever. Respiratory: Negative. Cardiovascular: Negative. Musculoskeletal: Positive for back pain and gait problem. Neurological: Positive for weakness and numbness. Objective:  General Appearance:  Well-appearing and in no acute distress. Vital signs: (most recent): Blood pressure (!) 150/90, pulse 92, temperature 97.6 °F (36.4 °C), height 5' 5\" (1.651 m), weight 249 lb (112.9 kg), SpO2 98 %, not currently breastfeeding. Vital signs are normal.  No fever. Output: Producing urine and producing stool. HEENT: Normal HEENT exam.    Lungs:  Normal effort and normal respiratory rate. Breath sounds clear to auscultation. She is not in respiratory distress. Heart: Normal rate. Extremities: Normal range of motion. There is no deformity. Neurological: Patient is alert and oriented to person, place and time. Patient has normal coordination. Pupils:  Pupils are equal, round, and reactive to light. Pupils are equal.   Skin:  Warm and dry. No rash or cyanosis. Assessment & Plan      Urine toxicology July 8, 2020  Positive for morphine  Positive for amphetamine  Validity testing okay  Also have a small amount of hydromorphone which is likely metabolite from morphine  Consistent with prescription history     1. Left lumbar radiculitis    2. Chronic use of opiate for therapeutic purpose    3.  Lumbar disc herniation        With flareup of left lumbar radiculitis  In past epidural injection has been helpful  We will schedule for a repeat injection  Last injection was 3 months ago     Automated prescription report reviewed  Safe use of medication discussed  Refill due on October 15     Patient will collect a strip when she comes for her procedure     Urine toxicology collected

## 2020-10-16 RX ORDER — GABAPENTIN 300 MG/1
300 CAPSULE ORAL 2 TIMES DAILY
Qty: 60 CAPSULE | Refills: 0 | Status: SHIPPED | OUTPATIENT
Start: 2020-10-16 | End: 2020-11-11 | Stop reason: SDUPTHER

## 2020-10-16 RX ORDER — MORPHINE SULFATE 15 MG/1
15 TABLET, FILM COATED, EXTENDED RELEASE ORAL EVERY 12 HOURS SCHEDULED
Qty: 60 TABLET | Refills: 0 | Status: SHIPPED | OUTPATIENT
Start: 2020-10-16 | End: 2020-11-11 | Stop reason: SDUPTHER

## 2020-10-16 RX ORDER — TIZANIDINE 4 MG/1
4 TABLET ORAL NIGHTLY
Qty: 30 TABLET | Refills: 0 | Status: SHIPPED | OUTPATIENT
Start: 2020-10-16 | End: 2020-11-11 | Stop reason: SDUPTHER

## 2020-11-11 ENCOUNTER — OFFICE VISIT (OUTPATIENT)
Dept: PAIN MANAGEMENT | Age: 55
End: 2020-11-11
Payer: MEDICARE

## 2020-11-11 VITALS
HEIGHT: 65 IN | HEART RATE: 90 BPM | SYSTOLIC BLOOD PRESSURE: 152 MMHG | DIASTOLIC BLOOD PRESSURE: 86 MMHG | OXYGEN SATURATION: 97 % | TEMPERATURE: 96.6 F | WEIGHT: 247 LBS | BODY MASS INDEX: 41.15 KG/M2

## 2020-11-11 PROCEDURE — G8427 DOCREV CUR MEDS BY ELIG CLIN: HCPCS | Performed by: ANESTHESIOLOGY

## 2020-11-11 PROCEDURE — G8484 FLU IMMUNIZE NO ADMIN: HCPCS | Performed by: ANESTHESIOLOGY

## 2020-11-11 PROCEDURE — G8417 CALC BMI ABV UP PARAM F/U: HCPCS | Performed by: ANESTHESIOLOGY

## 2020-11-11 PROCEDURE — 99214 OFFICE O/P EST MOD 30 MIN: CPT | Performed by: ANESTHESIOLOGY

## 2020-11-11 PROCEDURE — 4004F PT TOBACCO SCREEN RCVD TLK: CPT | Performed by: ANESTHESIOLOGY

## 2020-11-11 PROCEDURE — 3017F COLORECTAL CA SCREEN DOC REV: CPT | Performed by: ANESTHESIOLOGY

## 2020-11-11 RX ORDER — TIZANIDINE 4 MG/1
4 TABLET ORAL NIGHTLY
Qty: 30 TABLET | Refills: 0 | Status: SHIPPED | OUTPATIENT
Start: 2020-11-11 | End: 2020-12-15 | Stop reason: SDUPTHER

## 2020-11-11 RX ORDER — GABAPENTIN 300 MG/1
300 CAPSULE ORAL 2 TIMES DAILY
Qty: 60 CAPSULE | Refills: 0 | Status: SHIPPED | OUTPATIENT
Start: 2020-11-15 | End: 2020-12-15 | Stop reason: SDUPTHER

## 2020-11-11 RX ORDER — MORPHINE SULFATE 15 MG/1
15 TABLET, FILM COATED, EXTENDED RELEASE ORAL EVERY 12 HOURS SCHEDULED
Qty: 60 TABLET | Refills: 0 | Status: SHIPPED | OUTPATIENT
Start: 2020-11-15 | End: 2020-12-15 | Stop reason: SDUPTHER

## 2020-11-11 ASSESSMENT — ENCOUNTER SYMPTOMS: BACK PAIN: 1

## 2020-11-11 NOTE — PROGRESS NOTES
The patient is a 63 y. o. Non-/non  female. Chief Complaint   Patient presents with    Back Pain    Hip Pain    Medication Refill    Follow Up After Procedure        HPI    Chronic lower back pain onset many years ago located in the lumbar area across midline  Pain radiates down left leg  Associated symptoms include left leg numbness  Pain aggravated with routine activities walking standing left lifting and bending  No changes in bladder or bowel control  She report worsening of her balance while walking with a tendency to fall    Patient is on chronic medication management with morphine 15 mg twice a day along with tizanidine and gabapentin  She denies any side effect from the medication  She finds the medication helpful allowing her to do daily life activities    Last diagnostic work-up was an MRI lumbar spine in 2018  She had epidural injections with good intermediate term relief  She has tried conservative measures with physical therapy in past    S/P:LEFT L4 L5 EPIDURAL STEROID INJECTION DOS 10/12/20    Outcome   Any improvement of activity?   Yes   Any side effects (appetite,leg cramping,facial fleshing):no   Increase of pain:  No  Pain score Today:  7  % of pain relief:90%  Pain diary (medial branch block): No      Pain score Today:  7  Adverse effects (Constipation / Nausea / Sedation / sexual Dysfunction / others) : none  Mood: fair  Sleep pattern and quality: fair to poor  Activity level: poor    Pill count Today:# 6 Morphine counted  Last dose taken  11/10/20  OARRS report reviewed today: yes  ER/Hospitalizations/PCP visit related to pain since last visit:no   Any legal problems e.g. DUI etc.:No  Satisfied with current management: Yes    Opioid Contract:7/26/19  Last Urine Dug screen dated:7/11/20    Past Medical History, Past Surgical History, Social History, Allergies and Medications, reviewed and updated in EPIC as indicated    Past Medical History:   Diagnosis Date    ADHD (attention deficit hyperactivity disorder)     [facility] ([facility] [address]     Depression with anxiety     GERD (gastroesophageal reflux disease)     Heel spur     left    Hyperlipidemia     Hypertension     Left hip pain     PTSD (post-traumatic stress disorder)     Tremor     Trouble swallowing     Weight gain       Past Surgical History:   Procedure Laterality Date    ANESTHESIA NERVE BLOCK Left 10/7/2019    NERVE BLOCK LEFT HIP OBTURATOR AND FEMORAL performed by Mahendra Pratt MD at [address] Bilateral     EPIDURAL STEROID INJECTION Left 9/12/2019    EPIDURAL STEROID INJECTION performed by Mahendra Pratt MD at [facility] NERV Left 9/12/2019    INJECTION MEDICATION LEFT HIP performed by [facility]  08/01/2016    duramorph 1mg  celestone 9mg    NERVE BLOCK  10/31/2016    duramorph epidural steroid block decadron 7.5 mg durmoprh 1.5 mg    NERVE BLOCK Left     NO STEROID, LEFT MBNB# 1    NERVE BLOCK Left 01/16/2017    LT MBNB #2   celestone 6mg    NERVE BLOCK Left 02/06/2017    LT lumbar RF    kenalog 40    NERVE BLOCK Right 06/19/2017    right lumbar mbnb #1    NERVE BLOCK  07/07/2017    right radiofrequency kenolog 40mg    NERVE BLOCK  09/11/2017    duramorph 1mg & celestone 9 mg    NERVE BLOCK Left 10/02/2018    LEFT LUMBAR EPIDURAL STEROID BLOCK DECADRON 10MG    NERVE BLOCK  10/16/2018    blanca mbnb, marcaine and xylocaine    NERVE BLOCK Left 10/30/2018    left lumbar rf- no steroid    NERVE BLOCK Right 11/13/2018    RT lumbar RFA no steroid    NERVE BLOCK  03/26/2019    left hip decadron 10mg, marcaine . 5 %    OTHER SURGICAL HISTORY  08/01/2019    radiofrequency ablation L2-5    OTHER SURGICAL HISTORY Right 08/15/2019    lumbar radiofrequency ablation    OTHER SURGICAL HISTORY  09/12/2019     INJECTION MEDICATION LEFT HIP (Left Hip) EPIDURAL STEROID INJECTION (left back)    OTHER SURGICAL HISTORY Left 10/24/2019    nerve radiofrequency ablation    PAIN MANAGEMENT PROCEDURE Left 6/18/2020    EPIDURAL STEROID INJECTION LEFT L4,L5 performed by Nabila Calle MD at [address] [place] [address] Right 7/27/2020    RIGHT MEDIAL [facility] L2 - L5 performed by Nabila Calle MD at [address] [place] [address] Right 8/24/2020    RIGHT SIDED MEDIAL [address] [facility]   L 2 - L 5 performed by Nabila Calle MD at [address] [place] [address] Left 10/12/2020    LEFT L4 L5 EPIDURAL STEROID INJECTION performed by Nabila Calle MD at [facility] [place] [facility] Left 8/1/2019    NERVE RADIOFREQUENCY ABLATION MEDIAN BRANCHES AT TRANSVERSE PROCESSES L3/L4/L5 AND S1 LEFT performed by Nabila Calle MD at [facility] [place] Right 8/15/2019    NERVE RADIOFREQUENCY ABLATION MEDIAN BRANCHES AT TRANSVERSE PROCESSES OF L3/L4/L5 AND S1 RIGHT performed by Nabila Calle MD at [facility] [place] Left 10/24/2019    NERVE RADIOFREQUENCY ABLATION LEFT HIP FEMORAL AND OBTURATOR NERVE performed by Nabila Calle MD at [address] History     Socioeconomic History    Marital status:      Spouse name: None    Number of children: None    Years of education: None    Highest education level: None   Occupational History    None   Social Needs    Financial resource strain: None    Food insecurity     Worry: None     Inability: None    Transportation needs     Medical: None     Non-medical: None   Tobacco Use    Smoking status: Current Every Day Smoker     Packs/day: 0.50     Types: Cigarettes    Smokeless tobacco: Never Used   Substance and Sexual Activity    Alcohol use: Never     Alcohol/week: 0.0 standard drinks     Frequency: Never    Drug use: Never    Sexual activity: None   Lifestyle    Physical activity     Days per week: None     Minutes per session: None    Stress: None   Relationships    Social connections     Talks on phone: None     Gets together: None     Attends Restorationist service: None     Active member of club or organization: None     Attends meetings of clubs or organizations: None     Relationship status: None    Intimate partner violence     Fear of current or ex partner: None     Emotionally abused: None     Physically abused: None     Forced sexual activity: None   Other Topics Concern    None   Social History Narrative    None     Family History   Problem Relation Age of Onset    Other Mother         ulcers    Heart Disease Father     High Blood Pressure Father     Other Father         blind, pacemaker     No Known Allergies  Patient has no known allergies. Vitals:    11/11/20 1240   BP: (!) 152/86   Pulse: 90   Temp: 96.6 °F (35.9 °C)   SpO2: 97%     Current Outpatient Medications   Medication Sig Dispense Refill    [START ON 11/15/2020] morphine (MS CONTIN) 15 MG extended release tablet Take 1 tablet by mouth every 12 hours for 30 days. 60 tablet 0    [START ON 11/15/2020] gabapentin (NEURONTIN) 300 MG capsule Take 1 capsule by mouth 2 times daily for 30 days.  60 capsule 0    tiZANidine (ZANAFLEX) 4 MG tablet Take 1 tablet by mouth nightly 30 tablet 0    DULoxetine (CYMBALTA) 30 MG extended release capsule Take 30 mg by mouth daily      celecoxib (CELEBREX) 100 MG capsule Take 100 mg by mouth 2 times daily      benztropine (COGENTIN) 0.5 MG tablet Take 0.5 mg by mouth 2 times daily      Acetaminophen (TYLENOL ARTHRITIS PAIN PO) Take 2 tablets by mouth daily      pantoprazole (PROTONIX) 40 MG tablet TAKE 1 TABLET BY MOUTH EVERY DAY      busPIRone (BUSPAR) 10 MG tablet Take 10 mg by mouth 2 times daily      zolpidem (AMBIEN) 10 MG tablet Take 10 mg by mouth nightly as needed for Sleep      Multiple Vitamins-Minerals (THERAPEUTIC MULTIVITAMIN-MINERALS) tablet Take 1 tablet by mouth daily      Biotin 5000 MCG CAPS Take by mouth      amphetamine-dextroamphetamine (ADDERALL) 20 MG tablet Take 30 mg by mouth daily       rosuvastatin (CRESTOR) 10 MG tablet Take 20 mg by mouth daily       metoprolol (LOPRESSOR) 25 MG tablet Take 50 mg by mouth 2 times daily        No current facility-administered medications for this visit. Review of Systems   Constitutional: Positive for fatigue. Negative for fever. Musculoskeletal: Positive for arthralgias and back pain. Neurological: Positive for tremors. Objective:  General Appearance:  Well-appearing, in no acute distress, uncomfortable and in pain. Vital signs: (most recent): Blood pressure (!) 152/86, pulse 90, temperature 96.6 °F (35.9 °C), temperature source Infrared, height 5' 5\" (1.651 m), weight 247 lb (112 kg), SpO2 97 %, not currently breastfeeding. Vital signs are normal.  No fever. Output: Producing urine and producing stool. HEENT: Normal HEENT exam.    Lungs:  Normal effort and normal respiratory rate. Breath sounds clear to auscultation. She is not in respiratory distress. Heart: Normal rate. Extremities: Normal range of motion. There is no deformity. Neurological: Patient is alert and oriented to person, place and time. Patient has normal coordination. Pupils:  Pupils are equal, round, and reactive to light. Pupils are equal.   Skin:  Warm and dry. No rash or cyanosis.       Assessment & Plan     Chronic lower back pain onset many years ago located in the lumbar area across midline  Pain radiates down left leg  Associated symptoms include left leg numbness  Pain aggravated with routine activities walking standing left lifting and bending  No changes in bladder or bowel control  She report worsening of her balance while walking with a tendency to fall    Patient is on chronic medication management with morphine 15 mg twice a day along with tizanidine and gabapentin  She denies any side effect from the medication  She finds the medication helpful allowing her to do daily life activities    Last diagnostic work-up was an MRI lumbar spine in 2018  She had epidural injections with good intermediate term relief  She has tried conservative measures with physical therapy in past    1. Chronic use of opiate for therapeutic purpose    2. Lumbosacral spondylosis without myelopathy    3. Morbid obesity with BMI of 40.0-44.9, adult ([address]          Low back and left lumbar radicular pain  Last diagnostic work-up was more than 2 years ago  We will obtain a new MRI lumbar spine  If no surgical pathology is identified then consider for neuromodulation trial    Orders Placed This Encounter   Procedures    MRI LUMBAR SPINE WO CONTRAST      Orders Placed This Encounter   Medications    morphine (MS CONTIN) 15 MG extended release tablet     Sig: Take 1 tablet by mouth every 12 hours for 30 days. Dispense:  60 tablet     Refill:  0     Reduce doses taken as pain becomes manageable    gabapentin (NEURONTIN) 300 MG capsule     Sig: Take 1 capsule by mouth 2 times daily for 30 days. Dispense:  60 capsule     Refill:  0    tiZANidine (ZANAFLEX) 4 MG tablet     Sig: Take 1 tablet by mouth nightly     Dispense:  30 tablet     Refill:  0      Controlled Substance Monitoring:    Acute and Chronic Pain Monitoring:   RX Monitoring 11/11/2020   Attestation -   Acute Pain Prescriptions -   Periodic Controlled Substance Monitoring Possible medication side effects, risk of tolerance/dependence & alternative treatments discussed. ;No signs of potential drug abuse or diversion identified. ;Assessed functional status. ;Obtaining appropriate analgesic effect of treatment. Chronic Pain > 50 MEDD Obtained or confirmed \"Consent for Opioid Use\" on file.    Chronic Pain > 80 MEDD -           Electronically signed by Kendall Tong MD on 11/11/2020 at 1:52 PM

## 2020-12-09 ENCOUNTER — HOSPITAL ENCOUNTER (OUTPATIENT)
Dept: MRI IMAGING | Age: 55
Discharge: HOME | End: 2020-12-11
Payer: MEDICARE

## 2020-12-09 PROCEDURE — 72148 MRI LUMBAR SPINE W/O DYE: CPT

## 2020-12-15 ENCOUNTER — OFFICE VISIT (OUTPATIENT)
Dept: PAIN MANAGEMENT | Age: 55
End: 2020-12-15
Payer: MEDICARE

## 2020-12-15 VITALS
BODY MASS INDEX: 42.82 KG/M2 | SYSTOLIC BLOOD PRESSURE: 164 MMHG | OXYGEN SATURATION: 98 % | HEIGHT: 65 IN | TEMPERATURE: 96.4 F | HEART RATE: 99 BPM | WEIGHT: 257 LBS | DIASTOLIC BLOOD PRESSURE: 101 MMHG

## 2020-12-15 PROCEDURE — G8417 CALC BMI ABV UP PARAM F/U: HCPCS | Performed by: ANESTHESIOLOGY

## 2020-12-15 PROCEDURE — 99214 OFFICE O/P EST MOD 30 MIN: CPT | Performed by: ANESTHESIOLOGY

## 2020-12-15 PROCEDURE — G8484 FLU IMMUNIZE NO ADMIN: HCPCS | Performed by: ANESTHESIOLOGY

## 2020-12-15 PROCEDURE — G8427 DOCREV CUR MEDS BY ELIG CLIN: HCPCS | Performed by: ANESTHESIOLOGY

## 2020-12-15 PROCEDURE — 3017F COLORECTAL CA SCREEN DOC REV: CPT | Performed by: ANESTHESIOLOGY

## 2020-12-15 PROCEDURE — 4004F PT TOBACCO SCREEN RCVD TLK: CPT | Performed by: ANESTHESIOLOGY

## 2020-12-15 RX ORDER — MORPHINE SULFATE 15 MG/1
15 TABLET, FILM COATED, EXTENDED RELEASE ORAL EVERY 12 HOURS SCHEDULED
Qty: 60 TABLET | Refills: 0 | Status: SHIPPED | OUTPATIENT
Start: 2020-12-15 | End: 2021-01-13 | Stop reason: SDUPTHER

## 2020-12-15 RX ORDER — LISINOPRIL 10 MG/1
TABLET ORAL
COMMUNITY
Start: 2020-12-10 | End: 2021-04-09 | Stop reason: ALTCHOICE

## 2020-12-15 RX ORDER — TIZANIDINE 4 MG/1
4 TABLET ORAL NIGHTLY
Qty: 30 TABLET | Refills: 0 | Status: SHIPPED | OUTPATIENT
Start: 2020-12-15 | End: 2021-01-13 | Stop reason: SDUPTHER

## 2020-12-15 RX ORDER — GABAPENTIN 300 MG/1
300 CAPSULE ORAL 2 TIMES DAILY
Qty: 60 CAPSULE | Refills: 0 | Status: SHIPPED | OUTPATIENT
Start: 2020-12-15 | End: 2021-01-13 | Stop reason: SDUPTHER

## 2020-12-15 ASSESSMENT — ENCOUNTER SYMPTOMS
RESPIRATORY NEGATIVE: 1
BACK PAIN: 1

## 2020-12-15 NOTE — PROGRESS NOTES
 Hyperlipidemia     Hypertension     Left hip pain     PTSD (post-traumatic stress disorder)     Tremor     Trouble swallowing     Weight gain       Past Surgical History:   Procedure Laterality Date    ANESTHESIA NERVE BLOCK Left 10/7/2019    NERVE BLOCK LEFT HIP OBTURATOR AND FEMORAL performed by Margorie Crigler, MD at [address] Bilateral     EPIDURAL STEROID INJECTION Left 9/12/2019    EPIDURAL STEROID INJECTION performed by Margorie Crigler, MD at [address] [facility] NERV Left 9/12/2019    INJECTION MEDICATION LEFT HIP performed by Margorie Crigler, MD at \[facility]\""  08/01/2016    duramorph 1mg  celestone 9mg    NERVE BLOCK  10/31/2016    duramorph epidural steroid block decadron 7.5 mg durmoprh 1.5 mg    NERVE BLOCK Left     NO STEROID, LEFT MBNB# 1    NERVE BLOCK Left 01/16/2017    LT MBNB #2   celestone 6mg    NERVE BLOCK Left 02/06/2017    LT lumbar RF    kenalog 40    NERVE BLOCK Right 06/19/2017    right lumbar mbnb #1    NERVE BLOCK  07/07/2017    right radiofrequency kenolog 40mg    NERVE BLOCK  09/11/2017    duramorph 1mg & celestone 9 mg    NERVE BLOCK Left 10/02/2018    LEFT LUMBAR EPIDURAL STEROID BLOCK DECADRON 10MG    NERVE BLOCK  10/16/2018    blanca mbnb, marcaine and xylocaine    NERVE BLOCK Left 10/30/2018    left lumbar rf- no steroid    NERVE BLOCK Right 11/13/2018    RT lumbar RFA no steroid    NERVE BLOCK  03/26/2019    left hip decadron 10mg, marcaine . 5 %    OTHER SURGICAL HISTORY  08/01/2019    radiofrequency ablation L2-5    OTHER SURGICAL HISTORY Right 08/15/2019    lumbar radiofrequency ablation    OTHER SURGICAL HISTORY  09/12/2019     INJECTION MEDICATION LEFT HIP (Left Hip) EPIDURAL STEROID INJECTION (left back)    OTHER SURGICAL HISTORY Left 10/24/2019    nerve radiofrequency ablation    PAIN MANAGEMENT PROCEDURE Left 6/18/2020 EPIDURAL STEROID INJECTION LEFT L4,L5 performed by Katerina Chau MD at [facility] Right 7/27/2020    RIGHT MEDIAL [facility] L2 - L5 performed by Katerina Chau MD at [address] [place] [address] Right 8/24/2020    RIGHT SIDED MEDIAL [facility]   L 2 - L 5 performed by Katerina Chau MD at [address] [place] [address] Left 10/12/2020    LEFT L4 L5 EPIDURAL STEROID INJECTION performed by Katerina Chau MD at [address] [place] [address] Left 8/1/2019    NERVE RADIOFREQUENCY ABLATION MEDIAN BRANCHES AT TRANSVERSE PROCESSES L3/L4/L5 AND S1 LEFT performed by Katerina Chau MD at [address] [place] [address] Right 8/15/2019    NERVE RADIOFREQUENCY ABLATION MEDIAN BRANCHES AT TRANSVERSE PROCESSES OF L3/L4/L5 AND S1 RIGHT performed by Katerina Chau MD at [address] [place] [address] Left 10/24/2019    NERVE RADIOFREQUENCY ABLATION LEFT HIP FEMORAL AND OBTURATOR NERVE performed by Katerina Chau MD at [address] [facility] History     Socioeconomic History    Marital status:      Spouse name: None    Number of children: None    Years of education: None    Highest education level: None   Occupational History    None   Social Needs    Financial resource strain: None    Food insecurity     Worry: None     Inability: None    Transportation needs     Medical: None     Non-medical: None   Tobacco Use    Smoking status: Current Every Day Smoker     Packs/day: 0.50     Types: Cigarettes    Smokeless tobacco: Never Used   Substance and Sexual Activity    Alcohol use: Never     Alcohol/week: 0.0 standard drinks     Frequency: Never    Drug use: Never    Sexual activity: None   Lifestyle    Physical activity     Days per week: None     Minutes per session: None    Stress: None   Relationships    Social connections     Talks on phone: None Gets together: None     Attends Buddhist service: None     Active member of club or organization: None     Attends meetings of clubs or organizations: None     Relationship status: None    Intimate partner violence     Fear of current or ex partner: None     Emotionally abused: None     Physically abused: None     Forced sexual activity: None   Other Topics Concern    None   Social History Narrative    None     Family History   Problem Relation Age of Onset    Other Mother         ulcers    Heart Disease Father     High Blood Pressure Father     Other Father         blind, pacemaker     Allergies   Allergen Reactions    Fenofibrate Nausea And Vomiting     Fenofibrate   Vitals:    12/15/20 1126   BP: (!) 164/101   Pulse: 99   Temp:    SpO2: 98%     Current Outpatient Medications   Medication Sig Dispense Refill    morphine (MS CONTIN) 15 MG extended release tablet Take 1 tablet by mouth every 12 hours for 30 days. 60 tablet 0    gabapentin (NEURONTIN) 300 MG capsule Take 1 capsule by mouth 2 times daily for 30 days.  60 capsule 0    tiZANidine (ZANAFLEX) 4 MG tablet Take 1 tablet by mouth nightly 30 tablet 0    lisinopril (PRINIVIL;ZESTRIL) 10 MG tablet TAKE 1 TABLET(10 MG) BY MOUTH DAILY      DULoxetine (CYMBALTA) 30 MG extended release capsule Take 30 mg by mouth daily      celecoxib (CELEBREX) 100 MG capsule Take 100 mg by mouth 2 times daily      benztropine (COGENTIN) 0.5 MG tablet Take 0.5 mg by mouth 2 times daily      Acetaminophen (TYLENOL ARTHRITIS PAIN PO) Take 2 tablets by mouth daily      pantoprazole (PROTONIX) 40 MG tablet TAKE 1 TABLET BY MOUTH EVERY DAY      busPIRone (BUSPAR) 10 MG tablet Take 10 mg by mouth 2 times daily      zolpidem (AMBIEN) 10 MG tablet Take 10 mg by mouth nightly as needed for Sleep      Multiple Vitamins-Minerals (THERAPEUTIC MULTIVITAMIN-MINERALS) tablet Take 1 tablet by mouth daily      Biotin 5000 MCG CAPS Take by mouth  amphetamine-dextroamphetamine (ADDERALL) 20 MG tablet Take 30 mg by mouth daily       rosuvastatin (CRESTOR) 10 MG tablet Take 20 mg by mouth daily       metoprolol (LOPRESSOR) 25 MG tablet Take 50 mg by mouth 2 times daily        No current facility-administered medications for this visit. Review of Systems   Constitutional: Negative. Negative for fever. Respiratory: Negative. Musculoskeletal: Positive for arthralgias and back pain. Neurological: Negative. Objective:  General Appearance:  Well-appearing, in no acute distress, uncomfortable and in pain. Vital signs: (most recent): Blood pressure (!) 164/101, pulse 99, temperature 96.4 °F (35.8 °C), height 5' 5\" (1.651 m), weight 257 lb (116.6 kg), SpO2 98 %, not currently breastfeeding. Vital signs are normal.  No fever. Output: Producing urine and producing stool. HEENT: Normal HEENT exam.    Lungs:  Normal effort and normal respiratory rate. Breath sounds clear to auscultation. She is not in respiratory distress. Heart: Normal rate. Extremities: Normal range of motion. There is no deformity. Neurological: Patient is alert and oriented to person, place and time. Patient has normal coordination. Pupils:  Pupils are equal, round, and reactive to light. Pupils are equal.   Skin:  Warm and dry. No rash or cyanosis.       Assessment & Plan     59-year-old woman with history of chronic lower back pain onset many years ago located in the lumbar area  Report intermittent radiation of pain down left leg  Associated with left leg numbness  Pain aggravated with excessive activity  No previous lumbar spine surgical history  For chronic axial back pain issues she is diagnosed with lumbar spondylosis and had radiofrequency ablation with improvement in axial back pain  For worsening sciatica symptoms she did physical therapy without any improvement  Have a recent MRI here for review of the images Patient is on chronic medication management with morphine along with Cymbalta Neurontin and tizanidine  Report no side effect on the medication  Patient states she do find the medication helpful and keeps her pain tolerable  Rates average pain score 8/10  No history of illicit substance use  Medication Refill: Morphine, Zanaflex and gabapentin     EXAMINATION: MRI OF THE LUMBAR SPINE WITHOUT CONTRAST, 12/9/2020 11:39 am    L1-L2: Mild broad-based disc bulge and facet degenerative changes do not cause significant central canal or foraminal stenosis. L2-L3: Broad-based disc bulge and facet degenerative changes combine to create mild central canal stenosis and mild bilateral foraminal stenosis. L3-L4: Broad-based disc bulge and facet degenerative changes combine to create moderate central canal stenosis. Mild right foraminal stenosis. Moderate-severe left foraminal stenosis. L4-L5: Broad-based disc bulge and facet degenerative changes combine to create mild-moderate central canal stenosis. Changes combine to create mild left foraminal stenosis and moderate-severe right foraminal stenosis. L5-S1: Broad-based disc bulge and facet degenerative changes do not cause significant central canal stenosis. Changes combine to create severe right foraminal stenosis and moderate-severe left foraminal stenosis. 1. Chronic bilateral low back pain with left-sided sciatica    2. Lumbosacral spondylosis without myelopathy    3. Morbid obesity with BMI of 40.0-44.9, adult ([address]    4. Chronic use of opiate for therapeutic purpose    5.  Abnormal MRI, lumbar spine        MRI lumbar spine  Report was reviewed  Images were reviewed independently  Showed significant L5-S1 disease with loss of disc height Modic changes in the vertebrae and collapse of the segment with the severe foraminal narrowing    I think patient can benefit from surgical stabilization at this level  We will refer patient for surgical evaluation If no surgery is advised then will consider for repeat epidural injection or neuromodulation trial    Automated prescription report reviewed  Safe use of medication discussed  Refill ordered    Consider for repeat radiofrequency ablation for axial back pain in future if back pain flares up    Orders Placed This Encounter   Procedures   [address], Flavia Patterson DO, Neurosurgery, Elis Chavarria      Orders Placed This Encounter   Medications    morphine (MS CONTIN) 15 MG extended release tablet     Sig: Take 1 tablet by mouth every 12 hours for 30 days. Dispense:  60 tablet     Refill:  0     Reduce doses taken as pain becomes manageable    gabapentin (NEURONTIN) 300 MG capsule     Sig: Take 1 capsule by mouth 2 times daily for 30 days. Dispense:  60 capsule     Refill:  0    tiZANidine (ZANAFLEX) 4 MG tablet     Sig: Take 1 tablet by mouth nightly     Dispense:  30 tablet     Refill:  0      Controlled Substance Monitoring:    Acute and Chronic Pain Monitoring:   RX Monitoring 12/15/2020   Attestation -   Acute Pain Prescriptions -   Periodic Controlled Substance Monitoring Possible medication side effects, risk of tolerance/dependence & alternative treatments discussed. ;No signs of potential drug abuse or diversion identified. ;Assessed functional status. Chronic Pain > 50 MEDD Obtained or confirmed \"Consent for Opioid Use\" on file.    Chronic Pain > 80 MEDD -           Electronically signed by Tati Bedoya MD on 12/15/2020 at 11:34 AM

## 2021-01-13 ENCOUNTER — OFFICE VISIT (OUTPATIENT)
Dept: PAIN MANAGEMENT | Age: 56
End: 2021-01-13
Payer: MEDICARE

## 2021-01-13 VITALS — WEIGHT: 257 LBS | TEMPERATURE: 97.3 F | HEIGHT: 65 IN | BODY MASS INDEX: 42.82 KG/M2

## 2021-01-13 DIAGNOSIS — M25.561 BILATERAL CHRONIC KNEE PAIN: ICD-10-CM

## 2021-01-13 DIAGNOSIS — Z79.891 CHRONIC USE OF OPIATE FOR THERAPEUTIC PURPOSE: Primary | ICD-10-CM

## 2021-01-13 DIAGNOSIS — E66.01 MORBID OBESITY WITH BMI OF 40.0-44.9, ADULT (HCC): ICD-10-CM

## 2021-01-13 DIAGNOSIS — M25.562 BILATERAL CHRONIC KNEE PAIN: ICD-10-CM

## 2021-01-13 DIAGNOSIS — M54.12 CERVICAL RADICULOPATHY: ICD-10-CM

## 2021-01-13 DIAGNOSIS — G89.29 BILATERAL CHRONIC KNEE PAIN: ICD-10-CM

## 2021-01-13 DIAGNOSIS — M47.817 LUMBOSACRAL SPONDYLOSIS WITHOUT MYELOPATHY: Chronic | ICD-10-CM

## 2021-01-13 PROCEDURE — 3017F COLORECTAL CA SCREEN DOC REV: CPT | Performed by: ANESTHESIOLOGY

## 2021-01-13 PROCEDURE — G8484 FLU IMMUNIZE NO ADMIN: HCPCS | Performed by: ANESTHESIOLOGY

## 2021-01-13 PROCEDURE — G8427 DOCREV CUR MEDS BY ELIG CLIN: HCPCS | Performed by: ANESTHESIOLOGY

## 2021-01-13 PROCEDURE — G8417 CALC BMI ABV UP PARAM F/U: HCPCS | Performed by: ANESTHESIOLOGY

## 2021-01-13 PROCEDURE — 4004F PT TOBACCO SCREEN RCVD TLK: CPT | Performed by: ANESTHESIOLOGY

## 2021-01-13 PROCEDURE — 99214 OFFICE O/P EST MOD 30 MIN: CPT | Performed by: ANESTHESIOLOGY

## 2021-01-13 RX ORDER — BUSPIRONE HYDROCHLORIDE 10 MG/1
10 TABLET ORAL 2 TIMES DAILY
COMMUNITY
Start: 2020-12-26

## 2021-01-13 RX ORDER — TIZANIDINE 4 MG/1
4 TABLET ORAL NIGHTLY
Qty: 30 TABLET | Refills: 0 | Status: SHIPPED | OUTPATIENT
Start: 2021-01-13 | End: 2021-02-18 | Stop reason: SDUPTHER

## 2021-01-13 RX ORDER — GABAPENTIN 300 MG/1
300 CAPSULE ORAL 2 TIMES DAILY
Qty: 60 CAPSULE | Refills: 0 | Status: SHIPPED | OUTPATIENT
Start: 2021-01-13 | End: 2021-02-18 | Stop reason: SDUPTHER

## 2021-01-13 RX ORDER — MORPHINE SULFATE 15 MG/1
15 TABLET, FILM COATED, EXTENDED RELEASE ORAL EVERY 12 HOURS SCHEDULED
Qty: 60 TABLET | Refills: 0 | Status: SHIPPED | OUTPATIENT
Start: 2021-01-14 | End: 2021-02-18 | Stop reason: SDUPTHER

## 2021-01-13 ASSESSMENT — ENCOUNTER SYMPTOMS
RESPIRATORY NEGATIVE: 1
BACK PAIN: 1
EYES NEGATIVE: 1
GASTROINTESTINAL NEGATIVE: 1

## 2021-01-13 NOTE — PROGRESS NOTES
The patient is a 63 y. o. Non-/non  female.     Chief Complaint   Patient presents with    Medication Refill     Morphine (MS Contin) 15 MG     Back Pain        HPI      58-year-old woman with history of chronic lower back pain onset many years ago located in the lumbar area  Report radiation of pain down left leg associated left leg numbness  No previous lumbar spine surgical history  Have tried therapy in past  Had different to lumbar spine injection with limited benefit  Had recent diagnostic work-up that showed significant degenerative disc disease at L4 level with a spine instability suspicion  She was referred for surgical evaluation  Have not been able to make an appointment yet  Report no side effects from the current medication regimen taking MS Contin tizanidine and gabapentin  Pain is poorly controlled describes it as constant aching throbbing sensation with intensity 8/10  No changes in bladder or bowel control  Medication Refill: Morphine (MS CONTIN) 15MG     Knee pain  Pain is chronic onset many years ago  No previous knee surgical history  No previous knee diagnostic work-up  Note no particular physical therapy for knee pain  No previous knee injection history  Describe the knee pain is constant effect both knees aggravated standing and walking    Pain score Today: 8  Adverse effects (Constipation / Nausea / Sedation / sexual Dysfunction / others): No  Mood: fair  Sleep pattern and quality: poor  Activity level: poor    Pill count Today: (4) Morphine MS 15 MG   Last dose taken  01/12/21  OARRS report reviewed today: yes  ER/Hospitalizations/PCP visit related to pain since last visit:no   Any legal problems e.g. DUI etc.:No  Satisfied with current management: Yes    Opioid Contract: 07/26/2019  Last Urine Dug screen dated: 07/08/20    Past Medical History, Past Surgical History, Social History, Allergies and Medications reviewed and updated in EPIC as indicated    Family History reviewed and is noncontributory. Past Medical History:   Diagnosis Date    ADHD (attention deficit hyperactivity disorder)     Bipolar 1 disorder ([facility] [address]     Depression with anxiety     GERD (gastroesophageal reflux disease)     Heel spur     left    Hyperlipidemia     Hypertension     Left hip pain     PTSD (post-traumatic stress disorder)     Tremor     Trouble swallowing     Weight gain       Past Surgical History:   Procedure Laterality Date    ANESTHESIA NERVE BLOCK Left 10/7/2019    NERVE BLOCK LEFT HIP OBTURATOR AND FEMORAL performed by Xena Adams MD at [place] [address] Bilateral     EPIDURAL STEROID INJECTION Left 9/12/2019    EPIDURAL STEROID INJECTION performed by Xena Adams MD at [address] [place] [address] NERV Left 9/12/2019    INJECTION MEDICATION LEFT HIP performed by Xena Adams MD at [facility]  08/01/2016    duramorph 1mg  celestone 9mg    NERVE BLOCK  10/31/2016    duramorph epidural steroid block decadron 7.5 mg durmoprh 1.5 mg    NERVE BLOCK Left     NO STEROID, LEFT MBNB# 1    NERVE BLOCK Left 01/16/2017    LT MBNB #2   celestone 6mg    NERVE BLOCK Left 02/06/2017    LT lumbar RF    kenalog 40    NERVE BLOCK Right 06/19/2017    right lumbar mbnb #1    NERVE BLOCK  07/07/2017    right radiofrequency kenolog 40mg    NERVE BLOCK  09/11/2017    duramorph 1mg & celestone 9 mg    NERVE BLOCK Left 10/02/2018    LEFT LUMBAR EPIDURAL STEROID BLOCK DECADRON 10MG    NERVE BLOCK  10/16/2018    blanca mbnb, marcaine and xylocaine    NERVE BLOCK Left 10/30/2018    left lumbar rf- no steroid    NERVE BLOCK Right 11/13/2018    RT lumbar RFA no steroid    NERVE BLOCK  03/26/2019    left hip decadron 10mg, marcaine . 5 %    OTHER SURGICAL HISTORY  08/01/2019    radiofrequency ablation L2-5    OTHER SURGICAL HISTORY Right 08/15/2019    lumbar radiofrequency ablation    OTHER SURGICAL HISTORY  09/12/2019    Sara Caro MEDICATION LEFT HIP (Left Hip) EPIDURAL STEROID INJECTION (left back)    OTHER SURGICAL HISTORY Left 10/24/2019    nerve radiofrequency ablation    PAIN MANAGEMENT PROCEDURE Left 6/18/2020    EPIDURAL STEROID INJECTION LEFT L4,L5 performed by Rena Siddiqui MD at [address] [place] [address] Right 7/27/2020    RIGHT MEDIAL [address] [place] [address] L2 - L5 performed by Rena Siddiqui MD at [address] [place] [address] Right 8/24/2020    RIGHT SIDED MEDIAL [facility]   L 2 - L 5 performed by Rena Siddiqui MD at [address] [place] [address] Left 10/12/2020    LEFT L4 L5 EPIDURAL STEROID INJECTION performed by Rena Siddiqui MD at [address] [place] [address] Left 8/1/2019    NERVE RADIOFREQUENCY ABLATION MEDIAN BRANCHES AT TRANSVERSE PROCESSES L3/L4/L5 AND S1 LEFT performed by Rena Siddiqui MD at [facility] Right 8/15/2019    NERVE RADIOFREQUENCY ABLATION MEDIAN BRANCHES AT TRANSVERSE PROCESSES OF L3/L4/L5 AND S1 RIGHT performed by Rena Siddiqui MD at [address] [place] [address] Left 10/24/2019    NERVE RADIOFREQUENCY ABLATION LEFT HIP FEMORAL AND OBTURATOR NERVE performed by Rena Siddiqui MD at [address] History     Socioeconomic History    Marital status:      Spouse name: None    Number of children: None    Years of education: None    Highest education level: None   Occupational History    None   Social Needs    Financial resource strain: None    Food insecurity     Worry: None     Inability: None    Transportation needs     Medical: None     Non-medical: None   Tobacco Use    Smoking status: Current Every Day Smoker     Packs/day: 0.50     Types: Cigarettes    Smokeless tobacco: Never Used   Substance and Sexual Activity    Alcohol use: Never     Alcohol/week: 0.0 standard drinks     Frequency: Never    Drug use: Never    Sexual activity: None   Lifestyle    Physical activity     Days per week: None     Minutes per session: None    Stress: None   Relationships    Social connections     Talks on phone: None     Gets together: None     Attends Muslim service: None     Active member of club or organization: None     Attends meetings of clubs or organizations: None     Relationship status: None    Intimate partner violence     Fear of current or ex partner: None     Emotionally abused: None     Physically abused: None     Forced sexual activity: None   Other Topics Concern    None   Social History Narrative    None     Family History   Problem Relation Age of Onset    Other Mother         ulcers    Heart Disease Father     High Blood Pressure Father     Other Father         blind, pacemaker     Allergies   Allergen Reactions    Fenofibrate Nausea And Vomiting     Fenofibrate   Vitals:    01/13/21 1132   Temp: 97.3 °F (36.3 °C)     Current Outpatient Medications   Medication Sig Dispense Refill    busPIRone (BUSPAR) 30 MG tablet TAKE 1 TABLET BY MOUTH TWICE DAILY      [START ON 1/14/2021] morphine (MS CONTIN) 15 MG extended release tablet Take 1 tablet by mouth every 12 hours for 30 days. 60 tablet 0    gabapentin (NEURONTIN) 300 MG capsule Take 1 capsule by mouth 2 times daily for 30 days.  60 capsule 0    tiZANidine (ZANAFLEX) 4 MG tablet Take 1 tablet by mouth nightly 30 tablet 0    diclofenac sodium (VOLTAREN) 1 % GEL Apply 4 g topically 4 times daily as needed for Pain 120 g 1    lisinopril (PRINIVIL;ZESTRIL) 10 MG tablet TAKE 1 TABLET(10 MG) BY MOUTH DAILY      DULoxetine (CYMBALTA) 30 MG extended release capsule Take 30 mg by mouth daily      celecoxib (CELEBREX) 100 MG capsule Take 100 mg by mouth 2 times daily      benztropine (COGENTIN) 0.5 MG tablet Take 0.5 mg by mouth 2 times daily      Acetaminophen (TYLENOL ARTHRITIS PAIN PO) Take 2 tablets by mouth daily      pantoprazole (PROTONIX) 40 MG tablet TAKE 1 TABLET BY MOUTH EVERY DAY      zolpidem (AMBIEN) 10 MG tablet Take 10 mg by mouth nightly as needed for Sleep      Multiple Vitamins-Minerals (THERAPEUTIC MULTIVITAMIN-MINERALS) tablet Take 1 tablet by mouth daily      Biotin 5000 MCG CAPS Take by mouth      amphetamine-dextroamphetamine (ADDERALL) 20 MG tablet Take 30 mg by mouth daily       rosuvastatin (CRESTOR) 10 MG tablet Take 20 mg by mouth daily       metoprolol (LOPRESSOR) 25 MG tablet Take 50 mg by mouth 2 times daily        No current facility-administered medications for this visit. Review of Systems   Constitutional: Positive for fatigue. Negative for fever. HENT: Negative. Eyes: Negative. Respiratory: Negative. Cardiovascular: Negative. Gastrointestinal: Negative. Endocrine: Negative. Genitourinary: Negative. Musculoskeletal: Positive for arthralgias, back pain, joint swelling and myalgias. Skin: Negative. Neurological: Positive for numbness and headaches. Hematological: Negative. Psychiatric/Behavioral: Negative. Objective:  General Appearance:  Well-appearing, in no acute distress, uncomfortable and in pain. Vital signs: (most recent): Temperature 97.3 °F (36.3 °C), temperature source Temporal, height 5' 5\" (1.651 m), weight 257 lb (116.6 kg), not currently breastfeeding. Vital signs are normal.  No fever. Output: Producing urine and producing stool. HEENT: Normal HEENT exam.    Lungs:  Normal effort and normal respiratory rate. Breath sounds clear to auscultation. She is not in respiratory distress. Heart: Normal rate. Extremities: Normal range of motion. There is no deformity. Neurological: Patient is alert and oriented to person, place and time. Patient has normal coordination. Pupils:  Pupils are equal, round, and reactive to light. Pupils are equal.   Skin:  Warm and dry. No rash or cyanosis.       Assessment & Plan     58-year-old woman with history of chronic lower back pain onset many years ago located in the lumbar area  Report radiation of pain down left leg associated left leg numbness  No previous lumbar spine surgical history  Have tried therapy in past  Had different to lumbar spine injection with limited benefit  Had recent diagnostic work-up that showed significant degenerative disc disease at L4 level with a spine instability suspicion  She was referred for surgical evaluation  Have not been able to make an appointment yet  Report no side effects from the current medication regimen taking MS Contin tizanidine and gabapentin  Pain is poorly controlled describes it as constant aching throbbing sensation with intensity 8/10  No changes in bladder or bowel control  Medication Refill: Morphine (MS CONTIN) 15MG     1. Chronic use of opiate for therapeutic purpose    2. Lumbosacral spondylosis without myelopathy    3. Cervical radiculopathy    4. Morbid obesity with BMI of 40.0-44.9, adult ([facility] [address]    5. Bilateral chronic knee pain        Orders Placed This Encounter   Procedures    XR Knee Bilateral Standard      Orders Placed This Encounter   Medications    morphine (MS CONTIN) 15 MG extended release tablet     Sig: Take 1 tablet by mouth every 12 hours for 30 days. Dispense:  60 tablet     Refill:  0     Reduce doses taken as pain becomes manageable    gabapentin (NEURONTIN) 300 MG capsule     Sig: Take 1 capsule by mouth 2 times daily for 30 days.      Dispense:  60 capsule     Refill:  0    tiZANidine (ZANAFLEX) 4 MG tablet     Sig: Take 1 tablet by mouth nightly     Dispense:  30 tablet     Refill:  0    diclofenac sodium (VOLTAREN) 1 % GEL     Sig: Apply 4 g topically 4 times daily as needed for Pain     Dispense:  120 g     Refill:  1      Controlled Substance Monitoring:    Acute and Chronic Pain Monitoring:   RX Monitoring 1/13/2021   Attestation -   Acute Pain Prescriptions -   Periodic Controlled Substance Monitoring No signs of potential drug abuse or diversion identified. ;Possible medication side effects, risk of tolerance/dependence & alternative treatments discussed. ;Assessed functional status. ;Obtaining appropriate analgesic effect of treatment. Chronic Pain > 50 MEDD Obtained or confirmed \"Consent for Opioid Use\" on file.    Chronic Pain > 80 MEDD -           Electronically signed by Erin Rasmussen MD on 1/13/2021 at 11:49 AM

## 2021-02-15 DIAGNOSIS — M25.561 BILATERAL CHRONIC KNEE PAIN: ICD-10-CM

## 2021-02-15 DIAGNOSIS — G89.29 BILATERAL CHRONIC KNEE PAIN: ICD-10-CM

## 2021-02-15 DIAGNOSIS — M47.817 LUMBOSACRAL SPONDYLOSIS WITHOUT MYELOPATHY: Chronic | ICD-10-CM

## 2021-02-15 DIAGNOSIS — M25.562 BILATERAL CHRONIC KNEE PAIN: ICD-10-CM

## 2021-02-15 NOTE — TELEPHONE ENCOUNTER
Kelvin Adriana is requesting a refill on the following medications:   Requested Prescriptions     Pending Prescriptions Disp Refills    morphine (MS CONTIN) 15 MG extended release tablet 60 tablet 0     Sig: Take 1 tablet by mouth every 12 hours for 30 days.  gabapentin (NEURONTIN) 300 MG capsule 60 capsule 0     Sig: Take 1 capsule by mouth 2 times daily for 30 days.  tiZANidine (ZANAFLEX) 4 MG tablet 30 tablet 0     Sig: Take 1 tablet by mouth nightly    diclofenac sodium (VOLTAREN) 1 %  g 1     Sig: Apply 4 g topically 4 times daily as needed for Pain       Last OV 1/13/2021    No future appointments. OARRS report sent to Dr. Soheila Mehta through alternative route for review.

## 2021-02-16 RX ORDER — TIZANIDINE 4 MG/1
4 TABLET ORAL NIGHTLY
Qty: 30 TABLET | Refills: 0 | OUTPATIENT
Start: 2021-02-16 | End: 2021-03-18

## 2021-02-16 RX ORDER — GABAPENTIN 300 MG/1
300 CAPSULE ORAL 2 TIMES DAILY
Qty: 60 CAPSULE | Refills: 0 | OUTPATIENT
Start: 2021-02-16 | End: 2021-03-18

## 2021-02-16 RX ORDER — MORPHINE SULFATE 15 MG/1
15 TABLET, FILM COATED, EXTENDED RELEASE ORAL EVERY 12 HOURS SCHEDULED
Qty: 60 TABLET | Refills: 0 | OUTPATIENT
Start: 2021-02-16 | End: 2021-03-18

## 2021-02-17 DIAGNOSIS — M47.817 LUMBOSACRAL SPONDYLOSIS WITHOUT MYELOPATHY: Chronic | ICD-10-CM

## 2021-02-17 NOTE — TELEPHONE ENCOUNTER
Janki Hinojosa is requesting a refill on the following medications:   Requested Prescriptions     Pending Prescriptions Disp Refills    morphine (MS CONTIN) 15 MG extended release tablet 60 tablet 0     Sig: Take 1 tablet by mouth every 12 hours for 30 days.  gabapentin (NEURONTIN) 300 MG capsule 60 capsule 0     Sig: Take 1 capsule by mouth 2 times daily for 30 days.  tiZANidine (ZANAFLEX) 4 MG tablet 30 tablet 0     Sig: Take 1 tablet by mouth nightly       Last OV 1/13/21. No future appointments. OARRS report sent to Dr. Yuridia Coon through alternative route for review.

## 2021-02-18 RX ORDER — MORPHINE SULFATE 15 MG/1
15 TABLET, FILM COATED, EXTENDED RELEASE ORAL EVERY 12 HOURS SCHEDULED
Qty: 60 TABLET | Refills: 0 | Status: SHIPPED | OUTPATIENT
Start: 2021-02-18 | End: 2021-03-17 | Stop reason: SDUPTHER

## 2021-02-18 RX ORDER — TIZANIDINE 4 MG/1
4 TABLET ORAL NIGHTLY
Qty: 30 TABLET | Refills: 0 | Status: SHIPPED | OUTPATIENT
Start: 2021-02-18 | End: 2021-04-14 | Stop reason: SDUPTHER

## 2021-02-18 RX ORDER — GABAPENTIN 300 MG/1
300 CAPSULE ORAL 2 TIMES DAILY
Qty: 60 CAPSULE | Refills: 0 | Status: SHIPPED | OUTPATIENT
Start: 2021-02-18 | End: 2021-04-09 | Stop reason: ALTCHOICE

## 2021-02-18 NOTE — TELEPHONE ENCOUNTER
Attempted to notify pt that refills were approved and sent to pharmacy.  Pt did not answer call and voicemail box is full

## 2021-03-08 ENCOUNTER — HOSPITAL ENCOUNTER (OUTPATIENT)
Dept: GENERAL RADIOLOGY | Age: 56
Discharge: HOME | End: 2021-03-10
Payer: MEDICARE

## 2021-03-08 ENCOUNTER — HOSPITAL ENCOUNTER (OUTPATIENT)
Age: 56
Discharge: HOME | End: 2021-03-10
Payer: MEDICARE

## 2021-03-08 ENCOUNTER — HOSPITAL ENCOUNTER (OUTPATIENT)
Dept: MAMMOGRAPHY | Age: 56
Discharge: HOME | End: 2021-03-10
Payer: MEDICARE

## 2021-03-08 DIAGNOSIS — G89.29 CHRONIC PAIN OF BOTH KNEES: ICD-10-CM

## 2021-03-08 DIAGNOSIS — M25.562 CHRONIC PAIN OF BOTH KNEES: ICD-10-CM

## 2021-03-08 DIAGNOSIS — Z12.31 VISIT FOR SCREENING MAMMOGRAM: ICD-10-CM

## 2021-03-08 DIAGNOSIS — M25.561 CHRONIC PAIN OF BOTH KNEES: ICD-10-CM

## 2021-03-08 PROCEDURE — 73562 X-RAY EXAM OF KNEE 3: CPT

## 2021-03-08 PROCEDURE — 77063 BREAST TOMOSYNTHESIS BI: CPT

## 2021-03-15 ENCOUNTER — TELEPHONE (OUTPATIENT)
Dept: PAIN MANAGEMENT | Age: 56
End: 2021-03-15

## 2021-03-15 NOTE — TELEPHONE ENCOUNTER
Pt notified of results and recommendations per Dr. Mary Mccracken.  Pt has OV on 3/17 to further discuss

## 2021-03-15 NOTE — TELEPHONE ENCOUNTER
----- Message from Shalini Heath MD sent at 3/9/2021  2:50 PM EST -----  Knee x-rays showed mild arthritisI will recommend for knee cortisone injection

## 2021-03-17 ENCOUNTER — OFFICE VISIT (OUTPATIENT)
Dept: PAIN MANAGEMENT | Age: 56
End: 2021-03-17
Payer: MEDICARE

## 2021-03-17 ENCOUNTER — HOSPITAL ENCOUNTER (OUTPATIENT)
Dept: GENERAL RADIOLOGY | Age: 56
Discharge: HOME | End: 2021-03-19
Payer: MEDICARE

## 2021-03-17 ENCOUNTER — HOSPITAL ENCOUNTER (OUTPATIENT)
Age: 56
Discharge: HOME | End: 2021-03-19
Payer: MEDICARE

## 2021-03-17 ENCOUNTER — OFFICE VISIT (OUTPATIENT)
Dept: NEUROSURGERY | Age: 56
End: 2021-03-17
Payer: MEDICARE

## 2021-03-17 VITALS
DIASTOLIC BLOOD PRESSURE: 85 MMHG | SYSTOLIC BLOOD PRESSURE: 146 MMHG | HEIGHT: 65 IN | BODY MASS INDEX: 42.82 KG/M2 | TEMPERATURE: 97.1 F | HEART RATE: 90 BPM | WEIGHT: 257 LBS

## 2021-03-17 VITALS
HEART RATE: 89 BPM | DIASTOLIC BLOOD PRESSURE: 84 MMHG | WEIGHT: 257 LBS | OXYGEN SATURATION: 96 % | SYSTOLIC BLOOD PRESSURE: 165 MMHG | BODY MASS INDEX: 42.82 KG/M2 | HEIGHT: 65 IN

## 2021-03-17 DIAGNOSIS — M54.42 CHRONIC BILATERAL LOW BACK PAIN WITH LEFT-SIDED SCIATICA: Primary | ICD-10-CM

## 2021-03-17 DIAGNOSIS — M47.817 LUMBOSACRAL SPONDYLOSIS WITHOUT MYELOPATHY: Chronic | ICD-10-CM

## 2021-03-17 DIAGNOSIS — M48.062 SPINAL STENOSIS OF LUMBAR REGION WITH NEUROGENIC CLAUDICATION: ICD-10-CM

## 2021-03-17 DIAGNOSIS — G89.29 CHRONIC BILATERAL LOW BACK PAIN WITH LEFT-SIDED SCIATICA: Primary | ICD-10-CM

## 2021-03-17 DIAGNOSIS — M48.062 SPINAL STENOSIS OF LUMBAR REGION WITH NEUROGENIC CLAUDICATION: Primary | ICD-10-CM

## 2021-03-17 DIAGNOSIS — Z79.891 CHRONIC USE OF OPIATE FOR THERAPEUTIC PURPOSE: ICD-10-CM

## 2021-03-17 DIAGNOSIS — E66.01 MORBID OBESITY WITH BMI OF 40.0-44.9, ADULT (HCC): ICD-10-CM

## 2021-03-17 PROCEDURE — G8427 DOCREV CUR MEDS BY ELIG CLIN: HCPCS | Performed by: NEUROLOGICAL SURGERY

## 2021-03-17 PROCEDURE — 99203 OFFICE O/P NEW LOW 30 MIN: CPT | Performed by: NEUROLOGICAL SURGERY

## 2021-03-17 PROCEDURE — 72120 X-RAY BEND ONLY L-S SPINE: CPT

## 2021-03-17 PROCEDURE — 3017F COLORECTAL CA SCREEN DOC REV: CPT | Performed by: NEUROLOGICAL SURGERY

## 2021-03-17 PROCEDURE — 3017F COLORECTAL CA SCREEN DOC REV: CPT | Performed by: ANESTHESIOLOGY

## 2021-03-17 PROCEDURE — 4004F PT TOBACCO SCREEN RCVD TLK: CPT | Performed by: ANESTHESIOLOGY

## 2021-03-17 PROCEDURE — G8417 CALC BMI ABV UP PARAM F/U: HCPCS | Performed by: NEUROLOGICAL SURGERY

## 2021-03-17 PROCEDURE — G8427 DOCREV CUR MEDS BY ELIG CLIN: HCPCS | Performed by: ANESTHESIOLOGY

## 2021-03-17 PROCEDURE — 99214 OFFICE O/P EST MOD 30 MIN: CPT | Performed by: ANESTHESIOLOGY

## 2021-03-17 PROCEDURE — G8484 FLU IMMUNIZE NO ADMIN: HCPCS | Performed by: NEUROLOGICAL SURGERY

## 2021-03-17 PROCEDURE — G8484 FLU IMMUNIZE NO ADMIN: HCPCS | Performed by: ANESTHESIOLOGY

## 2021-03-17 PROCEDURE — 4004F PT TOBACCO SCREEN RCVD TLK: CPT | Performed by: NEUROLOGICAL SURGERY

## 2021-03-17 PROCEDURE — G8417 CALC BMI ABV UP PARAM F/U: HCPCS | Performed by: ANESTHESIOLOGY

## 2021-03-17 RX ORDER — MORPHINE SULFATE 15 MG/1
15 TABLET, FILM COATED, EXTENDED RELEASE ORAL EVERY 12 HOURS SCHEDULED
Qty: 60 TABLET | Refills: 0 | Status: SHIPPED | OUTPATIENT
Start: 2021-03-20 | End: 2021-04-14 | Stop reason: SDUPTHER

## 2021-03-17 RX ORDER — GABAPENTIN 300 MG/1
300 CAPSULE ORAL 3 TIMES DAILY
Qty: 90 CAPSULE | Refills: 2 | Status: SHIPPED | OUTPATIENT
Start: 2021-03-17 | End: 2021-04-14 | Stop reason: SDUPTHER

## 2021-03-17 RX ORDER — CELECOXIB 200 MG/1
CAPSULE ORAL
COMMUNITY
Start: 2021-03-11 | End: 2021-03-17 | Stop reason: ALTCHOICE

## 2021-03-17 ASSESSMENT — ENCOUNTER SYMPTOMS
APNEA: 1
ALLERGIC/IMMUNOLOGIC NEGATIVE: 1
BACK PAIN: 1
GASTROINTESTINAL NEGATIVE: 1

## 2021-03-17 NOTE — PROGRESS NOTES
Shalini Heath MD at [place] [address] Right 7/27/2020    RIGHT MEDIAL [address] [facility] L2 - L5 performed by Shalini Heath MD at [place] [address] Right 8/24/2020    RIGHT SIDED MEDIAL [address] [facility] [address]   L 2 - L 5 performed by Shalini Heath MD at [place] [address] Left 10/12/2020    LEFT L4 L5 EPIDURAL STEROID INJECTION performed by Shalini Heath MD at [address] [place] [address] Left 8/1/2019    NERVE RADIOFREQUENCY ABLATION MEDIAN BRANCHES AT TRANSVERSE PROCESSES L3/L4/L5 AND S1 LEFT performed by Shalini Heath MD at [address] [place] [address] Right 8/15/2019    NERVE RADIOFREQUENCY ABLATION MEDIAN BRANCHES AT TRANSVERSE PROCESSES OF L3/L4/L5 AND S1 RIGHT performed by Shalini Heath MD at [address] [place] [address] Left 10/24/2019    NERVE RADIOFREQUENCY ABLATION LEFT HIP FEMORAL AND OBTURATOR NERVE performed by Shalini Heath MD at [address] History     Socioeconomic History    Marital status:      Spouse name: None    Number of children: None    Years of education: None    Highest education level: None   Occupational History    None   Social Needs    Financial resource strain: None    Food insecurity     Worry: None     Inability: None    Transportation needs     Medical: None     Non-medical: None   Tobacco Use    Smoking status: Current Every Day Smoker     Packs/day: 0.50     Types: Cigarettes    Smokeless tobacco: Never Used   Substance and Sexual Activity    Alcohol use: Never     Alcohol/week: 0.0 standard drinks     Frequency: Never    Drug use: Never    Sexual activity: None   Lifestyle    Physical activity     Days per week: None     Minutes per session: None    Stress: None   Relationships    Social connections     Talks on phone: None     Gets together: None     Attends Confucianist service: None Active member of club or organization: None     Attends meetings of clubs or organizations: None     Relationship status: None    Intimate partner violence     Fear of current or ex partner: None     Emotionally abused: None     Physically abused: None     Forced sexual activity: None   Other Topics Concern    None   Social History Narrative    None     Family History   Problem Relation Age of Onset    Other Mother         ulcers    Heart Disease Father     High Blood Pressure Father     Other Father         blind, pacemaker     Allergies   Allergen Reactions    Fenofibrate Nausea And Vomiting     Fenofibrate   Vitals:    03/17/21 1108   BP: (!) 146/85   Pulse: 90   Temp:      Current Outpatient Medications   Medication Sig Dispense Refill    [START ON 3/20/2021] morphine (MS CONTIN) 15 MG extended release tablet Take 1 tablet by mouth every 12 hours for 30 days. 60 tablet 0    gabapentin (NEURONTIN) 300 MG capsule Take 1 capsule by mouth 2 times daily for 30 days.  60 capsule 0    tiZANidine (ZANAFLEX) 4 MG tablet Take 1 tablet by mouth nightly 30 tablet 0    busPIRone (BUSPAR) 30 MG tablet TAKE 1 TABLET BY MOUTH TWICE DAILY      lisinopril (PRINIVIL;ZESTRIL) 10 MG tablet TAKE 1 TABLET(10 MG) BY MOUTH DAILY      DULoxetine (CYMBALTA) 30 MG extended release capsule Take 30 mg by mouth daily      benztropine (COGENTIN) 0.5 MG tablet Take 0.5 mg by mouth 2 times daily      Acetaminophen (TYLENOL ARTHRITIS PAIN PO) Take 2 tablets by mouth daily      pantoprazole (PROTONIX) 40 MG tablet TAKE 1 TABLET BY MOUTH EVERY DAY      zolpidem (AMBIEN) 10 MG tablet Take 10 mg by mouth nightly as needed for Sleep      Multiple Vitamins-Minerals (THERAPEUTIC MULTIVITAMIN-MINERALS) tablet Take 1 tablet by mouth daily      Biotin 5000 MCG CAPS Take by mouth      amphetamine-dextroamphetamine (ADDERALL) 20 MG tablet Take 30 mg by mouth daily       metoprolol (LOPRESSOR) 25 MG tablet Take 50 mg by mouth 2 times daily       diclofenac sodium (VOLTAREN) 1 % GEL Apply 4 g topically 4 times daily as needed for Pain 120 g 1     No current facility-administered medications for this visit. Review of Systems   Constitutional: Positive for fatigue. Negative for fever. HENT: Negative. Eyes: Positive for visual disturbance. Respiratory: Positive for apnea. Cardiovascular: Negative. Gastrointestinal: Negative. Endocrine: Negative. Genitourinary: Negative. Musculoskeletal: Positive for arthralgias, back pain, joint swelling and myalgias. Skin: Negative. Allergic/Immunologic: Negative. Neurological: Positive for numbness. Hematological: Negative. Psychiatric/Behavioral: Negative. Objective:  General Appearance:  Uncomfortable, in pain, well-appearing and in no acute distress. Vital signs: (most recent): Blood pressure (!) 146/85, pulse 90, temperature 97.1 °F (36.2 °C), temperature source Temporal, height 5' 5\" (1.651 m), weight 257 lb (116.6 kg), not currently breastfeeding. Vital signs are normal.  No fever. Output: Producing urine and producing stool. HEENT: Normal HEENT exam.    Lungs:  Normal effort and normal respiratory rate. Breath sounds clear to auscultation. She is not in respiratory distress. No decreased breath sounds. Heart: Normal rate. Regular rhythm. Extremities: Normal range of motion. There is no deformity. Neurological: Patient is alert and oriented to person, place and time. Patient has normal coordination. Pupils:  Pupils are equal, round, and reactive to light. Pupils are equal.   Skin:  Warm and dry. No rash or cyanosis. Assessment & Plan       EXAMINATION: MRI OF THE LUMBAR SPINE WITHOUT CONTRAST, 12/9/2020 11:39 am    L1-L2: Mild broad-based disc bulge and facet degenerative changes do not cause significant central canal or foraminal stenosis.   L2-L3: Broad-based disc bulge and facet degenerative changes combine to create mild central canal stenosis and mild bilateral foraminal stenosis. L3-L4: Broad-based disc bulge and facet degenerative changes combine to create moderate central canal stenosis. Mild right foraminal stenosis. Moderate-severe left foraminal stenosis. L4-L5: Broad-based disc bulge and facet degenerative changes combine to create mild-moderate central canal stenosis. Changes combine to create mild left foraminal stenosis and moderate-severe right foraminal stenosis. L5-S1: Broad-based disc bulge and facet degenerative changes do not cause significant central canal stenosis. Changes combine to create severe right foraminal stenosis and moderate-severe left foraminal stenosis. 1. Chronic bilateral low back pain with left-sided sciatica    2. Lumbosacral spondylosis without myelopathy    3. Morbid obesity with BMI of 40.0-44.9, adult ([facility] [address]    4. Chronic use of opiate for therapeutic purpose        Automated prescription report reviewed  Safe use of medication discussed  Refill ordered    Advised patient to follow-up with neurosurgeon    MRI lumbar spine report reviewed  Multilevel degenerative disc disease  Most pertinent finding include significant Modic changes at L5 and S1 level that probably account for her chronic back pain issues  Collapsing L3 vertebrae on left side with resulting severe left L3 neural foraminal narrowing is likely responsible for her severe sciatica symptoms on the left side    I do believe patient can benefit from lateral body fusion at L3-4 level  Advised patient to discuss this with neurosurgeon      No orders of the defined types were placed in this encounter. Orders Placed This Encounter   Medications    morphine (MS CONTIN) 15 MG extended release tablet     Sig: Take 1 tablet by mouth every 12 hours for 30 days.      Dispense:  60 tablet     Refill:  0     Reduce doses taken as pain becomes manageable      Controlled Substance Monitoring:    Acute and Chronic Pain Monitoring: RX Monitoring 1/13/2021   Attestation -   Acute Pain Prescriptions -   Periodic Controlled Substance Monitoring No signs of potential drug abuse or diversion identified. ;Possible medication side effects, risk of tolerance/dependence & alternative treatments discussed. ;Assessed functional status. ;Obtaining appropriate analgesic effect of treatment. Chronic Pain > 50 MEDD Obtained or confirmed \"Consent for Opioid Use\" on file.    Chronic Pain > 80 MEDD -           Electronically signed by Jenn Arevalo MD on 3/17/2021 at 11:19 AM

## 2021-03-17 NOTE — PROGRESS NOTES
Department of Neurosurgery                                                 New patient clinic note      Reason for Consult:  Lumbar spondylosis  Requesting Physician:  Soheila Mehta  Neurosurgeon: Kenia Casper DO      History Obtained From:  patient    CHIEF COMPLAINT:         Chief Complaint   Patient presents with    New Patient     lumbosacral spondylosis       HISTORY OF PRESENT ILLNESS:       The patient is a 63 y.o. female who presents for consultation for lumbar spondylosis. She was referred to me by Dr. Soheila Mehta. She has been having low back pain for several years but more recently she has had severe pain that shoots down the left leg and it is difficult to walk. This pain is significantly worse than the low back pain. She also has numbness and weakness associated with this. She has several treatments with injection therapy with mixed results.     PAST MEDICAL HISTORY :       Past Medical History:      Diagnosis Date    ADHD (attention deficit hyperactivity disorder)     Bipolar 1 disorder ([address]     Depression with anxiety     GERD (gastroesophageal reflux disease)     Heel spur     left    Hyperlipidemia     Hypertension     Left hip pain     PTSD (post-traumatic stress disorder)     Tremor     Trouble swallowing     Weight gain        Past Surgical History:        Procedure Laterality Date    ANESTHESIA NERVE BLOCK Left 10/7/2019    NERVE BLOCK LEFT HIP OBTURATOR AND FEMORAL performed by Griselda Bond MD at [address] Bilateral     EPIDURAL STEROID INJECTION Left 9/12/2019    EPIDURAL STEROID INJECTION performed by Griselda Bond MD at [address] [facility] NERV Left 9/12/2019    INJECTION MEDICATION LEFT HIP performed by Griselda Bond MD at [facility]  08/01/2016    duramorph 1mg  celestone 9mg    NERVE BLOCK  10/31/2016    duramorph epidural steroid block decadron 7.5 mg durmoprh 1.5 mg    NERVE BLOCK Left NO STEROID, LEFT MBNB# 1    NERVE BLOCK Left 01/16/2017    LT MBNB #2   celestone 6mg    NERVE BLOCK Left 02/06/2017    LT lumbar RF    kenalog 40    NERVE BLOCK Right 06/19/2017    right lumbar mbnb #1    NERVE BLOCK  07/07/2017    right radiofrequency kenolog 40mg    NERVE BLOCK  09/11/2017    duramorph 1mg & celestone 9 mg    NERVE BLOCK Left 10/02/2018    LEFT LUMBAR EPIDURAL STEROID BLOCK DECADRON 10MG    NERVE BLOCK  10/16/2018    blanca mbnb, marcaine and xylocaine    NERVE BLOCK Left 10/30/2018    left lumbar rf- no steroid    NERVE BLOCK Right 11/13/2018    RT lumbar RFA no steroid    NERVE BLOCK  03/26/2019    left hip decadron 10mg, marcaine . 5 %    OTHER SURGICAL HISTORY  08/01/2019    radiofrequency ablation L2-5    OTHER SURGICAL HISTORY Right 08/15/2019    lumbar radiofrequency ablation    OTHER SURGICAL HISTORY  09/12/2019     INJECTION MEDICATION LEFT HIP (Left Hip) EPIDURAL STEROID INJECTION (left back)    OTHER SURGICAL HISTORY Left 10/24/2019    nerve radiofrequency ablation    PAIN MANAGEMENT PROCEDURE Left 6/18/2020    EPIDURAL STEROID INJECTION LEFT L4,L5 performed by Daiana Ramsey MD at [address] [place] [address] Right 7/27/2020    RIGHT MEDIAL [address] [facility] L2 - L5 performed by Daiana Ramsey MD at [address] [place] [address] Right 8/24/2020    RIGHT SIDED MEDIAL [address] [facility]   L 2 - L 5 performed by Daiana Ramsey MD at [address] [place] [address] Left 10/12/2020    LEFT L4 L5 EPIDURAL STEROID INJECTION performed by Daiana Ramsey MD at [address] [place] [address] Left 8/1/2019    NERVE RADIOFREQUENCY ABLATION MEDIAN BRANCHES AT TRANSVERSE PROCESSES L3/L4/L5 AND S1 LEFT performed by Daiana Ramsey MD at [address] [place] [address] Right 8/15/2019    NERVE RADIOFREQUENCY ABLATION MEDIAN BRANCHES AT TRANSVERSE PROCESSES OF L3/L4/L5 AND S1 RIGHT performed by Daiana Ramsey MD at [address] Left 10/24/2019    NERVE RADIOFREQUENCY ABLATION LEFT HIP FEMORAL AND OBTURATOR NERVE performed by Kolby Michael MD at [place] [address] History:   Social History     Socioeconomic History    Marital status:      Spouse name: Not on file    Number of children: Not on file    Years of education: Not on file    Highest education level: Not on file   Occupational History    Not on file   Social Needs    Financial resource strain: Not on file    Food insecurity     Worry: Not on file     Inability: Not on file    Transportation needs     Medical: Not on file     Non-medical: Not on file   Tobacco Use    Smoking status: Current Every Day Smoker     Packs/day: 0.50     Types: Cigarettes    Smokeless tobacco: Never Used   Substance and Sexual Activity    Alcohol use: Never     Alcohol/week: 0.0 standard drinks     Frequency: Never    Drug use: Never    Sexual activity: Not on file   Lifestyle    Physical activity     Days per week: Not on file     Minutes per session: Not on file    Stress: Not on file   Relationships    Social connections     Talks on phone: Not on file     Gets together: Not on file     Attends Baptism service: Not on file     Active member of club or organization: Not on file     Attends meetings of clubs or organizations: Not on file     Relationship status: Not on file    Intimate partner violence     Fear of current or ex partner: Not on file     Emotionally abused: Not on file     Physically abused: Not on file     Forced sexual activity: Not on file   Other Topics Concern    Not on file   Social History Narrative    Not on file       Family History:       Problem Relation Age of Onset    Other Mother         ulcers    Heart Disease Father     High Blood Pressure Father     Other Father         blind, pacemaker       Allergies:  Fenofibrate    Home Medications:  Prior to Admission medications Medication Sig Start Date End Date Taking? Authorizing Provider   morphine (MS CONTIN) 15 MG extended release tablet Take 1 tablet by mouth every 12 hours for 30 days. 3/20/21 4/19/21 Yes Lenard Coto MD   gabapentin (NEURONTIN) 300 MG capsule Take 1 capsule by mouth 2 times daily for 30 days. 2/18/21 3/20/21 Yes GISELLE Pimentel - CNP   tiZANidine (ZANAFLEX) 4 MG tablet Take 1 tablet by mouth nightly 2/18/21 3/20/21 Yes GISELLE Pimentel - CNP   busPIRone (BUSPAR) 30 MG tablet TAKE 1 TABLET BY MOUTH TWICE DAILY 12/26/20  Yes Historical Provider, MD   lisinopril (PRINIVIL;ZESTRIL) 10 MG tablet TAKE 1 TABLET(10 MG) BY MOUTH DAILY 12/10/20  Yes Historical Provider, MD   DULoxetine (CYMBALTA) 30 MG extended release capsule Take 30 mg by mouth daily   Yes Historical Provider, MD   benztropine (COGENTIN) 0.5 MG tablet Take 0.5 mg by mouth 2 times daily   Yes Historical Provider, MD   Acetaminophen (TYLENOL ARTHRITIS PAIN PO) Take 2 tablets by mouth daily   Yes Historical Provider, MD   pantoprazole (PROTONIX) 40 MG tablet TAKE 1 TABLET BY MOUTH EVERY DAY 1/10/18  Yes Historical Provider, MD   zolpidem (AMBIEN) 10 MG tablet Take 10 mg by mouth nightly as needed for Sleep   Yes Historical Provider, MD   Multiple Vitamins-Minerals (THERAPEUTIC MULTIVITAMIN-MINERALS) tablet Take 1 tablet by mouth daily   Yes Historical Provider, MD   Biotin 5000 MCG CAPS Take by mouth   Yes Historical Provider, MD   amphetamine-dextroamphetamine (ADDERALL) 20 MG tablet Take 30 mg by mouth daily    Yes Historical Provider, MD   metoprolol (LOPRESSOR) 25 MG tablet Take 50 mg by mouth 2 times daily    Yes Historical Provider, MD   diclofenac sodium (VOLTAREN) 1 % GEL Apply 4 g topically 4 times daily as needed for Pain 1/13/21 2/12/21  Lenard Coto MD       Current Medications:   No current facility-administered medications for this visit.      REVIEW OF SYSTEMS:       CONSTITUTIONAL: negative for fatigue and malaise   EYES: negative for double vision and photophobia    HEENT: negative for tinnitus and sore throat   RESPIRATORY: negative for cough, shortness of breath   CARDIOVASCULAR: negative for chest pain, palpitations   GASTROINTESTINAL: negative for nausea, vomiting   GENITOURINARY: negative for incontinence   MUSCULOSKELETAL: negative for neck or back pain   NEUROLOGICAL: negative for seizures   PSYCHIATRIC: negative for agitated     Review of systems otherwise negative.     PHYSICAL EXAM:       BP (!) 177/82   Pulse 95   Ht 5' 5\" (1.651 m)   Wt 257 lb (116.6 kg)   SpO2 96%   BMI 42.77 kg/m²     Gen: NAD, comfortable  HEENT: moist mucus membranes  Cardio: RRR  Pulm: chest rise symmetrically  GI: abd soft  Ext: no edema  Skin: warm    Neuro:    AOX3  CN 2-12 grossly intact  Speech articulate  Motor 5/5 except pain limited HF, DF, PF  Sensation decreased to light touch and have hyperalgesia in latera posterior thigh and lateral and posterio leg  No kendall or clonus    LABS AND IMAGING:     CBC with Differential:    Lab Results   Component Value Date    WBC 19.0 01/18/2017    RBC 5.41 01/18/2017    HGB 15.3 01/18/2017    HCT 46.6 01/18/2017     01/18/2017    MCV 86.2 01/18/2017    MCH 28.2 01/18/2017    MCHC 32.7 01/18/2017    RDW 14.4 01/18/2017    LYMPHOPCT 21 01/18/2017    MONOPCT 7 01/18/2017    BASOPCT 0 01/18/2017    MONOSABS 1.33 01/18/2017    LYMPHSABS 3.99 01/18/2017    EOSABS 0.19 01/18/2017    BASOSABS 0.00 01/18/2017    DIFFTYPE NOT REPORTED 01/18/2017     BMP:    Lab Results   Component Value Date     01/18/2017    K 3.9 01/18/2017     01/18/2017    CO2 25 01/18/2017    BUN 20 01/18/2017    CREATININE 0.87 01/18/2017    CALCIUM 10.0 01/18/2017    GFRAA >60 01/18/2017    LABGLOM >60 01/18/2017    GLUCOSE 85 01/18/2017       Radiology Review:  MRI L-spine:  FINDINGS:   BONES/ALIGNMENT: No acute fracture.  No subluxation.  No suspicious bone   marrow replacing lesion.  Moderate-severe disc space narrowing and endplate   degenerative changes throughout the lumbar spine.       SPINAL CORD: Normal signal within the conus which terminates at L1-L2.       SOFT TISSUES: No paraspinal abnormality.       L1-L2: Mild broad-based disc bulge and facet degenerative changes do not   cause significant central canal or foraminal stenosis.       L2-L3: Broad-based disc bulge and facet degenerative changes combine to   create mild central canal stenosis and mild bilateral foraminal stenosis.       L3-L4: Broad-based disc bulge and facet degenerative changes combine to   create moderate central canal stenosis.  Mild right foraminal stenosis. Moderate-severe left foraminal stenosis.       L4-L5: Broad-based disc bulge and facet degenerative changes combine to   create mild-moderate central canal stenosis.  Changes combine to create mild   left foraminal stenosis and moderate-severe right foraminal stenosis.       L5-S1: Broad-based disc bulge and facet degenerative changes do not cause   significant central canal stenosis.  Changes combine to create severe right   foraminal stenosis and moderate-severe left foraminal stenosis.         Impression   Moderate-severe multilevel degenerative changes. ASSESSMENT AND PLAN:       Patient Active Problem List   Diagnosis    Lumbar radiculopathy    Depressive disorder, not elsewhere classified    Chronic back pain    Chronic bilateral low back pain without sciatica    Medication monitoring encounter    Cervical radiculopathy    Lumbosacral spondylosis without myelopathy    Left hip pain    Hip arthritis    Left lumbar radiculitis    Lumbar disc herniation    Primary osteoarthritis of left hip    Chronic use of opiate for therapeutic purpose    Morbid obesity with BMI of 40.0-44.9, adult [facility])         A/P:  This is a 63 y.o. female with lumbar stenosis with neurogenic claudication radiculopathy.   She has had years of symptoms and progressive symptoms and severely limiting her ability to function and walk    Recommend Minimally invasive left L2-5 hemilaminectomy, possible full laminectomy  I Explained the risk benefits alternatives surgery and she wants to proceed    I showed the patient the imagings and explained the diagnosis and the various treatment options      Anai Naqvi DO     3/17/2021  1:01 PM    Pravin Smith DO  Staff Neurosurgeon    This note was created using voice recognition software. There may be inaccuracies of transcription  that are inadvertently overlooked prior to the signature. There is any questions about the transcription please contact me.

## 2021-03-17 NOTE — PATIENT INSTRUCTIONS
Schedule a Vaccine  When you qualify to receive the vaccine, call the [facility]) COVID-19 Vaccination Hotline to schedule your appointment or to get additional information about the [facility]) locations which are offering the COVID-19 vaccine. To be 94% effective, it's important that you receive two doses of one of the COVID-19 vaccines. -If you are receiving the Pedro Peter vaccine, your second shot will be scheduled as close to 21 days after the first shot as possible. -If you are receiving the Moderna vaccine, your second shot will be scheduled as close to 28 days after the first shot as possible. [facility]) COVID-19 Vaccination Hotline: [phone]    Links to [facility]) website and [facility] website:    [email or web address]    [email or web address]

## 2021-03-19 ENCOUNTER — TELEPHONE (OUTPATIENT)
Dept: PAIN MANAGEMENT | Age: 56
End: 2021-03-19

## 2021-03-19 NOTE — TELEPHONE ENCOUNTER
Pt states she was told to call pain management to inform Dr. Damien Jacob that Dr. Ezio Cifuentes recommended surgery and pt states she will be scheduling surgery in the near future

## 2021-03-19 NOTE — TELEPHONE ENCOUNTER
Patient calling to let Dr. Corinne Jones know she has her surgery 04.23.2021 with neurosurgery on her back.

## 2021-04-07 RX ORDER — SODIUM CHLORIDE, SODIUM LACTATE, POTASSIUM CHLORIDE, CALCIUM CHLORIDE 600; 310; 30; 20 MG/100ML; MG/100ML; MG/100ML; MG/100ML
1000 INJECTION, SOLUTION INTRAVENOUS CONTINUOUS
Status: CANCELLED | OUTPATIENT
Start: 2021-04-07

## 2021-04-09 ENCOUNTER — HOSPITAL ENCOUNTER (OUTPATIENT)
Dept: PREADMISSION TESTING | Age: 56
Discharge: HOME | End: 2021-04-13
Payer: MEDICARE

## 2021-04-09 VITALS
BODY MASS INDEX: 41.15 KG/M2 | SYSTOLIC BLOOD PRESSURE: 157 MMHG | OXYGEN SATURATION: 97 % | WEIGHT: 247 LBS | HEART RATE: 100 BPM | TEMPERATURE: 98.2 F | HEIGHT: 65 IN | RESPIRATION RATE: 18 BRPM | DIASTOLIC BLOOD PRESSURE: 95 MMHG

## 2021-04-09 LAB
ABO/RH: NORMAL
ANION GAP SERPL CALCULATED.3IONS-SCNC: 12 MMOL/L (ref 9–17)
ANTIBODY SCREEN: NEGATIVE
ARM BAND NUMBER: NORMAL
BUN BLDV-MCNC: 17 MG/DL (ref 6–20)
CHLORIDE BLD-SCNC: 102 MMOL/L (ref 98–107)
CO2: 23 MMOL/L (ref 20–31)
CREAT SERPL-MCNC: 0.7 MG/DL (ref 0.5–0.9)
EXPIRATION DATE: NORMAL
GFR AFRICAN AMERICAN: >60 ML/MIN
GFR NON-AFRICAN AMERICAN: >60 ML/MIN
GFR SERPL CREATININE-BSD FRML MDRD: NORMAL ML/MIN/{1.73_M2}
GFR SERPL CREATININE-BSD FRML MDRD: NORMAL ML/MIN/{1.73_M2}
GLUCOSE BLD-MCNC: 86 MG/DL (ref 70–99)
HCT VFR BLD CALC: 41.1 % (ref 36.3–47.1)
HEMOGLOBIN: 13 G/DL (ref 11.9–15.1)
MCH RBC QN AUTO: 27.8 PG (ref 25.2–33.5)
MCHC RBC AUTO-ENTMCNC: 31.6 G/DL (ref 28.4–34.8)
MCV RBC AUTO: 88 FL (ref 82.6–102.9)
NRBC AUTOMATED: 0 PER 100 WBC
PDW BLD-RTO: 14.5 % (ref 11.8–14.4)
PLATELET # BLD: 308 K/UL (ref 138–453)
PMV BLD AUTO: 9.4 FL (ref 8.1–13.5)
POTASSIUM SERPL-SCNC: 4.2 MMOL/L (ref 3.7–5.3)
RBC # BLD: 4.67 M/UL (ref 3.95–5.11)
SODIUM BLD-SCNC: 137 MMOL/L (ref 135–144)
WBC # BLD: 11.2 K/UL (ref 3.5–11.3)

## 2021-04-09 PROCEDURE — 82565 ASSAY OF CREATININE: CPT

## 2021-04-09 PROCEDURE — 86900 BLOOD TYPING SEROLOGIC ABO: CPT

## 2021-04-09 PROCEDURE — 82947 ASSAY GLUCOSE BLOOD QUANT: CPT

## 2021-04-09 PROCEDURE — 86850 RBC ANTIBODY SCREEN: CPT

## 2021-04-09 PROCEDURE — 84520 ASSAY OF UREA NITROGEN: CPT

## 2021-04-09 PROCEDURE — 85027 COMPLETE CBC AUTOMATED: CPT

## 2021-04-09 PROCEDURE — 36415 COLL VENOUS BLD VENIPUNCTURE: CPT

## 2021-04-09 PROCEDURE — 93005 ELECTROCARDIOGRAM TRACING: CPT

## 2021-04-09 PROCEDURE — 86901 BLOOD TYPING SEROLOGIC RH(D): CPT

## 2021-04-09 PROCEDURE — 80051 ELECTROLYTE PANEL: CPT

## 2021-04-09 RX ORDER — LOSARTAN POTASSIUM 25 MG/1
TABLET ORAL
COMMUNITY
Start: 2021-04-01 | End: 2021-04-14 | Stop reason: SDUPTHER

## 2021-04-09 RX ORDER — CELECOXIB 200 MG/1
200 CAPSULE ORAL 2 TIMES DAILY
COMMUNITY

## 2021-04-09 RX ORDER — ROSUVASTATIN CALCIUM 20 MG/1
20 TABLET, COATED ORAL DAILY
COMMUNITY
Start: 2021-03-10

## 2021-04-09 ASSESSMENT — PAIN SCALES - GENERAL: PAINLEVEL_OUTOF10: 8

## 2021-04-09 ASSESSMENT — PAIN DESCRIPTION - PROGRESSION: CLINICAL_PROGRESSION: GRADUALLY WORSENING

## 2021-04-09 ASSESSMENT — PAIN DESCRIPTION - FREQUENCY: FREQUENCY: CONTINUOUS

## 2021-04-09 NOTE — H&P
Helena Castro MD   DULoxetine (CYMBALTA) 30 MG extended release capsule Take 30 mg by mouth daily   Yes Historical Provider, MD   benztropine (COGENTIN) 0.5 MG tablet Take 0.5 mg by mouth 2 times daily   Yes Historical Provider, MD   Acetaminophen (TYLENOL ARTHRITIS PAIN PO) Take 2 tablets by mouth daily   Yes Historical Provider, MD   pantoprazole (PROTONIX) 40 MG tablet Take 40 mg by mouth 2 times daily  1/10/18  Yes Historical Provider, MD   zolpidem (AMBIEN) 10 MG tablet Take 10 mg by mouth nightly as needed for Sleep   Yes Historical Provider, MD   Multiple Vitamins-Minerals (THERAPEUTIC MULTIVITAMIN-MINERALS) tablet Take 1 tablet by mouth daily   Yes Historical Provider, MD   Biotin 5000 MCG CAPS Take 1 capsule by mouth daily    Yes Historical Provider, MD   amphetamine-dextroamphetamine (ADDERALL) 20 MG tablet Take 20 mg by mouth daily. Yes Historical Provider, MD     Past Medical History    has a past medical history of ADHD (attention deficit hyperactivity disorder), Anxiety and depression, Arthritis, Bipolar 1 disorder ([address] COPD (chronic obstructive pulmonary disease) ([address] Depression with anxiety, Family history of general anesthesia reaction, GERD (gastroesophageal reflux disease), Heel spur, History of kidney stones, Hyperlipidemia, Hypertension, Left hip pain, Lumbar radiculopathy, Lumbar stenosis with neurogenic claudication, Obesity, MARTÍN (obstructive sleep apnea), PTSD (post-traumatic stress disorder), Tremor, Trouble swallowing, Under care of team, Under care of team, Under care of team, Wears dentures, Wears glasses, Weight gain, and Wellness examination. Past Surgical History   has a past surgical history that includes Nerve Block (08/01/2016); Hysterectomy; Carpal tunnel release (Bilateral); Tonsillectomy; Nerve Block (10/31/2016); Nerve Block (Left); Nerve Block (Left, 01/16/2017); Nerve Block (Left, 02/06/2017); Nerve Block (Right, 06/19/2017); Nerve Block (07/07/2017);  Nerve Block (09/11/2017); Nerve Block (Left, 10/02/2018); Nerve Block (10/16/2018); Nerve Block (Left, 10/30/2018); Nerve Block (Right, 11/13/2018); Nerve Block (03/26/2019); other surgical history (08/01/2019); RADIOFREQUENCY ABLATION NERVES (Left, 08/01/2019); other surgical history (Right, 08/15/2019); RADIOFREQUENCY ABLATION NERVES (Right, 08/15/2019); other surgical history (09/12/2019); hc inject other perphrl nerv (Left, 09/12/2019); epidural steroid injection (Left, 09/12/2019); Anesthesia Nerve Block (Left, 10/07/2019); other surgical history (Left, 10/24/2019); RADIOFREQUENCY ABLATION NERVES (Left, 10/24/2019); Pain management procedure (Left, 06/18/2020); Pain management procedure (Right, 07/27/2020); Pain management procedure (Right, 08/24/2020); Pain management procedure (Left, 10/12/2020); Endoscopy, colon, diagnostic; Breast surgery; Colonoscopy; Lithotripsy; and Breast lumpectomy (Right, 1991). Social History   reports that she quit smoking about 6 weeks ago. She smoked 0.00 packs per day. She has never used smokeless tobacco.    reports no history of alcohol use. reports no history of drug use. Marital Status single  Children one  Occupation disabled  Family History  Family Status   Relation Name Status    Mother  Alive    Father  Alive     family history includes Cancer in her father; Heart Disease in her father; High Blood Pressure in her father and mother; Other in her father and mother; Stroke in her father; Vision Loss in her father and mother.     Review of Systems:  CONSTITUTIONAL:   negative for fevers, chills, fatigue and malaise    EYES:   negative for double vision, blurred vision and photophobia +glassese   HEENT:   negative for tinnitus, epistaxis and sore throat     RESPIRATORY:   negative for cough, shortness of breath, wheezing     CARDIOVASCULAR:   negative for chest pain, palpitations, syncope, edema     GASTROINTESTINAL:   negative for nausea, vomiting     GENITOURINARY:   negative for incontinence     MUSCULOSKELETAL:   +back pain   NEUROLOGICAL:   negative for headaches and dizziness     PSYCHIATRIC:   +psychiatric illness, stable with treatment      OBJECTIVE:   VITALS:  height is 5' 5\" (1.651 m) and weight is 247 lb (112 kg). Her temporal temperature is 98.2 °F (36.8 °C). Her blood pressure is 157/95 (abnormal) and her pulse is 100. Her respiration is 18 and oxygen saturation is 97%. CONSTITUTIONAL:alert & oriented x 3, no acute distress. Friendly. Very pleasant. SKIN:  Warm and dry, no rashes on exposed areas of skin   HEAD:  Normocephalic, atraumatic   EYES: PERRL. EOMs intact. Wearing glasses. EARS:  Equal bilaterally, no edema or thickening, skin is intact without lumps or lesions. No discharge. Hearing grossly WNL. NOSE:  Nares patent. No rhinorrhea   MOUTH/THROAT:  Mucous membranes moist, tongue is pink, uvula midline, dentures  NECK:supple, no lymphadenopathy  LUNGS: Respirations even and non-labored. Clear to auscultation bilaterally, no wheezes, rales, or rhonchi. CARDIOVASCULAR: Regular rate and rhythm, no murmurs/rubs/gallops   ABDOMEN: soft, non-tender, non-distended, bowel sounds active x 4, rotund   EXTREMITIES: No edema bilateral lower extremities. No varicosities bilateral lower extremities. NEUROLOGIC: CN II-XII are grossly intact.   Gait not assessed, using wheelchair today     Testing:   EK21  Labs pending: drawn 2021     IMPRESSIONS:   Lumbar stenosis with claudication, and radiculopathy       Diagnosis Date    ADHD (attention deficit hyperactivity disorder)     Anxiety and depression     Arthritis     shoulders, hips, elbows, back    Bipolar 1 disorder ([address]     COPD (chronic obstructive pulmonary disease) ([address]     Depression with anxiety     Family history of general anesthesia reaction     prolonged emergence in her father, who has multiple chronic conditions    GERD (gastroesophageal reflux disease)     Heel spur     left    History

## 2021-04-09 NOTE — PROGRESS NOTES
Anesthesia Focused Assessment    STOP-BANG Sleep Apnea Questionnaire    Prior + Covid-19 test? No      SNORE loudly (heard through closed doors)? Yes  TIRED, fatigued, sleepy during daytime? Yes  OBSERVED stopping breathing during sleep? Yes  High blood PRESSURE or being treated? Yes    BMI over 35? Yes  AGE over 48? Yes  NECK circumference over 16\"? Yes  GENDER (male)? No             Total 7  High risk 5-8  Intermediate risk 3-4  Low risk 0-2    ----------------------------------------------------------------------------------------------------------------------  MARTÍN:                                             yes  If yes, machine?:                          Does not use, can't tolerate    Type 1 DM:                                   no  T2DM:                                           no    Coronary Artery Disease:             no  Hypertension:                               Yes, follows with Dr. Ene Jimenez ([facility])  Angel Hopping / AICD / Pacemaker:           no    Renal Failure:                               no  If yes, on dialysis? :                           Active smoker:                              Quit smoking 2/2021  Drinks Alcohol:                              no  Illicit drugs:                                    no    Dentition:                                      Full dentures     Past Medical History:   Diagnosis Date    ADHD (attention deficit hyperactivity disorder)     Anxiety and depression     Arthritis     shoulders, hips, elbows, back    Bipolar 1 disorder (HCC)     COPD ([facility] obstructive [facility] disease) [facility]     Depression with anxiety     Family history of general anesthesia reaction     prolonged emergence in her father, who has multiple chronic conditions    GERD (gastroesophageal reflux disease)     Heel spur     left    History of kidney stones     Hyperlipidemia     Hypertension     follows with dr. Caity Sandra, has appt with him 4/13/21    Left hip pain     Lumbar radiculopathy     Lumbar stenosis with neurogenic claudication     Obesity     MARTÍN (obstructive sleep apnea)     has not used cpap in years, does not tolerate    PTSD (post-traumatic stress disorder)     [facility] [address] [facility], [place]    Tremor     Trouble swallowing     Under care of team 04/08/2021    lenpzgehtl-Wiipvzu-ynjiidgnxl-last visit dec 2020    Under care of team 04/08/2021    pulmonology-Dr [facility]-last visit 2019    Under care of team 04/08/2021    gu[facility] office-last visit 2015-due to see april 2021   [facility] Wears dentures     Wears glasses     Weight gain     Wellness examination 04/08/2021    pcp-dr [facility]ang[facility]-new doctor visit scheduled         Patient was evaluated in PAT & anesthesia guidelines were applied. NPO guidelines, medication instructions and scheduled arrival time were reviewed with patient. Hx of anesthesia complications:  no  Family hx of anesthesia complications:  Father- prolonged emergence                                                                                                                     Anesthesia contacted:   no    Medical or cardiac clearance ordered: no, pt seeing her cardiologist on 4/13/21 for follow-up (she reports for htn management). I advised her to make them aware of upcoming surgery and sent her with copy of her EKG for their records.      ALEKSANDR DESAI-CNP  4/9/21  12:52 PM

## 2021-04-13 LAB
EKG ATRIAL RATE: 89 BPM
EKG P AXIS: 68 DEGREES
EKG P-R INTERVAL: 158 MS
EKG Q-T INTERVAL: 358 MS
EKG QRS DURATION: 92 MS
EKG QTC CALCULATION (BAZETT): 435 MS
EKG R AXIS: -24 DEGREES
EKG T AXIS: 51 DEGREES
EKG VENTRICULAR RATE: 89 BPM

## 2021-04-14 ENCOUNTER — OFFICE VISIT (OUTPATIENT)
Dept: PAIN MANAGEMENT | Age: 56
End: 2021-04-14
Payer: MEDICARE

## 2021-04-14 VITALS
HEIGHT: 65 IN | BODY MASS INDEX: 41.15 KG/M2 | DIASTOLIC BLOOD PRESSURE: 101 MMHG | SYSTOLIC BLOOD PRESSURE: 180 MMHG | HEART RATE: 97 BPM | WEIGHT: 247 LBS

## 2021-04-14 DIAGNOSIS — M54.16 LEFT LUMBAR RADICULITIS: Chronic | ICD-10-CM

## 2021-04-14 DIAGNOSIS — E66.01 MORBID OBESITY WITH BMI OF 40.0-44.9, ADULT (HCC): ICD-10-CM

## 2021-04-14 DIAGNOSIS — Z79.891 CHRONIC USE OF OPIATE FOR THERAPEUTIC PURPOSE: Primary | ICD-10-CM

## 2021-04-14 DIAGNOSIS — M47.817 LUMBOSACRAL SPONDYLOSIS WITHOUT MYELOPATHY: Chronic | ICD-10-CM

## 2021-04-14 PROCEDURE — 3017F COLORECTAL CA SCREEN DOC REV: CPT | Performed by: ANESTHESIOLOGY

## 2021-04-14 PROCEDURE — G8427 DOCREV CUR MEDS BY ELIG CLIN: HCPCS | Performed by: ANESTHESIOLOGY

## 2021-04-14 PROCEDURE — G8417 CALC BMI ABV UP PARAM F/U: HCPCS | Performed by: ANESTHESIOLOGY

## 2021-04-14 PROCEDURE — 99213 OFFICE O/P EST LOW 20 MIN: CPT | Performed by: ANESTHESIOLOGY

## 2021-04-14 PROCEDURE — 1036F TOBACCO NON-USER: CPT | Performed by: ANESTHESIOLOGY

## 2021-04-14 RX ORDER — TIZANIDINE 4 MG/1
4 TABLET ORAL NIGHTLY
Qty: 30 TABLET | Refills: 0 | Status: ON HOLD | OUTPATIENT
Start: 2021-04-14 | End: 2021-04-24 | Stop reason: HOSPADM

## 2021-04-14 RX ORDER — GABAPENTIN 300 MG/1
300 CAPSULE ORAL 3 TIMES DAILY
Qty: 90 CAPSULE | Refills: 2 | Status: SHIPPED | OUTPATIENT
Start: 2021-04-14 | End: 2021-07-19 | Stop reason: SDUPTHER

## 2021-04-14 RX ORDER — LOSARTAN POTASSIUM 50 MG/1
TABLET ORAL
COMMUNITY
Start: 2021-04-13

## 2021-04-14 RX ORDER — MORPHINE SULFATE 15 MG/1
15 TABLET, FILM COATED, EXTENDED RELEASE ORAL EVERY 12 HOURS SCHEDULED
Qty: 10 TABLET | Refills: 0 | Status: SHIPPED | OUTPATIENT
Start: 2021-04-18 | End: 2021-06-22 | Stop reason: ALTCHOICE

## 2021-04-14 ASSESSMENT — ENCOUNTER SYMPTOMS
BACK PAIN: 1
SHORTNESS OF BREATH: 1

## 2021-04-14 NOTE — PROGRESS NOTES
The patient is a 63 y. o. Non-/non  female. Chief Complaint   Patient presents with    Medication Refill    Back Pain        HPI   Chief Complaint: back pain   Medication Refill: Morphine   58-year-old woman with history of obesity, obstructive sleep apnea, COPD  She is seen in our clinic for history of chronic lower back pain  Was last seen for flareup of back pain and left-sided sciatica  New MRI was obtained  That showed left-sided L3 level lateral collapse with impingement of left L4 nerve root    Patient was referred for surgical opinion  She is now scheduled for lumbar fusion surgery next Friday    Patient has been on chronic medication management with opioids  Currently take morphine 15 mg twice a day  She was a transfer of care from [facility] pain clinic  Daily morphine equaling 30 mg a day  Reports no side effect on the medication  Finds the medication helpful  Also takes nonopioid medications pain including Voltaren gel, gabapentin and tizanidine  No history of illicit substance use    Pain score Today:  9  Adverse effects (Constipation / Nausea / Sedation / sexual Dysfunction / others) : none   Mood: poor  Sleep pattern and quality: poor  Activity level: poor    Pill count Today: 6   Last dose taken  4/13/2021  OARRS report reviewed today: yes  ER/Hospitalizations/PCP visit related to pain since last visit:no   Any legal problems e.g. DUI etc.:No  Satisfied with current management: Yes    Opioid Contract: 7/26/2019  Last Urine Dug screen dated: 7/11/2020    No results found for: LABA1C  No results found for: EAG    Past Medical History, Past Surgical History, Social History, Allergies and Medications reviewed and updated in EPIC as indicated    Family History reviewed and is noncontributory.           Past Medical History:   Diagnosis Date    ADHD (attention deficit hyperactivity disorder)     Anxiety and depression     Arthritis     shoulders, hips, elbows, back    B[facility] 1 disorder ([facility]     COPD (chronic obstructive pulmonary disease) ([facility]     Depression with anxiety     Family history of general anesthesia reaction     prolonged emergence in her father, who has multiple chronic conditions    GERD (gastroesophageal reflux disease)     Heel spur     left    History of kidney stones     Hyperlipidemia     Hypertension     follows with dr. Allison Begum, has appt with him 4/13/21    Left hip pain     Lumbar radiculopathy     Lumbar stenosis with neurogenic claudication     Obesity     MARTÍN (obstructive sleep apnea)     has not used cpap in years, does not tolerate    PTSD (post-traumatic stress disorder)     [facility] [address] [facility], [email or web address]    Tremor     Trouble swallowing     Under care of team 04/08/2021    ljhhfcqpss-Vqhnels-shuqhrzwjr-last visit dec 2020    Under care of team 04/08/2021    pulmonology-Dr [facility]-last visit 2019    Under care of team 04/08/2021    xa-Kcwcgz-egxogx  office-last visit 2015-due to see april 2021   Arlene Chacko Wears dentures     Wears glasses     Weight gain     Wellness examination 04/08/2021    pcp-[facility]ang[facility]-new doctor visit scheduled      Past Surgical History:   Procedure Laterality Date    ANESTHESIA NERVE BLOCK Left 10/07/2019    NERVE BLOCK LEFT HIP OBTURATOR AND FEMORAL performed by Ally Bettencourt MD at [facility]      cyst from right breast    CARPAL TUNNEL RELEASE Bilateral     COLONOSCOPY      ENDOSCOPY, COLON, DIAGNOSTIC      EPIDURAL STEROID INJECTION Left 09/12/2019    EPIDURAL STEROID INJECTION performed by Ally Bettencourt MD at [address] [facility] NERV Left 09/12/2019    INJECTION MEDICATION LEFT HIP performed by Ally Bettencourt MD at [address] LITHOTRIPSY      x2   [address]  08/01/2016    duramorph 1mg  celestone 9mg    NERVE BLOCK  10/31/2016    duramorph epidural steroid block decadron 7.5 mg durmoprh 1.5 mg    NERVE BLOCK Left     NO STEROID, LEFT MBNB# 1    NERVE BLOCK Left 01/16/2017    LT MBNB #2   celestone 6mg    NERVE BLOCK Left 02/06/2017    LT lumbar RF    kenalog 40    NERVE BLOCK Right 06/19/2017    right lumbar mbnb #1    NERVE BLOCK  07/07/2017    right radiofrequency kenolog 40mg    NERVE BLOCK  09/11/2017    duramorph 1mg & celestone 9 mg    NERVE BLOCK Left 10/02/2018    LEFT LUMBAR EPIDURAL STEROID BLOCK DECADRON 10MG    NERVE BLOCK  10/16/2018    blanca mbnb, marcaine and xylocaine    NERVE BLOCK Left 10/30/2018    left lumbar rf- no steroid    NERVE BLOCK Right 11/13/2018    RT lumbar RFA no steroid    NERVE BLOCK  03/26/2019    left hip decadron 10mg, marcaine . 5 %    OTHER SURGICAL HISTORY  08/01/2019    radiofrequency ablation L2-5    OTHER SURGICAL HISTORY Right 08/15/2019    lumbar radiofrequency ablation    OTHER SURGICAL HISTORY  09/12/2019     INJECTION MEDICATION LEFT HIP (Left Hip) EPIDURAL STEROID INJECTION (left back)    OTHER SURGICAL HISTORY Left 10/24/2019    nerve radiofrequency ablation    PAIN MANAGEMENT PROCEDURE Left 06/18/2020    EPIDURAL STEROID INJECTION LEFT L4,L5 performed by Alysha Jung MD at [address] [place] [address] Right 07/27/2020    RIGHT MEDIAL [facility] L2 - L5 performed by Alysha Jung MD at [address] [place] [address] Right 08/24/2020    RIGHT SIDED MEDIAL BRANCH NERVE RADIOFREQUENCY ABLATION   L 2 - L 5 performed by Alysha Jung MD at [address] [place] [address] Left 10/12/2020    LEFT L4 L5 EPIDURAL STEROID INJECTION performed by Alysha Jung MD at [address] [place] [address] Left 08/01/2019    NERVE RADIOFREQUENCY ABLATION MEDIAN BRANCHES AT TRANSVERSE PROCESSES L3/L4/L5 AND S1 LEFT performed by Alysha Jung MD at [address] [place] [address] Right 08/15/2019    NERVE RADIOFREQUENCY ABLATION MEDIAN BRANCHES AT TRANSVERSE PROCESSES OF L3/L4/L5 AND S1 RIGHT performed by Verito Bettencourt MD at [address] Left 10/24/2019    NERVE RADIOFREQUENCY ABLATION LEFT HIP FEMORAL AND OBTURATOR NERVE performed by Verito Bettencourt MD at [address] History     Socioeconomic History    Marital status:      Spouse name: None    Number of children: None    Years of education: None    Highest education level: None   Occupational History    None   Social Needs    Financial resource strain: None    Food insecurity     Worry: None     Inability: None    Transportation needs     Medical: None     Non-medical: None   Tobacco Use    Smoking status: Former Smoker     Packs/day: 0.00     Quit date: 2021     Years since quittin.1    Smokeless tobacco: Never Used   Substance and Sexual Activity    Alcohol use: Never     Alcohol/week: 0.0 standard drinks     Frequency: Never    Drug use: Never    Sexual activity: None   Lifestyle    Physical activity     Days per week: None     Minutes per session: None    Stress: None   Relationships    Social connections     Talks on phone: None     Gets together: None     Attends Bahai service: None     Active member of club or organization: None     Attends meetings of clubs or organizations: None     Relationship status: None    Intimate partner violence     Fear of current or ex partner: None     Emotionally abused: None     Physically abused: None     Forced sexual activity: None   Other Topics Concern    None   Social History Narrative    None     Family History   Problem Relation Age of Onset    Other Mother         ulcers    High Blood Pressure Mother     Vision Loss Mother     Heart Disease Father     High Blood Pressure Father     Other Father         blind, pacemaker    Stroke Father     Vision Loss Father     Cancer Father      Allergies   Allergen Reactions    Fenofibrate Nausea And Vomiting     Fenofibrate   Vitals:    04/14/21 1206   BP: (!) 180/101   Pulse: 97     Current Outpatient Medications   Medication Sig Dispense Refill    [START ON 4/18/2021] morphine (MS CONTIN) 15 MG extended release tablet Take 1 tablet by mouth every 12 hours for 5 days. 10 tablet 0    gabapentin (NEURONTIN) 300 MG capsule Take 1 capsule by mouth 3 times daily for 90 days. Intended supply: 30 days 90 capsule 2    tiZANidine (ZANAFLEX) 4 MG tablet Take 1 tablet by mouth nightly 30 tablet 0    diclofenac sodium (VOLTAREN) 1 % GEL Apply 4 g topically 4 times daily as needed for Pain 120 g 1    losartan (COZAAR) 50 MG tablet       rosuvastatin (CRESTOR) 20 MG tablet Take 20 mg by mouth daily       celecoxib (CELEBREX) 200 MG capsule Take 200 mg by mouth 2 times daily      busPIRone (BUSPAR) 10 MG tablet Take 10 mg by mouth 2 times daily       DULoxetine (CYMBALTA) 30 MG extended release capsule Take 30 mg by mouth daily      benztropine (COGENTIN) 0.5 MG tablet Take 0.5 mg by mouth 2 times daily      Acetaminophen (TYLENOL ARTHRITIS PAIN PO) Take 2 tablets by mouth daily      pantoprazole (PROTONIX) 40 MG tablet Take 40 mg by mouth 2 times daily       zolpidem (AMBIEN) 10 MG tablet Take 10 mg by mouth nightly as needed for Sleep      Multiple Vitamins-Minerals (THERAPEUTIC MULTIVITAMIN-MINERALS) tablet Take 1 tablet by mouth daily      Biotin 5000 MCG CAPS Take 1 capsule by mouth daily       amphetamine-dextroamphetamine (ADDERALL) 20 MG tablet Take 20 mg by mouth daily. No current facility-administered medications for this visit. Review of Systems   Constitutional: Negative for fever. Respiratory: Positive for shortness of breath. Cardiovascular: Negative for chest pain. Genitourinary: Negative for difficulty urinating. Musculoskeletal: Positive for back pain and gait problem. Neurological: Positive for weakness. Psychiatric/Behavioral: Positive for sleep disturbance. Objective:  General Appearance:  Uncomfortable, in pain, well-appearing and in no acute distress. Vital signs: (most recent): Blood pressure (!) 180/101, pulse 97, height 5' 5\" (1.651 m), weight 247 lb (112 kg), not currently breastfeeding. Vital signs are normal.  No fever. Output: Producing urine and producing stool. HEENT: Normal HEENT exam.    Lungs:  Normal effort and normal respiratory rate. Breath sounds clear to auscultation. She is not in respiratory distress. Heart: Normal rate. Extremities: Normal range of motion. There is no deformity. Neurological: Patient is alert and oriented to person, place and time. Patient has normal coordination. Pupils:  Pupils are equal, round, and reactive to light. Pupils are equal.   Skin:  Warm and dry. No rash or cyanosis. Assessment & Plan   79-year-old woman with history of obesity, obstructive sleep apnea, COPD  She is seen in our clinic for history of chronic lower back pain  Was last seen for flareup of back pain and left-sided sciatica  New MRI was obtained  That showed left-sided L3 level lateral collapse with impingement of left L4 nerve root    Patient was referred for surgical opinion  She is now scheduled for lumbar fusion surgery next Friday    Patient has been on chronic medication management with opioids  Currently take morphine 15 mg twice a day  She was a transfer of care from [facility] pain clinic  Daily morphine equaling 30 mg a day  Reports no side effect on the medication  Finds the medication helpful  Also takes nonopioid medications pain including Voltaren gel, gabapentin and tizanidine  No history of illicit substance use    1. Chronic use of opiate for therapeutic purpose    2. Lumbosacral spondylosis without myelopathy    3. Morbid obesity with BMI of 40.0-44.9, adult ([facility]    4. Left lumbar radiculitis        No orders of the defined types were placed in this encounter.      Orders Placed This Encounter

## 2021-04-19 ENCOUNTER — HOSPITAL ENCOUNTER (OUTPATIENT)
Dept: LAB | Age: 56
Setting detail: SPECIMEN
Discharge: HOME | End: 2021-04-19
Payer: MEDICARE

## 2021-04-19 DIAGNOSIS — Z01.818 PREOP TESTING: Primary | ICD-10-CM

## 2021-04-19 PROCEDURE — U0005 INFEC AGEN DETEC AMPLI PROBE: HCPCS

## 2021-04-19 PROCEDURE — U0003 INFECTIOUS AGENT DETECTION BY NUCLEIC ACID (DNA OR RNA); SEVERE ACUTE RESPIRATORY SYNDROME CORONAVIRUS 2 (SARS-COV-2) (CORONAVIRUS DISEASE [COVID-19]), AMPLIFIED PROBE TECHNIQUE, MAKING USE OF HIGH THROUGHPUT TECHNOLOGIES AS DESCRIBED BY CMS-2020-01-R: HCPCS

## 2021-04-20 LAB
SARS-COV-2: NORMAL
SARS-COV-2: NOT DETECTED
SOURCE: NORMAL

## 2021-04-22 ENCOUNTER — ANESTHESIA EVENT (OUTPATIENT)
Dept: OPERATING ROOM | Age: 56
DRG: 519 | End: 2021-04-22
Payer: MEDICARE

## 2021-04-23 ENCOUNTER — ANESTHESIA (OUTPATIENT)
Dept: OPERATING ROOM | Age: 56
DRG: 519 | End: 2021-04-23
Payer: MEDICARE

## 2021-04-23 ENCOUNTER — HOSPITAL ENCOUNTER (INPATIENT)
Age: 56
LOS: 1 days | Discharge: HOME | DRG: 519 | End: 2021-04-24
Attending: NEUROLOGICAL SURGERY | Admitting: NEUROLOGICAL SURGERY
Payer: MEDICARE

## 2021-04-23 ENCOUNTER — APPOINTMENT (OUTPATIENT)
Dept: CT IMAGING | Age: 56
DRG: 519 | End: 2021-04-23
Attending: NEUROLOGICAL SURGERY
Payer: MEDICARE

## 2021-04-23 ENCOUNTER — APPOINTMENT (OUTPATIENT)
Dept: GENERAL RADIOLOGY | Age: 56
DRG: 519 | End: 2021-04-23
Attending: NEUROLOGICAL SURGERY
Payer: MEDICARE

## 2021-04-23 VITALS — DIASTOLIC BLOOD PRESSURE: 110 MMHG | TEMPERATURE: 98.7 F | OXYGEN SATURATION: 98 % | SYSTOLIC BLOOD PRESSURE: 178 MMHG

## 2021-04-23 DIAGNOSIS — Z98.1 S/P LUMBAR FUSION: Primary | ICD-10-CM

## 2021-04-23 PROCEDURE — 72131 CT LUMBAR SPINE W/O DYE: CPT

## 2021-04-23 PROCEDURE — 2709999900 HC NON-CHARGEABLE SUPPLY: Performed by: NEUROLOGICAL SURGERY

## 2021-04-23 PROCEDURE — 6360000002 HC RX W HCPCS: Performed by: NEUROLOGICAL SURGERY

## 2021-04-23 PROCEDURE — 2580000003 HC RX 258: Performed by: ANESTHESIOLOGY

## 2021-04-23 PROCEDURE — 7100000001 HC PACU RECOVERY - ADDTL 15 MIN: Performed by: NEUROLOGICAL SURGERY

## 2021-04-23 PROCEDURE — 99024 POSTOP FOLLOW-UP VISIT: CPT | Performed by: NEUROLOGICAL SURGERY

## 2021-04-23 PROCEDURE — 2580000003 HC RX 258: Performed by: PHYSICIAN ASSISTANT

## 2021-04-23 PROCEDURE — 2500000003 HC RX 250 WO HCPCS: Performed by: SPECIALIST

## 2021-04-23 PROCEDURE — 3600000014 HC SURGERY LEVEL 4 ADDTL 15MIN: Performed by: NEUROLOGICAL SURGERY

## 2021-04-23 PROCEDURE — 1200000000 HC SEMI PRIVATE

## 2021-04-23 PROCEDURE — 3700000000 HC ANESTHESIA ATTENDED CARE: Performed by: NEUROLOGICAL SURGERY

## 2021-04-23 PROCEDURE — 2500000003 HC RX 250 WO HCPCS: Performed by: NEUROLOGICAL SURGERY

## 2021-04-23 PROCEDURE — 6360000002 HC RX W HCPCS: Performed by: ANESTHESIOLOGY

## 2021-04-23 PROCEDURE — 6360000002 HC RX W HCPCS: Performed by: SPECIALIST

## 2021-04-23 PROCEDURE — 01NB0ZZ RELEASE LUMBAR NERVE, OPEN APPROACH: ICD-10-PCS | Performed by: NEUROLOGICAL SURGERY

## 2021-04-23 PROCEDURE — 2580000003 HC RX 258: Performed by: SPECIALIST

## 2021-04-23 PROCEDURE — 2780000010 HC IMPLANT OTHER: Performed by: NEUROLOGICAL SURGERY

## 2021-04-23 PROCEDURE — 2580000003 HC RX 258: Performed by: NEUROLOGICAL SURGERY

## 2021-04-23 PROCEDURE — 00UT0KZ SUPPLEMENT SPINAL MENINGES WITH NONAUTOLOGOUS TISSUE SUBSTITUTE, OPEN APPROACH: ICD-10-PCS | Performed by: NEUROLOGICAL SURGERY

## 2021-04-23 PROCEDURE — 3600000004 HC SURGERY LEVEL 4 BASE: Performed by: NEUROLOGICAL SURGERY

## 2021-04-23 PROCEDURE — C9290 INJ, BUPIVACAINE LIPOSOME: HCPCS | Performed by: NEUROLOGICAL SURGERY

## 2021-04-23 PROCEDURE — 2720000010 HC SURG SUPPLY STERILE: Performed by: NEUROLOGICAL SURGERY

## 2021-04-23 PROCEDURE — 7100000000 HC PACU RECOVERY - FIRST 15 MIN: Performed by: NEUROLOGICAL SURGERY

## 2021-04-23 PROCEDURE — 6360000002 HC RX W HCPCS: Performed by: PHYSICIAN ASSISTANT

## 2021-04-23 PROCEDURE — 6370000000 HC RX 637 (ALT 250 FOR IP): Performed by: PHYSICIAN ASSISTANT

## 2021-04-23 PROCEDURE — APPSS30 APP SPLIT SHARED TIME 16-30 MINUTES: Performed by: PHYSICIAN ASSISTANT

## 2021-04-23 PROCEDURE — 3209999900 FLUORO FOR SURGICAL PROCEDURES

## 2021-04-23 PROCEDURE — 00NY0ZZ RELEASE LUMBAR SPINAL CORD, OPEN APPROACH: ICD-10-PCS | Performed by: NEUROLOGICAL SURGERY

## 2021-04-23 PROCEDURE — 6370000000 HC RX 637 (ALT 250 FOR IP): Performed by: NEUROLOGICAL SURGERY

## 2021-04-23 PROCEDURE — 6370000000 HC RX 637 (ALT 250 FOR IP): Performed by: NURSE PRACTITIONER

## 2021-04-23 PROCEDURE — 3700000001 HC ADD 15 MINUTES (ANESTHESIA): Performed by: NEUROLOGICAL SURGERY

## 2021-04-23 DEVICE — NUSHIELD 2X4CM 8SQ CM: Type: IMPLANTABLE DEVICE | Site: SPINE LUMBAR | Status: FUNCTIONAL

## 2021-04-23 RX ORDER — SODIUM CHLORIDE, SODIUM LACTATE, POTASSIUM CHLORIDE, CALCIUM CHLORIDE 600; 310; 30; 20 MG/100ML; MG/100ML; MG/100ML; MG/100ML
INJECTION, SOLUTION INTRAVENOUS CONTINUOUS PRN
Status: DISCONTINUED | OUTPATIENT
Start: 2021-04-23 | End: 2021-04-23 | Stop reason: SDUPTHER

## 2021-04-23 RX ORDER — M-VIT,TX,IRON,MINS/CALC/FOLIC 27MG-0.4MG
1 TABLET ORAL DAILY
Status: DISCONTINUED | OUTPATIENT
Start: 2021-04-23 | End: 2021-04-24 | Stop reason: HOSPADM

## 2021-04-23 RX ORDER — FENTANYL CITRATE 50 UG/ML
50 INJECTION, SOLUTION INTRAMUSCULAR; INTRAVENOUS EVERY 5 MIN PRN
Status: DISCONTINUED | OUTPATIENT
Start: 2021-04-23 | End: 2021-04-23 | Stop reason: HOSPADM

## 2021-04-23 RX ORDER — SODIUM CHLORIDE 0.9 % (FLUSH) 0.9 %
5-40 SYRINGE (ML) INJECTION EVERY 12 HOURS SCHEDULED
Status: DISCONTINUED | OUTPATIENT
Start: 2021-04-23 | End: 2021-04-24 | Stop reason: HOSPADM

## 2021-04-23 RX ORDER — SODIUM CHLORIDE 0.9 % (FLUSH) 0.9 %
5-40 SYRINGE (ML) INJECTION PRN
Status: DISCONTINUED | OUTPATIENT
Start: 2021-04-23 | End: 2021-04-24 | Stop reason: HOSPADM

## 2021-04-23 RX ORDER — LIDOCAINE HYDROCHLORIDE AND EPINEPHRINE 10; 10 MG/ML; UG/ML
INJECTION, SOLUTION INFILTRATION; PERINEURAL PRN
Status: DISCONTINUED | OUTPATIENT
Start: 2021-04-23 | End: 2021-04-23 | Stop reason: ALTCHOICE

## 2021-04-23 RX ORDER — VANCOMYCIN HYDROCHLORIDE 1 G/20ML
INJECTION, POWDER, LYOPHILIZED, FOR SOLUTION INTRAVENOUS PRN
Status: DISCONTINUED | OUTPATIENT
Start: 2021-04-23 | End: 2021-04-23 | Stop reason: ALTCHOICE

## 2021-04-23 RX ORDER — PANTOPRAZOLE SODIUM 40 MG/1
40 TABLET, DELAYED RELEASE ORAL 2 TIMES DAILY
Status: DISCONTINUED | OUTPATIENT
Start: 2021-04-23 | End: 2021-04-24

## 2021-04-23 RX ORDER — LABETALOL HYDROCHLORIDE 5 MG/ML
INJECTION, SOLUTION INTRAVENOUS PRN
Status: DISCONTINUED | OUTPATIENT
Start: 2021-04-23 | End: 2021-04-23 | Stop reason: SDUPTHER

## 2021-04-23 RX ORDER — ROCURONIUM BROMIDE 10 MG/ML
INJECTION, SOLUTION INTRAVENOUS PRN
Status: DISCONTINUED | OUTPATIENT
Start: 2021-04-23 | End: 2021-04-23 | Stop reason: SDUPTHER

## 2021-04-23 RX ORDER — TIZANIDINE 4 MG/1
4 TABLET ORAL NIGHTLY
Status: DISCONTINUED | OUTPATIENT
Start: 2021-04-23 | End: 2021-04-24 | Stop reason: HOSPADM

## 2021-04-23 RX ORDER — ACETAMINOPHEN 325 MG/1
650 TABLET ORAL EVERY 6 HOURS
Status: DISCONTINUED | OUTPATIENT
Start: 2021-04-23 | End: 2021-04-24 | Stop reason: HOSPADM

## 2021-04-23 RX ORDER — LIDOCAINE HYDROCHLORIDE 10 MG/ML
INJECTION, SOLUTION EPIDURAL; INFILTRATION; INTRACAUDAL; PERINEURAL PRN
Status: DISCONTINUED | OUTPATIENT
Start: 2021-04-23 | End: 2021-04-23 | Stop reason: SDUPTHER

## 2021-04-23 RX ORDER — OXYCODONE HYDROCHLORIDE 5 MG/1
10 TABLET ORAL EVERY 4 HOURS PRN
Status: DISCONTINUED | OUTPATIENT
Start: 2021-04-23 | End: 2021-04-24 | Stop reason: HOSPADM

## 2021-04-23 RX ORDER — ZOLPIDEM TARTRATE 5 MG/1
10 TABLET ORAL NIGHTLY PRN
Status: DISCONTINUED | OUTPATIENT
Start: 2021-04-23 | End: 2021-04-24 | Stop reason: HOSPADM

## 2021-04-23 RX ORDER — METHOCARBAMOL 750 MG/1
1500 TABLET, FILM COATED ORAL 3 TIMES DAILY
Status: DISCONTINUED | OUTPATIENT
Start: 2021-04-23 | End: 2021-04-24 | Stop reason: HOSPADM

## 2021-04-23 RX ORDER — MAGNESIUM HYDROXIDE 1200 MG/15ML
LIQUID ORAL CONTINUOUS PRN
Status: COMPLETED | OUTPATIENT
Start: 2021-04-23 | End: 2021-04-23

## 2021-04-23 RX ORDER — OXYCODONE HYDROCHLORIDE 5 MG/1
5 TABLET ORAL EVERY 4 HOURS PRN
Status: DISCONTINUED | OUTPATIENT
Start: 2021-04-23 | End: 2021-04-24 | Stop reason: HOSPADM

## 2021-04-23 RX ORDER — MORPHINE SULFATE 2 MG/ML
2 INJECTION, SOLUTION INTRAMUSCULAR; INTRAVENOUS
Status: DISCONTINUED | OUTPATIENT
Start: 2021-04-23 | End: 2021-04-24

## 2021-04-23 RX ORDER — LOSARTAN POTASSIUM 50 MG/1
50 TABLET ORAL DAILY
Status: DISCONTINUED | OUTPATIENT
Start: 2021-04-23 | End: 2021-04-24 | Stop reason: HOSPADM

## 2021-04-23 RX ORDER — SENNA AND DOCUSATE SODIUM 50; 8.6 MG/1; MG/1
1 TABLET, FILM COATED ORAL 2 TIMES DAILY
Status: DISCONTINUED | OUTPATIENT
Start: 2021-04-23 | End: 2021-04-24 | Stop reason: HOSPADM

## 2021-04-23 RX ORDER — DEXAMETHASONE SODIUM PHOSPHATE 10 MG/ML
INJECTION INTRAMUSCULAR; INTRAVENOUS PRN
Status: DISCONTINUED | OUTPATIENT
Start: 2021-04-23 | End: 2021-04-23 | Stop reason: SDUPTHER

## 2021-04-23 RX ORDER — DEXTROAMPHETAMINE SACCHARATE, AMPHETAMINE ASPARTATE, DEXTROAMPHETAMINE SULFATE AND AMPHETAMINE SULFATE 2.5; 2.5; 2.5; 2.5 MG/1; MG/1; MG/1; MG/1
20 TABLET ORAL DAILY
Status: DISCONTINUED | OUTPATIENT
Start: 2021-04-24 | End: 2021-04-24 | Stop reason: HOSPADM

## 2021-04-23 RX ORDER — DULOXETIN HYDROCHLORIDE 30 MG/1
30 CAPSULE, DELAYED RELEASE ORAL DAILY
Status: DISCONTINUED | OUTPATIENT
Start: 2021-04-23 | End: 2021-04-24 | Stop reason: HOSPADM

## 2021-04-23 RX ORDER — MIDAZOLAM HYDROCHLORIDE 2 MG/2ML
2 INJECTION, SOLUTION INTRAMUSCULAR; INTRAVENOUS ONCE
Status: COMPLETED | OUTPATIENT
Start: 2021-04-23 | End: 2021-04-23

## 2021-04-23 RX ORDER — MIDAZOLAM HYDROCHLORIDE 1 MG/ML
INJECTION INTRAMUSCULAR; INTRAVENOUS PRN
Status: DISCONTINUED | OUTPATIENT
Start: 2021-04-23 | End: 2021-04-23 | Stop reason: SDUPTHER

## 2021-04-23 RX ORDER — FENTANYL CITRATE 50 UG/ML
INJECTION, SOLUTION INTRAMUSCULAR; INTRAVENOUS PRN
Status: DISCONTINUED | OUTPATIENT
Start: 2021-04-23 | End: 2021-04-23 | Stop reason: SDUPTHER

## 2021-04-23 RX ORDER — ONDANSETRON 2 MG/ML
INJECTION INTRAMUSCULAR; INTRAVENOUS PRN
Status: DISCONTINUED | OUTPATIENT
Start: 2021-04-23 | End: 2021-04-23 | Stop reason: SDUPTHER

## 2021-04-23 RX ORDER — BUSPIRONE HYDROCHLORIDE 10 MG/1
10 TABLET ORAL 2 TIMES DAILY
Status: DISCONTINUED | OUTPATIENT
Start: 2021-04-23 | End: 2021-04-24 | Stop reason: HOSPADM

## 2021-04-23 RX ORDER — BISACODYL 10 MG
10 SUPPOSITORY, RECTAL RECTAL DAILY PRN
Status: DISCONTINUED | OUTPATIENT
Start: 2021-04-23 | End: 2021-04-24 | Stop reason: HOSPADM

## 2021-04-23 RX ORDER — MORPHINE SULFATE 15 MG/1
15 TABLET, FILM COATED, EXTENDED RELEASE ORAL EVERY 12 HOURS SCHEDULED
Status: DISCONTINUED | OUTPATIENT
Start: 2021-04-23 | End: 2021-04-24 | Stop reason: HOSPADM

## 2021-04-23 RX ORDER — GABAPENTIN 300 MG/1
300 CAPSULE ORAL 3 TIMES DAILY
Status: DISCONTINUED | OUTPATIENT
Start: 2021-04-23 | End: 2021-04-24 | Stop reason: HOSPADM

## 2021-04-23 RX ORDER — SODIUM CHLORIDE 9 MG/ML
25 INJECTION, SOLUTION INTRAVENOUS PRN
Status: DISCONTINUED | OUTPATIENT
Start: 2021-04-23 | End: 2021-04-24 | Stop reason: HOSPADM

## 2021-04-23 RX ORDER — CEFAZOLIN SODIUM 2 G/50ML
SOLUTION INTRAVENOUS PRN
Status: DISCONTINUED | OUTPATIENT
Start: 2021-04-23 | End: 2021-04-23 | Stop reason: SDUPTHER

## 2021-04-23 RX ORDER — PROMETHAZINE HYDROCHLORIDE 12.5 MG/1
12.5 TABLET ORAL EVERY 6 HOURS PRN
Status: DISCONTINUED | OUTPATIENT
Start: 2021-04-23 | End: 2021-04-24 | Stop reason: HOSPADM

## 2021-04-23 RX ORDER — ONDANSETRON 2 MG/ML
4 INJECTION INTRAMUSCULAR; INTRAVENOUS EVERY 6 HOURS PRN
Status: DISCONTINUED | OUTPATIENT
Start: 2021-04-23 | End: 2021-04-24 | Stop reason: HOSPADM

## 2021-04-23 RX ORDER — BENZTROPINE MESYLATE 0.5 MG/1
0.5 TABLET ORAL 2 TIMES DAILY
Status: DISCONTINUED | OUTPATIENT
Start: 2021-04-23 | End: 2021-04-24 | Stop reason: HOSPADM

## 2021-04-23 RX ORDER — METOPROLOL TARTRATE 5 MG/5ML
INJECTION INTRAVENOUS PRN
Status: DISCONTINUED | OUTPATIENT
Start: 2021-04-23 | End: 2021-04-23 | Stop reason: SDUPTHER

## 2021-04-23 RX ORDER — MORPHINE SULFATE 4 MG/ML
4 INJECTION, SOLUTION INTRAMUSCULAR; INTRAVENOUS
Status: DISCONTINUED | OUTPATIENT
Start: 2021-04-23 | End: 2021-04-24

## 2021-04-23 RX ORDER — SODIUM CHLORIDE 9 MG/ML
INJECTION, SOLUTION INTRAVENOUS CONTINUOUS
Status: DISCONTINUED | OUTPATIENT
Start: 2021-04-23 | End: 2021-04-24

## 2021-04-23 RX ORDER — PHENYLEPHRINE HYDROCHLORIDE 10 MG/ML
INJECTION INTRAVENOUS PRN
Status: DISCONTINUED | OUTPATIENT
Start: 2021-04-23 | End: 2021-04-23 | Stop reason: SDUPTHER

## 2021-04-23 RX ORDER — PROPOFOL 10 MG/ML
INJECTION, EMULSION INTRAVENOUS PRN
Status: DISCONTINUED | OUTPATIENT
Start: 2021-04-23 | End: 2021-04-23 | Stop reason: SDUPTHER

## 2021-04-23 RX ORDER — ROSUVASTATIN CALCIUM 20 MG/1
20 TABLET, COATED ORAL DAILY
Status: DISCONTINUED | OUTPATIENT
Start: 2021-04-23 | End: 2021-04-24 | Stop reason: HOSPADM

## 2021-04-23 RX ORDER — KETAMINE HYDROCHLORIDE 10 MG/ML
INJECTION, SOLUTION INTRAMUSCULAR; INTRAVENOUS PRN
Status: DISCONTINUED | OUTPATIENT
Start: 2021-04-23 | End: 2021-04-23 | Stop reason: SDUPTHER

## 2021-04-23 RX ORDER — FENTANYL CITRATE 50 UG/ML
25 INJECTION, SOLUTION INTRAMUSCULAR; INTRAVENOUS EVERY 5 MIN PRN
Status: DISCONTINUED | OUTPATIENT
Start: 2021-04-23 | End: 2021-04-23 | Stop reason: HOSPADM

## 2021-04-23 RX ORDER — SODIUM CHLORIDE, SODIUM LACTATE, POTASSIUM CHLORIDE, CALCIUM CHLORIDE 600; 310; 30; 20 MG/100ML; MG/100ML; MG/100ML; MG/100ML
1000 INJECTION, SOLUTION INTRAVENOUS CONTINUOUS
Status: DISCONTINUED | OUTPATIENT
Start: 2021-04-23 | End: 2021-04-23

## 2021-04-23 RX ADMIN — ACETAMINOPHEN 650 MG: 325 TABLET ORAL at 15:45

## 2021-04-23 RX ADMIN — SODIUM CHLORIDE, POTASSIUM CHLORIDE, SODIUM LACTATE AND CALCIUM CHLORIDE 1000 ML: 600; 310; 30; 20 INJECTION, SOLUTION INTRAVENOUS at 06:27

## 2021-04-23 RX ADMIN — METHOCARBAMOL TABLETS 1500 MG: 750 TABLET, COATED ORAL at 21:55

## 2021-04-23 RX ADMIN — GABAPENTIN 300 MG: 300 CAPSULE ORAL at 15:49

## 2021-04-23 RX ADMIN — SUGAMMADEX 300 MG: 100 INJECTION, SOLUTION INTRAVENOUS at 10:35

## 2021-04-23 RX ADMIN — SODIUM CHLORIDE, PRESERVATIVE FREE 10 ML: 5 INJECTION INTRAVENOUS at 21:55

## 2021-04-23 RX ADMIN — METHOCARBAMOL TABLETS 1500 MG: 750 TABLET, COATED ORAL at 15:44

## 2021-04-23 RX ADMIN — BUSPIRONE HYDROCHLORIDE 10 MG: 10 TABLET ORAL at 21:50

## 2021-04-23 RX ADMIN — LIDOCAINE HYDROCHLORIDE 50 MG: 10 INJECTION, SOLUTION EPIDURAL; INFILTRATION; INTRACAUDAL; PERINEURAL at 07:26

## 2021-04-23 RX ADMIN — METOPROLOL TARTRATE 2 MG: 1 INJECTION, SOLUTION INTRAVENOUS at 10:16

## 2021-04-23 RX ADMIN — DEXTROSE MONOHYDRATE 2000 MG: 50 INJECTION, SOLUTION INTRAVENOUS at 19:04

## 2021-04-23 RX ADMIN — ROCURONIUM BROMIDE 20 MG: 10 INJECTION INTRAVENOUS at 10:10

## 2021-04-23 RX ADMIN — BENZTROPINE MESYLATE 0.5 MG: 0.5 TABLET ORAL at 21:51

## 2021-04-23 RX ADMIN — Medication 1 TABLET: at 15:44

## 2021-04-23 RX ADMIN — FENTANYL CITRATE 50 MCG: 50 INJECTION, SOLUTION INTRAMUSCULAR; INTRAVENOUS at 11:23

## 2021-04-23 RX ADMIN — SODIUM CHLORIDE: 9 INJECTION, SOLUTION INTRAVENOUS at 19:04

## 2021-04-23 RX ADMIN — NAPHAZOLINE HYDROCHLORIDE AND PHENIRAMINE MALEATE 1 DROP: .25; 3 SOLUTION/ DROPS OPHTHALMIC at 15:45

## 2021-04-23 RX ADMIN — FENTANYL CITRATE 50 MCG: 50 INJECTION, SOLUTION INTRAMUSCULAR; INTRAVENOUS at 11:18

## 2021-04-23 RX ADMIN — PHENYLEPHRINE HYDROCHLORIDE 100 MCG: 10 INJECTION INTRAVENOUS at 08:53

## 2021-04-23 RX ADMIN — TIZANIDINE 4 MG: 4 TABLET ORAL at 21:53

## 2021-04-23 RX ADMIN — PROPOFOL 200 MG: 10 INJECTION, EMULSION INTRAVENOUS at 07:26

## 2021-04-23 RX ADMIN — SODIUM CHLORIDE, POTASSIUM CHLORIDE, SODIUM LACTATE AND CALCIUM CHLORIDE: 600; 310; 30; 20 INJECTION, SOLUTION INTRAVENOUS at 07:21

## 2021-04-23 RX ADMIN — METOPROLOL TARTRATE 1 MG: 1 INJECTION, SOLUTION INTRAVENOUS at 10:24

## 2021-04-23 RX ADMIN — FENTANYL CITRATE 100 MCG: 50 INJECTION, SOLUTION INTRAMUSCULAR; INTRAVENOUS at 07:33

## 2021-04-23 RX ADMIN — METOPROLOL TARTRATE 2 MG: 1 INJECTION, SOLUTION INTRAVENOUS at 07:58

## 2021-04-23 RX ADMIN — NAPHAZOLINE HYDROCHLORIDE AND PHENIRAMINE MALEATE 1 DROP: .25; 3 SOLUTION/ DROPS OPHTHALMIC at 21:53

## 2021-04-23 RX ADMIN — DICLOFENAC SODIUM 4 G: 10 GEL TOPICAL at 21:53

## 2021-04-23 RX ADMIN — PANTOPRAZOLE SODIUM 40 MG: 40 TABLET, DELAYED RELEASE ORAL at 15:43

## 2021-04-23 RX ADMIN — CEFAZOLIN SODIUM 2000 MG: 2 SOLUTION INTRAVENOUS at 07:57

## 2021-04-23 RX ADMIN — PANTOPRAZOLE SODIUM 40 MG: 40 TABLET, DELAYED RELEASE ORAL at 21:55

## 2021-04-23 RX ADMIN — PHENYLEPHRINE HYDROCHLORIDE 100 MCG: 10 INJECTION INTRAVENOUS at 08:41

## 2021-04-23 RX ADMIN — KETAMINE HYDROCHLORIDE 50 MG: 10 INJECTION INTRAMUSCULAR; INTRAVENOUS at 10:12

## 2021-04-23 RX ADMIN — PHENYLEPHRINE HYDROCHLORIDE 100 MCG: 10 INJECTION INTRAVENOUS at 09:17

## 2021-04-23 RX ADMIN — GABAPENTIN 300 MG: 300 CAPSULE ORAL at 21:52

## 2021-04-23 RX ADMIN — Medication 10 MG: at 10:30

## 2021-04-23 RX ADMIN — ROCURONIUM BROMIDE 20 MG: 10 INJECTION INTRAVENOUS at 09:48

## 2021-04-23 RX ADMIN — FENTANYL CITRATE 50 MCG: 50 INJECTION, SOLUTION INTRAMUSCULAR; INTRAVENOUS at 07:26

## 2021-04-23 RX ADMIN — PHENYLEPHRINE HYDROCHLORIDE 100 MCG: 10 INJECTION INTRAVENOUS at 09:05

## 2021-04-23 RX ADMIN — KETAMINE HYDROCHLORIDE 50 MG: 10 INJECTION INTRAMUSCULAR; INTRAVENOUS at 08:22

## 2021-04-23 RX ADMIN — ROCURONIUM BROMIDE 30 MG: 10 INJECTION INTRAVENOUS at 08:42

## 2021-04-23 RX ADMIN — OXYCODONE HYDROCHLORIDE 10 MG: 5 TABLET ORAL at 15:45

## 2021-04-23 RX ADMIN — ACETAMINOPHEN 650 MG: 325 TABLET ORAL at 21:50

## 2021-04-23 RX ADMIN — ROCURONIUM BROMIDE 50 MG: 10 INJECTION INTRAVENOUS at 07:26

## 2021-04-23 RX ADMIN — ONDANSETRON 4 MG: 2 INJECTION INTRAMUSCULAR; INTRAVENOUS at 10:26

## 2021-04-23 RX ADMIN — LOSARTAN POTASSIUM 50 MG: 50 TABLET, FILM COATED ORAL at 21:53

## 2021-04-23 RX ADMIN — MIDAZOLAM HYDROCHLORIDE 2 MG: 1 INJECTION, SOLUTION INTRAMUSCULAR; INTRAVENOUS at 07:01

## 2021-04-23 RX ADMIN — MIDAZOLAM HYDROCHLORIDE 2 MG: 1 INJECTION, SOLUTION INTRAMUSCULAR; INTRAVENOUS at 07:26

## 2021-04-23 RX ADMIN — DEXAMETHASONE SODIUM PHOSPHATE 10 MG: 10 INJECTION INTRAMUSCULAR; INTRAVENOUS at 09:00

## 2021-04-23 RX ADMIN — PHENYLEPHRINE HYDROCHLORIDE 100 MCG: 10 INJECTION INTRAVENOUS at 09:23

## 2021-04-23 RX ADMIN — MORPHINE SULFATE 15 MG: 15 TABLET, EXTENDED RELEASE ORAL at 21:50

## 2021-04-23 RX ADMIN — Medication 10 MG: at 10:50

## 2021-04-23 RX ADMIN — FENTANYL CITRATE 100 MCG: 50 INJECTION, SOLUTION INTRAMUSCULAR; INTRAVENOUS at 07:30

## 2021-04-23 ASSESSMENT — PULMONARY FUNCTION TESTS
PIF_VALUE: 27
PIF_VALUE: 6
PIF_VALUE: 7
PIF_VALUE: 28
PIF_VALUE: 26
PIF_VALUE: 26
PIF_VALUE: 32
PIF_VALUE: 27
PIF_VALUE: 29
PIF_VALUE: 29
PIF_VALUE: 28
PIF_VALUE: 30
PIF_VALUE: 31
PIF_VALUE: 30
PIF_VALUE: 31
PIF_VALUE: 30
PIF_VALUE: 28
PIF_VALUE: 23
PIF_VALUE: 1
PIF_VALUE: 26
PIF_VALUE: 29
PIF_VALUE: 30
PIF_VALUE: 29
PIF_VALUE: 7
PIF_VALUE: 28
PIF_VALUE: 27
PIF_VALUE: 7
PIF_VALUE: 25
PIF_VALUE: 28
PIF_VALUE: 30
PIF_VALUE: 31
PIF_VALUE: 29
PIF_VALUE: 31
PIF_VALUE: 30
PIF_VALUE: 27
PIF_VALUE: 25
PIF_VALUE: 29
PIF_VALUE: 29
PIF_VALUE: 27
PIF_VALUE: 21
PIF_VALUE: 30
PIF_VALUE: 7
PIF_VALUE: 32
PIF_VALUE: 29
PIF_VALUE: 24
PIF_VALUE: 27
PIF_VALUE: 32
PIF_VALUE: 24
PIF_VALUE: 9
PIF_VALUE: 21
PIF_VALUE: 8
PIF_VALUE: 27
PIF_VALUE: 29
PIF_VALUE: 7
PIF_VALUE: 32
PIF_VALUE: 8
PIF_VALUE: 33
PIF_VALUE: 31
PIF_VALUE: 25
PIF_VALUE: 8
PIF_VALUE: 28
PIF_VALUE: 6
PIF_VALUE: 4
PIF_VALUE: 6
PIF_VALUE: 30
PIF_VALUE: 29
PIF_VALUE: 28
PIF_VALUE: 24
PIF_VALUE: 30
PIF_VALUE: 31
PIF_VALUE: 1
PIF_VALUE: 33
PIF_VALUE: 30
PIF_VALUE: 31
PIF_VALUE: 30
PIF_VALUE: 33
PIF_VALUE: 30
PIF_VALUE: 29
PIF_VALUE: 27
PIF_VALUE: 27
PIF_VALUE: 29
PIF_VALUE: 28
PIF_VALUE: 28
PIF_VALUE: 21
PIF_VALUE: 29
PIF_VALUE: 28
PIF_VALUE: 27
PIF_VALUE: 21
PIF_VALUE: 27
PIF_VALUE: 7
PIF_VALUE: 20
PIF_VALUE: 31
PIF_VALUE: 24
PIF_VALUE: 21
PIF_VALUE: 7
PIF_VALUE: 29
PIF_VALUE: 25
PIF_VALUE: 8
PIF_VALUE: 43
PIF_VALUE: 32
PIF_VALUE: 31
PIF_VALUE: 29
PIF_VALUE: 31
PIF_VALUE: 31
PIF_VALUE: 28
PIF_VALUE: 32
PIF_VALUE: 30
PIF_VALUE: 0
PIF_VALUE: 27
PIF_VALUE: 30
PIF_VALUE: 28
PIF_VALUE: 30
PIF_VALUE: 29
PIF_VALUE: 31
PIF_VALUE: 31
PIF_VALUE: 27
PIF_VALUE: 6
PIF_VALUE: 1
PIF_VALUE: 28
PIF_VALUE: 28
PIF_VALUE: 30
PIF_VALUE: 8
PIF_VALUE: 6
PIF_VALUE: 31

## 2021-04-23 ASSESSMENT — PAIN DESCRIPTION - DESCRIPTORS
DESCRIPTORS: CONSTANT;ACHING;SHARP
DESCRIPTORS: DISCOMFORT

## 2021-04-23 ASSESSMENT — PAIN SCALES - GENERAL
PAINLEVEL_OUTOF10: 3
PAINLEVEL_OUTOF10: 5
PAINLEVEL_OUTOF10: 10
PAINLEVEL_OUTOF10: 10

## 2021-04-23 ASSESSMENT — PAIN DESCRIPTION - ORIENTATION: ORIENTATION: RIGHT;LEFT

## 2021-04-23 ASSESSMENT — PAIN DESCRIPTION - ONSET: ONSET: ON-GOING

## 2021-04-23 ASSESSMENT — PAIN DESCRIPTION - LOCATION: LOCATION: BACK

## 2021-04-23 ASSESSMENT — ENCOUNTER SYMPTOMS: SHORTNESS OF BREATH: 1

## 2021-04-23 ASSESSMENT — LIFESTYLE VARIABLES: SMOKING_STATUS: 1

## 2021-04-23 NOTE — BRIEF OP NOTE
Brief Postoperative Note      Patient: Boubacar Peralta  YOB: 1965  MRN: [id number]    Date of Procedure: 4/23/2021    Pre-Op Diagnosis: LUMBAR STENOSIS WITH CLAUDICATION, RADICULOPATHY    Post-Op Diagnosis: Same       Procedure(s):  LEFT HEMILAMINECTOMY L2-5 WITH CONTRALATERAL DECOMPRESSION. L3 FORAMINOTOMY    Surgeon(s):  Teressa Tidwell DO    Assistant:  First Assistant: Danny Reynolds RN    Anesthesia: General    Estimated Blood Loss (mL): less than 50     Complications: None    Specimens:   * No specimens in log *    Implants:  Implant Name Type Inv.  Item Serial No.  Lot No. LRB No. Used Action   GRAFT HUM TISS [id number] PLCNTA Silvia Iqbal [id number]  GRAFT HUM TISS [id number] PLCNTA PRATIBHA Qureshi [id number] [employer] [facility] [id number] Left 1 Implanted         Drains:   [REMOVED] Urethral Catheter Non-latex 16 fr (Removed)       Findings: satisfactory decompression     Electronically signed by Teressa Tidwell DO on 4/23/2021 at 10:53 AM

## 2021-04-23 NOTE — PROGRESS NOTES
Physical Therapy    DATE: 2021    NAME: Gabi Grigsby  MRN: [id number]   : 1965      Patient not seen this date for Physical Therapy due to:    Surgery/Procedure: Pt had lumbar surgery this AM. Will check .       Electronically signed by Shad Braxton PT on 2021 at 2:07 PM

## 2021-04-23 NOTE — OP NOTE
Operative Note      Patient: Catherine Li  YOB: 1965  MRN: [id number]    Date of Procedure: 4/23/2021    Pre-Op Diagnosis: LUMBAR STENOSIS WITH CLAUDICATION, RADICULOPATHY    Post-Op Diagnosis: Same       Procedure(s):  LEFT HEMILAMINECTOMY L2-5 WITH CONTRALATERAL DECOMPRESSION. L3 FORAMINOTOMY    Surgeon(s):  Anna Velasquez DO    Assistant:   First Assistant: Lily Summers; Tara Donis RN    Anesthesia: General    Estimated Blood Loss (mL): less than 50     Complications: None    Specimens:   * No specimens in log *    Implants:  Implant Name Type Inv. Item Serial No.  Lot No. LRB No. Used Action   GRAFT HUM TISS [id number] PLCNTA DEHYDR Laxmi Neither - [id number]  GRAFT HUM TISS [id number] PLCNTA DEHYDR Laxmi Neither [id number] [id number]- [id number] Left 1 Implanted         Drains:   [REMOVED] Urethral Catheter Non-latex 16 fr (Removed)       Findings: satisfictory decompression of neuro elements    Detailed Description of Procedure:   Operative details:  Patient was brought in the OR by anesthesiologist.  [facility] IVs were installed. Patient was induced and intubated. Hernandez catheter was placed. Patient was then turned prone to an open Mendoza table with manny frame on top. All points of contact between the body and the table as well padded. A localizing x-ray was done to localize level of L2-L5. This area of the back was thoroughly scrubbed with alcohol, prepped with ChloraPrep, and sterilely draped. Patient received preoperative antibiotics. A timeout was called and all members of the operative team agreed to the procedure. 1% lidocaine with epinephrine was infiltrated in the proposed midline incision. Approximately 10 cm midline incision was carried down with 10 blade. Hemostasis was obtained by bipolar cautery at low settings.   Cautery was then used to expose the thoracodorsal fascia and then a subperiosteal dissection was done to expose the L2-5 hemilamina on the left side with a combination of Bryant and Bovie. A self retaining retractor maintained exposure. Intraoperative fluoroscopy was used to verify the level. Then using combination of High-speed drill and Kerrisons, a hemilaminectomy was done. The underlying thickened ligamentum flavum was removed as well. At L3  forminotomy was done as there was significant lateral recess and forminal stenosis requiring a small amount of medial facetectomy. An contralateral sublaminar decompression was completed using high-speed drill and Kerrisons to thin the lamina and hypertrophic ligamentous flavus focused on the interlaminar junctions. A Penfield 3 was used  to probe the contralateral size to ensure adequate  central lumbar decompression. The contralateral decompression was probed for any jagged bony edges and this was made smooth by curettes and Kerrisons to reduce the risk of delayed CSF leak. The wound was thoroughly irrigated several times to ensure no active bleeding. Epidural bleeding that was easily controlled with Surgi-Han and tamponade. A final inspection showed no active bleeding. A piece of NuShield dehydrated placental allograft placed on the dura to prevent future scarring. Vancomycin powder was onlayed in the subfascial space. A drain was placed and tunneled subcutaneously superiorly from the wound. The wound was  closed in multiple layers usual fashion. vancomycin powder was then underlaid on the suprafascial space. The skin was dressed with Dermabond. Patient was then turned supine off the OR table and extubated and recovered in PACU without complications.     Electronically signed by Jerri Hernandez DO on 4/23/2021 at 11:56 AM

## 2021-04-23 NOTE — H&P
Pt Name: Tori Jett  MRN: [id number]  YOB: 1965  Date of evaluation: 4/23/2021    I have reviewed the patient's history and physical examination completed in pre-admission testing on 4/9/21. Original H&P copied below. No changes to history or on examination today, unless noted below. Negative covid-19 screening 4/19/21    ALEKSANDR  MAURA APRN-CNP  4/23/21  6:56 AM    History and Physical    Pt Name: Tori Jett  MRN: [id number]  YOB: 1965  Date of evaluation: 4/9/2021  Primary Care Physician: GISELLE Austin CNP    SUBJECTIVE:   History of Chief Complaint:    Tori Jett is a 63 y.o. female who presents for PAT appointment. Patient complains of chronic back pain, left leg pain all the way down to her ankle. She reports she is unable to walk long distances or for long duration and has to use assistive devices for ambulation. She reports she has to \"pick it up\" as well. She reports following with pain management and has undergone multiple interventions with them for her back. *(She reports her RIGHT  leg has also had issues in the past- alleviated with therapy but feels pain returning on the right again). Patient has been scheduled for HEMILAMINECTOMY L2-5, POSSIBLE FULL LAMINECTOMY- LEFT. Allergies  is allergic to fenofibrate. Medications  Prior to Admission medications    Medication Sig Start Date End Date Taking? Authorizing Provider   losartan (COZAAR) 25 MG tablet TAKE 1 TABLET(25 MG) BY MOUTH DAILY 4/1/21  Yes Historical Provider, MD   rosuvastatin (CRESTOR) 20 MG tablet Take 20 mg by mouth daily  3/10/21  Yes Historical Provider, MD   celecoxib (CELEBREX) 200 MG capsule Take 200 mg by mouth 2 times daily   Yes Historical Provider, MD   morphine (MS CONTIN) 15 MG extended release tablet Take 1 tablet by mouth every 12 hours for 30 days. 3/20/21 4/19/21 Yes Laxmi Moon MD   gabapentin (NEURONTIN) 300 MG capsule Take 1 capsule by mouth 3 times daily for 90 days. Intended supply: 30 days 3/17/21 6/15/21 Yes Beckie Jacobson DO   tiZANidine (ZANAFLEX) 4 MG tablet Take 1 tablet by mouth nightly 2/18/21 4/9/21 Yes GISELLE Vázquez CNP   busPIRone (BUSPAR) 10 MG tablet Take 10 mg by mouth 2 times daily  12/26/20  Yes Historical Provider, MD   diclofenac sodium (VOLTAREN) 1 % GEL Apply 4 g topically 4 times daily as needed for Pain 1/13/21 4/9/21 Yes Mary Gant MD   DULoxetine (CYMBALTA) 30 MG extended release capsule Take 30 mg by mouth daily   Yes Historical Provider, MD   benztropine (COGENTIN) 0.5 MG tablet Take 0.5 mg by mouth 2 times daily   Yes Historical Provider, MD   Acetaminophen (TYLENOL ARTHRITIS PAIN PO) Take 2 tablets by mouth daily   Yes Historical Provider, MD   pantoprazole (PROTONIX) 40 MG tablet Take 40 mg by mouth 2 times daily  1/10/18  Yes Historical Provider, MD   zolpidem (AMBIEN) 10 MG tablet Take 10 mg by mouth nightly as needed for Sleep   Yes Historical Provider, MD   Multiple Vitamins-Minerals (THERAPEUTIC MULTIVITAMIN-MINERALS) tablet Take 1 tablet by mouth daily   Yes Historical Provider, MD   Biotin 5000 MCG CAPS Take 1 capsule by mouth daily    Yes Historical Provider, MD   amphetamine-dextroamphetamine (ADDERALL) 20 MG tablet Take 20 mg by mouth daily.     Yes Historical Provider, MD     Past Medical History    has a past medical history of ADHD (attention deficit hyperactivity disorder), Anxiety and depression, Arthritis, Bipolar 1 disorder ([facility] [address] COPD (chronic obstructive pulmonary disease) ([facility] [address] Depression with anxiety, Family history of general anesthesia reaction, GERD (gastroesophageal reflux disease), Heel spur, History of kidney stones, Hyperlipidemia, Hypertension, Left hip pain, Lumbar radiculopathy, Lumbar stenosis with neurogenic claudication, Obesity, MARTÍN (obstructive sleep apnea), PTSD (post-traumatic stress disorder), Tremor, Trouble swallowing, Under care of team, Under care of team, Under care of team, Wears dentures, Wears glasses, Weight gain, and Wellness examination. Past Surgical History   has a past surgical history that includes Nerve Block (08/01/2016); Hysterectomy; Carpal tunnel release (Bilateral); Tonsillectomy; Nerve Block (10/31/2016); Nerve Block (Left); Nerve Block (Left, 01/16/2017); Nerve Block (Left, 02/06/2017); Nerve Block (Right, 06/19/2017); Nerve Block (07/07/2017); Nerve Block (09/11/2017); Nerve Block (Left, 10/02/2018); Nerve Block (10/16/2018); Nerve Block (Left, 10/30/2018); Nerve Block (Right, 11/13/2018); Nerve Block (03/26/2019); other surgical history (08/01/2019); RADIOFREQUENCY ABLATION NERVES (Left, 08/01/2019); other surgical history (Right, 08/15/2019); RADIOFREQUENCY ABLATION NERVES (Right, 08/15/2019); other surgical history (09/12/2019); hc inject other perphrl nerv (Left, 09/12/2019); epidural steroid injection (Left, 09/12/2019); Anesthesia Nerve Block (Left, 10/07/2019); other surgical history (Left, 10/24/2019); RADIOFREQUENCY ABLATION NERVES (Left, 10/24/2019); Pain management procedure (Left, 06/18/2020); Pain management procedure (Right, 07/27/2020); Pain management procedure (Right, 08/24/2020); Pain management procedure (Left, 10/12/2020); Endoscopy, colon, diagnostic; Breast surgery; Colonoscopy; Lithotripsy; and Breast lumpectomy (Right, 1991). Social History   reports that she quit smoking about 6 weeks ago. She smoked 0.00 packs per day. She has never used smokeless tobacco.    reports no history of alcohol use. reports no history of drug use. Marital Status single  Children one  Occupation disabled  Family History  Family Status   Relation Name Status    Mother  Alive    Father  Alive     family history includes Cancer in her father; Heart Disease in her father; High Blood Pressure in her father and mother; Other in her father and mother; Stroke in her father; Vision Loss in her father and mother.     Review of Systems:  CONSTITUTIONAL:   negative for fevers, chills, fatigue and malaise    EYES:   negative for double vision, blurred vision and photophobia +glassese   HEENT:   negative for tinnitus, epistaxis and sore throat     RESPIRATORY:   negative for cough, shortness of breath, wheezing     CARDIOVASCULAR:   negative for chest pain, palpitations, syncope, edema     GASTROINTESTINAL:   negative for nausea, vomiting     GENITOURINARY:   negative for incontinence     MUSCULOSKELETAL:   +back pain   NEUROLOGICAL:   negative for headaches and dizziness     PSYCHIATRIC:   +psychiatric illness, stable with treatment      OBJECTIVE:   VITALS:  height is 5' 5\" (1.651 m) and weight is 247 lb (112 kg). Her temporal temperature is 98.2 °F (36.8 °C). Her blood pressure is 157/95 (abnormal) and her pulse is 100. Her respiration is 18 and oxygen saturation is 97%. CONSTITUTIONAL:alert & oriented x 3, no acute distress. Friendly. Very pleasant. SKIN:  Warm and dry, no rashes on exposed areas of skin   HEAD:  Normocephalic, atraumatic   EYES: PERRL. EOMs intact. Wearing glasses. EARS:  Equal bilaterally, no edema or thickening, skin is intact without lumps or lesions. No discharge. Hearing grossly WNL. NOSE:  Nares patent. No rhinorrhea   MOUTH/THROAT:  Mucous membranes moist, tongue is pink, uvula midline, dentures  NECK:supple, no lymphadenopathy  LUNGS: Respirations even and non-labored. Clear to auscultation bilaterally, no wheezes, rales, or rhonchi. CARDIOVASCULAR: Regular rate and rhythm, no murmurs/rubs/gallops   ABDOMEN: soft, non-tender, non-distended, bowel sounds active x 4, rotund   EXTREMITIES: No edema bilateral lower extremities. No varicosities bilateral lower extremities. NEUROLOGIC: CN II-XII are grossly intact.   Gait not assessed, using wheelchair today     Testing:   EK21  Labs pending: drawn 2021     IMPRESSIONS:   Lumbar stenosis with claudication, and radiculopathy       Diagnosis Date    ADHD (attention deficit hyperactivity disorder)

## 2021-04-23 NOTE — ANESTHESIA POSTPROCEDURE EVALUATION
Department of Anesthesiology  Postprocedure Note    Patient: Anika Colvin  MRN: [id number]  YOB: 1965  Date of evaluation: 4/23/2021  Time:  12:17 PM     Procedure Summary     Date: 04/23/21 Room / Location: [address]    Anesthesia Start: 1878 Anesthesia Stop: 1101    Procedure: HEMILAMINECTOMY L2-5 WITH CONTRALATERAL DECOMPRESSION. L3 FORAMINOTOMY (Left Spine Lumbar) Diagnosis: (LUMBAR STENOSIS WITH CLAUDICATION, RADICULOPATHY)    Surgeons: Ange Ray DO Responsible Provider: Ravin Tejeda MD    Anesthesia Type: general ASA Status: 3          Anesthesia Type: general    Gauri Phase I: Gauri Score: 9    Gauri Phase II:      Last vitals: Reviewed and per EMR flowsheets.        Anesthesia Post Evaluation    Patient location during evaluation: PACU  Patient participation: complete - patient participated  Level of consciousness: awake and alert  Pain score: 4  Airway patency: patent  Nausea & Vomiting: no nausea and no vomiting  Complications: no  Cardiovascular status: hemodynamically stable  Respiratory status: acceptable  Hydration status: euvolemic

## 2021-04-23 NOTE — ANESTHESIA PRE PROCEDURE
(CELEBREX) 200 MG capsule Take 200 mg by mouth 2 times daily    Historical Provider, MD   pantoprazole (PROTONIX) 40 MG tablet Take 40 mg by mouth 2 times daily  1/10/18   Historical Provider, MD   zolpidem (AMBIEN) 10 MG tablet Take 10 mg by mouth nightly as needed for Sleep    Historical Provider, MD       Current medications:    Current Facility-Administered Medications   Medication Dose Route Frequency Provider Last Rate Last Admin    lactated ringers infusion 1,000 mL  1,000 mL Intravenous Continuous Nava Lindsey MD 50 mL/hr at 04/23/21 0627 1,000 mL at 04/23/21 9893       Allergies:     Allergies   Allergen Reactions    Fenofibrate Nausea And Vomiting       Problem List:    Patient Active Problem List   Diagnosis Code    Lumbar radiculopathy M54.16    Depressive disorder, not elsewhere classified F32.9    Chronic back pain M54.9, G89.29    Chronic bilateral low back pain without sciatica M54.5, G89.29    Medication monitoring encounter Z51.81    Cervical radiculopathy M54.12    Lumbosacral spondylosis without myelopathy M47.817    Left hip pain M25.552    Hip arthritis M16.10    Left lumbar radiculitis M54.16    Lumbar disc herniation M51.26    Primary osteoarthritis of left hip M16.12    Chronic use of opiate for therapeutic purpose Z79.891    Morbid obesity with BMI of 40.0-44.9, adult ([facility]) E66.01, Z68.41       Past Medical History:        Diagnosis Date    ADHD (attention deficit hyperactivity disorder)     Anxiety and depression     Arthritis     shoulders, hips, elbows, back    Bipolar 1 disorder ([facility])     COPD (chronic obstructive pulmonary disease) ([facility])     Depression with anxiety     Family history of general anesthesia reaction     prolonged emergence in her father, who has multiple chronic conditions    GERD (gastroesophageal reflux disease)     Heel spur     left    History of kidney stones     Hyperlipidemia     Hypertension     follows with dr. Lexie Dhaliwal - nadine, has appt with him 4/13/21    Left hip pain     Lumbar radiculopathy     Lumbar stenosis with neurogenic claudication     Obesity     MARTÍN (obstructive sleep apnea)     has not used cpap in years, does not tolerate    PTSD (post-traumatic stress disorder)     [facility] [address] [facility], [facility]    Tremor     Trouble swallowing     Under care of team 04/08/2021    fgkgspbkrw-Gibejko-wktqarmxhx-last visit dec 2020    Under care of team 04/08/2021    pul[facility]ology[facility]-last visit 2019    Under care of team 04/08/2021    [facility]-last visit 2015-due to see april 2021   Cinthia Agustin Wears dentures     Wears glasses     Weight gain     Wellness examination 04/08/2021    pcp-dr kang-nadine-new doctor visit scheduled       Past Surgical History:        Procedure Laterality Date    ANESTHESIA NERVE BLOCK Left 10/07/2019    NERVE BLOCK LEFT HIP OBTURATOR AND FEMORAL performed by Laxmi Moon MD at [facility]      cyst from right breast    CARPAL TUNNEL RELEASE Bilateral     COLONOSCOPY      ENDOSCOPY, COLON, DIAGNOSTIC      EPIDURAL STEROID INJECTION Left 09/12/2019    EPIDURAL STEROID INJECTION performed by Laxmi Moon MD at [address] [facility] NERV Left 09/12/2019    INJECTION MEDICATION LEFT HIP performed by Laxmi Moon MD at [address] [place] [address] LITHOTRIPSY      x2   [address]  08/01/2016    duramorph 1mg  celestone 9mg    NERVE BLOCK  10/31/2016    duramorph epidural steroid block decadron 7.5 mg durmoprh 1.5 mg    NERVE BLOCK Left     NO STEROID, LEFT MBNB# 1    NERVE BLOCK Left 01/16/2017    LT MBNB #2   celestone 6mg    NERVE BLOCK Left 02/06/2017    LT lumbar RF    kenalog 40    NERVE BLOCK Right 06/19/2017    right lumbar mbnb #1    NERVE BLOCK  07/07/2017    right radiofrequency kenolog 40mg    NERVE BLOCK  09/11/2017 duramorph 1mg & celestone 9 mg    NERVE BLOCK Left 10/02/2018    LEFT LUMBAR EPIDURAL STEROID BLOCK DECADRON 10MG    NERVE BLOCK  10/16/2018    blanca mbnb, marcaine and xylocaine    NERVE BLOCK Left 10/30/2018    left lumbar rf- no steroid    NERVE BLOCK Right 11/13/2018    RT lumbar RFA no steroid    NERVE BLOCK  03/26/2019    left hip decadron 10mg, marcaine . 5 %    OTHER SURGICAL HISTORY  08/01/2019    radiofrequency ablation L2-5    OTHER SURGICAL HISTORY Right 08/15/2019    lumbar radiofrequency ablation    OTHER SURGICAL HISTORY  09/12/2019     INJECTION MEDICATION LEFT HIP (Left Hip) EPIDURAL STEROID INJECTION (left back)    OTHER SURGICAL HISTORY Left 10/24/2019    nerve radiofrequency ablation    PAIN MANAGEMENT PROCEDURE Left 06/18/2020    EPIDURAL STEROID INJECTION LEFT L4,L5 performed by Pratibha Bennett MD at [place] [address] Right 07/27/2020    RIGHT MEDIAL [facility] L2 - L5 performed by Pratibha Bennett MD at [place] [address] Right 08/24/2020    RIGHT SIDED MEDIAL BRANCH NERVE RADIOFREQUENCY ABLATION   L 2 - L 5 performed by Pratibha Bennett MD at [place] [address] Left 10/12/2020    LEFT L4 L5 EPIDURAL STEROID INJECTION performed by Pratibha Bennett MD at [address] [place] [address] Left 08/01/2019    NERVE RADIOFREQUENCY ABLATION MEDIAN BRANCHES AT TRANSVERSE PROCESSES L3/L4/L5 AND S1 LEFT performed by Pratibha Bennett MD at [address] [place] [address] Right 08/15/2019    NERVE RADIOFREQUENCY ABLATION MEDIAN BRANCHES AT TRANSVERSE PROCESSES OF L3/L4/L5 AND S1 RIGHT performed by Pratibha Bennett MD at [address] [place] [address] Left 10/24/2019    NERVE RADIOFREQUENCY ABLATION LEFT HIP FEMORAL AND OBTURATOR NERVE performed by Pratibha Bennett MD at [place] [address] History:    Social History     Tobacco Use    Smoking status: Former Smoker Packs/day: 0.00     Quit date: 2021     Years since quittin.1    Smokeless tobacco: Never Used   Substance Use Topics    Alcohol use: Never     Alcohol/week: 0.0 standard drinks     Frequency: Never                                Counseling given: Not Answered      Vital Signs (Current):   Vitals:    21 0618   BP: (!) 192/91   Pulse: 96   Resp: 22   Temp: 97.7 °F (36.5 °C)   TempSrc: Temporal   SpO2: 98%   Weight: 247 lb (112 kg)   Height: 5' 5\" (1.651 m)                                              BP Readings from Last 3 Encounters:   21 (!) 192/91   21 (!) 180/101   21 (!) 157/95       NPO Status: Time of last liquid consumption:                         Time of last solid consumption:                         Date of last liquid consumption: 21                        Date of last solid food consumption: 21    BMI:   Wt Readings from Last 3 Encounters:   21 247 lb (112 kg)   21 247 lb (112 kg)   21 247 lb (112 kg)     Body mass index is 41.1 kg/m². CBC:   Lab Results   Component Value Date    WBC 11.2 2021    RBC 4.67 2021    HGB 13.0 2021    HCT 41.1 2021    MCV 88.0 2021    RDW 14.5 2021     2021       CMP:   Lab Results   Component Value Date     2021    K 4.2 2021     2021    CO2 23 2021    BUN 17 2021    CREATININE 0.70 2021    GFRAA >60 2021    LABGLOM >60 2021    GLUCOSE 86 2021    CALCIUM 10.0 2017       POC Tests: No results for input(s): POCGLU, POCNA, POCK, POCCL, POCBUN, POCHEMO, POCHCT in the last 72 hours.     Coags: No results found for: PROTIME, INR, APTT    HCG (If Applicable): No results found for: PREGTESTUR, PREGSERUM, HCG, HCGQUANT     ABGs: No results found for: PHART, PO2ART, ZSI2MLK, ESD3PHX, BEART, C7ITOLXT     Type & Screen (If Applicable):  No results found for: LABABO, LABRH    Drug/Infectious Status (If Applicable):  No results found for: HIV, HEPCAB    COVID-19 Screening (If Applicable):   Lab Results   Component Value Date    COVID19 Not Detected 04/19/2021    COVID19 Not Detected 08/20/2020    COVID19 Not Detected 06/14/2020           Anesthesia Evaluation    Airway: Mallampati: I     Neck ROM: full   Dental:    (+) upper dentures and lower dentures      Pulmonary: breath sounds clear to auscultation  (+) COPD:  shortness of breath:  sleep apnea: on noncompliant,  current smoker    (-) asthma                           Cardiovascular:    (+) hypertension:,         Rhythm: regular  Rate: normal                    Neuro/Psych:   (+) neuromuscular disease:, psychiatric history:depression/anxiety             GI/Hepatic/Renal:   (+) GERD:,           Endo/Other: Negative Endo/Other ROS                    Abdominal:   (+) obese,         Vascular: negative vascular ROS. Anesthesia Plan      general     ASA 3       Induction: intravenous. Anesthetic plan and risks discussed with patient. Plan discussed with CRNA.                   Rhina Johnston MD   4/23/2021

## 2021-04-23 NOTE — PLAN OF CARE
Problem: Pain:  Goal: Pain level will decrease  Description: Pain level will decrease  Outcome: Ongoing  Note: Pain level assessment complete. Pt rated pain at 7/10. Pt educated on pain scale and control interventions. PRN pain medication given per pt request. Pt instructed to call out with new onset of pain or unrelieved pain. Will continue to monitor.

## 2021-04-24 VITALS
HEIGHT: 65 IN | BODY MASS INDEX: 41.15 KG/M2 | HEART RATE: 97 BPM | RESPIRATION RATE: 16 BRPM | OXYGEN SATURATION: 95 % | DIASTOLIC BLOOD PRESSURE: 66 MMHG | TEMPERATURE: 98 F | WEIGHT: 247 LBS | SYSTOLIC BLOOD PRESSURE: 160 MMHG

## 2021-04-24 LAB
HCT VFR BLD CALC: 39.4 % (ref 36.3–47.1)
HEMOGLOBIN: 12.2 G/DL (ref 11.9–15.1)
INR BLD: 0.9
MCH RBC QN AUTO: 27.9 PG (ref 25.2–33.5)
MCHC RBC AUTO-ENTMCNC: 31 G/DL (ref 28.4–34.8)
MCV RBC AUTO: 90 FL (ref 82.6–102.9)
NRBC AUTOMATED: 0 PER 100 WBC
PARTIAL THROMBOPLASTIN TIME: 25.1 SEC (ref 20.5–30.5)
PDW BLD-RTO: 14.3 % (ref 11.8–14.4)
PLATELET # BLD: 340 K/UL (ref 138–453)
PMV BLD AUTO: 9.3 FL (ref 8.1–13.5)
PROTHROMBIN TIME: 9.5 SEC (ref 9.1–12.3)
RBC # BLD: 4.38 M/UL (ref 3.95–5.11)
WBC # BLD: 18.4 K/UL (ref 3.5–11.3)

## 2021-04-24 PROCEDURE — 6370000000 HC RX 637 (ALT 250 FOR IP): Performed by: REGISTERED NURSE

## 2021-04-24 PROCEDURE — 85730 THROMBOPLASTIN TIME PARTIAL: CPT

## 2021-04-24 PROCEDURE — 97162 PT EVAL MOD COMPLEX 30 MIN: CPT

## 2021-04-24 PROCEDURE — 85027 COMPLETE CBC AUTOMATED: CPT

## 2021-04-24 PROCEDURE — 97535 SELF CARE MNGMENT TRAINING: CPT

## 2021-04-24 PROCEDURE — 36415 COLL VENOUS BLD VENIPUNCTURE: CPT

## 2021-04-24 PROCEDURE — 97530 THERAPEUTIC ACTIVITIES: CPT

## 2021-04-24 PROCEDURE — APPSS30 APP SPLIT SHARED TIME 16-30 MINUTES: Performed by: REGISTERED NURSE

## 2021-04-24 PROCEDURE — 2580000003 HC RX 258: Performed by: PHYSICIAN ASSISTANT

## 2021-04-24 PROCEDURE — 6360000002 HC RX W HCPCS: Performed by: PHYSICIAN ASSISTANT

## 2021-04-24 PROCEDURE — 6370000000 HC RX 637 (ALT 250 FOR IP): Performed by: PHYSICIAN ASSISTANT

## 2021-04-24 PROCEDURE — 97166 OT EVAL MOD COMPLEX 45 MIN: CPT

## 2021-04-24 PROCEDURE — 85610 PROTHROMBIN TIME: CPT

## 2021-04-24 RX ORDER — OXYCODONE HYDROCHLORIDE 5 MG/1
TABLET ORAL
Qty: 56 TABLET | Refills: 0 | Status: SHIPPED | OUTPATIENT
Start: 2021-04-24 | End: 2021-05-14 | Stop reason: SDUPTHER

## 2021-04-24 RX ORDER — TIZANIDINE 4 MG/1
4 TABLET ORAL EVERY 6 HOURS PRN
Qty: 30 TABLET | Refills: 0 | Status: SHIPPED | OUTPATIENT
Start: 2021-04-24 | End: 2021-05-07 | Stop reason: ALTCHOICE

## 2021-04-24 RX ORDER — FAMOTIDINE 20 MG/1
20 TABLET, FILM COATED ORAL 2 TIMES DAILY
Status: DISCONTINUED | OUTPATIENT
Start: 2021-04-24 | End: 2021-04-24 | Stop reason: HOSPADM

## 2021-04-24 RX ADMIN — GABAPENTIN 300 MG: 300 CAPSULE ORAL at 12:52

## 2021-04-24 RX ADMIN — BUSPIRONE HYDROCHLORIDE 10 MG: 10 TABLET ORAL at 08:46

## 2021-04-24 RX ADMIN — Medication 1 TABLET: at 08:45

## 2021-04-24 RX ADMIN — METHOCARBAMOL TABLETS 1500 MG: 750 TABLET, COATED ORAL at 08:45

## 2021-04-24 RX ADMIN — DICLOFENAC SODIUM 4 G: 10 GEL TOPICAL at 08:54

## 2021-04-24 RX ADMIN — ACETAMINOPHEN 650 MG: 325 TABLET ORAL at 13:01

## 2021-04-24 RX ADMIN — DEXTROSE MONOHYDRATE 2000 MG: 50 INJECTION, SOLUTION INTRAVENOUS at 02:21

## 2021-04-24 RX ADMIN — METHOCARBAMOL TABLETS 1500 MG: 750 TABLET, COATED ORAL at 12:52

## 2021-04-24 RX ADMIN — ACETAMINOPHEN 650 MG: 325 TABLET ORAL at 08:45

## 2021-04-24 RX ADMIN — BENZTROPINE MESYLATE 0.5 MG: 0.5 TABLET ORAL at 09:37

## 2021-04-24 RX ADMIN — DULOXETINE HYDROCHLORIDE 30 MG: 30 CAPSULE, DELAYED RELEASE ORAL at 08:45

## 2021-04-24 RX ADMIN — DEXTROAMPHETAMINE SACCHARATE, AMPHETAMINE ASPARTATE, DEXTROAMPHETAMINE SULFATE AND AMPHETAMINE SULFATE 20 MG: 2.5; 2.5; 2.5; 2.5 TABLET ORAL at 08:46

## 2021-04-24 RX ADMIN — MORPHINE SULFATE 15 MG: 15 TABLET, EXTENDED RELEASE ORAL at 08:46

## 2021-04-24 RX ADMIN — ENOXAPARIN SODIUM 40 MG: 40 INJECTION, SOLUTION INTRAVENOUS; SUBCUTANEOUS at 08:45

## 2021-04-24 RX ADMIN — ACETAMINOPHEN 650 MG: 325 TABLET ORAL at 02:21

## 2021-04-24 RX ADMIN — GABAPENTIN 300 MG: 300 CAPSULE ORAL at 08:45

## 2021-04-24 RX ADMIN — ROSUVASTATIN CALCIUM 20 MG: 20 TABLET, FILM COATED ORAL at 09:37

## 2021-04-24 RX ADMIN — FAMOTIDINE 20 MG: 20 TABLET, FILM COATED ORAL at 08:45

## 2021-04-24 RX ADMIN — SODIUM CHLORIDE, PRESERVATIVE FREE 10 ML: 5 INJECTION INTRAVENOUS at 08:51

## 2021-04-24 ASSESSMENT — PAIN DESCRIPTION - LOCATION
LOCATION: BACK
LOCATION: BACK

## 2021-04-24 ASSESSMENT — PAIN SCALES - GENERAL
PAINLEVEL_OUTOF10: 4
PAINLEVEL_OUTOF10: 3
PAINLEVEL_OUTOF10: 3

## 2021-04-24 ASSESSMENT — PAIN DESCRIPTION - DESCRIPTORS
DESCRIPTORS: ACHING
DESCRIPTORS: CONSTANT

## 2021-04-24 ASSESSMENT — PAIN DESCRIPTION - PAIN TYPE: TYPE: ACUTE PAIN

## 2021-04-24 NOTE — CARE COORDINATION
Transitional Planning:    PS sent to Dr. Alessandro Hansen with neurosurg regarding pt requesting RW for discharge. 1406- faxed order, face to face, facesheet, PT note and h&p to [facility] [place] [facility] [place] for pt to have wheeled walker be delivered to the hospital before 4:00pm. If after 4:00pm, then pt is requesting that it be delivered to her home. 200- provided pt phone number for [facility] to call once discharged home incase wheeled walker is not delivered to her room before 4pm today. She verbalized understanding.     Discharge 1 [facility] Case Management Department  Written by: Lyly Porter RN    Patient Name: Sophie Bazzi  Attending Provider: Kevin Proctor DO  Admit Date: 2021  5:54 AM  MRN: [id number]  Account: [de-identified]                     : 1965  Discharge Date: 21      Disposition: home    Lyly Porter RN

## 2021-04-24 NOTE — PROGRESS NOTES
Occupational Therapy   Occupational Therapy Initial Assessment  Date: 2021   Patient Name: Boubacar Peralta  MRN: [id number]     : 1965     S/p left hemilaminectomy L2-5 with contralateral decompression, L3 foraminotomy (21)    Date of Service: 2021    Discharge Recommendations:  Patient would benefit from continued therapy after discharge  OT Equipment Recommendations  Equipment Needed: Yes  Mobility Devices: Merlynn Steff: Rolling    Assessment   Performance deficits / Impairments: Decreased functional mobility ; Decreased ADL status; Decreased safe awareness;Decreased endurance;Decreased balance;Decreased high-level IADLs  Assessment: Pt agreeable to OT eval this date. Pt completed functional transfers with SBA and functional mobility with CGA and RW use. Pt educated on bending/lifting/twisting restrictions and verbalized good understanding. Pt demo CGA-Min A for majority of ADLs at this time. Pt will benefit from continued OT services throughout hospitalization to increase independence and safety for ADL and functional mobility performance  Prognosis: Good  Decision Making: Medium Complexity  Patient Education: Pt educated on OT role, OT POC, safety awareness, RW use, bend/lift/twist restrictions, and importance of continued OT. Pt verbalized good understanding  REQUIRES OT FOLLOW UP: Yes  Activity Tolerance  Activity Tolerance: Patient Tolerated treatment well  Safety Devices  Safety Devices in place: Yes  Type of devices: Nurse notified; Left in bed;Gait belt;Call light within reach  Restraints  Initially in place: No           Patient Diagnosis(es): The encounter diagnosis was S/P lumbar fusion.      has a past medical history of ADHD (attention deficit hyperactivity disorder), Anxiety and depression, Arthritis, Bipolar 1 disorder ([address] COPD (chronic obstructive pulmonary disease) ([address] Depression with anxiety, Family history of general anesthesia reaction, GERD (gastroesophageal reflux this date. Pt agreeable to session and pleasant/cooperative throughout  Patient Currently in Pain: Yes  Pain Assessment  Pain Assessment: 0-10  Pain Level: 3  Pain Type: Acute pain  Pain Location: Back  Pain Orientation: Right; Lower  Pain Descriptors: Aching  Functional Pain Assessment: Activities are not prevented  Non-Pharmaceutical Pain Intervention(s): Ambulation/Increased Activity; Distraction;Repositioned; Therapeutic presence  Response to Pain Intervention: Patient Satisfied  Pre Treatment Pain Screening  Intervention List: Patient able to continue with treatment    Social/Functional History  Social/Functional History  Lives With: Family(Father and 25 y.o son)  Type of Home: Trailer  Home Layout: One level  Home Access: Stairs to enter without rails  Entrance Stairs - Number of Steps: 2  Bathroom Shower/Tub: Walk-in shower  Bathroom Toilet: Standard  Bathroom Equipment: Grab bars in [address]: (No DME)  ADL Assistance: Independent  Homemaking Assistance: Independent  Homemaking Responsibilities: Yes(Pt reports son performs cooking tasks)  Ambulation Assistance: Independent(pt reports independently ambulating with no AD)  Transfer Assistance: Independent  Active : Yes  Mode of Transportation: Car  Occupation: On disability  Leisure & Hobbies: Cat; TV  Additional Comments: Pt reports son is able to provide 24hr support and pt's father is currently in rehab. Objective   Vision: Impaired  Vision Exceptions: Wears glasses at all times  Hearing: Within functional limits         Balance  Sitting Balance: Stand by assistance  Standing Balance: Contact guard assistance  Functional Mobility  Functional - Mobility Device: Rolling Walker  Activity: Other  Assist Level: Contact guard assistance  Functional Mobility Comments: Pt completed functional mobility within hospital room and hallway with RW and CGA.  Pt mildly unsteady d/t LLE pain and limited ability to WB through LLE, requiring increased WB through BUE through RW     ADL  Feeding: Independent;Setup  Grooming: Stand by assistance;Setup; Increased time to complete  UE Bathing: Setup; Increased time to complete;Contact guard assistance  LE Bathing: Minimal assistance;Setup; Increased time to complete  UE Dressing: Contact guard assistance;Setup; Increased time to complete(donned and doffed gown to backside with CGA for threading BUE)  LE Dressing: Minimal assistance;Setup; Increased time to complete  Toileting: Contact guard assistance;Setup; Increased time to complete  Tone RUE  RUE Tone: Normotonic  Tone LUE  LUE Tone: Normotonic  Coordination  Movements Are Fluid And Coordinated: Yes    Bed mobility  Sit to Supine: Stand by assistance  Scooting: Stand by assistance  Comment: pt seated EOB at OT entrance; returned to supine at end of session.  HOB elevated ~45 degrees and utilizing bed rails  Transfers  Sit to stand: Stand by assistance  Stand to sit: Stand by assistance    Cognition  Overall Cognitive Status: Exceptions  Safety Judgement: Decreased awareness of need for assistance  Insights: Decreased awareness of deficits       Sensation  Overall Sensation Status: WFL        LUE AROM (degrees)  LUE AROM : WFL  Left Hand AROM (degrees)  Left Hand AROM: WFL  RUE AROM (degrees)  RUE AROM : WFL  Right Hand AROM (degrees)  Right Hand AROM: WFL  LUE Strength  Gross LUE Strength: WFL  L Hand General: 4/5  RUE Strength  Gross RUE Strength: WFL  R Hand General: 4/5                   Plan   Plan  Times per week: 3-4 x/wk  Current Treatment Recommendations: Balance Training, Functional Mobility Training, Endurance Training, Cognitive Reorientation, Safety Education & Training, Patient/Caregiver Education & Training, Equipment Evaluation, Education, & procurement, Self-Care / ADL, Home Management Training    AM-PAC Score  AM-PAC Inpatient Daily Activity Raw Score: 19 (04/24/21 1353)  AM-PAC Inpatient ADL T-Scale Score : 40.22 (04/24/21 1353)  ADL Inpatient CMS 0-100%

## 2021-04-24 NOTE — PROGRESS NOTES
Pt discharged with all of her belongings and paperwork. All questions were answered and pt was advised to  prescriptions. Also, pt was advised to keep all follow up appointments. Pt was wheeled down to the lobby by the [facility].

## 2021-04-24 NOTE — PROGRESS NOTES
Neurosurgery DOMINIK/Resident    Daily Progress Note   No chief complaint on file. 4/24/2021  7:48 AM    Chart reviewed. No acute events overnight. No new complaints. Pain well controlled. Tolerating oral diet. Voiding without issues. Ambulating to the bathroom overnight. Vitals:    04/23/21 2038 04/24/21 0216 04/24/21 0445 04/24/21 0725   BP: (!) 163/103 132/61 134/79    Pulse: 110 96 88 106   Resp: 17 17 15 19   Temp: 97.8 °F (36.6 °C)      TempSrc: Oral      SpO2: 92%  95% 98%   Weight:       Height:           PE:   AOx3   Motor   L iliopsoas 5/5 , R iliopsoas 5/5  L quadriceps 5/5; R quadriceps 5/5  L Dorsiflexion 5/5; R dorsiflexion 5/5  L Plantarflexion 5/5; R plantarflexion 5/5  L EHL 5/5; R EHL 5/5    Sensation intact    Incision lumbar site open to air      Lab Results   Component Value Date    WBC 18.4 (H) 04/24/2021    HGB 12.2 04/24/2021    HCT 39.4 04/24/2021     04/24/2021     04/09/2021    K 4.2 04/09/2021     04/09/2021    CREATININE 0.70 04/09/2021    BUN 17 04/09/2021    CO2 23 04/09/2021    INR 0.9 04/24/2021    CRP 4.8 09/18/2018    SEDRATE 15 09/18/2018       Radiology   Ct Lumbar Spine Wo Contrast    Result Date: 4/23/2021  EXAMINATION: CT OF THE LUMBAR SPINE WITHOUT CONTRAST  4/23/2021 TECHNIQUE: CT of the lumbar spine was performed without the administration of intravenous contrast. Multiplanar reformatted images are provided for review. Dose modulation, iterative reconstruction, and/or weight based adjustment of the mA/kV was utilized to reduce the radiation dose to as low as reasonably achievable. COMPARISON: 12/09/2020 HISTORY: ORDERING SYSTEM PROVIDED HISTORY: s/p laminectomy TECHNOLOGIST PROVIDED HISTORY: s/p laminectomy Reason for Exam: s/p laminectomy, s/p laminectomy FINDINGS: BONES/ALIGNMENT: There are 5 non-rib-bearing, lumbar type vertebral bodies. There is dextrocurvature of the lumbar spine.   There is left hemilaminectomy at L3, L4, and L5. DEGENERATIVE Yes

## 2021-04-24 NOTE — PROGRESS NOTES
Physical Therapy    Facility/Department: [facility]UR[facility]  Initial Assessment    NAME: Sean August  : 1965  MRN: [id number]    Date of Service: 2021  S/p  left hemilaminectomy L2-5 with contralateral decompression, L3 foraminotomy (21)  Discharge Recommendations:    Further therapy recommended at discharge. PT Equipment Recommendations  Equipment Needed: Yes  Mobility Devices: Tracie Estrin: Rolling    Assessment   Body structures, Functions, Activity limitations: Decreased functional mobility ; Decreased endurance; Increased pain;Decreased safe awareness;Decreased strength  Assessment: Pt ambulated 275ft w/ RW CGA, ambulating 15ft w/ no AD CGA. Pt demonstrates increased gait stability with use of RW this date. Recommending continued skilled physical therapy to address functional mobility deficits and stair negotiation to return pt to prior level of independence. Prognosis: Good  Decision Making: Medium Complexity  PT Education: Goals;PT Role;Plan of Care;Gait Training;Equipment  Patient Education: Use of RW to increase gait stability, decrease fall risk, and increased endurance  REQUIRES PT FOLLOW UP: Yes  Activity Tolerance  Activity Tolerance: Patient Tolerated treatment well       Patient Diagnosis(es): The encounter diagnosis was S/P lumbar fusion.      has a past medical history of ADHD (attention deficit hyperactivity disorder), Anxiety and depression, Arthritis, Bipolar 1 disorder ([address] COPD (chronic obstructive pulmonary disease) ([address] Depression with anxiety, Family history of general anesthesia reaction, GERD (gastroesophageal reflux disease), Heel spur, History of kidney stones, Hyperlipidemia, Hypertension, Left hip pain, Lumbar radiculopathy, Lumbar stenosis with neurogenic claudication, Obesity, MARTÍN (obstructive sleep apnea), PTSD (post-traumatic stress disorder), Tremor, Trouble swallowing, Under care of team, Under care of team, Under care of team, Wears dentures, Wears glasses, Weight gain, and Wellness examination. has a past surgical history that includes Nerve Block (08/01/2016); Hysterectomy; Carpal tunnel release (Bilateral); Tonsillectomy; Nerve Block (10/31/2016); Nerve Block (Left); Nerve Block (Left, 01/16/2017); Nerve Block (Left, 02/06/2017); Nerve Block (Right, 06/19/2017); Nerve Block (07/07/2017); Nerve Block (09/11/2017); Nerve Block (Left, 10/02/2018); Nerve Block (10/16/2018); Nerve Block (Left, 10/30/2018); Nerve Block (Right, 11/13/2018); Nerve Block (03/26/2019); RADIOFREQUENCY ABLATION NERVES (Left, 08/01/2019); RADIOFREQUENCY ABLATION NERVES (Right, 08/15/2019); hc inject other perphrl nerv (Left, 09/12/2019); epidural steroid injection (Left, 09/12/2019); Anesthesia Nerve Block (Left, 10/07/2019); RADIOFREQUENCY ABLATION NERVES (Left, 10/24/2019); Pain management procedure (Left, 06/18/2020); Pain management procedure (Right, 07/27/2020); Pain management procedure (Right, 08/24/2020); Pain management procedure (Left, 10/12/2020); Endoscopy, colon, diagnostic; Breast surgery; Colonoscopy; Lithotripsy; Breast lumpectomy (Right, 1991); Lumbar spine surgery (04/23/2021); and laminectomy (04/23/2021). Restrictions  Position Activity Restriction  Other position/activity restrictions:  Act as tolerated; S/p left hemilaminectomy L2-5 with contralateral decompression, L3 foraminotomy (4/23/21)  Vision/Hearing  Vision: Impaired  Vision Exceptions: Wears glasses at all times  Hearing: Within functional limits     Subjective  General  Patient assessed for rehabilitation services?: Yes  Response To Previous Treatment: Not applicable  Family / Caregiver Present: No  Follows Commands: Within Functional Limits  Subjective  Subjective: RN and pt in agreement for PT eval; pt seated EOB upon writer's arrival, pt pleasant and cooperative throughout  Pain Screening  Patient Currently in Pain: Yes  Pain Assessment  Pain Assessment: 0-10  Pain Level: 3  Pain Type: Acute pain  Pain Yes  Ambulation 1  Surface: level tile  Device: Rolling Walker  Assistance: Contact guard assistance  Quality of Gait: Difficulty with progression of RLE  Gait Deviations: Slow Bailey;Deviated path  Distance: 275ft  Comments: No LOB noted throughout. Pt reports UE fatigue with ambulation with RW. Ambulation 2  Surface - 2: level tile  Device 2: No device  Assistance 2: Contact guard assistance  Quality of Gait 2: Increased lateral sway; decreased step length bilaterally  Distance: 15ft  Comments: Increased difficulty with progression of RLE due to fatigue.  Pt demonstrate increased unsteadiness with mobility with no AD  Stairs/Curb  Stairs?: No     Balance  Posture: Good  Sitting - Static: Good  Sitting - Dynamic: Good  Standing - Static: Good;-  Standing - Dynamic: Good;-  Comments: Standing balance assessed w/ RW        Plan   Plan  Times per week: 5-6x/week  Current Treatment Recommendations: Strengthening, Transfer Training, Endurance Training, Patient/Caregiver Education & Training, ROM, Balance Training, Gait Training, Home Exercise Program, Functional Mobility Training, Stair training, Safety Education & Training  Safety Devices  Type of devices: Left in bed, Call light within reach, Gait belt, Nurse notified  Restraints  Initially in place: No    AM-PAC Score     AM-PAC Inpatient Mobility without Stair Climbing Raw Score : 18 (04/24/21 1310)  AM-PAC Inpatient without Stair Climbing T-Scale Score : 51.97 (04/24/21 1310)  Mobility Inpatient CMS 0-100% Score: 23.26 (04/24/21 1310)  Mobility Inpatient without Stair CMS G-Code Modifier : Silvestre Melendez (04/24/21 1310)       Goals  Short term goals  Time Frame for Short term goals: 10  Short term goal 1: Pt to perform bed mobility per log roll technique independently  Short term goal 2: Ambulate 300ft w/ no AD independently  Short term goal 3: Ascend/descend 2 stairs with one hand rail to simulate home environment SBA  Short term goal 4: Tolerate 30 minutes of therapy to carolee increased endurance  Patient Goals   Patient goals :  To go home       Therapy Time   Individual Concurrent Group Co-treatment   Time In 0913         Time Out 0937         Minutes 24         Timed Code Treatment Minutes: 8 Minutes       Ananda Cole PT

## 2021-04-24 NOTE — PLAN OF CARE
Balbina Houston was evaluated today and a DME order was entered for a wheeled walker because she requires this to successfully complete daily living tasks of personal cares and ambulating. A wheeled walker is necessary due to the patient's unsteady gait, upper body weakness, and inability to  an ambulation device; and she can ambulate only by pushing a walker instead of a lesser assistive device such as a cane, crutch, or standard walker. The need for this equipment was discussed with the patient and she understands and is in agreement.

## 2021-04-24 NOTE — CARE COORDINATION
Case Management Initial Discharge Plan  Sanam Paz,             Met with:patient to discuss discharge plans. Information verified: address, contacts, phone number, , insurance Yes    Emergency Contact/Next of Kin name & number: Oliver Garcia [phone]    PCP: Sonam Campa MD  Date of last visit: a few weeks ago    Insurance Provider: [facility] Medicare    Discharge Planning    Living Arrangements:  Children   Support Systems:  [id number] has 1 stories  2 stairs to climb to get into front door, 0 stairs to climb to reach second floor  Location of bedroom/bathroom in home main    Patient able to perform ADL's: Independent    Current Services (outpatient & in home) none  DME equipment: none  DME provider:     Receiving oral anticoagulation therapy? No    If indicated:   Physician managing anticoagulation treatment:   Where does patient obtain lab work for ATC treatment? Potential Assistance Needed:  N/A    Patient agreeable to home care: No  [place] of choice provided:  n/a    Prior SNF/Rehab Placement and Facility: none  Agreeable to SNF/Rehab: No  [place] of choice provided: n/a     Evaluation: n/a    Expected Discharge date:  21    Patient expects to be discharged to:  home  Follow Up Appointment: Best Day/ Time: Tuesday AM    Transportation provider: son  Transportation arrangements needed for discharge: No    Readmission Risk              Risk of Unplanned Readmission:        6             Does patient have a readmission risk score greater than 14?: No  If yes, follow-up appointment must be made within 7 days of discharge.      Goals of Care:       Discharge Plan: home with son, declines HH, requesting RW- waiting on order, has ride          Electronically signed by Dmitry Mcleod RN on 21 at 1:33 PM EDT

## 2021-04-26 ENCOUNTER — TELEPHONE (OUTPATIENT)
Dept: NEUROSURGERY | Age: 56
End: 2021-04-26

## 2021-04-26 DIAGNOSIS — M48.062 SPINAL STENOSIS OF LUMBAR REGION WITH NEUROGENIC CLAUDICATION: Primary | ICD-10-CM

## 2021-04-26 NOTE — TELEPHONE ENCOUNTER
Patient called stating zanaflex which was given after surgery is not working. Patient states what she was taking during hospital stay was worked better.  Please advise

## 2021-04-26 NOTE — DISCHARGE SUMMARY
iliopsoas 5/5 , R iliopsoas 5/5  L quadriceps 5/5; R quadriceps 5/5  L Dorsiflexion 5/5; R dorsiflexion 5/5  L Plantarflexion 5/5; R plantarflexion 5/5  L EHL 5/5; R EHL 5/5     Sensation intact     Incision lumbar site open to air    LABS     Recent Labs     04/24/21  0541   WBC 18.4*   HGB 12.2   HCT 39.4          DISCHARGE INSTRUCTIONS     Discharge Medications:        Medication List      START taking these medications    oxyCODONE 5 MG immediate release tablet  Commonly known as: Devyn Kim  May take 1 tablet by mouth every 6 hours as needed for Pain. May also take 2 tablets every 6 hours as needed for Pain. Do all this for 7 days. CHANGE how you take these medications    tiZANidine 4 MG tablet  Commonly known as: ZANAFLEX  Take 1 tablet by mouth every 6 hours as needed (pain)  What changed:   when to take this  reasons to take this        CONTINUE taking these medications    amphetamine-dextroamphetamine 20 MG tablet  Commonly known as: ADDERALL     benztropine 0.5 MG tablet  Commonly known as: COGENTIN     Biotin 5000 MCG Caps     busPIRone 10 MG tablet  Commonly known as: BUSPAR     celecoxib 200 MG capsule  Commonly known as: CELEBREX     diclofenac sodium 1 % Gel  Commonly known as: Voltaren  Apply 4 g topically 4 times daily as needed for Pain     DULoxetine 30 MG extended release capsule  Commonly known as: CYMBALTA     gabapentin 300 MG capsule  Commonly known as: NEURONTIN  Take 1 capsule by mouth 3 times daily for 90 days. Intended supply: 30 days     losartan 50 MG tablet  Commonly known as: COZAAR     morphine 15 MG extended release tablet  Commonly known as: MS CONTIN  Take 1 tablet by mouth every 12 hours for 5 days.      pantoprazole 40 MG tablet  Commonly known as: PROTONIX     rosuvastatin 20 MG tablet  Commonly known as: CRESTOR     therapeutic multivitamin-minerals tablet     TYLENOL ARTHRITIS PAIN PO     zolpidem 10 MG tablet  Commonly known as: AMBIEN           Where to Get

## 2021-04-26 NOTE — TELEPHONE ENCOUNTER
Please clarify what kind of symptoms she continues to have. There is some amount of postoperative pain that is expected. It does appear that tizanidine is what she was receiving while she was in the hospital, is there a different medication she is referring to?

## 2021-04-27 RX ORDER — CYCLOBENZAPRINE HCL 10 MG
10 TABLET ORAL 3 TIMES DAILY PRN
Qty: 21 TABLET | Refills: 0 | Status: SHIPPED | OUTPATIENT
Start: 2021-04-27 | End: 2021-05-07 | Stop reason: SDUPTHER

## 2021-04-27 NOTE — TELEPHONE ENCOUNTER
Flexeril sent to the pharmacy, to be used instead of Zanaflex. Hopefully this provides improvement in the muscle spasms. As per previous note, walker should be delivered today. We will see the patient in the office for her postop visit as scheduled.

## 2021-04-27 NOTE — TELEPHONE ENCOUNTER
Pt called stating she missed a call and wanted to know abt the muscle relaxer. Agreed to flexeril and would like medication to go to [place] on [address]. Wanting to know if having a brace would help with back? Please review and advise.    -Stated she was supposed to have a wheeled walker delivered by last Saturday and today is Tuesday and still no walker. Called [employer] and was informed walker would be delivered today.

## 2021-05-07 ENCOUNTER — OFFICE VISIT (OUTPATIENT)
Dept: NEUROSURGERY | Age: 56
End: 2021-05-07

## 2021-05-07 VITALS
SYSTOLIC BLOOD PRESSURE: 149 MMHG | OXYGEN SATURATION: 97 % | HEIGHT: 65 IN | DIASTOLIC BLOOD PRESSURE: 84 MMHG | BODY MASS INDEX: 41.15 KG/M2 | WEIGHT: 247 LBS | HEART RATE: 108 BPM

## 2021-05-07 DIAGNOSIS — M48.062 SPINAL STENOSIS OF LUMBAR REGION WITH NEUROGENIC CLAUDICATION: Primary | ICD-10-CM

## 2021-05-07 DIAGNOSIS — Z98.890 STATUS POST LUMBAR SURGERY: ICD-10-CM

## 2021-05-07 PROCEDURE — 99024 POSTOP FOLLOW-UP VISIT: CPT | Performed by: NURSE PRACTITIONER

## 2021-05-07 RX ORDER — CYCLOBENZAPRINE HCL 10 MG
10 TABLET ORAL 3 TIMES DAILY PRN
Qty: 21 TABLET | Refills: 0 | Status: SHIPPED | OUTPATIENT
Start: 2021-05-07 | End: 2021-05-14 | Stop reason: SDUPTHER

## 2021-05-07 RX ORDER — FAMOTIDINE 20 MG/1
20 TABLET, FILM COATED ORAL 2 TIMES DAILY
COMMUNITY
Start: 2021-04-15

## 2021-05-07 NOTE — PROGRESS NOTES
Calvin Lawrence  [facility] [address]  [id number]  Dept: [phone]    Patient:  Iman Cruz  YOB: 1965  Date: 5/7/21    The patient is a 63 y.o. female who presents today for consult of the following problems:     Chief Complaint   Patient presents with    Post-Op Check     2 week         HPI:     Iman Cruz is a 63 y.o. female who presents to the office for post-op evaluation s/p left hemilaminectomy L2-5 with contralateral decompression, L3 foraminotomy. LLE radiculopathy has resolved. Was having some numbness and tingling to right shin, this has resolved, but feels that she has had swelling to her right leg, which is new. No erythema, calf pain. Back/incision pain is tolerable. Incision is healing well, no erythema, discharge or drainage present. Has been trying to increase ambulation. Uses a walker. Strength 5/5  Sensation intact  1+ edema to right lower extremity, no erythema, calf tenderness, warmth  Incision CDI    Date of surgery: 4/23/2021    Assessment and Plan:      1. Spinal stenosis of lumbar region with neurogenic claudication    2. Status post lumbar surgery          Plan: Patient overall recovering well. Reassurance provided that it does take time for postoperative healing. Does already have improvement to left lower extremity radiculopathy. Incision healing well. Muscle relaxer refilled as requested, Flexeril works better for her than tizanidine. Advised to closely monitor leg, with any increase in swelling, redness, calf discomfort-contact office immediately. Referral also provided to initiate physical therapy to work on lower extremity range of motion, stretching, strengthening, no bending twisting lifting as of yet. Patient to return for next postop visit in June as scheduled. Followup: Return in about 4 weeks (around 6/4/2021), or if symptoms worsen or fail to improve.     Prescriptions Ordered:  Orders Placed This Encounter   Medications    cyclobenzaprine (FLEXERIL) 10 MG tablet     Sig: Take 1 tablet by mouth 3 times daily as needed for Muscle spasms     Dispense:  21 tablet     Refill:  0        Orders Placed:  Orders Placed This Encounter   Procedures   [facility]     Referral Priority:   Routine     Referral Type:   Eval and Treat     Referral Reason:   Specialty Services Required     Requested Specialty:   Physical Therapy     Number of Visits Requested:   1        Electronically signed by GISELLE Chaves CNP on 5/7/2021 at 3:23 PM    Please note that this chart was generated using voice recognition Dragon dictation software. Although every effort was made to ensure the accuracy of this automated transcription, some errors in transcription may have occurred.

## 2021-05-11 ENCOUNTER — HOSPITAL ENCOUNTER (OUTPATIENT)
Dept: PHYSICAL THERAPY | Facility: CLINIC | Age: 56
Setting detail: THERAPIES SERIES
Discharge: HOME | End: 2021-05-11
Payer: MEDICARE

## 2021-05-14 DIAGNOSIS — Z98.1 S/P LUMBAR FUSION: ICD-10-CM

## 2021-05-14 DIAGNOSIS — M48.062 SPINAL STENOSIS OF LUMBAR REGION WITH NEUROGENIC CLAUDICATION: ICD-10-CM

## 2021-05-14 RX ORDER — CYCLOBENZAPRINE HCL 10 MG
10 TABLET ORAL 3 TIMES DAILY PRN
Qty: 21 TABLET | Refills: 0 | Status: SHIPPED | OUTPATIENT
Start: 2021-05-14 | End: 2021-05-21 | Stop reason: SDUPTHER

## 2021-05-14 RX ORDER — OXYCODONE HYDROCHLORIDE 5 MG/1
5-10 TABLET ORAL EVERY 6 HOURS PRN
Qty: 35 TABLET | Refills: 0 | Status: SHIPPED | OUTPATIENT
Start: 2021-05-14 | End: 2021-05-21 | Stop reason: SDUPTHER

## 2021-05-18 ENCOUNTER — HOSPITAL ENCOUNTER (OUTPATIENT)
Dept: PHYSICAL THERAPY | Facility: CLINIC | Age: 56
Setting detail: THERAPIES SERIES
Discharge: HOME | End: 2021-05-18
Payer: MEDICARE

## 2021-05-18 NOTE — FLOWSHEET NOTE
[] [facility]) [facility]  [address]    [phone]  F: [phone]   [] [address]  [facility]   [address]  P: [phone]  F: [phone]  [] [place]  [address]  P: [phone]  F: [phone] [x] [address]  P: [phone]  F: [phone]  [] [address]  [facility]   [address]   [place]: [phone]  F: [phone]   [] [facility]  [address] [facility] [address]  [place]: [phone]   F: [phone]     Physical Therapy Cancel/No Show note    Date: 2021  Patient: Kae Tsai  : 1965  MRN: [id number]    Cancels/No Shows to date: 2 initial evaluation cx's     For today's appointment patient:    [x]  Cancelled    [] Rescheduled appointment    [] No-show     Reason given by patient:    [x]  Patient ill    []  Conflicting appointment    [] No transportation      [] Conflict with work    [] No reason given    [] Weather related    [] [id number]    [] Other:      Comments:  Patient stated she is feeling shaking and she needs to cancel. Patient asked to reschedule later in the day but there wasn't any appointments available. Patient was notified of the cancellation policy. Patient was advised that she call back when she is feeling better and is confident that she will be able to make the eval appointment because we will not be able to schedule her after the 3rd cx/ no-show eval appointment.       [] Next appointment was confirmed    Electronically signed by: Mike Brown

## 2021-05-21 DIAGNOSIS — M48.062 SPINAL STENOSIS OF LUMBAR REGION WITH NEUROGENIC CLAUDICATION: ICD-10-CM

## 2021-05-21 DIAGNOSIS — Z98.1 S/P LUMBAR FUSION: ICD-10-CM

## 2021-05-21 RX ORDER — CYCLOBENZAPRINE HCL 10 MG
10 TABLET ORAL 3 TIMES DAILY PRN
Qty: 21 TABLET | Refills: 0 | Status: SHIPPED | OUTPATIENT
Start: 2021-05-21 | End: 2021-05-27 | Stop reason: SDUPTHER

## 2021-05-21 RX ORDER — OXYCODONE HYDROCHLORIDE 5 MG/1
5 TABLET ORAL EVERY 6 HOURS PRN
Qty: 28 TABLET | Refills: 0 | Status: SHIPPED | OUTPATIENT
Start: 2021-05-21 | End: 2021-05-27 | Stop reason: SDUPTHER

## 2021-05-27 DIAGNOSIS — Z98.1 S/P LUMBAR FUSION: ICD-10-CM

## 2021-05-27 DIAGNOSIS — M48.062 SPINAL STENOSIS OF LUMBAR REGION WITH NEUROGENIC CLAUDICATION: ICD-10-CM

## 2021-05-27 RX ORDER — OXYCODONE HYDROCHLORIDE 5 MG/1
5 TABLET ORAL EVERY 8 HOURS PRN
Qty: 21 TABLET | Refills: 0 | Status: SHIPPED | OUTPATIENT
Start: 2021-05-27 | End: 2021-06-04 | Stop reason: SDUPTHER

## 2021-05-27 RX ORDER — CYCLOBENZAPRINE HCL 10 MG
10 TABLET ORAL 3 TIMES DAILY PRN
Qty: 21 TABLET | Refills: 0 | Status: SHIPPED | OUTPATIENT
Start: 2021-05-27 | End: 2021-06-04 | Stop reason: SDUPTHER

## 2021-06-04 DIAGNOSIS — Z98.1 S/P LUMBAR FUSION: ICD-10-CM

## 2021-06-04 DIAGNOSIS — M48.062 SPINAL STENOSIS OF LUMBAR REGION WITH NEUROGENIC CLAUDICATION: ICD-10-CM

## 2021-06-04 RX ORDER — CYCLOBENZAPRINE HCL 10 MG
10 TABLET ORAL 3 TIMES DAILY PRN
Qty: 21 TABLET | Refills: 0 | Status: SHIPPED | OUTPATIENT
Start: 2021-06-04 | End: 2021-06-14

## 2021-06-04 RX ORDER — OXYCODONE HYDROCHLORIDE 5 MG/1
5 TABLET ORAL EVERY 8 HOURS PRN
Qty: 17 TABLET | Refills: 0 | Status: SHIPPED | OUTPATIENT
Start: 2021-06-04 | End: 2021-06-10 | Stop reason: SDUPTHER

## 2021-06-10 ENCOUNTER — TELEPHONE (OUTPATIENT)
Dept: NEUROSURGERY | Age: 56
End: 2021-06-10

## 2021-06-10 DIAGNOSIS — Z98.1 S/P LUMBAR FUSION: ICD-10-CM

## 2021-06-10 RX ORDER — OXYCODONE HYDROCHLORIDE 5 MG/1
5 TABLET ORAL EVERY 12 HOURS PRN
Qty: 14 TABLET | Refills: 0 | Status: SHIPPED | OUTPATIENT
Start: 2021-06-11 | End: 2021-06-18

## 2021-06-10 NOTE — TELEPHONE ENCOUNTER
Patient calling for refill of Oxycodone and Flexeril.     Date of last fill: 06/04/21     Date of next follow up appointment: 06/22/21    Pt notified response time for refills is 24-48 hours

## 2021-06-14 ENCOUNTER — TELEPHONE (OUTPATIENT)
Dept: NEUROSURGERY | Age: 56
End: 2021-06-14

## 2021-06-14 NOTE — TELEPHONE ENCOUNTER
Patient calling for refill of Flexeril.     Date of last fill: 06/04/21    Date of next follow up appointment: 06/22/21    Pt notified response time for refills is 24-48 hours

## 2021-06-15 RX ORDER — CYCLOBENZAPRINE HCL 10 MG
10 TABLET ORAL 3 TIMES DAILY PRN
Qty: 30 TABLET | Refills: 1 | Status: SHIPPED | OUTPATIENT
Start: 2021-06-15 | End: 2021-07-05

## 2021-06-22 ENCOUNTER — OFFICE VISIT (OUTPATIENT)
Dept: NEUROSURGERY | Age: 56
End: 2021-06-22

## 2021-06-22 ENCOUNTER — HOSPITAL ENCOUNTER (OUTPATIENT)
Age: 56
Discharge: HOME | End: 2021-06-22
Payer: MEDICARE

## 2021-06-22 VITALS
WEIGHT: 247 LBS | DIASTOLIC BLOOD PRESSURE: 83 MMHG | HEART RATE: 102 BPM | BODY MASS INDEX: 41.15 KG/M2 | SYSTOLIC BLOOD PRESSURE: 137 MMHG | HEIGHT: 65 IN

## 2021-06-22 DIAGNOSIS — Z98.890 S/P LAMINECTOMY: ICD-10-CM

## 2021-06-22 DIAGNOSIS — R25.2 LEG CRAMP: ICD-10-CM

## 2021-06-22 DIAGNOSIS — Z98.890 S/P LAMINECTOMY: Primary | ICD-10-CM

## 2021-06-22 LAB
ANION GAP SERPL CALCULATED.3IONS-SCNC: 11 MMOL/L (ref 9–17)
BUN BLDV-MCNC: 20 MG/DL (ref 6–20)
BUN/CREAT BLD: ABNORMAL (ref 9–20)
CALCIUM SERPL-MCNC: 9.4 MG/DL (ref 8.6–10.4)
CHLORIDE BLD-SCNC: 101 MMOL/L (ref 98–107)
CO2: 25 MMOL/L (ref 20–31)
CREAT SERPL-MCNC: 0.74 MG/DL (ref 0.5–0.9)
GFR AFRICAN AMERICAN: >60 ML/MIN
GFR NON-AFRICAN AMERICAN: >60 ML/MIN
GFR SERPL CREATININE-BSD FRML MDRD: ABNORMAL ML/MIN/{1.73_M2}
GFR SERPL CREATININE-BSD FRML MDRD: ABNORMAL ML/MIN/{1.73_M2}
GLUCOSE BLD-MCNC: 107 MG/DL (ref 70–99)
MAGNESIUM: 2 MG/DL (ref 1.6–2.6)
PHOSPHORUS: 3.5 MG/DL (ref 2.6–4.5)
POTASSIUM SERPL-SCNC: 4.4 MMOL/L (ref 3.7–5.3)
SODIUM BLD-SCNC: 137 MMOL/L (ref 135–144)

## 2021-06-22 PROCEDURE — 83735 ASSAY OF MAGNESIUM: CPT

## 2021-06-22 PROCEDURE — 1036F TOBACCO NON-USER: CPT | Performed by: NEUROLOGICAL SURGERY

## 2021-06-22 PROCEDURE — G8427 DOCREV CUR MEDS BY ELIG CLIN: HCPCS | Performed by: NEUROLOGICAL SURGERY

## 2021-06-22 PROCEDURE — 80048 BASIC METABOLIC PNL TOTAL CA: CPT

## 2021-06-22 PROCEDURE — 3017F COLORECTAL CA SCREEN DOC REV: CPT | Performed by: NEUROLOGICAL SURGERY

## 2021-06-22 PROCEDURE — 84100 ASSAY OF PHOSPHORUS: CPT

## 2021-06-22 PROCEDURE — 99024 POSTOP FOLLOW-UP VISIT: CPT | Performed by: NEUROLOGICAL SURGERY

## 2021-06-22 PROCEDURE — G8417 CALC BMI ABV UP PARAM F/U: HCPCS | Performed by: NEUROLOGICAL SURGERY

## 2021-06-22 RX ORDER — OMEPRAZOLE 20 MG/1
1 CAPSULE, DELAYED RELEASE ORAL 2 TIMES DAILY
COMMUNITY
Start: 2021-06-14

## 2021-06-22 RX ORDER — METHOCARBAMOL 750 MG/1
750 TABLET, FILM COATED ORAL EVERY 8 HOURS PRN
Qty: 90 TABLET | Refills: 0 | Status: SHIPPED | OUTPATIENT
Start: 2021-06-22 | End: 2021-07-19 | Stop reason: SDUPTHER

## 2021-06-22 NOTE — PROGRESS NOTES
Wellness examination 04/08/2021    pcp-dr kang-[facility]-new doctor visit scheduled       Past Surgical History:        Procedure Laterality Date    ANESTHESIA NERVE BLOCK Left 10/07/2019    NERVE BLOCK LEFT HIP OBTURATOR AND FEMORAL performed by Emre Hanley MD at [address] Right 1991    BREAST SURGERY      cyst from right breast    CARPAL TUNNEL RELEASE Bilateral     COLONOSCOPY      ENDOSCOPY, COLON, DIAGNOSTIC      EPIDURAL STEROID INJECTION Left 09/12/2019    EPIDURAL STEROID INJECTION performed by Emre Hanley MD at [address] [facility] NERV Left 09/12/2019    INJECTION MEDICATION LEFT HIP performed by Emre Hanley MD at [address] [place] [address]  04/23/2021    HEMILAMINECTOMY L2-5 WITH CONTRALATERAL DECOMPRESSION. L3 FORAMINOTOMY (Left Spine Lumbar)    LITHOTRIPSY      x2    LUMBAR SPINE SURGERY  04/23/2021    HEMILAMINECTOMY L2-5 WITH CONTRALATERAL DECOMPRESSION. L3 FORAMINOTOMY     LUMBAR SPINE SURGERY Left 4/23/2021    HEMILAMINECTOMY L2-5 WITH CONTRALATERAL DECOMPRESSION.  L3 FORAMINOTOMY performed by Rigoberto Bean DO at [facility] 21  08/01/2016    duramorph 1mg  celestone 9mg    NERVE BLOCK  10/31/2016    duramorph epidural steroid block decadron 7.5 mg durmoprh 1.5 mg    NERVE BLOCK Left     NO STEROID, LEFT MBNB# 1    NERVE BLOCK Left 01/16/2017    LT MBNB #2   celestone 6mg    NERVE BLOCK Left 02/06/2017    LT lumbar RF    kenalog 40    NERVE BLOCK Right 06/19/2017    right lumbar mbnb #1    NERVE BLOCK  07/07/2017    right radiofrequency kenolog 40mg    NERVE BLOCK  09/11/2017    duramorph 1mg & celestone 9 mg    NERVE BLOCK Left 10/02/2018    LEFT LUMBAR EPIDURAL STEROID BLOCK DECADRON 10MG    NERVE BLOCK  10/16/2018    blanca mbnb, marcaine and xylocaine    NERVE BLOCK Left 10/30/2018    left lumbar rf- no steroid    NERVE BLOCK Right 11/13/2018    RT lumbar RFA no steroid    NERVE BLOCK  03/26/2019    left hip decadron 10mg, marcaine . 5 %    PAIN MANAGEMENT PROCEDURE Left 2020    EPIDURAL STEROID INJECTION LEFT L4,L5 performed by Vida Feliciano MD at [address] [place] [place] [address] Right 2020    RIGHT MEDIAL BRANCH NERVE RADIOFREQUENCY ABLATION L2 - L5 performed by Vida Feliciano MD at [address] [place] [place] [address] Right 2020    RIGHT SIDED MEDIAL BRANCH NERVE RADIOFREQUENCY ABLATION   L 2 - L 5 performed by Vida Feliciano MD at [address] [place] [place] [address] Left 10/12/2020    LEFT L4 L5 EPIDURAL STEROID INJECTION performed by Vida Feliciano MD at [address] [place] [address] Left 2019    NERVE RADIOFREQUENCY ABLATION MEDIAN BRANCHES AT TRANSVERSE PROCESSES L3/L4/L5 AND S1 LEFT performed by Vida Feliciano MD at [address] [place] [address] Right 08/15/2019    NERVE RADIOFREQUENCY ABLATION MEDIAN BRANCHES AT TRANSVERSE PROCESSES OF L3/L4/L5 AND S1 RIGHT performed by Vida Feliciano MD at [address] [place] [address] Left 10/24/2019    NERVE RADIOFREQUENCY ABLATION LEFT HIP FEMORAL AND OBTURATOR NERVE performed by Vida Feliciano MD at [facility] History:   Social History     Socioeconomic History    Marital status:      Spouse name: Not on file    Number of children: Not on file    Years of education: Not on file    Highest education level: Not on file   Occupational History    Not on file   Tobacco Use    Smoking status: Former Smoker     Packs/day: 0.00     Quit date: 2021     Years since quittin.3    Smokeless tobacco: Never Used   Vaping Use    Vaping Use: Never used   Substance and Sexual Activity    Alcohol use: Never     Alcohol/week: 0.0 standard drinks    Drug use: Never    Sexual activity: Not on file   Other Topics Concern    Not on file   Social History Narrative    Not on file     Social Determinants of Health     Financial Resource Strain:     Difficulty of Paying Living Expenses:    Food Insecurity:     Worried About [address] [employer] in the Last Year:     [address] in the Last Year:    Transportation Needs:     Lack of Transportation (Medical):  Lack of Transportation (Non-Medical):    Physical Activity:     Days of Exercise per Week:     Minutes of Exercise per Session:    Stress:     Feeling of Stress :    Social Connections:     Frequency of Communication with Friends and Family:     Frequency of Social Gatherings with Friends and Family:     Attends Adventism Services:     Active Member of Clubs or Organizations:     Attends Club or Organization Meetings:     Marital Status:    Intimate Partner Violence:     Fear of Current or Ex-Partner:     Emotionally Abused:     Physically Abused:     Sexually Abused:        Family History:       Problem Relation Age of Onset    Other Mother         ulcers    High Blood Pressure Mother     Vision Loss Mother     Heart Disease Father     High Blood Pressure Father     Other Father         blind, pacemaker    Stroke Father     Vision Loss Father     Cancer Father        Allergies:  Fenofibrate    Home Medications:  Prior to Admission medications    Medication Sig Start Date End Date Taking? Authorizing Provider   omeprazole (PRILOSEC) 20 MG delayed release capsule Take 1 capsule by mouth 2 times daily 6/14/21  Yes Historical Provider, MD   cyclobenzaprine (FLEXERIL) 10 MG tablet Take 1 tablet by mouth 3 times daily as needed for Muscle spasms 6/15/21 7/5/21 Yes Teressa Tidwell DO   gabapentin (NEURONTIN) 300 MG capsule Take 1 capsule by mouth 3 times daily for 90 days.  Intended supply: 30 days 4/14/21 7/13/21 Yes Carol Lynne MD   losartan (COZAAR) 50 MG tablet  4/13/21  Yes Historical Provider, MD   rosuvastatin (CRESTOR) 20 MG tablet Take 20 mg by mouth daily  3/10/21  Yes Historical Provider, MD   celecoxib (CELEBREX) 200 MG capsule Take 200 mg by mouth 2 times daily   Yes Historical Provider, MD   busPIRone (BUSPAR) 10 MG tablet Take 10 mg by mouth 2 times daily  12/26/20  Yes Historical Provider, MD   DULoxetine (CYMBALTA) 30 MG extended release capsule Take 30 mg by mouth daily   Yes Historical Provider, MD   benztropine (COGENTIN) 0.5 MG tablet Take 0.5 mg by mouth 2 times daily   Yes Historical Provider, MD   Acetaminophen (TYLENOL ARTHRITIS PAIN PO) Take 2 tablets by mouth daily   Yes Historical Provider, MD   zolpidem (AMBIEN) 10 MG tablet Take 10 mg by mouth nightly as needed for Sleep   Yes Historical Provider, MD   Multiple Vitamins-Minerals (THERAPEUTIC MULTIVITAMIN-MINERALS) tablet Take 1 tablet by mouth daily   Yes Historical Provider, MD   Biotin 5000 MCG CAPS Take 1 capsule by mouth daily    Yes Historical Provider, MD   amphetamine-dextroamphetamine (ADDERALL) 20 MG tablet Take 20 mg by mouth daily. Yes Historical Provider, MD   famotidine (PEPCID) 20 MG tablet Take 20 mg by mouth 2 times daily  Patient not taking: Reported on 6/22/2021 4/15/21   Historical Provider, MD   morphine (MS CONTIN) 15 MG extended release tablet Take 1 tablet by mouth every 12 hours for 5 days. 4/18/21 5/7/21  Queen Frantz MD   diclofenac sodium (VOLTAREN) 1 % GEL Apply 4 g topically 4 times daily as needed for Pain 4/14/21 5/14/21  Queen Frantz MD   pantoprazole (PROTONIX) 40 MG tablet Take 40 mg by mouth 2 times daily   Patient not taking: Reported on 6/22/2021 1/10/18   Historical Provider, MD       Current Medications:   No current facility-administered medications for this visit.       PHYSICAL EXAM:       Ht 5' 5\" (1.651 m)   Wt 247 lb (112 kg)   BMI 41.10 kg/m²   Physical Exam     Gen: NAD  HEENT: moist mucus membranes  Cardio: RRR  Pulm: chest rise symmetrically  GI: abd soft  Ext: no edema  Skin: warm    Neuro:    AOX3  CN 2-12 grossly intact  Speech articulate  Motor 5/5  No pronator drift  Sensation symmetrical   Wound well healed         Radiology Review:  None new      ASSESSMENT AND PLAN: Patient Active Problem List   Diagnosis    Lumbar radiculopathy    Depressive disorder, not elsewhere classified    Chronic back pain    Chronic bilateral low back pain without sciatica    Medication monitoring encounter    Cervical radiculopathy    Lumbosacral spondylosis without myelopathy    Left hip pain    Hip arthritis    Left lumbar radiculitis    Lumbar disc herniation    Primary osteoarthritis of left hip    Chronic use of opiate for therapeutic purpose    Morbid obesity with BMI of 40.0-44.9, adult ([address]    S/P lumbar fusion         A/P:  This is a 63 y.o. female with 2-month status post lumbar laminectomy L2-5, L3 foraminotomy  Much improved  Check electrolytes rule out systemic cause of cramp  She should start physical therapy to improve her strength mobility and reduce scarring  Also restart her on Robaxin    Patient and/or family was counseled on the diagnosis and treatment plan    Masha Palomares DO     Board Certified Neurosurgeon  6/22/2021  12:08 PM      This note was created using voice recognition software. There may be inaccuracies of transcription  that are inadvertently overlooked prior to the signature. There is any questions about the transcription please contact me.

## 2021-07-07 ENCOUNTER — HOSPITAL ENCOUNTER (OUTPATIENT)
Dept: PHYSICAL THERAPY | Age: 56
Setting detail: THERAPIES SERIES
Discharge: HOME | End: 2021-07-07
Payer: MEDICARE

## 2021-07-07 PROCEDURE — 97162 PT EVAL MOD COMPLEX 30 MIN: CPT

## 2021-07-07 NOTE — PROGRESS NOTES
[address] [facility]  [address] [facility]         Phone: [phone]       Fax: [phone]    Physical Therapy Spine Evaluation    Date:  2021  Patient: Manuelito Ruiz  : 1965  MRN: [id number]  Physician: Jeanie Crockett DO      Medical Diagnosis: Laminectomy (L11.601) Leg Cramp (R25.2)   Clinical Diagnosis: Low back pain with mobility deficits   Onset date: 2021    Next 's appt.: TBD  PT Visit Information  PT Insurance Information: [address] [facility] Dual Complete  Total # of Visits Approved: 18  Total # of Visits to Date: 1  No Show: 0  Canceled Appointment: 0     Subjective  CC: Low back pain   HPI: (2021) History of low back pain/laminectomy performed on 2021 @ [address] [facility]. Discharged to home the next day. Recently followed up with her surgeon/plain films taken following procedure/negative. Outpt PT ordered. PMHx: [] Unremarkable [] Diabetes [x] HTN  [] Pacemaker   [] MI/Heart Problems [] Cancer [] Arthritis   [] Other: Bipolar 1 disorder ([facility] [address] ADHD (attention deficit hyperactivity disorder); Heel spur; Trouble swallowing; GERD (gastroesophageal reflux disease); Depression with anxiety; PTSD (post-traumatic stress disorder); Tremor; Left hip pain; Weight gain; Anxiety and depression; Wellness examination; Under care of team; Under care of team; Under care of team; Lumbar stenosis with neurogenic claudication; Lumbar radiculopathy; MARTÍN (obstructive sleep apnea); Obesity; History of kidney stones; Wears glasses; Wears dentures;  Family history of general anesthesia reaction, COPD, Hyperlipidemia                [x] Refer to full medical chart  In EPIC     Comorbidities  [x] Obesity [] Dialysis  [] Other:   [x] Asthma/COPD [] Dementia [] Other:   [] Stroke [] Sleep apnea [] Other:   [] Vascular disease [] Rheumatic disease [] Other:     Tests: [] X-Ray: [] MRI:  [] Other:     Medications: [x] Refer to full medical record [] None [] Other:  Allergies:      [x] Refer to full medical record [] None [] Other:     Normal Deficit Comments   Follows commands [x] []    Vision [x] []    Hearing [x] []    Orientation [x] []      Pain  Pain:  [x] Yes   [] No      Location: (L) buttocks and down both legs into the toes.    Pain Rating: (0-10 scale)  5/10  Worst: 6/10  Best:4/10  Symptoms:  [] Improving [x] Worsening       [] Same  Descriptors: Constant, squeezing mid calf, burning, sharp, tingling  Aggravating factors:  Standing/ambulation  Relieving factors: Rest/repositioning   Sleep: Disturbed   Other:     Function  Hand Dominance  [x] Right  [] Left  Working:  [] Normal Duty  [] Light Duty  [] Off D/T Condition  [] Retired    [] Not Employed    [x]  Disability  [] Other:           Return to work:   Job/ADL Description:    ADL/IADL Previous level of function Current level of function Who currently assists the patient with task/Comments   Bathing  [x] Independent  [] Assist [x] Independent  [] Assist    Dress/grooming [x] Independent  [] Assist [] Independent  [x] Assist Son   Transfer/mobility [x] Independent  [] Assist [] Independent  [x] Assist Son   Feeding [x] Independent  [] Assist [x] Independent  [] Assist    Toileting [x] Independent  [] Assist [x] Independent  [] Assist    Driving [x] Independent  [] Assist [] Independent  [] Assist    Housekeeping [x] Independent  [] Assist [] Independent  [x] Assist Son   Grocery shop/meal prep [x] Independent  [] Assist [] Independent  [x] Assist Son     Gait Prior level of function Current level of function    [x] Independent  [] Assist [x] Independent  [] Assist   Device: [] Independent [] Independent    [x] Straight Cane [] Quad cane [] Straight Cane [] Quad cane    [] Standard walker [] Rolling walker   [] 4 wheeled walker [] Standard walker [x] Rolling walker   [] 4 wheeled walker    [] Wheelchair [] Wheelchair     Objective  Observation/Palpation   Normal Deficit Comments None  Comments: \"Rigidity\" throughout the lumbar region with a decreased step length noted on the (R) LE. Balance  Poor static and dynamic balance was observed. Patient was unable to stand independently without needing external support. Unable to ambulate without using external support (wall, door etc)    Assessment  Patient presented with high irritability within the lumbar region/LEs especially the (L) LE could not be touched. Very limited assessment was performed due to this issue. Plan to re-assess in several weeks following aquatic sessions for pain control/improved active motion. Patient would continue to benefit from skilled physical therapy services in order to assist with pain control for her lumbar region/LEs to allow her to perform her ADLs with less pain/limitation. Problems  [x] ? Pain: 5/10 lumbar region/LEs      [] ? Function:    [x] ? Balance Poor static/dynamic double limb support  [x] Postural Deviations Forward head, rounded shoulders, kyphosis, (R) genu recurvatum   [x] Gait Deviations \"Rigidity\" within the lumbar region with a decreased step length on the (R) LE      Goals  STG: (to be met in 9 treatments)  1. Patient will report an average pain level of a 3-4/10 within the lumbar region/LEs at rest/ADLs. LTG: (to be met in 18 treatments)  To be determined @ the re-assessment         Patient goals: To relieve the pain    Functional Assessment Used: Modified Oswestry Disability Index   Current Status: Score 66%  Goal Status: Score 56%    Evaluation Complexity: Moderate    History: FABQ - 23 physical activity work scale (red flag @ 14)  STarT Back Screening Tool - High Risk, Obesity, COPD  Exam: 4-6/10 pain level, immediate pain with movement/transitions   Clinical Presentation: Patient's symptoms are evolving.    Barriers to Learning: None    Rehab Potential:  [x] Good  [] Fair  [] Poor   Suggested Professional Referral:  [x] No  [] Yes:  Barriers to Goal Achievement[de-identified]  [x] No  [] Yes:  Domestic Concerns:  [x] No  [] Yes:    Pt. Education:  [x] Plans/Goals, Risks/Benefits discussed  [] Home exercise program    Method of Education: [] Verbal  [] Demo  [] Written  Comprehension of Education:  [] Verbalizes understanding. [] Demonstrates understanding. [] Needs Review. [] Demonstrates/verbalizes understanding of HEP/Ed previously given. Treatment Plan:  [x] Therapeutic Exercise   29373  [] Iontophoresis: 4 mg/mL Dexamethasone Sodium Phosphate  mAmin  90554   [] Therapeutic Activity  49866 [] Vasopneumatic cold with compression  69082    [] Gait Training   59271 [] Ultrasound   28566   [] Neuromuscular Re-education  17950 [] Electrical Stimulation Unattended  50840   [] Manual Therapy  98077 [] Electrical Stimulation Attended  57535   [x] Instruction in HEP  [] Lumbar/Cervical Traction  65149   [x] Aquatic Therapy   05010 [] Cold/hotpack    [] Massage   85782      [] Dry Needling, 1 or 2 muscles  55406   [] Biofeedback, first 15 minutes   63561  [] Biofeedback, additional 15 minutes   20875 [] Dry Needling, 3 or more muscles  63397     []  Medication allergies reviewed for use of    Dexamethasone Sodium Phosphate 4mg/ml     with iontophoresis treatments. Pt is not allergic. Frequency: 2 x/week for 18 visits    Todays Treatment:  Modalities:   Precautions:  Exercises:  Exercise Reps/ Time Weight/ Level Comments                                 Other:    Specific Instructions for next treatment: Initiate an aquatic program for pain control.      Treatment Charges: Mins Units   [x] Evaluation       []  Low       [x]  Moderate       []  High 60 1   []  Modalities     []  Ther Exercise     []  Manual Therapy     []  Ther Activities     []  Aquatics     []  Neuromuscular     []  Gait Training     []  Dry Needling           1-2 muscles     []  Dry Needling           3 or more          muscles     [] Vasocompression     []  Other         TOTAL TREATMENT TIME: 45    Time in: 1300 Time Out: 1400    Electronically signed by: Chen Alba PT DPT    Physician Signature:________________________________Date:__________________  By signing above or cosigning this note, I have reviewed this plan of care and certify a need for medically necessary rehabilitation services.      *PLEASE SIGN ABOVE AND FAX BACK ALL PAGES*

## 2021-07-08 NOTE — PROGRESS NOTES
[facility] J Exercise Log  Aquatic, Hip & DLS Program- Phase 1    Date of Eval: 07/07/2021                             Primary PT: Memo White PT DPT  Diagnosis: Laminectomy (Z98.890) Leg Cramp (R25.2)     Things to Focus On (goals): Pain Control  Surgical Precautions:  Medical Precautions: Bipolar 1 disorder ([facility] [address] ADHD (attention deficit hyperactivity disorder); Heel spur; Trouble swallowing; GERD (gastroesophageal reflux disease); Depression with anxiety; PTSD (post-traumatic stress disorder); Tremor; Left hip pain; Weight gain; Anxiety and depression; Wellness examination; Under care of team; Under care of team; Under care of team; Lumbar stenosis with neurogenic claudication; Lumbar radiculopathy; MARTÍN (obstructive sleep apnea); Obesity; History of kidney stones; Wears glasses; Wears dentures; Family history of general anesthesia reaction, COPD, Hyperlipidemia, HTN  [] C-9 dates  [] Occ Med   [] Medicare     Name: Estephania Andrews \"Jayleen\" Heraclio Padilla  Date        Visit #        Walk F/L/R        Marching        Squats        Step-Ups F/L        Step Down F/L        Heel-toe raises        SLR F/L/R        Hip/Knee Flex/Ext        F/L Lunges                Kickboard Ex. Iso Abd.         Push-pull        Paddling                UE Format:        Horiz Abd/Add        IR/ER (wipers)        Alt Flex/Ext        Alt Press Down        Abd/Add                [place]:        [facility]        X-Country                Balance        SLS                Stretches        Achllies        Hamstring                Cool Down        Pain Rating

## 2021-07-12 ENCOUNTER — HOSPITAL ENCOUNTER (OUTPATIENT)
Dept: PHYSICAL THERAPY | Age: 56
Setting detail: THERAPIES SERIES
Discharge: HOME | End: 2021-07-12
Payer: MEDICARE

## 2021-07-12 PROCEDURE — 97113 AQUATIC THERAPY/EXERCISES: CPT

## 2021-07-12 NOTE — FLOWSHEET NOTE
[facility]   [address] [place] [address] [address]   Phone: [phone]   Fax: [phone]    Physical Therapy Daily Treatment Note      Date:  2021  Patient Name:  Sanam Ortega    :  1965  MRN: [id number]  Physician: Nilda Albert DO                               Medical Diagnosis: Laminectomy (V67.719) Leg Cramp (R25.2)       Clinical Diagnosis: Low back pain with mobility deficits   Onset date: 2021                  Next 's appt.: TBD  PT Visit Information  PT Insurance Information: [employer] Dual Complete  Total # of Visits Approved: 18  Total # of Visits to Date: 2  No Show: 0  Canceled Appointment: 0    Subjective:  Pt reports (B) LE pain from mid thigh to feet- L>R. States it is always hurting but with the rainy weather. Pain:  [x] Yes  [] No Location: lumbar back and (B) LE mid thigh down to feet- L>R   Pain Rating: (0-10 scale) 8/10  Pain altered Tx:  [x] No  [] Yes  Action:  Comments: Initial aquatic therapy visit. Educated on postural awareness with emphasis on core stability, benefits of aquatic therapy and working in pain free ranges. Objective:  [facility] Services Exercise Log  Aquatic, Hip & DLS Program- Phase 1     Date of Eval: 2021                             Primary PT: Gisela Carmona PT DPT  Diagnosis: Laminectomy (Z98.890) Leg Cramp (R25.2)     Things to Focus On (goals): Pain Control  Surgical Precautions:  Medical Precautions: Bipolar 1 disorder ([facility] [address] ADHD (attention deficit hyperactivity disorder); Heel spur; Trouble swallowing; GERD (gastroesophageal reflux disease); Depression with anxiety; PTSD (post-traumatic stress disorder); Tremor; Left hip pain; Weight gain; Anxiety and depression; Wellness examination; Under care of team; Under care of team; Under care of team; Lumbar stenosis with neurogenic claudication; Lumbar radiculopathy; MARTÍN (obstructive sleep apnea); Obesity;  History of kidney stones; Wears glasses; Wears dentures; Family history of general anesthesia reaction, COPD, Hyperlipidemia, HTN  [] C-9 dates  [] Occ Med   [] Medicare      Name: Julien Juarez \"Jayleen\" Jordan Arevalo  Date  7/12/21           Visit #  2/12           Walk F/L/R  1 Lap @ Rail           Marching  10x           Squats 10x5\"           Step-Ups F/L            Step Down F/L            Heel-toe raises 20x           SLR F/L/R 10x           Hip/Knee Flex/Ext            F/L Lunges                          Kickboard Ex. Sm           Iso Abd. 10x5\"            Push-pull             Paddling                           UE Format:             Horiz Abd/Add             IR/ER (wipers)             Alt Flex/Ext             Alt Press Down             Abd/Add                           Sunspot:             [place]             X-Country                           Balance             SLS                           Stretches             Achllies             Hamstring  2x20\"                         Cool Down             Pain Rating 8                Specific Instructions for next treatment:  Assess response to initial aquatic therapy visit and progress as patient is able. Assessment: [x] Progressing toward goals. Initiated aquatic therapy for pain control in lower legs and lumbar back. Emphasized core stability, postural awaerness and working in pain free ranges throughout program.  Denies and increased pain with exercises in pool today. Notes good relief with deep water hang and less pain when patient exited pool. [] No change. [] Other:    [x] Patient would continue to benefit from skilled physical therapy services in order to: to assist with pain control for her lumbar region/LEs to allow her to perform her ADLs with less pain/limitation. Goals  STG: (to be met in 9 treatments)  1. Patient will report an average pain level of a 3-4/10 within the lumbar region/LEs at rest/ADLs.       LTG: (to be met in 18 treatments)  To be determined @ the re-assessment         Patient goals: To relieve the pain    Pt. Education:  [x] Yes  [] No  [x] Reviewed Prior HEP/Ed  Method of Education: [x] Verbal  [x] Demo  [] Written  Comprehension of Education:  [x] Verbalizes understanding. [x] Demonstrates understanding. [] Needs review. [] Demonstrates/verbalizes HEP/Ed previously given. Plan: [x] Continue per plan of care.    [] Other:      Treatment Charges: Mins Units   []  Modalities     []  Ther Exercise     []  Manual Therapy     []  Ther Activities     [x]  Aquatics 25 2   []  Neuromuscular     [] Vasocompression     [] Gait Training     [] Dry needling        [] 1 or 2 muscles        [] 3 or more muscles     []  Other     Total Treatment time 25 2     Time In: 6640            Time Out: 5653    Electronically signed by:  Fredy Mcclure PTA

## 2021-07-15 ENCOUNTER — HOSPITAL ENCOUNTER (OUTPATIENT)
Dept: PHYSICAL THERAPY | Age: 56
Setting detail: THERAPIES SERIES
Discharge: HOME | End: 2021-07-15
Payer: MEDICARE

## 2021-07-15 PROCEDURE — 97113 AQUATIC THERAPY/EXERCISES: CPT

## 2021-07-15 NOTE — FLOWSHEET NOTE
a 3-4/10 within the lumbar region/LEs at rest/ADLs. LTG: (to be met in 18 treatments)  To be determined @ the re-assessment         Patient goals: To relieve the pain    Pt. Education:  [x] Yes  [] No  [x] Reviewed Prior HEP/Ed  Method of Education: [x] Verbal  [x] Demo  [] Written  Comprehension of Education:  [x] Verbalizes understanding. [x] Demonstrates understanding. [] Needs review. [] Demonstrates/verbalizes HEP/Ed previously given. Plan: [x] Continue per plan of care.    [] Other:      Treatment Charges: Mins Units   []  Modalities     []  Ther Exercise     []  Manual Therapy     []  Ther Activities     [x]  Aquatics 30 2   []  Neuromuscular     [] Vasocompression     [] Gait Training     [] Dry needling        [] 1 or 2 muscles        [] 3 or more muscles     []  Other     Total Treatment time 30 2     Time In: 530pm            Time Out: 600pm    Electronically signed by:  Carola Dumont PTA

## 2021-07-19 ENCOUNTER — HOSPITAL ENCOUNTER (OUTPATIENT)
Dept: PHYSICAL THERAPY | Age: 56
Setting detail: THERAPIES SERIES
Discharge: HOME | End: 2021-07-19
Payer: MEDICARE

## 2021-07-19 DIAGNOSIS — Z98.890 S/P LAMINECTOMY: ICD-10-CM

## 2021-07-19 DIAGNOSIS — M48.062 SPINAL STENOSIS OF LUMBAR REGION WITH NEUROGENIC CLAUDICATION: Primary | ICD-10-CM

## 2021-07-19 PROCEDURE — 97113 AQUATIC THERAPY/EXERCISES: CPT

## 2021-07-19 RX ORDER — METHOCARBAMOL 750 MG/1
750 TABLET, FILM COATED ORAL EVERY 8 HOURS PRN
Qty: 90 TABLET | Refills: 0 | Status: SHIPPED | OUTPATIENT
Start: 2021-07-19 | End: 2021-08-16 | Stop reason: SDUPTHER

## 2021-07-19 RX ORDER — GABAPENTIN 300 MG/1
300 CAPSULE ORAL 3 TIMES DAILY
Qty: 90 CAPSULE | Refills: 0 | Status: SHIPPED | OUTPATIENT
Start: 2021-07-19 | End: 2021-08-16 | Stop reason: SDUPTHER

## 2021-07-19 NOTE — FLOWSHEET NOTE
[address] [facility] Outpatient Physical Therapy   [address]   Phone: [phone]   Fax: [phone]    Physical Therapy Daily Treatment Note      Date:  2021  Patient Name:  Manuelito Ruiz    :  1965  MRN: [id number]  Physician: Jeanie Crockett DO                               Medical Diagnosis: Laminectomy (L53.255) Leg Cramp (R25.2)       Clinical Diagnosis: Low back pain with mobility deficits   Onset date: 2021                  Next Dr's appt.: TBD  PT Visit Information  PT Insurance Information: [employer] Dual Complete  Total # of Visits Approved: 18  Total # of Visits to Date: 4  No Show: 0  Canceled Appointment: 0    Subjective:  LE pain is the biggest issue L>R- reports pain is minimal in lower back since surgery. Also has (B) Foot pain R>L. Needs assistance at home with donning/doffing shoes. Denies issues after last visit  Pain:  [x] Yes  [] No Location: lumbar back, (B) LE knee joint line down to feet- L>R   Pain Rating: (0-10 scale) Lower back pain 1/10; 6/10 L LE and R LE 4/10  Pain altered Tx:  [x] No  [] Yes  Action:  Comments: Emphasis on core stability and postural awareness; educated patient to avoid increasing pain levels. Patient arrives to pool area with use of rolling walker- antalgic gait noted    Objective:  [facility] J Exercise Log  Aquatic, Hip & DLS Program- Phase 1     Date of Eval: 2021                             Primary PT: Marichuy Mathur PT DPT  Diagnosis: Laminectomy (Z98.890) Leg Cramp (R25.2)     Things to Focus On (goals): Pain Control  Surgical Precautions:  Medical Precautions: Bipolar 1 disorder ([address] ADHD (attention deficit hyperactivity disorder); Heel spur; Trouble swallowing; GERD (gastroesophageal reflux disease); Depression with anxiety; PTSD (post-traumatic stress disorder); Tremor; Left hip pain; Weight gain;  Anxiety and depression;  Lumbar stenosis with neurogenic claudication; Lumbar radiculopathy; MARTÍN (obstructive sleep apnea); Obesity; History of kidney stones; Wears glasses; Wears dentures; COPD, Hyperlipidemia, HTN  [] C-9 dates  [] Occ Med   [] Medicare      Name: Rachel Carver \"Jayleen\" Natalia Hightower  Date  7/12/21 7/15/21   7/19/21       Visit #  2/12 3/12   4/12       Walk F/L/R  1 Lap @ Rail  1 Lap @ Rail   2 Laps @ Rail F/L +Retro     Marching  10x  10x   10x       Squats 10x5\" 10x5\"  10x5\"        Step-Ups F/L            Step Down F/L            Heel-toe raises 20x 20x  10x        SLR F/L/R 10x  10x   10x       Hip/Knee Flex/Ext  10x   10x       F/L Lunges                          Kickboard Ex. Sm Sm   Small       Iso Abd. 10x5\"  10x5\"    10x5\"       Push-pull   10x   10x       Paddling                           UE Format:        Add     Horiz Abd/Add             IR/ER (wipers)             Alt Flex/Ext             Alt Press Down             Abd/Add                           Deep Water:     1 Noodle        Hang      3'       Cycling      2'       Jacks             X-Country                           Balance             SLS                           Stretches             Achllies     Defers- foot issues        Hamstring  2x20\"  2x20\"   2X20\"                     Cool Down    2 laps fwd  2 Laps        Pain Rating 8 8   4-6            Specific Instructions for next treatment:  Add retro ambulation and UE format to challenge core stability      Assessment: [x] Progressing toward goals. Patient reports pain with hip/knee flexion on (B) Knees- encouraged decreased ROM. Emphasis on technique throughout program. Added deep water aerobics  for endurance and to unload spine in [facility] environment. Reviewed stepping sequence on steps with patient for safe entering/exiting pool. No other complaints during program   [] No change.      [] Other:    [x] Patient would continue to benefit from skilled physical therapy services in order to: to assist with pain control for her lumbar region/LEs to allow her to perform her ADLs with less pain/limitation. Goals  STG: (to be met in 9 treatments)  1. Patient will report an average pain level of a 3-4/10 within the lumbar region/LEs at rest/ADLs. LTG: (to be met in 18 treatments)  To be determined @ the re-assessment         Patient goals: To relieve the pain    Pt. Education:  [x] Yes  [] No  [x] Reviewed Prior HEP/Ed  Method of Education: [x] Verbal  [x] Demo  [] Written  Comprehension of Education:  [x] Verbalizes understanding. [x] Demonstrates understanding. [] Needs review. [] Demonstrates/verbalizes HEP/Ed previously given. Plan: [x] Continue per plan of care.    [] Other:      Treatment Charges: Mins Units   []  Modalities     []  Ther Exercise     []  Manual Therapy     []  Ther Activities     [x]  Aquatics 30 2   []  Neuromuscular     [] Vasocompression     [] Gait Training     [] Dry needling        [] 1 or 2 muscles        [] 3 or more muscles     []  Other     Total Treatment time 30 2     Time In: 100 PM           Time Out: 135 pm    Electronically signed by:  Simon Bergman PTA

## 2021-07-21 ENCOUNTER — HOSPITAL ENCOUNTER (OUTPATIENT)
Dept: PHYSICAL THERAPY | Age: 56
Setting detail: THERAPIES SERIES
End: 2021-07-21
Payer: MEDICARE

## 2021-07-21 NOTE — FLOWSHEET NOTE
[] [address]  [place]: [phone]   F: [phone]     Physical Therapy Cancel/No Show note    Date: 2021  Patient: Ariadne Buitrago  : 1965  MRN: [id number]    Visit Count:   Cancels/No Shows to date:     For today's appointment patient:    [x]  Cancelled    [] Rescheduled appointment    [] No-show     Reason given by patient:    []  Patient ill    []  Conflicting appointment    [] No transportation      [] Conflict with work    [] No reason given    [] Weather related    [] [id number]    [x] Other:   REPORTS SHE IS TOO SORE   Comments:        [x] Next appointment was confirmed    Electronically signed by: Suzanne Duarte PTA

## 2021-07-22 ENCOUNTER — HOSPITAL ENCOUNTER (OUTPATIENT)
Dept: PHYSICAL THERAPY | Age: 56
Setting detail: THERAPIES SERIES
Discharge: HOME | End: 2021-07-22
Payer: MEDICARE

## 2021-07-22 ENCOUNTER — TELEPHONE (OUTPATIENT)
Dept: NEUROSURGERY | Age: 56
End: 2021-07-22

## 2021-07-22 PROCEDURE — 97113 AQUATIC THERAPY/EXERCISES: CPT

## 2021-07-22 NOTE — FLOWSHEET NOTE
509 [facility] Outpatient Physical Therapy   [address]   Phone: [phone]   Fax: [phone]    Physical Therapy Daily Treatment Note      Date:  2021  Patient Name:  Cassandra Honeycutt    :  1965  MRN: [id number]  Physician: Nanda Reyes DO                               Medical Diagnosis: Laminectomy (R97.336) Leg Cramp (R25.2)       Clinical Diagnosis: Low back pain with mobility deficits   Onset date: 2021                  Next Dr's appt.: TBD  PT Visit Information  PT Insurance Information: [employer] Dual Complete  Total # of Visits Approved: 18  Total # of Visits to Date: 5  No Show: 0  Canceled Appointment: 0    Subjective:  Pt reports increased pain since last visit but was very active and on her feet the rest of that day. Running late due to having a rough day and fatigued from walk into clinic. Pain:  [x] Yes  [] No Location: lumbar back, (B) LE knee joint line down to feet- L>R   Pain Rating: (0-10 scale) Lower back pain 1/10; 6/10 L LE knee down and R LE 4/10  Pain altered Tx:  [x] No  [] Yes  Action:  Comments: Emphasis on core stability and postural awareness; educated patient to avoid increasing pain levels. Patient arrives to pool area with use of rolling walker- antalgic gait noted    Objective:  [address] [facility] J Exercise Log  Aquatic, Hip & DLS Program- Phase 1     Date of Eval: 2021                             Primary PT: Harsh Mark PT DPT  Diagnosis: Laminectomy (Z98.890) Leg Cramp (R25.2)     Things to Focus On (goals): Pain Control  Surgical Precautions:  Medical Precautions: Bipolar 1 disorder ([facility] [address] ADHD (attention deficit hyperactivity disorder); Heel spur; Trouble swallowing; GERD (gastroesophageal reflux disease); Depression with anxiety; PTSD (post-traumatic stress disorder); Tremor; Left hip pain; Weight gain;  Anxiety and depression;  Lumbar stenosis with neurogenic claudication; Lumbar radiculopathy; MARTÍN (obstructive sleep apnea); Obesity; History of kidney stones; Wears glasses; Wears dentures; COPD, Hyperlipidemia, HTN  [] C-9 dates  [] Occ Med   [] Medicare      Name: Colton Diaz \"Jayleen\" Lajoyce Chiara  Date  7/12/21 7/15/21   7/19/21 7/22/21     Visit #  2/12 3/12   4/12 5/12     Walk F/L/R  1 Lap @ Rail  1 Lap @ Rail   2 Laps @ Rail F/L 2 Laps @ Rail +Retro     Marching  10x  10x   10x  10x     Squats 10x5\" 10x5\"  10x5\"  10x5\"     Step-Ups F/L            Step Down F/L            Heel-toe raises 20x 20x  10x  10x     SLR F/L/R 10x  10x   10x 10x     Hip/Knee Flex/Ext  10x   10x 10x     F/L Lunges                          Kickboard Ex. Sm Sm   Small  Small     Iso Abd. 10x5\"  10x5\"    10x5\" 10x5\"     Push-pull   10x   10x 10x     Paddling                           UE Format:        add    Horiz Abd/Add             IR/ER (wipers)             Alt Flex/Ext             Alt Press Down             Abd/Add                           Deep Water:     1 Noodle   1 Noodle     Hang      3'  3'     Cycling      2'  2'     Jacks             X-Country                           Balance             SLS                           Stretches             Achllies     Defers- foot issues        Hamstring  2x20\"  2x20\"   2X20\"  2x20\"                   Cool Down    2 laps fwd  2 Laps   2 Laps     Pain Rating 8 8   4-6 1 and 6          Specific Instructions for next treatment:  Add UE format and KB paddling next visit to challenge core stability. Assessment: [] Progressing toward goals. Limited on time today due to arriving late. Tolerated all exercises well today with increased pain with 1 rep of retro SLR and decreased ROM for the rest of the reps. [x] No change. [] Other:    [x] Patient would continue to benefit from skilled physical therapy services in order to: to assist with pain control for her lumbar region/LEs to allow her to perform her ADLs with less pain/limitation.     Goals  STG: (to be met in 9 treatments)  1. Patient will report an average pain level of a 3-4/10 within the lumbar region/LEs at rest/ADLs. LTG: (to be met in 18 treatments)  To be determined @ the re-assessment         Patient goals: To relieve the pain    Pt. Education:  [x] Yes  [] No  [x] Reviewed Prior HEP/Ed  Method of Education: [x] Verbal  [x] Demo  [] Written  Comprehension of Education:  [x] Verbalizes understanding. [x] Demonstrates understanding. [] Needs review. [] Demonstrates/verbalizes HEP/Ed previously given. Plan: [x] Continue per plan of care.    [] Other:      Treatment Charges: Mins Units   []  Modalities     []  Ther Exercise     []  Manual Therapy     []  Ther Activities     [x]  Aquatics 24 2   []  Neuromuscular     [] Vasocompression     [] Gait Training     [] Dry needling        [] 1 or 2 muscles        [] 3 or more muscles     []  Other     Total Treatment time 24 2     Time In: 5290 AM           Time Out: 1200 pm    Electronically signed by:  Radha Li PTA

## 2021-07-22 NOTE — TELEPHONE ENCOUNTER
Patient called in wanting to know if Dr. David Hyman thought compressions stockings could help with the pain in her legs. Patient also wanted to know if an order could be placed for compression stockings.

## 2021-07-23 NOTE — TELEPHONE ENCOUNTER
Informed patient of instructions per Win Chan. Patient states voltaren gel that was used in hospital worked some also but is currently out.

## 2021-07-26 ENCOUNTER — HOSPITAL ENCOUNTER (OUTPATIENT)
Dept: PHYSICAL THERAPY | Age: 56
Setting detail: THERAPIES SERIES
Discharge: HOME | End: 2021-07-26
Payer: MEDICARE

## 2021-07-26 PROCEDURE — 97113 AQUATIC THERAPY/EXERCISES: CPT

## 2021-07-26 NOTE — FLOWSHEET NOTE
[facility] Outpatient Physical Therapy   [address]   Phone: [phone]   Fax: [phone]    Physical Therapy Daily Treatment Note      Date:  2021  Patient Name:  Manuelito Ruiz    :  1965  MRN: [id number]  Physician: Alfred Kurtz DO                               Medical Diagnosis: Laminectomy (H82.726) Leg Cramp (R25.2)       Clinical Diagnosis: Low back pain with mobility deficits   Onset date: 2021                  Next Dr's appt.: TBD  PT Visit Information  PT Insurance Information: [employer] Complete  Total # of Visits Approved: 18  Total # of Visits to Date: 6  No Show: 0  Canceled Appointment: 0    Subjective:  Pt reports feeling good over the weekend until doing too much on . Notes lumbar back pain feels good but pain in RLE a little more intense and starting further down. Pain:  [x] Yes  [] No Location: lumbar back, (B) LE knee joint line down to feet- L>R   Pain Rating: (0-10 scale) Lower back pain 1.5/10; 7/10 L LE just distal to knee down and R LE 4/10  Pain altered Tx:  [x] No  [] Yes  Action:  Comments: Emphasis on core stability and postural awareness; educated patient to avoid increasing pain levels. Patient arrives to pool area with use of rolling walker- antalgic gait noted    Objective:  [facility] J Exercise Log  Aquatic, Hip & DLS Program- Phase 1     Date of Eval: 2021                             Primary PT: Marichuy Mathur PT DPT  Diagnosis: Laminectomy (Z98.890) Leg Cramp (R25.2)     Things to Focus On (goals): Pain Control  Surgical Precautions:  Medical Precautions: Bipolar 1 disorder ([address] ADHD (attention deficit hyperactivity disorder); Heel spur; Trouble swallowing; GERD (gastroesophageal reflux disease); Depression with anxiety; PTSD (post-traumatic stress disorder); Tremor; Left hip pain; Weight gain;  Anxiety and depression;  Lumbar stenosis with neurogenic claudication; Lumbar radiculopathy; MARTÍN (obstructive sleep apnea); Obesity; History of kidney stones; Wears glasses; Wears dentures; COPD, Hyperlipidemia, HTN  [] C-9 dates  [] Occ Med   [] Medicare      Name: Noelle Randle \"Jayleen\" Rashaun Hurley  Date  7/12/21 7/15/21   7/19/21 7/22/21  7/2621   Visit #  2/12 3/12   4/12 5/12  6/12   Walk F/L/R  1 Lap @ Rail  1 Lap @ Rail   2 Laps @ Rail F/L 2 Laps @ Rail +Retro  2 Laps @ Rail    Marching  10x  10x   10x  10x  12x   Squats 10x5\" 10x5\"  10x5\"  10x5\"  12x5\"   Step-Ups F/L            Step Down F/L            Heel-toe raises 20x 20x  10x  10x  12x   SLR F/L/R 10x  10x   10x 10x  12x   Hip/Knee Flex/Ext  10x   10x 10x  12x   F/L Lunges                          Kickboard Ex. Sm Sm   Small  Small  Small   Iso Abd. 10x5\"  10x5\"    10x5\" 10x5\"  12x5\"   Push-pull   10x   10x 10x  12x   Paddling                           UE Format:           Horiz Abd/Add            IR/ER (wipers)            Alt Flex/Ext            Alt Press Down            Abd/Add                          Deep Water:     1 Noodle   1 Noodle  1 Noodle   Hang      3'  3' 3'   Cycling      2'  2' 2'   Jacks             X-Country                           Balance             SLS                           Stretches             Achllies     Defers- foot issues        Hamstring  2x20\"  2x20\"   2X20\"  2x20\"  2x20\"                 Cool Down    2 laps fwd  2 Laps   2 Laps  2 Laps   Pain Rating 8 8   4-6 1 and 6  1.5 and 7        Specific Instructions for next treatment:  Assess response to recent progression and add UE format    Assessment: [x] Progressing toward goals. Tolerated added reps well today without c/o increased pain. Cueing for posture throughout with good carryover. Performed most exercises without UE support and walking without UE support. Notes decreased pain with deep water hang. [] No change.      [] Other:    [x] Patient would continue to benefit from skilled physical therapy services in order to: to assist with pain control for her lumbar region/LEs to allow her to perform her ADLs with less pain/limitation. Goals  STG: (to be met in 9 treatments)  1. Patient will report an average pain level of a 3-4/10 within the lumbar region/LEs at rest/ADLs. LTG: (to be met in 18 treatments)  To be determined @ the re-assessment         Patient goals: To relieve the pain    Pt. Education:  [x] Yes  [] No  [x] Reviewed Prior HEP/Ed  Method of Education: [x] Verbal  [x] Demo  [] Written  Comprehension of Education:  [x] Verbalizes understanding. [x] Demonstrates understanding. [] Needs review. [] Demonstrates/verbalizes HEP/Ed previously given. Plan: [x] Continue per plan of care.    [] Other:      Treatment Charges: Mins Units   []  Modalities     []  Ther Exercise     []  Manual Therapy     []  Ther Activities     [x]  Aquatics 30 2   []  Neuromuscular     [] Vasocompression     [] Gait Training     [] Dry needling        [] 1 or 2 muscles        [] 3 or more muscles     []  Other     Total Treatment time 30 2     Time In: 6801 AM           Time Out: 9506 am    Electronically signed by:  Venice Jain PTA

## 2021-07-29 ENCOUNTER — HOSPITAL ENCOUNTER (OUTPATIENT)
Dept: PHYSICAL THERAPY | Age: 56
Setting detail: THERAPIES SERIES
Discharge: HOME | End: 2021-07-29
Payer: MEDICARE

## 2021-07-29 PROCEDURE — 97113 AQUATIC THERAPY/EXERCISES: CPT

## 2021-07-29 NOTE — FLOWSHEET NOTE
[facility]   [address]   Phone: [phone]   Fax: [phone]    Physical Therapy Daily Treatment Note      Date:  2021  Patient Name:  Loretta Lopez    :  1965  MRN: [id number]  Physician: Tirso Limon DO                               Medical Diagnosis: Laminectomy (R86.551) Leg Cramp (R25.2)       Clinical Diagnosis: Low back pain with mobility deficits   Onset date: 2021                  Next Dr's appt.: TBD  PT Visit Information  PT Insurance Information: [facility] Dual Complete  Total # of Visits Approved: 18  Total # of Visits to Date: 7  No Show: 0  Canceled Appointment: 0    Subjective:  Pt reports waking up this morning with high pain in lateral left calf. Got moving started hurting more and sitting the car made it hurt even more. States little to no pain after leaving therapy last visit and has felt good until this morning. Notes doing laundry on Tuesday and standing to cook yesterday without any issues but woke up in high pain this morning. Pain:  [x] Yes  [] No Location: lumbar back, (B) LE knee joint line down to feet- L>R   Pain Rating: (0-10 scale) Lower back pain 1/10; 9/10 L LE (calf) and R LE 4/10  Pain altered Tx:  [x] No  [] Yes  Action:  Comments: Emphasis on core stability and postural awareness; educated patient to avoid increasing pain levels. Patient arrives to pool area with use of rolling walker- antalgic gait noted    Objective:  [facility] J Exercise Log  Aquatic, Hip & DLS Program- Phase 1     Date of Eval: 2021                             Primary PT: Gaby Doll PT DPT  Diagnosis: Laminectomy (Z98.890) Leg Cramp (R25.2)     Things to Focus On (goals): Pain Control  Surgical Precautions:  Medical Precautions: Bipolar 1 disorder ([facility] [address] ADHD (attention deficit hyperactivity disorder);  Heel spur; Trouble swallowing; GERD (gastroesophageal reflux disease); Depression with anxiety; PTSD (post-traumatic stress disorder); Tremor; Left hip pain; Weight gain; Anxiety and depression;  Lumbar stenosis with neurogenic claudication; Lumbar radiculopathy; MARTÍN (obstructive sleep apnea); Obesity; History of kidney stones; Wears glasses; Wears dentures; COPD, Hyperlipidemia, HTN  [] C-9 dates  [] Occ Med   [] Medicare      Name: Rachel Carver \"Jayleen\" Natalia Hightower  Date 7/22/21 7/2621 7/29/21    Visit # 5/12 6/12 7/12    Walk F/L/R 2 Laps @ Rail +Retro  2 Laps @ Rail  2 Laps @ Rail    Marching  10x  12x 15x    Squats 10x5\"  12x5\" 15x5\"    Step-Ups F/L         Step Down F/L         Heel-toe raises 10x  12x 15x    SLR F/L/R 10x  12x 15x    Hip/Knee Flex/Ext 10x  12x 15x    F/L Lunges                   Kickboard Ex. Small  Small Small    Iso Abd. 10x5\"  12x5\" 15x5\"    Push-pull 10x  12x 15x    Paddling                   UE Format:       Horiz Abd/Add        IR/ER (wipers)        Alt Flex/Ext        Alt Press Down        Abd/Add                  Deep Water:  1 Noodle  1 Noodle 1 Noodle    Hang  3' 3' 3'    Cycling  2' 2' 2'    Jacks         X-Country                   Balance         SLS                   Stretches         Achllies         Hamstring  2x20\"  2x20\" 2x20\"              Cool Down  2 Laps  2 Laps 2 Laps    Pain Rating 1 and 6  1.5 and 7 1 and 9         Specific Instructions for next treatment:  Add kickboard paddling and deep water jacks and x-country next visit. Assessment: [x] Progressing toward goals. Tolerated exercises well with less pain in left calf when in the water today. Notes feels stretch and felt good performing heel-toe raises with less pain felt at end of treatment. Progressed reps and no UE for support to challenge balance and core stability. [] No change.      [] Other:    [x] Patient would continue to benefit from skilled physical therapy services in order to: to assist with pain control for her lumbar region/LEs to allow her to perform her ADLs with less pain/limitation. Goals  STG: (to be met in 9 treatments)  1. Patient will report an average pain level of a 3-4/10 within the lumbar region/LEs at rest/ADLs. LTG: (to be met in 18 treatments)  To be determined @ the re-assessment         Patient goals: To relieve the pain    Pt. Education:  [x] Yes  [] No  [x] Reviewed Prior HEP/Ed  Method of Education: [x] Verbal  [x] Demo  [] Written  Comprehension of Education:  [x] Verbalizes understanding. [x] Demonstrates understanding. [] Needs review. [] Demonstrates/verbalizes HEP/Ed previously given. Plan: [x] Continue per plan of care. [] Other:   Physical therapy treatment completed today in part by physical therapist assistant Nikolay Gr (HADLEY). Treatment Charges: Mins Units   []  Modalities     [x]  Ther Exercise 10 1   []  Manual Therapy     []  Ther Activities     [x]  Aquatics 30 2   []  Neuromuscular     [] Vasocompression     [] Gait Training     [] Dry needling        [] 1 or 2 muscles        [] 3 or more muscles     [x]  Other Re-Evaluation  15 0   Total Treatment time 30 2   Treatment times reflect total treatment time combined by both [place] PT, DPT (30 minutes) and Nikolay Gr PTA (30 minutes) for today's service.    Time In: 1025 AM           Time Out: 8615 am    Electronically signed by:  Lewis Zavala PTA

## 2021-07-29 NOTE — PROGRESS NOTES
[facility]  [address] [place] [address]. [address]         Phone: [phone]       Fax: [phone]    Physical Therapy Progress Note Update      Date:  2021  Patient: Duane Kwok  : 1965  MRN: [id number]  Physician: Manpreet Leroy DO      Medical Diagnosis: Laminectomy (C72.184) Leg Cramp (R25.2)   Clinical Diagnosis: Low back pain with mobility deficits   Onset date: 2021    Next 's appt.: TBD  PT Visit Information  PT Insurance Information: [employer] Dual Complete  Total # of Visits Approved: 18  Total # of Visits to Date: 7  No Show: 0  Canceled Appointment: 0     Subjective  Patient stated that her symptoms have significantly improved within the lumbar region. Dull achy feeling across the lumbar region.       Pain  Pain:  [x] Yes   [] No      Location: (R) and (L) lumbar region   Pain Rating: (0-10 scale)  1/10  Worst: 1/10  Best:1/10  Symptoms:  [x] Improving [] Worsening       [] Same  Descriptors: Constant, dull achy   Aggravating factors:  Standing/ambulation   Relieving factors: Rest/repositioning   Sleep: Disturbed   Other:     Function  Hand Dominance  [x] Right  [] Left  Working:  [] Normal Duty  [] Light Duty  [] Off D/T Condition  [] Retired    [] Not Employed    [x]  Disability  [] Other:           Return to work:   Job/ADL Description:    ADL/IADL Previous level of function Current level of function Who currently assists the patient with task/Comments   Bathing  [x] Independent  [] Assist [x] Independent  [] Assist    Dress/grooming [x] Independent  [] Assist [] Independent  [x] Assist Son   Transfer/mobility [x] Independent  [] Assist [x] Independent  [] Assist    Feeding [x] Independent  [] Assist [x] Independent  [] Assist    Toileting [x] Independent  [] Assist [x] Independent  [] Assist    Driving [x] Independent  [] Assist [x] Independent  [] Assist    Housekeeping [x] Independent  [] Assist [] Independent  [x] Assist Son   Grocery shop/meal prep [x] Independent  [] Assist [] Independent  [x] Assist Son     Gait Prior level of function Current level of function    [x] Independent  [] Assist [x] Independent  [] Assist   Device: [] Independent [] Independent    [x] Straight Cane [] Quad cane [] Straight Cane [] Quad cane    [] Standard walker [] Rolling walker   [] 4 wheeled walker [] Standard walker [x] Rolling walker   [] 4 wheeled walker    [] Wheelchair [] Wheelchair     Objective  Observation/Palpation   Normal Deficit Comments   Observation [] [x] (R) genu recurvatum  Unable to straighten the (L) knee   Unable to observe in a standing position due to balance issues  Forward head, rounded shoulders,kyphosis (seated position)       Palpation [] [x] Able to touch the (L) hip/LE now without increased pain expect the muscle belly of the (L) gastroc      Edema [] []    Scar [] []    Other [] []        Additional Measures    Normal Deficit Comments   Sensation   [] []    Reflexes   [] []    Gross motor   [] []    Flexibility    [] [] (L) quad tightness, unable to assess hamstring, able to assess (L) gastroc tightness on the (L) - tightness was apparent. (R) quad, gastroc and hamstring tightness was still noted    Special Tests   [] []     strength   [] []    Other   [] []      Assessment  Patient is improving as evidence by self-reported symptom reduction within her lumbar region. LE tightness was still apparent along with pain. Therefore, the patient would continue to benefit    from skilled physical therapy services in order to assist with pain control for her lumbar region/LEs to allow her to perform her ADLs with less pain/limitation. Goals  STG: (to be met in 9 treatments)  1. Patient will report an average pain level of a 3-4/10 within the lumbar region/LEs at rest/ADLs.  (Met)    Patient demonstrated improvement from condition with _1_ of_1__ short term goals     Comments/Function: Function relatively unchanged since her initial evaluation. Pt. Education:  [x] Plans/Goals, Risks/Benefits discussed   Home exercise program - standing gastroc stretch 30 sec hold x 3 reps with each leg (Daily 1-2 sessions)   Method of Education: [x] Verbal  [x] Demo  [] Written  Comprehension of Education:  [x] Verbalizes understanding. [x] Demonstrates understanding. [] Needs Review. [] Demonstrates/verbalizes understanding of HEP/Ed previously given. Recommendations: Continue with current prescription/aquatics to further address her functional needs noted above. [] Patient is Discharged At This Time Unless Further Orders Are Received. New Script Needed To Continue Treatment: [x] No   [] Yes  (visits left on current prescription:    11           ) Please sign orders at the bottom of this page and return by faxing. Thank you. Current Treatment:  [] Therapeutic Exercise    [] Modalities                [] Work Conditioning  [] Therapeutic Activity    [] Ultrasound  [] Electrical Stimulation  [] Gait Training     [] Massage       [] Lumbar/Cervical Traction  [] Neuromuscular Re-education [] Cold/hotpack [] Iontophoresis: 4 mg/mL  [x] Instruction in Home Exercise Program                     Dexamethasone Sodium  [] Manual Therapy             Phosphate 40-80 mAmin  [x] Aquatic Therapy                                 [] Vaso Pneumatic compression  [] Other:    Medicare/Regulatory Requirements:  I have reviewed this plan of care and certify a need for medically necessary rehabilitation services.   [] Physician Signature                                                   Date:       Thank you for your referral                    Electronically signed by: Derrick Amaral, PT DPT     [facility] @ [facility]  [address] [place] [address] [address]  [address]  Phone [phone]  Fax [phone]

## 2021-08-03 ENCOUNTER — HOSPITAL ENCOUNTER (OUTPATIENT)
Dept: PHYSICAL THERAPY | Age: 56
Setting detail: THERAPIES SERIES
Discharge: HOME | End: 2021-08-03
Payer: MEDICARE

## 2021-08-03 PROCEDURE — 97113 AQUATIC THERAPY/EXERCISES: CPT

## 2021-08-03 NOTE — FLOWSHEET NOTE
[facility]    [address]   Phone: [phone]   Fax: [phone]    Physical Therapy Daily Treatment Note      Date:  8/3/2021  Patient Name:  Vy Britt    :  1965  MRN: [id number]  Physician: Janel                                Medical Diagnosis: Laminectomy (C56.551) Leg Cramp (R25.2)       Clinical Diagnosis: Low back pain with mobility deficits   Onset date: 2021                  Next Dr's appt.: TBD  PT Visit Information  PT Insurance Information: [facility] Dual Complete  Total # of Visits Approved: 18  Total # of Visits to Date: 8  No Show: 0  Canceled Appointment: 0    Subjective:  States her hip feels out of place but is not yet allowed to see a chiropractor. Patient states the pool has been helping overall. Reports compliance with exercise and stretching given to her for home. Continues to use walker at home and in community. Reports she has difficulty entering/exiting house due to steps without rail. Has a massager she uses at home now for knees and also Dr started her on arthritis meds for knees which seems to help. Has difficulty getting left sock and shoe on. Pain:  [x] Yes  [] No Location: lumbar back, (B) LE knee joint line down to feet- L>R   Pain Rating: (0-10 scale) Lower back pain 1/10; 7/10 L knee/LE (calf) and R LE 4/10  Pain altered Tx:  [x] No  [] Yes  Action:  Comments:     Objective:  [address] [facility] J Exercise Log  Aquatic, Hip & DLS Program- Phase 1     Date of Eval: 2021                             Primary PT: Sonja Brooks PT DPT  Diagnosis: Laminectomy (Z98.890) Leg Cramp (R25.2)     Things to Focus On (goals): Pain Control  Surgical Precautions:  Medical Precautions: Bipolar 1 disorder ([facility] [address] ADHD (attention deficit hyperactivity disorder); Heel spur; Trouble swallowing; GERD (gastroesophageal reflux disease);  Depression with anxiety; PTSD (post-traumatic stress disorder); Tremor; Left hip pain; Weight gain; Anxiety and depression;  Lumbar stenosis with neurogenic claudication; Lumbar radiculopathy; MARTÍN (obstructive sleep apnea); Obesity; History of kidney stones; Wears glasses; Wears dentures; COPD, Hyperlipidemia, HTN  [] C-9 dates  [] Occ Med   [] Medicare      Name: Sandie Villatoro \"Jayleen\" Clay Sax  Date 7/22/21  7/2621 7/29/21 8/3/21    Visit # 5/12 6/12 7/12 8/18    Walk F/L/R 2 Laps @ Rail +Retro  2 Laps @ Rail  2 Laps @ Rail 2 Laps     Marching  10x  12x 15x 1 Lap @ Gale Done 10x5\"  12x5\" 15x5\" 15x5\"    Step-Ups F/L          Step Down F/L       Add   Heel-toe raises 10x  12x 15x 15x    SLR F/L/R 10x  12x 15x 15x    Hip/Knee Flex/Ext 10x  12x 15x 15x    F/L Lunges                     Kickboard Ex. Small  Small Small Small    Iso Abd. 10x5\"  12x5\" 15x5\" 10x5\"    Push-pull 10x  12x 15x 15x    Paddling       Add              UE Format:        Horiz Abd/Add         IR/ER (wipers)         Alt Flex/Ext         Alt Press Down         Abd/Add                    Deep Water:  1 Noodle  1 Noodle 1 Noodle 1 Noodle    Hang  3' 3' 3' 3'    Cycling  2' 2' 2' 2'    Jacks      1'    X-Country      1'               Balance          SLS                     Stretches          Achllies          Hamstring  2x20\"  2x20\" 2x20\" 2x20\"               Cool Down  2 Laps  2 Laps 2 Laps 2 Laps    Pain Rating 1 and 6  1.5 and 7  1-7         Specific Instructions for next treatment:  Add kickboard paddling to challenge core stability and step ups for LE strength    Assessment: [x] Progressing toward goals. Added marching laps and progressed deep water aerobics to tolerance. Emphasis on posture and technique throughout program.  Patient tolerated without increased symptoms. [] No change.      [] Other:    [x] Patient would continue to benefit from skilled physical therapy services in order to: to assist with pain control for her lumbar region/LEs to allow her to perform her ADLs with less pain/limitation. Goals  STG: (to be met in 9 treatments)  1. Patient will report an average pain level of a 3-4/10 within the lumbar region/LEs at rest/ADLs. LTG: (to be met in 18 treatments)  To be determined @ the re-assessment         Patient goals: To relieve the pain    Pt. Education:  [x] Yes  [] No  [x] Reviewed Prior HEP/Ed  Method of Education: [x] Verbal  [x] Demo  [] Written  Comprehension of Education:  [x] Verbalizes understanding. [x] Demonstrates understanding. [] Needs review. [] Demonstrates/verbalizes HEP/Ed previously given. Plan: [x] Continue per plan of care.    [] Other:      Treatment Charges: Mins Units   []  Modalities     []  Ther Exercise     []  Manual Therapy     []  Ther Activities     [x]  Aquatics 30 2   []  Neuromuscular     [] Vasocompression     [] Gait Training     [] Dry needling        [] 1 or 2 muscles        [] 3 or more muscles     []  Other Re-Evaluation      Total Treatment time 30 2     Time In:  230 PM          Time Out: 305 PM    Electronically signed by:  Dilip Dumont PTA

## 2021-08-06 ENCOUNTER — HOSPITAL ENCOUNTER (OUTPATIENT)
Dept: PHYSICAL THERAPY | Age: 56
Setting detail: THERAPIES SERIES
Discharge: HOME | End: 2021-08-06
Payer: MEDICARE

## 2021-08-06 PROCEDURE — 97113 AQUATIC THERAPY/EXERCISES: CPT

## 2021-08-06 NOTE — FLOWSHEET NOTE
[facility]   [address]   Phone: [phone]   Fax: [phone]    Physical Therapy Daily Treatment Note    Date:  2021  Patient Name:  Tre Westbrook    :  1965  MRN: [id number]  Physician: Sukhi Mason DO                               Medical Diagnosis: Laminectomy (J98.029) Leg Cramp (R25.2)       Clinical Diagnosis: Low back pain with mobility deficits   Onset date: 2021                  Next 's appt.: TBD  PT Visit Information  PT Insurance Information: [facility] Dual Complete  Total # of Visits Approved: 18  Total # of Visits to Date: 9  No Show: 0  Canceled Appointment: 0    Subjective:  Pt reports feeling good after last therapy session with minimal increase in pain. Denies any change in pain this morning. States has been performing a HEP to increase activity tolerance. Pain:  [x] Yes  [] No Location: lumbar back, (B) LE knee joint line down to feet- L>R   Pain Rating: (0-10 scale) Lower back pain 1/10; 6/10 L knee/LE (calf) and R LE 3/10  Pain altered Tx:  [x] No  [] Yes  Action:  Comments:     Objective:  [facility] J Exercise Log  Aquatic, Hip & DLS Program- Phase 1     Date of Eval: 2021                             Primary PT: Pina Huggins PT DPT  Diagnosis: Laminectomy (Z98.890) Leg Cramp (R25.2)     Things to Focus On (goals): Pain Control  Surgical Precautions:  Medical Precautions: Bipolar 1 disorder ([facility] [address] ADHD (attention deficit hyperactivity disorder); Heel spur; Trouble swallowing; GERD (gastroesophageal reflux disease); Depression with anxiety; PTSD (post-traumatic stress disorder); Tremor; Left hip pain; Weight gain; Anxiety and depression;  Lumbar stenosis with neurogenic claudication; Lumbar radiculopathy; MARTÍN (obstructive sleep apnea); Obesity; History of kidney stones; Wears glasses;  Wears dentures; COPD, Hyperlipidemia, HTN  [] C-9 dates  [] Occ Med   [] Medicare      Name: Stephanie Gamino \"Jayleen\" Clois Gandhi  Date 7/22/21  7/2621 7/29/21 8/3/21 8/6/21   Visit # 5/12 6/12 7/12 8/18 9/18   Walk F/L/R 2 Laps @ Rail +Retro  2 Laps @ Rail  2 Laps @ Rail 2 Laps  2 Laps    Marching  10x  12x 15x 1 Lap @ Rail 2 Laps @ Rail   Squats 10x5\"  12x5\" 15x5\" 15x5\" 15x5\"   Step-Ups F/L       Low 10x   Step Down F/L          Heel-toe raises 10x  12x 15x 15x 15x   SLR F/L/R 10x  12x 15x 15x 15x   Hip/Knee Flex/Ext 10x  12x 15x 15x 15x   F/L Lunges                     Kickboard Ex. Small  Small Small Small Small   Iso Abd. 10x5\"  12x5\" 15x5\" 10x5\" 10x5\"   Push-pull 10x  12x 15x 15x 15x   Paddling       10x              UE Format:        Horiz Abd/Add         IR/ER (wipers)         Alt Flex/Ext         Alt Press Down         Abd/Add                    Deep Water:  1 Noodle  1 Noodle 1 Noodle 1 Noodle 1 Noodle   Hang  3' 3' 3' 3' 3'   Cycling  2' 2' 2' 2' 2'   Jacks      1' 1'   X-Country      1' 1'              Balance          SLS                     Stretches          Achllies          Hamstring  2x20\"  2x20\" 2x20\" 2x20\" 2x20\"              Cool Down  2 Laps  2 Laps 2 Laps 2 Laps 2 Laps   Pain Rating 1 and 6  1.5 and 7  1-7 1-7        Specific Instructions for next treatment:  Add lateral step ups and UE format next visit. Assessment: [x] Progressing toward goals. Added kb paddling and step ups to low box for core stabilization and LE strengthening. Denies any increased pain throughout program.  Also encouraged no UE support to challenge balance and core strength today. Ended with deep water hang for pain relief. [] No change. [] Other:    [x] Patient would continue to benefit from skilled physical therapy services in order to: to assist with pain control for her lumbar region/LEs to allow her to perform her ADLs with less pain/limitation. Goals  STG: (to be met in 9 treatments)  1. Patient will report an average pain level of a 3-4/10 within the lumbar region/LEs at rest/ADLs.

## 2021-08-09 ENCOUNTER — HOSPITAL ENCOUNTER (OUTPATIENT)
Dept: PHYSICAL THERAPY | Age: 56
Setting detail: THERAPIES SERIES
Discharge: HOME | End: 2021-08-09
Payer: MEDICARE

## 2021-08-09 NOTE — FLOWSHEET NOTE
[x] [facility]) - [facility]  [address]  [place]: [phone]   F: [phone]     Physical Therapy Cancel/No Show note    Date: 2021  Patient: Jade Garcia  : 1965  MRN: [id number]    Visit Count:   Cancels/No Shows to date:      For today's appointment patient:    [x]  Cancelled    [] Rescheduled appointment    [] No-show     Reason given by patient:    []  Patient ill    []  Conflicting appointment    [] No transportation      [] Conflict with work    [] No reason given    [] Weather related    [] COVID-19    [x] Other:      Comments:  Diarrhea       [x] Next appointment was confirmed    Electronically signed by: [place] PTA

## 2021-08-12 ENCOUNTER — HOSPITAL ENCOUNTER (OUTPATIENT)
Dept: PHYSICAL THERAPY | Age: 56
Setting detail: THERAPIES SERIES
Discharge: HOME | End: 2021-08-12
Payer: MEDICARE

## 2021-08-12 PROCEDURE — 97113 AQUATIC THERAPY/EXERCISES: CPT

## 2021-08-12 NOTE — FLOWSHEET NOTE
[facility]   [address]   Phone: [phone]   Fax: [phone]    Physical Therapy Daily Treatment Note    Date:  2021  Patient Name:  Iman Bañuelos    :  1965  MRN: [id number]  Physician: Salinas Miller DO                               Medical Diagnosis: Laminectomy (F94.135) Leg Cramp (R25.2)       Clinical Diagnosis: Low back pain with mobility deficits   Onset date: 2021                  Next Dr's appt.: End of Sept.  PT Visit Information  PT Insurance Information: [employer] Dual Complete  Total # of Visits Approved: 18  Total # of Visits to Date: 10  No Show: 0  Canceled Appointment: 2    Subjective:  Continues with numbness in R anterior thigh, knee to dorsum of foot and 2nd and 3rd toes. Lower back pain is minimal.  Pain:  [x] Yes  [] No Location: lumbar back, (B) LE knee joint line down to feet- L>R   Pain Rating: (0-10 scale) Lower back pain 1/10; 6/10 (B) knees  L>R  Pain altered Tx:  [x] No  [] Yes  Action:  Comments:     Objective:  [address] [facility] J Exercise Log  Aquatic, Hip & DLS Program- Phase 1     Date of Eval: 2021                             Primary PT: Madi Bermeo PT DPT  Diagnosis: Laminectomy (Z98.890) Leg Cramp (R25.2)     Things to Focus On (goals): Pain Control  Surgical Precautions:  Medical Precautions: Bipolar 1 disorder ([facility] [address] ADHD (attention deficit hyperactivity disorder); Heel spur; Trouble swallowing; GERD (gastroesophageal reflux disease); Depression with anxiety; PTSD (post-traumatic stress disorder); Tremor; Left hip pain; Weight gain; Anxiety and depression;  Lumbar stenosis with neurogenic claudication; Lumbar radiculopathy; MARTÍN (obstructive sleep apnea); Obesity; History of kidney stones; Wears glasses;  Wears dentures; COPD, Hyperlipidemia, HTN  [] C-9 dates  [] Occ Med   [] Medicare      Name: Blanca Schaeffer \"Jayleen\" Noelle Chamberlain  Date 7/29/21 8/3/21 8/6/21 understanding. [] Needs review. [] Demonstrates/verbalizes HEP/Ed previously given. Plan: [x] Continue per plan of care.    [] Other:      Treatment Charges: Mins Units   []  Modalities     []  Ther Exercise     []  Manual Therapy     []  Ther Activities     [x]  Aquatics 43 3   []  Neuromuscular     [] Vasocompression     [] Gait Training     [] Dry needling        [] 1 or 2 muscles        [] 3 or more muscles     []  Other Re-Evaluation      Total Treatment time 43 3     Time In: 3922 PM          Time Out: 145 PM    Electronically signed by:  Marisela Villa PTA

## 2021-08-16 ENCOUNTER — HOSPITAL ENCOUNTER (OUTPATIENT)
Dept: PHYSICAL THERAPY | Age: 56
Setting detail: THERAPIES SERIES
End: 2021-08-16
Payer: MEDICARE

## 2021-08-16 DIAGNOSIS — Z98.890 S/P LAMINECTOMY: ICD-10-CM

## 2021-08-16 DIAGNOSIS — M48.062 SPINAL STENOSIS OF LUMBAR REGION WITH NEUROGENIC CLAUDICATION: ICD-10-CM

## 2021-08-16 RX ORDER — METHOCARBAMOL 750 MG/1
750 TABLET, FILM COATED ORAL EVERY 8 HOURS PRN
Qty: 90 TABLET | Refills: 0 | Status: SHIPPED | OUTPATIENT
Start: 2021-08-16 | End: 2021-09-21 | Stop reason: SDUPTHER

## 2021-08-16 RX ORDER — GABAPENTIN 300 MG/1
300 CAPSULE ORAL 3 TIMES DAILY
Qty: 90 CAPSULE | Refills: 0 | Status: SHIPPED | OUTPATIENT
Start: 2021-08-16 | End: 2021-09-21 | Stop reason: SDUPTHER

## 2021-08-17 ENCOUNTER — HOSPITAL ENCOUNTER (OUTPATIENT)
Dept: PHYSICAL THERAPY | Age: 56
Setting detail: THERAPIES SERIES
Discharge: HOME | End: 2021-08-17
Payer: MEDICARE

## 2021-08-17 PROCEDURE — 97113 AQUATIC THERAPY/EXERCISES: CPT

## 2021-08-17 NOTE — FLOWSHEET NOTE
[facility] includes the [facility] Outpatient Physical Therapy   [address]   Phone: [phone]   Fax: [phone]    Physical Therapy Daily Treatment Note    Date:  2021  Patient Name:  Juan Carrero    :  1965  MRN: [id number]  Physician: Toya Al DO                               Medical Diagnosis: Laminectomy (Q29.540) Leg Cramp (R25.2)       Clinical Diagnosis: Low back pain with mobility deficits   Onset date: 2021                  Next Dr's appt.: End of Sept.  PT Visit Information  PT Insurance Information: [address] [facility] Dual Complete  Total # of Visits Approved: 18  Total # of Visits to Date: 11  No Show: 0  Canceled Appointment: 2    Subjective:  No complaints after lat visit. Feels she is walking more upright but still has pain at (B) knees and shins. Reports she was able to get in and out of her bath tub with a step stool and grab bar. Pain:  [x] Yes  [] No Location: Lower back, (B) LE knee joint line down to feet- L>R more lateral this date   Pain Rating: (0-10 scale) Lower back pain 1/10; 6/10 L knee 4/10 R Knee  Pain altered Tx:  [x] No  [] Yes  Action:  Comments: Continues to arrive to pool deck with use of rolling walker; slow paced; shuffled gait, antalgia with decreased heel strike and toe off left > right    Objective:  [facility] J Exercise Log  Aquatic, Hip & DLS Program- Phase 1     Date of Eval: 2021                             Primary PT: Jose Escoto PT DPT  Diagnosis: Laminectomy (Z98.890) Leg Cramp (R25.2)     Things to Focus On (goals): Pain Control  Surgical Precautions:  Medical Precautions: Bipolar 1 disorder ([facility] [address] ADHD (attention deficit hyperactivity disorder); Heel spur; Trouble swallowing; GERD (gastroesophageal reflux disease); Depression with anxiety; PTSD (post-traumatic stress disorder); Tremor; Left hip pain; Weight gain;  Anxiety and depression;  Lumbar stenosis with neurogenic claudication; Lumbar radiculopathy; MARTÍN (obstructive sleep apnea); Obesity; History of kidney stones; Wears glasses; Wears dentures; COPD, Hyperlipidemia, HTN  [] C-9 dates  [] Occ Med   [] Medicare      Name: Stephanie Gamino \"Jayleen\" Jose Gandhi  Date 8/6/21 8/12/21 8/17/21    Visit # 9/18 10/18 11/18    Walk F/L/R 2 Laps  2 Laps +R @ Rail 2 Laps @ [facility] 2 Laps @ Rail 2 Laps @ Rail 2 Laps @ [place] 15x5\" 20x5\" 20x5\"    Step-Ups F/L Low 10x Low 10x+L Low 10x    Step Down F/L       Heel-toe raises 15x 20x 20x    SLR F/L/R 15x 20x 20x    Hip/Knee Flex/Ext 15x 20x 20x    F/L Lunges               Kickboard Ex. Small Small Med    Iso Abd. 10x5\" 15x5\" 10x5\"    Push-pull 15x 15x 10x    Paddling 10x 15x 10x            UE Format:       Horiz Abd/Add   10x    IR/ER (wipers)    Add   Alt Flex/Ext   10x    Alt Press Down    Add   Abd/Add   10x            Deep Water: 1 Noodle 1 Noodle 1 Noodle    Hang 3' 3' 3'    Cycling 2' 2' 2'    Jacks 1' 1' 1'    [employer]-Country 1' 1' 1'            Balance       SLS               Stretches       Achllies       Hamstring 2x20\" 2x20\" 3x20\"            Cool Down 2 Laps NT     Pain Rating 1-7 1/6 See above         Specific Instructions for next treatment:  Continue to progress speed to challenge core    Assessment: [x] Progressing toward goals. Continued cues for technique and to keep on task. Added long lever UE exercise to challenge stability. Overall patient tolerated program well without increased pain complaints    [] No change. [] Other:    [x] Patient would continue to benefit from skilled physical therapy services in order to: to assist with pain control for her lumbar region/LEs to allow her to perform her ADLs with less pain/limitation. Goals  STG: (to be met in 9 treatments)  1. Patient will report an average pain level of a 3-4/10 within the lumbar region/LEs at rest/ADLs. LTG: (to be met in 18 treatments)  To be determined @ the re-assessment         Patient goals:  To relieve the pain    Pt. Education:  [x] Yes  [] No  [x] Reviewed Prior HEP/Ed  Method of Education: [x] Verbal  [x] Demo  [] Written  Comprehension of Education:  [x] Verbalizes understanding. [x] Demonstrates understanding. [] Needs review. [] Demonstrates/verbalizes HEP/Ed previously given. Plan: [x] Continue per plan of care.    [] Other:      Treatment Charges: Mins Units   []  Modalities     []  Ther Exercise     []  Manual Therapy     []  Ther Activities     [x]  Aquatics 40 3   []  Neuromuscular     [] Vasocompression     [] Gait Training     [] Dry needling        [] 1 or 2 muscles        [] 3 or more muscles     []  Other Re-Evaluation      Total Treatment time 40 3     Time In: 331 PM          Time Out: 438 PM    Electronically signed by:  Nico Flores PTA

## 2021-08-19 ENCOUNTER — HOSPITAL ENCOUNTER (OUTPATIENT)
Dept: PHYSICAL THERAPY | Age: 56
Setting detail: THERAPIES SERIES
Discharge: HOME | End: 2021-08-19
Payer: MEDICARE

## 2021-08-19 NOTE — FLOWSHEET NOTE
[x] [facility]) [facility] & Therapy  [address]  [place]: [phone]   F: [phone]     Physical Therapy Cancel/No Show note    Date: 2021  Patient: Josias Joe  : 1965  MRN: [id number]    Visit Count:   Cancels/No Shows to date: 0/3    For today's appointment patient:    [x]  Cancelled    [] Rescheduled appointment    [] No-show     Reason given by patient:    [x]  Patient ill    []  Conflicting appointment    [] No transportation      [] Conflict with work    [] No reason given    [] Weather related    [] [id number]    [] Other:      Comments:        [x] Next appointment was confirmed    Electronically signed by: Katy Marquez PTA

## 2021-08-23 ENCOUNTER — HOSPITAL ENCOUNTER (OUTPATIENT)
Dept: PHYSICAL THERAPY | Age: 56
Setting detail: THERAPIES SERIES
Discharge: HOME | End: 2021-08-23
Payer: MEDICARE

## 2021-08-23 PROCEDURE — 97113 AQUATIC THERAPY/EXERCISES: CPT

## 2021-08-23 NOTE — FLOWSHEET NOTE
[facility] Outpatient Physical Therapy   [address]   Phone: [phone]   Fax: [phone]    Physical Therapy Daily Treatment Note    Date:  2021  Patient Name:  Dao Shultz    :  1965  MRN: [id number]  Physician: Amber Nina DO                               Medical Diagnosis: Laminectomy (Q46.756) Leg Cramp (R25.2)       Clinical Diagnosis: Low back pain with mobility deficits   Onset date: 2021                  Next Dr's appt.: End of Sept.  PT Visit Information  PT Insurance Information: [address] [facility] Dual Complete  Total # of Visits Approved: 18  Total # of Visits to Date: 12  No Show: 0  Canceled Appointment: 3    Subjective:  Reports she had a stressful day yesterday and ended up having a fall yesterday evening- states she got out of bed to use the restroom and didn't sit at the edge of the bed long enough and had \"sea legs\" states she took 3 steps without walker and fell to floor- states she controlled the lower with her upper body and was able to get herself back up. Has been using a massager on left hip/groin and has noted some relief and improvement with getting dressed.   Pain:  [x] Yes  [] No Location: Lower back, (B) LE knee joint line down to feet- L>R more lateral this date   Pain Rating: (0-10 scale) Lower back pain 1/10; 5/10 L knee 4.5/10 R Knee  Pain altered Tx:  [x] No  [] Yes  Action:  Comments: Continues to arrive to pool deck with use of rolling walker; slow paced; shuffled gait, antalgia with decreased heel strike and toe off left > right    Objective:  [address] [facility] J Exercise Log  Aquatic, Hip & DLS Program- Phase 1     Date of Eval: 2021                             Primary PT: Andrés Atkinson PT DPT  Diagnosis: Laminectomy (Z98.890) Leg Cramp (R25.2)     Things to Focus On (goals): Pain Control  Surgical Precautions:  Medical Precautions: Bipolar 1 disorder ([address] ADHD (attention deficit hyperactivity disorder); Heel spur; Trouble swallowing; GERD (gastroesophageal reflux disease); Depression with anxiety; PTSD (post-traumatic stress disorder); Tremor; Left hip pain; Weight gain; Anxiety and depression;  Lumbar stenosis with neurogenic claudication; Lumbar radiculopathy; MARTÍN (obstructive sleep apnea); Obesity; History of kidney stones; Wears glasses; Wears dentures; COPD, Hyperlipidemia, HTN  [] C-9 dates  [] Occ Med   [] Medicare      Name: Melany Bui \"Jayleen\" Ibis Martyr  Date 8/6/21 8/12/21 8/17/21 8/23/21    Visit # 9/18 10/18 11/18 12/18    Walk F/L/R 2 Laps  2 Laps +R @ Rail 2 Laps @ Rail 2 Laps @ Rail    Marching 2 Laps @ Rail 2 Laps @ Rail 2 Laps @ Rail 2 Laps @ Rail        Low Box    Squats 15x5\" 20x5\" 20x5\" 10x5\"    Step-Ups F/L Low 10x Low 10x+L Low 10x Low 10x    Step Down F/L        Heel-toe raises 15x 20x 20x 10x    SLR F/L/R 15x 20x 20x 10x    Hip/Knee Flex/Ex 15x 20x 20x 10x    F/L Lunges                 Kickboard Ex. Small Small Med Med    Iso Abd. 10x5\" 15x5\" 10x5\" 10x5\"    Push-pull 15x 15x 10x 10x    Paddling 10x 15x 10x 10x             UE Format:        Horiz Abd/Add   10x 10x    IR/ER (wipers)     Add   Alt Flex/Ext   10x 10x    Alt Press= Down     Add   Abd/Add   10x 10x             Deep Water: 1 Noodle 1 Noodle 1 Noodle 1 Noodle    Hang 3' 3' 3' 3'    Cycling 2' 2' 2' 2'    Jacks 1' 1' 1' 1'    X-Country 1' 1' 1' 1'             Balance        SLS                 Stretches        Achllies        Hamstring 2x20\" 2x20\" 3x20\" 2x20\"             Cool Down 2 Laps NT      Pain Rating 1-7 1/6 See above See above         Specific Instructions for next treatment: Progress UE format to challenge core    Assessment: [x] Progressing toward goals. Progressed program to step level to increase LE WB- overall patient tolerated well. Some (L) Shoulder soreness with kickboard paddling. [] No change.      [] Other:    [x] Patient would continue to benefit from skilled physical therapy services in order to: to assist with pain control for her lumbar region/LEs to allow her to perform her ADLs with less pain/limitation. Goals  STG: (to be met in 9 treatments)  1. Patient will report an average pain level of a 3-4/10 within the lumbar region/LEs at rest/ADLs. LTG: (to be met in 18 treatments)  To be determined @ the re-assessment         Patient goals: To relieve the pain    Pt. Education:  [x] Yes  [] No  [x] Reviewed Prior HEP/Ed  Method of Education: [x] Verbal  [x] Demo  [] Written  Comprehension of Education:  [x] Verbalizes understanding. [x] Demonstrates understanding. [] Needs review. [] Demonstrates/verbalizes HEP/Ed previously given. Plan: [x] Continue per plan of care.    [] Other:      Treatment Charges: Mins Units   []  Modalities     []  Ther Exercise     []  Manual Therapy     []  Ther Activities     [x]  Aquatics 40 3   []  Neuromuscular     [] Vasocompression     [] Gait Training     [] Dry needling        [] 1 or 2 muscles        [] 3 or more muscles     []  Other Re-Evaluation      Total Treatment time 40 3     Time In: 7319 PM          Time Out: 137 PM    Electronically signed by:  Batsheva Hopper PTA

## 2021-08-26 ENCOUNTER — HOSPITAL ENCOUNTER (OUTPATIENT)
Dept: PHYSICAL THERAPY | Age: 56
Setting detail: THERAPIES SERIES
End: 2021-08-26
Payer: MEDICARE

## 2021-08-30 ENCOUNTER — HOSPITAL ENCOUNTER (OUTPATIENT)
Dept: PHYSICAL THERAPY | Age: 56
Setting detail: THERAPIES SERIES
Discharge: HOME | End: 2021-08-30
Payer: MEDICARE

## 2021-08-30 PROCEDURE — 97113 AQUATIC THERAPY/EXERCISES: CPT

## 2021-08-30 NOTE — FLOWSHEET NOTE
[facility] Outpatient Physical Therapy   [facility] [address]   Phone: [phone]   Fax: [phone]    Physical Therapy Daily Treatment Note    Date:  2021  Patient Name:  Ruthanna Opitz    :  1965  MRN: [id number]  Physician: Elaine Chavez DO                               Medical Diagnosis: Laminectomy (L26.950) Leg Cramp (R25.2)       Clinical Diagnosis: Low back pain with mobility deficits   Onset date: 2021                  Next 's appt.:   PT Visit Information  PT Insurance Information: [employer] Dual Complete  Total # of Visits Approved: 18  Total # of Visits to Date: 13  No Show: 0  Canceled Appointment: 4    Subjective:  Patient reports she had a death in the family last week. Does not feel arthritis med is helping knees. States she has been walking around house without walker as it is too hard to maneuver. Denies any falls since last visit. Pain:  [x] Yes  [] No Location: Lower back, (B) LE knee joint line down to feet- L>R more lateral this date   Pain Rating: (0-10 scale) Lower back pain 1/10; 4/10 L hip/lateral thigh knee; 4/10 R Knee mid way down calf  Pain altered Tx:  [x] No  [] Yes  Action:  Comments: Continues to arrive to pool deck with use of rolling walker; slow paced; shuffled gait, antalgia with decreased heel strike and toe off left > right    Objective:  [address] [facility] J Exercise Log  Aquatic, Hip & DLS Program- Phase 1     Date of Eval: 2021                             Primary PT: Paul Listen PT DPT  Diagnosis: Laminectomy (Z98.890) Leg Cramp (R25.2)     Things to Focus On (goals): Pain Control  Surgical Precautions:  Medical Precautions: Bipolar 1 disorder ([facility] [address] ADHD (attention deficit hyperactivity disorder); Heel spur; Trouble swallowing; GERD (gastroesophageal reflux disease); Depression with anxiety; PTSD (post-traumatic stress disorder); Tremor;  Left hip pain; Weight gain; Anxiety and depression;  Lumbar stenosis with neurogenic claudication; Lumbar radiculopathy; MARTÍN (obstructive sleep apnea); Obesity; History of kidney stones; Wears glasses; Wears dentures; COPD, Hyperlipidemia, HTN  [] C-9 dates  [] Occ Med   [] Medicare      Name: Saumya Cazares \"Jayleen\" Cori Brood  Date 8/17/21 8/23/21 8/30/21    Visit # 11/18 12/18 13/18    Walk F/L/R 2 Laps @ Rail 2 Laps @ Rail 2 Laps @ Rail    Marching 2 Laps @ Rail 2 Laps @ Rail 2 Laps @ Rail      Low Box Low Box    Squats 20x5\" 10x5\"     Step-Ups F/L Low 10x Low 10x Low 10x    Step Down F/L       Heel-toe raises 20x 10x 10x    SLR F/L/R 20x 10x 10x    Hip/Knee Flex/Ex 20x 10x 10x    F/L Lunges    Add           Kickboard Ex. Med Med Med    Iso Abd. 10x5\" 10x5\" 10x5\"    Push-pull 10x 10x 10x    Paddling 10x 10x 10x            UE Format:       Horiz Abd/Add 10x 10x 10x    IR/ER (wipers)   10x    Alt Flex/Ext 10x 10x 10x    Alt Press= Down   10x    Abd/Add 10x 10x 10x            Deep Water: 1 Noodle 1 Noodle 1 Noodle    Hang 3' 3' 3'    Cycling 2' 2' 2'    Jacks 1' 1' 1'    X-Country 1' 1' 1'            Balance       SLS               Stretches       Achllies       Hamstring 3x20\" 2x20\" 2x20\"            Cool Down            Specific Instructions for next treatment: Add lunges and increase reps/speed to tolerance    Assessment: [x] Progressing toward goals. Progressed UE format as noted above with verbal cues and demonstration to maintain neutral spine/core stabilization. Continued to challenge balance with LE exercise. [] No change. [] Other:    [x] Patient would continue to benefit from skilled physical therapy services in order to: to assist with pain control for her lumbar region/LEs to allow her to perform her ADLs with less pain/limitation. Goals  STG: (to be met in 9 treatments)  1. Patient will report an average pain level of a 3-4/10 within the lumbar region/LEs at rest/ADLs.       LTG: (to be met in 18 treatments)  To be determined @ the re-assessment         Patient goals: To relieve the pain    Pt. Education:  [x] Yes  [] No  [x] Reviewed Prior HEP/Ed  Method of Education: [x] Verbal  [x] Demo  [] Written  Comprehension of Education:  [x] Verbalizes understanding. [x] Demonstrates understanding. [] Needs review. [] Demonstrates/verbalizes HEP/Ed previously given. Plan: [x] Continue per plan of care.    [] Other:      Treatment Charges: Mins Units   []  Modalities     []  Ther Exercise     []  Manual Therapy     []  Ther Activities     [x]  Aquatics 39 3   []  Neuromuscular     [] Vasocompression     [] Gait Training     [] Dry needling        [] 1 or 2 muscles        [] 3 or more muscles     []  Other Re-Evaluation      Total Treatment time 39 3     Time In: 8230 PM          Time Out: 139 PM    Electronically signed by:  Maddi Solis PTA

## 2021-09-02 ENCOUNTER — HOSPITAL ENCOUNTER (OUTPATIENT)
Dept: PHYSICAL THERAPY | Age: 56
Setting detail: THERAPIES SERIES
Discharge: HOME | End: 2021-09-02
Payer: MEDICARE

## 2021-09-02 PROCEDURE — 97113 AQUATIC THERAPY/EXERCISES: CPT

## 2021-09-02 NOTE — FLOWSHEET NOTE
radiculopathy; MARTÍN (obstructive sleep apnea); Obesity; History of kidney stones; Wears glasses; Wears dentures; COPD, Hyperlipidemia, HTN  [] C-9 dates  [] Occ Med   [] Medicare      Name: Lissette Shabazz \"Jayleen\" Ai Rogers  Date 8/17/21 8/23/21 8/30/21 9/2/21    Visit # 11/18 12/18 13/18 14/18    Walk F/L/R 2 Laps @ Rail 2 Laps @ Rail 2 Laps @ Rail 2 Laps @ Rail    Marching 2 Laps @ Rail 2 Laps @ Rail 2 Laps @ Rail 2 Laps @ Rail      Low Box Low Box Low Box    Squats 20x5\" 10x5\"  10x5\"    Step-Ups F/L Low 10x Low 10x Low 10x Low 10x    Step Down F/L     Low Fwd   Heel-toe raises 20x 10x 10x 10x    SLR F/L/R 20x 10x 10x 10x    Hip/Knee Flex/Ex 20x 10x 10x 10x    Leg Curls    10x    F/L Lunges                 Kickboard Ex. Med Med Med Med    Iso Abd. 10x5\" 10x5\" 10x5\" 10x5\"    Push-pull 10x 10x 10x 10x    Paddling 10x 10x 10x 10x             UE Format:        Horiz Abd/Add 10x 10x 10x 12x    IR/ER (wipers)   10x 12x    Alt Flex/Ext 10x 10x 10x 12x    Alt Press= Down   10x 12x    Abd/Add 10x 10x 10x 12x             Deep Water: 1 Noodle 1 Noodle 1 Noodle 1 Noodle    Hang 3' 3' 3' 3'    Cycling 2' 2' 2' 2'    Jacks 1' 1' 1' 1'    X-Country 1' 1' 1' 1'             Balance        SLS                 Stretches        Achllies        Hamstring 3x20\" 2x20\" 2x20\" 2x20\"             Cool Down             Specific Instructions for next treatment: Add lunges and step downs to challenge LE strength and stability    Assessment: [x] Progressing toward goals. Increased reps with UE format to challenge core and balance. No increased pain complaints with LE exercise this date at step level- patient WB @ 40-50% WB in aquatic environment. Patient progressing well with endurance exercise in deep water unloaded    [] No change.      [] Other:    [x] Patient would continue to benefit from skilled physical therapy services in order to: to assist with pain control for her lumbar region/LEs to allow her to perform her ADLs with less pain/limitation. Goals  STG: (to be met in 9 treatments)  1. Patient will report an average pain level of a 3-4/10 within the lumbar region/LEs at rest/ADLs. LTG: (to be met in 18 treatments)  To be determined @ the re-assessment         Patient goals: To relieve the pain    Pt. Education:  [x] Yes  [] No  [x] Reviewed Prior HEP/Ed  Method of Education: [x] Verbal  [x] Demo  [] Written  Comprehension of Education:  [x] Verbalizes understanding. [x] Demonstrates understanding. [] Needs review. [] Demonstrates/verbalizes HEP/Ed previously given. Plan: [x] Continue per plan of care. [] Other:      Treatment Charges: Mins Units   []  Modalities     []  Ther Exercise     []  Manual Therapy     []  Ther Activities     [x]  Aquatics 35 2   []  Neuromuscular     [] Vasocompression     [] Gait Training     [] Dry needling        [] 1 or 2 muscles        [] 3 or more muscles     [x]  Other Re-Evaluation  30 0   Total Treatment time 65 2     Time In: 8081 PM          Time Out: 0230 PM    Treatment times reflect total treatment time combined by both PT and PTA for today's service. Physical therapy treatment completed today in part by physical therapist assistant (PTA).       Electronically signed by:  Jose Luis Landeros PTA

## 2021-09-02 NOTE — PROGRESS NOTES
[address] [facility]  [address] [facility]         Phone: [phone]       Fax: [phone]    Physical Therapy Progress Note Update    Date:  2021  Patient: Meron Bonilla  : 1965  MRN: [id number]  Physician: Lorice Rinne, DO      Medical Diagnosis: Laminectomy (B90.294) Leg Cramp (R25.2)   Clinical Diagnosis: Low back pain with mobility deficits   Onset date: 2021    Next 's appt.: TBD  PT Visit Information  PT Insurance Information: [employer] Dual Complete  Total # of Visits Approved: 18  Total # of Visits to Date: 14  No Show: 0  Canceled Appointment: 0     Subjective  Patient stated that she is relatively pain free within the lumbar region. However, her (L) hip is still problematic due to a history of instability with it. Pain  Pain:  [] Yes   [x] No      Location:   Pain Rating: (0-10 scale)    Worst:   Best:  Symptoms:  Improving - Worsening       - Same  Descriptors:   Aggravating factors:  Relieving factors:   Sleep:    Other:     Function  Hand Dominance  [x] Right  [] Left  Working:  [] Normal Duty  [] Light Duty  [] Off D/T Condition  [] Retired    [] Not Employed    [x]  Disability  [] Other:           Return to work:   Job/ADL Description:    ADL/IADL Previous level of function Current level of function Who currently assists the patient with task/Comments   Bathing  [x] Independent  [] Assist [x] Independent  [] Assist    Dress/grooming [x] Independent  [] Assist [] Independent  [x] Assist Son   Transfer/mobility [x] Independent  [] Assist [] Independent  [x] Assist Son   Feeding [x] Independent  [] Assist [x] Independent  [] Assist    Toileting [x] Independent  [] Assist [x] Independent  [] Assist    Driving [x] Independent  [] Assist [] Independent  [] Assist    Housekeeping [x] Independent  [] Assist [] Independent  [x] Assist Son   Grocery shop/meal prep [x] Independent  [] Assist [] Independent  [x] Assist Son hip     Gait Assessment  Assistive device: Rolling Walker  Comments: Forward lean at the waist, slow kiersten and limited (L) hip motion was noted. Balance  Able to stand with feet together with eyes closed x 15 seconds. Unable to perform SLS on either LE without an immediate LOB without UE support. Assessment  Patient is improving as evidence by her self-reporting reduced symptom/relatively pain-free within the lumbar region. Improved motion(s) noted throughout the lumbar region. However, core/LE weakness was still apparent. Therefore, the patient would continue to benefit from skilled physical therapy services in order to assist with the strength, motion and functional limitations noted. Goals  STG: (to be met in 9 treatments)  1. Patient will report an average pain level of a 3-4/10 within the lumbar region/LEs at rest/ADLs. (Met)    LTG: (to be met in 18 treatments) - Established (09/02/2021)  1. Patient will demonstrate improved strength within all involved planes to assist with (I) function. 2. Patient will demonstrate improved motion within all involved planes to assist with (I) function. 3. Modified Oswestry Disability Index improved by 10% ([employer])  4. Patient will demonstrate independence with her home exercise program.       Patient goals: To relieve the pain    Patient demonstrated improvement from condition with _1_ of_1__ short term goals     Comments/Function: Patient stated that she is now able to stand and cook for approximately 10-12 minutes before needing to sit down while completing the meal.     PT Education: [] Home Exercise Program  Comprehension [] Good [] Fair [] Poor  [x] Plans/Goals developed/discussed with pt/family [] Other    Recommendations: Continue with current prescription/progress toward a land - based exercise program to further address her functional impairments/limitations. [] Patient is Discharged At This Time Unless Further Orders Are Received.   New Script Needed To Continue Treatment: [] No   [x] Yes  (visits left on current prescription:        4       ) Please sign orders at the bottom of this page and return by faxing. Thank you. Current Treatment:  [] Therapeutic Exercise    [] Modalities                [] Work Conditioning  [] Therapeutic Activity    [] Ultrasound  [] Electrical Stimulation  [] Gait Training     [] Massage       [] Lumbar/Cervical Traction  [] Neuromuscular Re-education [] Cold/hotpack [] Iontophoresis: 4 mg/mL  [] Instruction in Home Exercise Program                     Dexamethasone Sodium  [] Manual Therapy             Phosphate 40-80 mAmin  [x] Aquatic Therapy                                 [] Vaso Pneumatic compression  [] Other:  More objective information is available upon request.                      Physical Therapy Prescription:      [] Continue current Rx    [] Therapist Discretion    [x] Rx as below  Recommended Changes to Treatment:  [] Heat/Cold  [x] Stretching  [x] Therapeutic Exercise  [] Reconditioning  [] Massage  [] Ultrasound  [] Electrical Stim  [] Iontophoresis: 40mg/ml Dexamethasone Sodium Phosphate 40-80 mAmin  [] Aquatics  [] Vasocompression  [] Other:  Frequency:     2     X/wk for  18 visits     Medicare/Regulatory Requirements:  I have reviewed this plan of care and certify a need for medically necessary rehabilitation services.   [] Physician Signature                                                   Date:       Thank you for your referral                    Electronically signed by: Amie Gaucher, PT DPT    [facility]) @ [facility]  [address]  [place], [address] [place] [address]  Phone [phone]  Fax [phone]

## 2021-09-08 ENCOUNTER — HOSPITAL ENCOUNTER (OUTPATIENT)
Dept: PHYSICAL THERAPY | Age: 56
Setting detail: THERAPIES SERIES
Discharge: HOME | End: 2021-09-08
Payer: MEDICARE

## 2021-09-08 PROCEDURE — 97113 AQUATIC THERAPY/EXERCISES: CPT

## 2021-09-08 NOTE — FLOWSHEET NOTE
[facility]   [address]   Phone: [phone]   Fax: [phone]    Physical Therapy Daily Treatment Note    Date:  2021  Patient Name:  Wanda Collado    :  1965  MRN: [id number]  Physician: Brandon Cortez DO                               Medical Diagnosis: Laminectomy (P41.007) Leg Cramp (R25.2)       Clinical Diagnosis: Low back pain with mobility deficits   Onset date: 2021                  Next Dr's appt.:   PT Visit Information  PT Insurance Information: [employer] Dual Complete  Total # of Visits Approved: 18  Total # of Visits to Date: 15  No Show: 0  Canceled Appointment: 4    Subjective:  Sore since last visit- felt exercise on firm table were bothersome to lower back and left hip. Pain:  [x] Yes  [] No Location: Lower back, (B) LE knee joint line down to feet- L>R more lateral this date   Pain Rating: (0-10 scale) Lower back pain 1/10; 5/10 L hip/lateral thigh knee; 3/10 R Knee mid way down calf  Pain altered Tx:  [x] No  [] Yes  Action:  Comments: Continues to arrive to pool deck with use of rolling walker; slow paced; shuffled gait, antalgia with decreased heel strike and toe off left > right    Objective:  [address] [facility] Exercise Log  Aquatic, Hip & DLS Program- Phase 1     Date of Eval: 2021                             Primary PT: Hong Nickerson PT DPT  Diagnosis: Laminectomy (Z98.890) Leg Cramp (R25.2)     Things to Focus On (goals): Pain Control  Surgical Precautions:  Medical Precautions: Bipolar 1 disorder ([facility] [address] ADHD (attention deficit hyperactivity disorder); Heel spur; Trouble swallowing; GERD (gastroesophageal reflux disease); Depression with anxiety; PTSD (post-traumatic stress disorder); Tremor; Left hip pain; Weight gain; Anxiety and depression;  Lumbar stenosis with neurogenic claudication; Lumbar radiculopathy; MARTÍN (obstructive sleep apnea);  Obesity; History of kidney stones; Wears glasses; Wears dentures; COPD, Hyperlipidemia, HTN  [] C-9 dates  [] Occ Med   [] Medicare      Name: Blanca Schaeffer \"Jayleen\" Noelle Chamberlain  Date 8/30/21 9/2/21 9/8/21    Visit # 13/18 14/18 15/18    Walk F/L/R 2 Laps @ Rail 2 Laps @ Rail 2 Laps @ Rail    Marching 2 Laps @ Rail 2 Laps @ Rail 2 Laps @ Rail     Low Box Low Box Low Box    Squats  10x5\" 12x5\"    Step-Ups F/L Low 10x Low 10x Low 12x    Step Down F/L    Low Fwd   Heel-toe raises 10x 10x 12x    SLR F/L/R 10x 10x 12x    Hip/Knee Flex/Ex 10x 10x 12x    Leg Curls  10x 12x    F/L Lunges               Kickboard Ex. Med Med Med    Iso Abd. 10x5\" 10x5\" 10x5\"    Push-pull 10x 10x 12x    Paddling 10x 10x 12x            UE Format:       Horiz Abd/Add 10x 12x 15x    IR/ER (wipers) 10x 12x 15x    Alt Flex/Ext 10x 12x 15x    Alt Press= Down 10x 12x 15x    Abd/Add 10x 12x 15x            Deep Water: 1 Noodle 1 Noodle 1 noodle    Hang 3' 3' 3'    Cycling 2' 2' 2'    Jacks 1' 1' 1'    X-Country 1' 1' 1'            Balance       SLS               Stretches       Achllies       Hamstring 2x20\" 2x20\" 2x20\"            Cool Down            Specific Instructions for next treatment: Add step downs to challenge LE strength and stability    Assessment: [x] Progressing toward goals. Increased reps to challenge core and balance. Patient with improved tolerance and speed this date. No increased pain this date. [] No change. [] Other:    [x] Patient would continue to benefit from skilled physical therapy services in order to: to assist with pain control for her lumbar region/LEs to allow her to perform her ADLs with less pain/limitation. Goals  STG: (to be met in 9 treatments)  1. Patient will report an average pain level of a 3-4/10 within the lumbar region/LEs at rest/ADLs. (Met)     LTG: (to be met in 18 treatments) - Established (09/02/2021)  1. Patient will demonstrate improved strength within all involved planes to assist with (I) function.   2. Patient will demonstrate improved motion within all involved planes to assist with (I) function. 2. Modified Oswestry Disability Index improved by 10% ([employer] Walker Ultramar 112)  3. Patient will demonstrate independence with her home exercise program.       Patient goals: To relieve the pain    Pt. Education:  [x] Yes  [] No  [x] Reviewed Prior HEP/Ed  Method of Education: [x] Verbal  [x] Demo  [] Written  Comprehension of Education:  [x] Verbalizes understanding. [x] Demonstrates understanding. [] Needs review. [] Demonstrates/verbalizes HEP/Ed previously given. Plan: [x] Continue per plan of care.    [] Other:      Treatment Charges: Mins Units   []  Modalities     []  Ther Exercise     []  Manual Therapy     []  Ther Activities     [x]  Aquatics 35 2   []  Neuromuscular     [] Vasocompression     [] Gait Training     [] Dry needling        [] 1 or 2 muscles        [] 3 or more muscles     []  Other Re-Evaluation      Total Treatment time 35 2     Time In: 9357 AM          Time Out: 5679 AM      Electronically signed by:  Ana Maria King PTA

## 2021-09-14 ENCOUNTER — HOSPITAL ENCOUNTER (OUTPATIENT)
Dept: PHYSICAL THERAPY | Age: 56
Setting detail: THERAPIES SERIES
Discharge: HOME | End: 2021-09-14
Payer: MEDICARE

## 2021-09-14 PROCEDURE — 97113 AQUATIC THERAPY/EXERCISES: CPT

## 2021-09-14 NOTE — FLOWSHEET NOTE
neurogenic claudication; Lumbar radiculopathy; MARTÍN (obstructive sleep apnea); Obesity; History of kidney stones; Wears glasses; Wears dentures; COPD, Hyperlipidemia, HTN  [] C-9 dates  [] Occ Med   [] Medicare      Name: Pedro Calabrese \"Jayleen\" Alric Medal  Date 8/30/21 9/2/21 9/8/21 9/14/21   Visit # 13/18 14/18 15/18 16/18   Walk F/L/R 2 Laps @ Rail 2 Laps @ Rail 2 Laps @ Rail 2 Laps @ Rail   Marching 2 Laps @ Rail 2 Laps @ Rail 2 Laps @ Rail 2 Laps @ Rail    Low Box Low Box Low Box Low Box   Squats  10x5\" 12x5\" 12x5\"   Step-Ups F/L Low 10x Low 10x Low 12x Low 12x   Step Down F/L    Low Fwd 10x   Heel-toe raises 10x 10x 12x 12x   SLR F/L/R 10x 10x 12x 12x   Hip/Knee Flex/Ex 10x 10x 12x 12x   Leg Curls  10x 12x 12x   F/L Lunges               Kickboard Ex. Med Med Med Med   Iso Abd. 10x5\" 10x5\" 10x5\" 10x5\"   Push-pull 10x 10x 12x 12x   Paddling 10x 10x 12x 12x           UE Format:       Horiz Abd/Add 10x 12x 15x 15x   IR/ER (wipers) 10x 12x 15x 15x   Alt Flex/Ext 10x 12x 15x 15x   Alt Press= Down 10x 12x 15x 15x   Abd/Add 10x 12x 15x 15x           Deep Water: 1 Noodle 1 Noodle 1 noodle 1 Noodle   Hang 3' 3' 3' 3'   Cycling 2' 2' 2' 2'   Jacks 1' 1' 1' 1'   X-Country 1' 1' 1' 1'           Balance       SLS               Stretches       Achllies       Hamstring 2x20\" 2x20\" 2x20\" 3x20\"           Cool Down            Specific Instructions for next treatment: Land based activity next visit    Assessment: [x] Progressing toward goals. Added forward step downs to tolerance. Continued to encouraged less UE support during standing LE exercise to challenge balance and strength. Improved gait when in aquatic environment but continues difficulty with ambulation land based    [] No change. [] Other:    [x] Patient would continue to benefit from skilled physical therapy services in order to: to assist with pain control for her lumbar region/LEs to allow her to perform her ADLs with less pain/limitation.     Goals  STG: (to be met in 9 treatments)  1. Patient will report an average pain level of a 3-4/10 within the lumbar region/LEs at rest/ADLs. (Met)     LTG: (to be met in 18 treatments) - Established (09/02/2021)  1. Patient will demonstrate improved strength within all involved planes to assist with (I) function. 2. Patient will demonstrate improved motion within all involved planes to assist with (I) function. 2. Modified Oswestry Disability Index improved by 10% ([employer] Walker Ultr[employer] 112)  3. Patient will demonstrate independence with her home exercise program.       Patient goals: To relieve the pain    Pt. Education:  [x] Yes  [] No  [x] Reviewed Prior HEP/Ed  Method of Education: [x] Verbal  [x] Demo  [] Written  Comprehension of Education:  [x] Verbalizes understanding. [x] Demonstrates understanding. [] Needs review. [] Demonstrates/verbalizes HEP/Ed previously given. Plan: [x] Continue per plan of care.  Progression with land based therapy   [] Other:      Treatment Charges: Mins Units   []  Modalities     []  Ther Exercise     []  Manual Therapy     []  Ther Activities     [x]  Aquatics 38 3   []  Neuromuscular     [] Vasocompression     [] Gait Training     [] Dry needling        [] 1 or 2 muscles        [] 3 or more muscles     []  Other Re-Evaluation      Total Treatment time 38 3     Time In: 217 PM          Time Out: 303 PM      Electronically signed by:  Jose Luis Landeros PTA

## 2021-09-17 ENCOUNTER — HOSPITAL ENCOUNTER (OUTPATIENT)
Dept: PHYSICAL THERAPY | Age: 56
Setting detail: THERAPIES SERIES
Discharge: HOME | End: 2021-09-17
Payer: MEDICARE

## 2021-09-17 PROCEDURE — 97110 THERAPEUTIC EXERCISES: CPT

## 2021-09-17 NOTE — PROGRESS NOTES
[facility]   [address]   Phone: [phone]   Fax: [phone]    Physical Therapy Daily Treatment Note    Date:  2021  Patient: Wanda Collado  : 1965  MRN: [id number]  Physician: Brandon Cortez DO           Medical Diagnosis: Laminectomy (Y69.991) Leg Cramp (R25.2   Clinical Diagnosis: Low back pain with mobility deficits   Onset date: 2021  Next Dr's appt.: TBD  PT Visit Information   PT Insurance Information: [employer] Dual Complete  Total # of Visits Approved: 18  Total # of Visits to Date: 17  No Show: 0  Canceled Appointment: 0    Subjective    Pain:  [x] Yes   [] No      Location: Lumbar region with radicular symptoms into the (L) hip anterior/lateral thigh region    Pain Rating: .5-5/10   Worst: 5/10   Best: .5/10  Descriptors:Constant \"twisting\" sensation   Other:     Objective  Modalities:   Precautions:   Exercises:  Exercise Reps/ Time Weight/ Level Comments   Supine (L) hip flexion  5 reps   AAROM/following SKTC stretch    Supine (L) hip abduction  5 reps   AAROM/following adductor stretch    Supine (L) hip adduction 5 reps        Passive Stretching   Supine (L) SKTC stretch  30 sec hold x 3 reps   Supine (L) quad stretch 30 sec hold x 3 reps   Supine (L) hip adductor stretch 30 sec hold x 3 reps   Supine (L) IT band stretch 30 sec hold x 3 reps   Supine (L) hamstring stretch 30 sec hold x 3 reps   Standing Gastroc Stretch 30 sec hold x 3 reps     Manual Therapy   Supine (L) hip manual distraction - 20 sec hold x 3 reps     Pt. Education:  [] Yes  [] No  [] Reviewed Prior HEP/Ed  Method of Education: [] Verbal  [] Demo  [] Written  HEP:   Comprehension of Education:  [] Verbalizes understanding. [] Demonstrates understanding. [] Needs review. [] Demonstrates/verbalizes HEP/Ed previously given. Activity Tolerance  Patient tolerated treatment well.      Assessment  Initiated a land based exercise program to include LE stretching followed by JHONNY. Demonstrated well with min cues. Goals  STG: (to be met in 9 treatments)  1. Patient will report an average pain level of a 3-4/10 within the lumbar region/LEs at rest/ADLs. (Met)     LTG: (to be met in 18 treatments)   1. Patient will demonstrate improved strength within all involved planes to assist with (I) function. 2. Patient will demonstrate improved motion within all involved planes to assist with (I) function. 3. Modified Oswestry Disability Index improved by 10% ([employer] 112)  4. Patient will demonstrate independence with her home exercise program.       Patient goals: To relieve the pain     Plan: [x] Continue per plan of care.    [] Other:      Treatment Charges: Mins Units   []  Modalities     [x]  Ther Exercise 45 3   []  Manual Therapy     []  Ther Activities     []  Aquatics     []  Neuromuscular     [] Vasocompression     [] Gait Training     [] Dry needling        [] 1 or 2 muscles        [] 3 or more muscles     []  Other     Total Treatment time 45 3     Time In: 7516         Time Out: 1600    Electronically signed by:  Madi Bermeo PT DPT

## 2021-09-21 ENCOUNTER — HOSPITAL ENCOUNTER (OUTPATIENT)
Dept: PHYSICAL THERAPY | Age: 56
Setting detail: THERAPIES SERIES
Discharge: HOME | End: 2021-09-21
Payer: MEDICARE

## 2021-09-21 DIAGNOSIS — M48.062 SPINAL STENOSIS OF LUMBAR REGION WITH NEUROGENIC CLAUDICATION: ICD-10-CM

## 2021-09-21 DIAGNOSIS — Z98.890 S/P LAMINECTOMY: ICD-10-CM

## 2021-09-21 PROCEDURE — 97110 THERAPEUTIC EXERCISES: CPT

## 2021-09-21 RX ORDER — GABAPENTIN 300 MG/1
300 CAPSULE ORAL 3 TIMES DAILY
Qty: 90 CAPSULE | Refills: 0 | Status: SHIPPED | OUTPATIENT
Start: 2021-09-21 | End: 2021-12-20

## 2021-09-21 RX ORDER — METHOCARBAMOL 750 MG/1
750 TABLET, FILM COATED ORAL EVERY 8 HOURS PRN
Qty: 90 TABLET | Refills: 0 | Status: SHIPPED | OUTPATIENT
Start: 2021-09-21 | End: 2021-10-21

## 2021-09-21 NOTE — PROGRESS NOTES
[facility] Outpatient Physical Therapy  [address]         Phone: [phone]       Fax: [phone]    Physical Therapy Progress Note Update/Discharge Summary     Date:  2021  Patient: Ruthanna Opitz  : 1965  MRN: [id number]  Physician: Tam Ding DO      Medical Diagnosis: Laminectomy (I46.588) Leg Cramp (R25.2)   Clinical Diagnosis: Low back pain with mobility deficits   Onset date: 2021    Next Dr's appt.: TBD  PT Visit Information  PT Insurance Information: [employer] Dual Complete  Total # of Visits Approved: 18  Total # of Visits to Date: 18  No Show: 0  Canceled Appointment: 4     Subjective  Patient that her back pain has relatively returned to normal. However, her ADLs are unchanged due to other painful areas of her body (I.e (L) hip and (B) knee). Pain  Pain:  [x] Yes   [] No      Location: (L) buttocks and down both legs into the toes.    Pain Rating: (0-10 scale)  0.5/10  Worst: 0.5/10  Best: /10  Symptoms:  [x] Improving [] Worsening       [] Same  Descriptors: constant,l annoying  Aggravating factors:  standing/ambulation  Relieving factors: Rest/repositioning   Sleep: Disturbed   Other:     Function  Hand Dominance  [x] Right  [] Left  Working:  [] Normal Duty  [] Light Duty  [] Off D/T Condition  [] Retired    [] Not Employed    [x]  Disability  [] Other:           Return to work:   Job/ADL Description:    ADL/IADL Previous level of function Current level of function Who currently assists the patient with task/Comments   Bathing  [x] Independent  [] Assist [x] Independent  [] Assist    Dress/grooming [x] Independent  [] Assist [] Independent  [x] Assist Son   Transfer/mobility [x] Independent  [] Assist [] Independent  [x] Assist Son   Feeding [x] Independent  [] Assist [x] Independent  [] Assist    Toileting [x] Independent  [] Assist [x] Independent  [] Assist    Driving [x] Independent  [] Assist [] Independent  [] Assist Housekeeping [x] Independent  [] Assist [] Independent  [x] Assist Son   Grocery shop/meal prep [x] Independent  [] Assist [] Independent  [x] Assist Son     Gait Prior level of function Current level of function    [x] Independent  [] Assist [x] Independent  [] Assist   Device: [] Independent [] Independent    [x] Straight Cane [] Quad cane [] Straight Cane [] Quad cane    [] Standard walker [] Rolling walker   [] 4 wheeled walker [] Standard walker [x] Rolling walker   [] 4 wheeled walker    [] Wheelchair [] Wheelchair     Objective  Observation/Palpation   Normal Deficit Comments   Observation [] [x] (R) genu recurvatum  Able to straighten the (L) knee now   Unequal weight bearing in a standing position/(R) LE more than the (L) LE   Forward head, rounded shoulders,kyphosis (seated position)       Palpation [] [x] Able to touch the (L) LE now except at the great trochanter and deep into the groin region    Edema [] []    Scar [] []    Other [] []      Range of Motion  Spine - Range of Motion  Thoracic  WNL in all planes   Lumbar  WNL in all planes except extension - an immediate increase in pain was noted.        Right Lower Extremity - Passive ROM  Hip flexion  WNL with pain @ end range  Hip extension  WNL with pain @ end range  Hip abduction  WNL with pain @ end range  Hip adduction  WNL with pain @ end range  Hip ER  WNL with pain @ end range  Hip IR  WNL with pain @ end range  Knee flexion  WNL with pain @ end range  Knee extension  WNL with pain @ end range  Dorsiflexion  WNL with pain @ end range  Plantarflexion  WNL with pain @ end range    Left Lower Extremity - Passive ROM  Hip flexion  WNL with pain @ end range  Hip extension  WNL with pain @ end range  Hip abduction  WNL with pain @ end range  Hip adduction  WNL with pain @ end range  Hip ER  WNL with pain @ end range  Hip IR  WNL with pain @ end range  Knee flexion  WNL with pain @ end range  Knee extension  WNL with pain @ end range  Dorsiflexion  WNL   Plantarflexion  WNL     Strength  Spine Normal Deficit Comments   Anterior chain   [] [] Supine Hook lying Posterior Pelvic Tilt/Bent Leg Lift    Posterior chain   [] [] N/A - immediate increase in pain noted when attempted. Lateral chain   [] [] (R) hip abduction - 2/5 MMT  (L) hip abduction - 2-/5 MMT      Right Lower Extremity - Strength  Hip flexion  2/5 MMT  Hip extension  2/5 MMT  Hip abduction  2/5 MMT   Hip adduction  2/5 MMT  Hip ER  2/5 MMT  Hip IR  2/5 MMT  Knee flexion  5/5 MMT  Knee extension  5/5 MMT  Dorsiflexion  5/5 MMT  Plantarflexion  5/5 MMT    Left Lower Extremity - Strength  N/A - unable to even assess passively with planes of the hip and knee    Additional Measures    Normal Deficit Comments   Sensation   [] []    Reflexes   [] []    Gross motor   [] []    Flexibility    [] [] (B) quad and hamstring tightness was noted. Gastroc tightness was no longer apparent    Special Tests   [] []     strength   [] []    Other   [] []      Gait Assessment  Assistive device: None  Comments: \"Rigidity\" throughout the lumbar region with a decreased step length noted on the (R) LE was still observed. Balance  Patient is now able to stand independently without an assistive device. However, she cannot equally weight bear (R) LE more than (L) LE due to the pain. Unable to ambulate without an assistive device. Assessment  Pt improved as evidence by her self reporting a reduction in symptoms within the lumbar region. In addition, the patient felt that her ADLs were relatively unchanged verbally. However, per the modified Oswestry disability index a clinically significant improvement was shown. Goals  STG: (to be met in 9 treatments)  1. Patient will report an average pain level of a 3-4/10 within the lumbar region/LEs at rest/ADLs. (Met)     LTG: (to be met in 18 treatments)   1.  Patient will demonstrate improved strength within all involved planes to assist with (I) function. (Not Met)  2. Patient will demonstrate improved motion within all involved planes to assist with (I) function. (Not Met)   3 Modified Oswestry Disability Index improved by 10% (MDC) (Met)  4. Patient will demonstrate independence with her home exercise program. (Met)      Patient goals: To relieve the pain    Patient demonstrated improvement from condition with _1_ of_1__ short term goals   Patient demonstrated improvement from condition with _2_ of_4__ long term goals     Comments/Function: Functionally, clinical significance was shown per the Modified Oswestry Disability Index (greater than 10% improvement)     Pt. Education:  [] Plans/Goals, Risks/Benefits discussed  [x] Home exercise program -seated hamstring stretch and a supine quad stretch 30 sec hold x 3 reps with each one (Daily 1-2 sessions)   Method of Education: [] Verbal  [] Demo  [x] Written  Comprehension of Education:  [x] Verbalizes understanding. [x] Demonstrates understanding. [] Needs Review. [] Demonstrates/verbalizes understanding of HEP/Ed previously given. Recommendations: Current prescription completed. Pt was provided a written home exercise program to further address her functional impairments/limitations noted above. [x] Patient is Discharged At This Time Unless Further Orders Are Received. New Script Needed To Continue Treatment: [x] No   [] Yes  (visits left on current prescription:        0       ) Please sign orders at the bottom of this page and return by faxing. Thank you.      Current Treatment:  [x] Therapeutic Exercise    [] Modalities                [] Work Conditioning  [] Therapeutic Activity    [] Ultrasound  [] Electrical Stimulation  [] Gait Training     [] Massage       [] Lumbar/Cervical Traction  [] Neuromuscular Re-education [] Cold/hotpack [] Iontophoresis: 4 mg/mL  [x] Instruction in Home Exercise Program                     Dexamethasone Sodium  [] Manual Therapy             Phosphate 40-80 mAmin  [x] Aquatic Therapy                                 [] Vaso Pneumatic compression  [] Other:  More objective information is available upon request.    Medicare/Regulatory Requirements:  I have reviewed this plan of care and certify a need for medically necessary rehabilitation services.   [] Physician Signature                                                   Date:       Thank you for your referral                    Electronically signed by: Corwin Michael PT DPT    [facility]) @ [address] [facility], [id number] [place] [address]  Phone [phone]  Fax [phone]    Treatment Charges: Mins Units   [] Evaluation       []  Low       []  Moderate       []  High     []  Modalities     [x]  Ther Exercise 15 1   []  Manual Therapy     []  Ther Activities     []  Aquatics     []  Neuromuscular     []  Gait Training     []  Dry Needling           1-2 muscles     []  Dry Needling           3 or more          muscles     [] Vasocompression     [x]  Other: Re-Evaluation 30 2     TOTAL TREATMENT TIME: 45    Time in: 1345 Time Out: 1430

## 2021-09-28 ENCOUNTER — OFFICE VISIT (OUTPATIENT)
Dept: NEUROSURGERY | Age: 56
End: 2021-09-28
Payer: MEDICARE

## 2021-09-28 ENCOUNTER — HOSPITAL ENCOUNTER (OUTPATIENT)
Dept: GENERAL RADIOLOGY | Age: 56
Discharge: HOME | End: 2021-09-30
Payer: MEDICARE

## 2021-09-28 ENCOUNTER — HOSPITAL ENCOUNTER (OUTPATIENT)
Age: 56
Discharge: HOME | End: 2021-09-30
Payer: MEDICARE

## 2021-09-28 VITALS — SYSTOLIC BLOOD PRESSURE: 168 MMHG | DIASTOLIC BLOOD PRESSURE: 92 MMHG | HEART RATE: 92 BPM | RESPIRATION RATE: 16 BRPM

## 2021-09-28 DIAGNOSIS — G89.29 HIP PAIN, CHRONIC, LEFT: ICD-10-CM

## 2021-09-28 DIAGNOSIS — R25.1 TREMOR: ICD-10-CM

## 2021-09-28 DIAGNOSIS — G89.29 HIP PAIN, CHRONIC, LEFT: Primary | ICD-10-CM

## 2021-09-28 DIAGNOSIS — M25.562 CHRONIC PAIN OF LEFT KNEE: ICD-10-CM

## 2021-09-28 DIAGNOSIS — M25.552 HIP PAIN, CHRONIC, LEFT: ICD-10-CM

## 2021-09-28 DIAGNOSIS — M16.12 PRIMARY OSTEOARTHRITIS OF LEFT HIP: ICD-10-CM

## 2021-09-28 DIAGNOSIS — M25.552 HIP PAIN, CHRONIC, LEFT: Primary | ICD-10-CM

## 2021-09-28 DIAGNOSIS — G89.29 CHRONIC PAIN OF LEFT KNEE: ICD-10-CM

## 2021-09-28 PROCEDURE — 3017F COLORECTAL CA SCREEN DOC REV: CPT | Performed by: NEUROLOGICAL SURGERY

## 2021-09-28 PROCEDURE — 73502 X-RAY EXAM HIP UNI 2-3 VIEWS: CPT

## 2021-09-28 PROCEDURE — G8427 DOCREV CUR MEDS BY ELIG CLIN: HCPCS | Performed by: NEUROLOGICAL SURGERY

## 2021-09-28 PROCEDURE — 73562 X-RAY EXAM OF KNEE 3: CPT

## 2021-09-28 PROCEDURE — 99214 OFFICE O/P EST MOD 30 MIN: CPT | Performed by: NEUROLOGICAL SURGERY

## 2021-09-28 PROCEDURE — G8417 CALC BMI ABV UP PARAM F/U: HCPCS | Performed by: NEUROLOGICAL SURGERY

## 2021-09-28 PROCEDURE — 1036F TOBACCO NON-USER: CPT | Performed by: NEUROLOGICAL SURGERY

## 2021-09-28 NOTE — PROGRESS NOTES
Department of Neurosurgery                                                      Follow up visit      History Obtained From:  patient    CHIEF COMPLAINT:         5 month post op follow up    HISTORY OF PRESENT ILLNESS:       The patient is a 63 y.o. female who presents for follow up 5 months s/p lumbar laminectomy L2-5, L3 foraminotomy. She reports her preoperative shooting left leg pain remains resolved. Back pain went from very to severe to now 1/10 post operatively. She continues report a different type of achy s lateral posterior left hip, calves, and leg pain with spasms. Stretching relives leg pain. Cannot stand for longer than 15 minutes due to left hip pain as well as left knee pain which is a chronic problem which has become more noticeable recently as she has no longer a shooting leg pain . Finished aquatherapy last week. Ambulates with wheelchair/walker outside her house.     PAST MEDICAL HISTORY :       Past Medical History:        Diagnosis Date    ADHD (attention deficit hyperactivity disorder)     Anxiety and depression     Arthritis     shoulders, hips, elbows, back    Bipolar 1 disorder ([address]     COPD [facility]chronic obstructive pulmonary disease) [facility]     Depression with anxiety     Family history of general anesthesia reaction     prolonged emergence in her father, who has multiple chronic conditions    GERD (gastroesophageal reflux disease)     Heel spur     left    History of kidney stones     Hyperlipidemia     Hypertension     follows with dr. Shar Anaya, has appt with him 4/13/21    Left hip pain     Lumbar radiculopathy     Lumbar stenosis with neurogenic claudication     Obesity     MARTÍN (obstructive sleep apnea)     has not used cpap in years, does not tolerate    PTSD (post-traumatic stress disorder)     [facility] [address] [facility], [facility] [employer] Tremor     Trouble swallowing     Under care of team 04/08/2021    bvrpvpjgsh-Texwbpy-hmqpdzdxob-last visit dec 2020    Under care of team 04/08/2021    pul[facility]ology-[facility]-last visit 2019    Under care of team 04/08/2021    dy-Cdh[facility]-vriwbi  office-last visit 2015-due to see april 2021   [facility] Wears dentures     Wears glasses     Weight gain     Wellness examination 04/08/2021    pcp-dr [facility]-new doctor visit scheduled       Past Surgical History:        Procedure Laterality Date    ANESTHESIA NERVE BLOCK Left 10/07/2019    NERVE BLOCK LEFT HIP OBTURATOR AND FEMORAL performed by Louann Johnson MD at [address] [facility]      cyst from right breast    CARPAL TUNNEL RELEASE Bilateral     COLONOSCOPY      ENDOSCOPY, COLON, DIAGNOSTIC      EPIDURAL STEROID INJECTION Left 09/12/2019    EPIDURAL STEROID INJECTION performed by Louann Johnson MD at [address] [facility] NERV Left 09/12/2019    INJECTION MEDICATION LEFT HIP performed by Louann Johnson MD at [address] [facility]  04/23/2021    HEMILAMINECTOMY L2-5 WITH CONTRALATERAL DECOMPRESSION. L3 FORAMINOTOMY (Left Spine Lumbar)    LITHOTRIPSY      x2    LUMBAR SPINE SURGERY  04/23/2021    HEMILAMINECTOMY L2-5 WITH CONTRALATERAL DECOMPRESSION. L3 FORAMINOTOMY     LUMBAR SPINE SURGERY Left 4/23/2021    HEMILAMINECTOMY L2-5 WITH CONTRALATERAL DECOMPRESSION.  L3 FORAMINOTOMY performed by Lucie Hernandez DO at [address]  08/01/2016    duramorph 1mg  celestone 9mg    NERVE BLOCK  10/31/2016    duramorph epidural steroid block decadron 7.5 mg durmoprh 1.5 mg    NERVE BLOCK Left     NO STEROID, LEFT MBNB# 1    NERVE BLOCK Left 01/16/2017    LT MBNB #2   celestone 6mg    NERVE BLOCK Left 02/06/2017    LT lumbar RF    kenalog 40    NERVE BLOCK Right 06/19/2017    right lumbar mbnb #1    NERVE BLOCK  07/07/2017    right radiofrequency kenolog 40mg    NERVE BLOCK  09/11/2017    duramorph 1mg & celestone 9 mg    NERVE BLOCK Left 10/02/2018    LEFT LUMBAR EPIDURAL STEROID BLOCK DECADRON 10MG    NERVE BLOCK  10/16/2018    blanca mbnb, marcaine and xylocaine    NERVE BLOCK Left 10/30/2018    left lumbar rf- no steroid    NERVE BLOCK Right 2018    RT lumbar RFA no steroid    NERVE BLOCK  2019    left hip decadron 10mg, marcaine . 5 %    PAIN MANAGEMENT PROCEDURE Left 2020    EPIDURAL STEROID INJECTION LEFT L4,L5 performed by Dusty Fletcher MD at [address] [place] [address] Right 2020    RIGHT MEDIAL [address] [place] [address] L2 - L5 performed by Dusty Fletcher MD at [address] [place] [address] Right 2020    RIGHT SIDED MEDIAL BRANCH NERVE RADIOFREQUENCY ABLATION   L 2 - L 5 performed by Dusty Fletcher MD at [address] [place] [address] Left 10/12/2020    LEFT L4 L5 EPIDURAL STEROID INJECTION performed by Dusty Fletcher MD at [address] Left 2019    NERVE RADIOFREQUENCY ABLATION MEDIAN BRANCHES AT TRANSVERSE PROCESSES L3/L4/L5 AND S1 LEFT performed by Dusty Fletcher MD at [address] Right 08/15/2019    NERVE RADIOFREQUENCY ABLATION MEDIAN BRANCHES AT TRANSVERSE PROCESSES OF L3/L4/L5 AND S1 RIGHT performed by Dusty Fletcher MD at [address] Left 10/24/2019    NERVE RADIOFREQUENCY ABLATION LEFT HIP FEMORAL AND OBTURATOR NERVE performed by Dusty Fletcher MD at [address] [place] [address] History:   Social History     Socioeconomic History    Marital status:      Spouse name: Not on file    Number of children: Not on file    Years of education: Not on file    Highest education level: Not on file   Occupational History    Not on file   Tobacco Use    Smoking status: Former Smoker     Packs/day: 0.00     Quit date: 2021     Years since quittin.6    Smokeless tobacco: Never Used   Vaping Use    Vaping Use: Never used Substance and Sexual Activity    Alcohol use: Never     Alcohol/week: 0.0 standard drinks    Drug use: Never    Sexual activity: Not on file   Other Topics Concern    Not on file   Social History Narrative    Not on file     Social Determinants of Health     Financial Resource Strain:     Difficulty of Paying Living Expenses:    Food Insecurity:     Worried About Running Out of Food in the Last Year:     [address] in the Last Year:    Transportation Needs:     Lack of Transportation (Medical):  Lack of Transportation (Non-Medical):    Physical Activity:     Days of Exercise per Week:     Minutes of Exercise per Session:    Stress:     Feeling of Stress :    Social Connections:     Frequency of Communication with Friends and Family:     Frequency of Social Gatherings with Friends and Family:     Attends Nondenominational Services:     Active Member of Clubs or Organizations:     Attends Club or Organization Meetings:     Marital Status:    Intimate Partner Violence:     Fear of Current or Ex-Partner:     Emotionally Abused:     Physically Abused:     Sexually Abused:        Family History:       Problem Relation Age of Onset    Other Mother         ulcers    High Blood Pressure Mother     Vision Loss Mother     Heart Disease Father     High Blood Pressure Father     Other Father         blind, pacemaker    Stroke Father     Vision Loss Father     Cancer Father        Allergies:  Fenofibrate    Home Medications:  Prior to Admission medications    Medication Sig Start Date End Date Taking? Authorizing Provider   gabapentin (NEURONTIN) 300 MG capsule Take 1 capsule by mouth 3 times daily for 90 days.  Intended supply: 30 days 9/21/21 12/20/21  GISELLE Langford CNP   methocarbamol (ROBAXIN-750) 750 MG tablet Take 1 tablet by mouth every 8 hours as needed (spasm) 9/21/21 10/21/21  Juan Jose RingGISELLE - CHARLES   diclofenac sodium (VOLTAREN) 1 % GEL Apply topically 2 times daily 7/26/21 Hesham Ascencio,    omeprazole (PRILOSEC) 20 MG delayed release capsule Take 1 capsule by mouth 2 times daily 6/14/21   Historical Provider, MD   famotidine (PEPCID) 20 MG tablet Take 20 mg by mouth 2 times daily  Patient not taking: Reported on 6/22/2021 4/15/21   Historical Provider, MD   diclofenac sodium (VOLTAREN) 1 % GEL Apply 4 g topically 4 times daily as needed for Pain 4/14/21 5/14/21  Anny Wallace MD   losartan (COZAAR) 50 MG tablet  4/13/21   Historical Provider, MD   rosuvastatin (CRESTOR) 20 MG tablet Take 20 mg by mouth daily  3/10/21   Historical Provider, MD   celecoxib (CELEBREX) 200 MG capsule Take 200 mg by mouth 2 times daily    Historical Provider, MD   busPIRone (BUSPAR) 10 MG tablet Take 10 mg by mouth 2 times daily  12/26/20   Historical Provider, MD   DULoxetine (CYMBALTA) 30 MG extended release capsule Take 30 mg by mouth daily    Historical Provider, MD   benztropine (COGENTIN) 0.5 MG tablet Take 0.5 mg by mouth 2 times daily    Historical Provider, MD   Acetaminophen (TYLENOL ARTHRITIS PAIN PO) Take 2 tablets by mouth daily    Historical Provider, MD   pantoprazole (PROTONIX) 40 MG tablet Take 40 mg by mouth 2 times daily   Patient not taking: Reported on 6/22/2021 1/10/18   Historical Provider, MD   zolpidem (AMBIEN) 10 MG tablet Take 10 mg by mouth nightly as needed for Sleep    Historical Provider, MD   Multiple Vitamins-Minerals (THERAPEUTIC MULTIVITAMIN-MINERALS) tablet Take 1 tablet by mouth daily    Historical Provider, MD   Biotin 5000 MCG CAPS Take 1 capsule by mouth daily     Historical Provider, MD   amphetamine-dextroamphetamine (ADDERALL) 20 MG tablet Take 20 mg by mouth daily. Historical Provider, MD       Current Medications:   No current facility-administered medications for this visit. PHYSICAL EXAM:       There were no vitals taken for this visit.   Physical Exam     Gen: NAD  HEENT: moist mucus membranes  Cardio: RRR  Pulm: chest rise symmetrically  GI: abd instructions and agree that the records reflect my personal performance and is accurate and complete. Hilda Barahona DO. Board certified neurosurgeon 9/28/21       This note was created using voice recognition software. There may be inaccuracies of transcription  that are inadvertently overlooked prior to the signature. There is any questions about the transcription please contact me.

## 2022-06-03 DIAGNOSIS — Z98.890 S/P LAMINECTOMY: ICD-10-CM

## 2022-06-03 DIAGNOSIS — M48.062 SPINAL STENOSIS OF LUMBAR REGION WITH NEUROGENIC CLAUDICATION: ICD-10-CM

## 2022-06-03 RX ORDER — GABAPENTIN 300 MG/1
CAPSULE ORAL
Qty: 90 CAPSULE | Refills: 0 | OUTPATIENT
Start: 2022-06-03

## 2022-06-03 NOTE — TELEPHONE ENCOUNTER
Not prescribe this medication for >9 months for the patient, would recommend follow-up visit if she is having new issues.
Pharmacy requesting refill of neurotin.     Date of last fill: 9.21.2021  Surgery: 4.23.2021  Time elapsed since last surgery: over a year   Date of next follow up appointment: na    Pt notified response time for refills is 24-48 hours
Discharged

## 2023-11-07 ENCOUNTER — OFFICE VISIT (OUTPATIENT)
Age: 58
End: 2023-11-07
Payer: MEDICARE

## 2023-11-07 ENCOUNTER — HOSPITAL ENCOUNTER (OUTPATIENT)
Age: 58
Setting detail: SPECIMEN
Discharge: HOME | End: 2023-11-07

## 2023-11-07 VITALS
DIASTOLIC BLOOD PRESSURE: 63 MMHG | HEART RATE: 84 BPM | SYSTOLIC BLOOD PRESSURE: 130 MMHG | WEIGHT: 293 LBS | HEIGHT: 65 IN | BODY MASS INDEX: 48.82 KG/M2 | TEMPERATURE: 97.7 F | RESPIRATION RATE: 18 BRPM

## 2023-11-07 DIAGNOSIS — Z87.891 PERSONAL HISTORY OF TOBACCO USE: ICD-10-CM

## 2023-11-07 DIAGNOSIS — Z23 NEED FOR SHINGLES VACCINE: ICD-10-CM

## 2023-11-07 DIAGNOSIS — Z00.01 ENCOUNTER FOR ROUTINE ADULT PHYSICAL EXAM WITH ABNORMAL FINDINGS: ICD-10-CM

## 2023-11-07 DIAGNOSIS — Z11.59 ENCOUNTER FOR SCREENING FOR OTHER VIRAL DISEASES: ICD-10-CM

## 2023-11-07 DIAGNOSIS — Z00.01 ENCOUNTER FOR ROUTINE ADULT PHYSICAL EXAM WITH ABNORMAL FINDINGS: Primary | ICD-10-CM

## 2023-11-07 DIAGNOSIS — Z87.898 HISTORY OF ABNORMAL MAMMOGRAM: ICD-10-CM

## 2023-11-07 DIAGNOSIS — Z13.820 ENCOUNTER FOR SCREENING FOR OSTEOPOROSIS: ICD-10-CM

## 2023-11-07 DIAGNOSIS — Z12.31 ENCOUNTER FOR SCREENING MAMMOGRAM FOR MALIGNANT NEOPLASM OF BREAST: ICD-10-CM

## 2023-11-07 DIAGNOSIS — Z23 NEED FOR IMMUNIZATION AGAINST INFLUENZA: ICD-10-CM

## 2023-11-07 LAB
ALBUMIN SERPL-MCNC: 4.3 G/DL (ref 3.5–5.2)
ALBUMIN/GLOB SERPL: 1.2 {RATIO} (ref 1–2.5)
ALP SERPL-CCNC: 116 U/L (ref 35–104)
ALT SERPL-CCNC: 16 U/L (ref 5–33)
ANION GAP SERPL CALCULATED.3IONS-SCNC: 15 MMOL/L (ref 9–17)
AST SERPL-CCNC: 20 U/L
BASOPHILS # BLD: 0.05 K/UL (ref 0–0.2)
BASOPHILS NFR BLD: 0 % (ref 0–2)
BILIRUB SERPL-MCNC: 0.4 MG/DL (ref 0.3–1.2)
BUN SERPL-MCNC: 15 MG/DL (ref 6–20)
CALCIUM SERPL-MCNC: 9.5 MG/DL (ref 8.6–10.4)
CHLORIDE SERPL-SCNC: 100 MMOL/L (ref 98–107)
CHOLEST SERPL-MCNC: 143 MG/DL
CHOLESTEROL/HDL RATIO: 3.8
CO2 SERPL-SCNC: 24 MMOL/L (ref 20–31)
CREAT SERPL-MCNC: 0.8 MG/DL (ref 0.5–0.9)
EOSINOPHIL # BLD: 0.25 K/UL (ref 0–0.44)
EOSINOPHILS RELATIVE PERCENT: 2 % (ref 1–4)
ERYTHROCYTE [DISTWIDTH] IN BLOOD BY AUTOMATED COUNT: 15.2 % (ref 11.8–14.4)
GFR SERPL CREATININE-BSD FRML MDRD: >60 ML/MIN/1.73M2
GLUCOSE SERPL-MCNC: 113 MG/DL (ref 70–99)
HBV SURFACE AB SERPL IA-ACNC: 32.05 MIU/ML
HBV SURFACE AG SERPL QL IA: NONREACTIVE
HCT VFR BLD AUTO: 43 % (ref 36.3–47.1)
HDLC SERPL-MCNC: 38 MG/DL
HGB BLD-MCNC: 13.3 G/DL (ref 11.9–15.1)
IMM GRANULOCYTES # BLD AUTO: 0.05 K/UL (ref 0–0.3)
IMM GRANULOCYTES NFR BLD: 0 %
LDLC SERPL CALC-MCNC: 69 MG/DL (ref 0–130)
LYMPHOCYTES NFR BLD: 2.87 K/UL (ref 1.1–3.7)
LYMPHOCYTES RELATIVE PERCENT: 25 % (ref 24–43)
MCH RBC QN AUTO: 26.5 PG (ref 25.2–33.5)
MCHC RBC AUTO-ENTMCNC: 30.9 G/DL (ref 28.4–34.8)
MCV RBC AUTO: 85.8 FL (ref 82.6–102.9)
MONOCYTES NFR BLD: 0.73 K/UL (ref 0.1–1.2)
MONOCYTES NFR BLD: 6 % (ref 3–12)
NEUTROPHILS NFR BLD: 67 % (ref 36–65)
NEUTS SEG NFR BLD: 7.69 K/UL (ref 1.5–8.1)
NRBC BLD-RTO: 0 PER 100 WBC
PLATELET # BLD AUTO: 326 K/UL (ref 138–453)
PMV BLD AUTO: 10.4 FL (ref 8.1–13.5)
POTASSIUM SERPL-SCNC: 4.4 MMOL/L (ref 3.7–5.3)
PROT SERPL-MCNC: 7.8 G/DL (ref 6.4–8.3)
RBC # BLD AUTO: 5.01 M/UL (ref 3.95–5.11)
RBC # BLD: ABNORMAL 10*6/UL
SODIUM SERPL-SCNC: 139 MMOL/L (ref 135–144)
TRIGL SERPL-MCNC: 182 MG/DL
WBC OTHER # BLD: 11.6 K/UL (ref 3.5–11.3)

## 2023-11-07 PROCEDURE — G0296 VISIT TO DETERM LDCT ELIG: HCPCS | Performed by: FAMILY MEDICINE

## 2023-11-07 PROCEDURE — 90686 IIV4 VACC NO PRSV 0.5 ML IM: CPT | Performed by: FAMILY MEDICINE

## 2023-11-07 PROCEDURE — 99212 OFFICE O/P EST SF 10 MIN: CPT | Performed by: FAMILY MEDICINE

## 2023-11-07 SDOH — ECONOMIC STABILITY: HOUSING INSECURITY
IN THE LAST 12 MONTHS, WAS THERE A TIME WHEN YOU DID NOT HAVE A STEADY PLACE TO SLEEP OR SLEPT IN A SHELTER (INCLUDING NOW)?: NO

## 2023-11-07 SDOH — ECONOMIC STABILITY: FOOD INSECURITY: WITHIN THE PAST 12 MONTHS, YOU WORRIED THAT YOUR FOOD WOULD RUN OUT BEFORE YOU GOT MONEY TO BUY MORE.: NEVER TRUE

## 2023-11-07 SDOH — ECONOMIC STABILITY: FOOD INSECURITY: WITHIN THE PAST 12 MONTHS, THE FOOD YOU BOUGHT JUST DIDN'T LAST AND YOU DIDN'T HAVE MONEY TO GET MORE.: NEVER TRUE

## 2023-11-07 SDOH — ECONOMIC STABILITY: INCOME INSECURITY: HOW HARD IS IT FOR YOU TO PAY FOR THE VERY BASICS LIKE FOOD, HOUSING, MEDICAL CARE, AND HEATING?: SOMEWHAT HARD

## 2023-11-07 ASSESSMENT — PATIENT HEALTH QUESTIONNAIRE - PHQ9
SUM OF ALL RESPONSES TO PHQ QUESTIONS 1-9: 0
SUM OF ALL RESPONSES TO PHQ9 QUESTIONS 1 & 2: 0
SUM OF ALL RESPONSES TO PHQ QUESTIONS 1-9: 0
SUM OF ALL RESPONSES TO PHQ QUESTIONS 1-9: 0
1. LITTLE INTEREST OR PLEASURE IN DOING THINGS: 0
SUM OF ALL RESPONSES TO PHQ QUESTIONS 1-9: 0
2. FEELING DOWN, DEPRESSED OR HOPELESS: 0

## 2023-11-07 NOTE — PROGRESS NOTES
EPIDURAL STEROID INJECTION LEFT L4,L5 performed by Chato Moura MD at [address] Right 2020    RIGHT MEDIAL BRANCH NERVE RADIOFREQUENCY ABLATION L2 - L5 performed by Chato Moura MD at [address] Right 2020    RIGHT SIDED MEDIAL BRANCH NERVE RADIOFREQUENCY ABLATION   L 2 - L 5 performed by Chato Moura MD at [address] Left 10/12/2020    LEFT L4 L5 EPIDURAL STEROID INJECTION performed by Chato Moura MD at [address] [place] [address] Left 2019    NERVE RADIOFREQUENCY ABLATION MEDIAN BRANCHES AT TRANSVERSE PROCESSES L3/L4/L5 AND S1 LEFT performed by Chato Moura MD at [address] [place] [address] Right 08/15/2019    NERVE RADIOFREQUENCY ABLATION MEDIAN BRANCHES AT TRANSVERSE PROCESSES OF L3/L4/L5 AND S1 RIGHT performed by Chato Moura MD at [address] [place] [address] Left 10/24/2019    NERVE RADIOFREQUENCY ABLATION LEFT HIP FEMORAL AND OBTURATOR NERVE performed by Chato Moura MD at [address]         Patient Active Problem List   Diagnosis    Lumbar radiculopathy    Depressive disorder, not elsewhere classified    Chronic back pain    Chronic bilateral low back pain without sciatica    Medication monitoring encounter    Cervical radiculopathy    Lumbosacral spondylosis without myelopathy    Left hip pain    Hip arthritis    Left lumbar radiculitis    Lumbar disc herniation    Primary osteoarthritis of left hip    Chronic use of opiate for therapeutic purpose    Morbid obesity with BMI of 40.0-44.9, adult ([facility])    S/P lumbar fusion       Social History     Socioeconomic History    Marital status:      Spouse name: None    Number of children: None    Years of education: None    Highest education level: None   Tobacco Use    Smoking status: Former     Packs/day: 0     Types: Cigarettes     Quit date: 2021     Years since quittin.7

## 2023-11-08 LAB — HBV CORE AB SER QL: NONREACTIVE

## 2023-11-08 ASSESSMENT — ENCOUNTER SYMPTOMS
VOMITING: 0
BACK PAIN: 1
RESPIRATORY NEGATIVE: 1
GASTROINTESTINAL NEGATIVE: 1
NAUSEA: 0
DIARRHEA: 0
BLOOD IN STOOL: 0
SHORTNESS OF BREATH: 0

## 2023-11-08 ASSESSMENT — VISUAL ACUITY: OU: 1

## 2023-12-05 ENCOUNTER — OFFICE VISIT (OUTPATIENT)
Age: 58
End: 2023-12-05
Payer: MEDICARE

## 2023-12-05 ENCOUNTER — HOSPITAL ENCOUNTER (OUTPATIENT)
Age: 58
Setting detail: SPECIMEN
Discharge: HOME | End: 2023-12-05

## 2023-12-05 VITALS
HEART RATE: 85 BPM | DIASTOLIC BLOOD PRESSURE: 68 MMHG | SYSTOLIC BLOOD PRESSURE: 145 MMHG | RESPIRATION RATE: 16 BRPM | TEMPERATURE: 98 F | BODY MASS INDEX: 48.82 KG/M2 | HEIGHT: 65 IN | WEIGHT: 293 LBS

## 2023-12-05 DIAGNOSIS — E66.01 MORBID OBESITY WITH BMI OF 40.0-44.9, ADULT (HCC): ICD-10-CM

## 2023-12-05 DIAGNOSIS — R73.01 IMPAIRED FASTING GLUCOSE: ICD-10-CM

## 2023-12-05 DIAGNOSIS — M25.562 CHRONIC PAIN OF BOTH KNEES: Primary | ICD-10-CM

## 2023-12-05 DIAGNOSIS — E66.01 MORBID OBESITY WITH BMI OF 50.0-59.9, ADULT (HCC): ICD-10-CM

## 2023-12-05 DIAGNOSIS — M25.561 CHRONIC PAIN OF BOTH KNEES: Primary | ICD-10-CM

## 2023-12-05 DIAGNOSIS — E78.1 HIGH TRIGLYCERIDES: ICD-10-CM

## 2023-12-05 DIAGNOSIS — G89.29 CHRONIC PAIN OF BOTH KNEES: Primary | ICD-10-CM

## 2023-12-05 PROCEDURE — 3017F COLORECTAL CA SCREEN DOC REV: CPT | Performed by: FAMILY MEDICINE

## 2023-12-05 PROCEDURE — 1036F TOBACCO NON-USER: CPT | Performed by: FAMILY MEDICINE

## 2023-12-05 PROCEDURE — G8417 CALC BMI ABV UP PARAM F/U: HCPCS | Performed by: FAMILY MEDICINE

## 2023-12-05 PROCEDURE — 99212 OFFICE O/P EST SF 10 MIN: CPT | Performed by: FAMILY MEDICINE

## 2023-12-05 PROCEDURE — 99213 OFFICE O/P EST LOW 20 MIN: CPT | Performed by: FAMILY MEDICINE

## 2023-12-05 PROCEDURE — G8427 DOCREV CUR MEDS BY ELIG CLIN: HCPCS | Performed by: FAMILY MEDICINE

## 2023-12-05 PROCEDURE — G8482 FLU IMMUNIZE ORDER/ADMIN: HCPCS | Performed by: FAMILY MEDICINE

## 2023-12-05 RX ORDER — ROSUVASTATIN CALCIUM 20 MG/1
20 TABLET, COATED ORAL DAILY
Qty: 90 TABLET | Refills: 2 | Status: SHIPPED | OUTPATIENT
Start: 2023-12-05

## 2023-12-05 RX ORDER — MELOXICAM 15 MG/1
15 TABLET ORAL DAILY
COMMUNITY

## 2023-12-05 ASSESSMENT — ENCOUNTER SYMPTOMS
GASTROINTESTINAL NEGATIVE: 1
RESPIRATORY NEGATIVE: 1

## 2023-12-05 ASSESSMENT — VISUAL ACUITY: OU: 1

## 2023-12-05 NOTE — PROGRESS NOTES
MEDIAL BRANCH NERVE RADIOFREQUENCY ABLATION L2 - L5 performed by Nitza Anna MD at [address] Right 2020    RIGHT SIDED MEDIAL BRANCH NERVE RADIOFREQUENCY ABLATION   L 2 - L 5 performed by Nitza Anna MD at [address] Left 10/12/2020    LEFT L4 L5 EPIDURAL STEROID INJECTION performed by Nitza Anna MD at [address] [place] [address] Left 2019    NERVE RADIOFREQUENCY ABLATION MEDIAN BRANCHES AT TRANSVERSE PROCESSES L3/L4/L5 AND S1 LEFT performed by Nitza Anna MD at [address] [place] [address] Right 08/15/2019    NERVE RADIOFREQUENCY ABLATION MEDIAN BRANCHES AT TRANSVERSE PROCESSES OF L3/L4/L5 AND S1 RIGHT performed by Nitza Anna MD at [address] [place] [address] Left 10/24/2019    NERVE RADIOFREQUENCY ABLATION LEFT HIP FEMORAL AND OBTURATOR NERVE performed by Nitza Anna MD at [address]         Patient Active Problem List   Diagnosis    Lumbar radiculopathy    Depressive disorder, not elsewhere classified    Chronic back pain    Chronic bilateral low back pain without sciatica    Medication monitoring encounter    Cervical radiculopathy    Lumbosacral spondylosis without myelopathy    Left hip pain    Hip arthritis    Left lumbar radiculitis    Lumbar disc herniation    Primary osteoarthritis of left hip    Chronic use of opiate for therapeutic purpose    Morbid obesity with BMI of 40.0-44.9, adult ([facility])    S/P lumbar fusion       Social History     Socioeconomic History    Marital status:      Spouse name: None    Number of children: None    Years of education: None    Highest education level: None   Tobacco Use    Smoking status: Former     Packs/day: 0.50     Years: 42.00     Additional pack years: 0.00     Total pack years: 21.00     Types: Cigarettes     Start date: 5     Quit date: 2021     Years since quittin.7    Smokeless tobacco: Never   Vaping Use

## 2023-12-05 NOTE — ASSESSMENT & PLAN NOTE
Patient would be a good candidate for medical weight loss with GLP-1 agents or potentially phentermine as an alternative, especially given her knee pain and limited mobility/ability to exercise. Will obtain updated A1C before prescribing.

## 2023-12-05 NOTE — ASSESSMENT & PLAN NOTE
Patient has a documented history of chronic knee pain as well as back pain. Do agree with patient that this is certainly attributable at least in part due to her weight. Can consider future referral to pain management of pain is not able to be adequately controlled with meloxicam and Voltaren. Patient has not had success with gabapentin previously and Lyrica is likely not a good option due to side effect of weight gain.

## 2023-12-05 NOTE — PATIENT INSTRUCTIONS
Thank you for your visit today to the [address] [place] [address] [address]. It was our pleasure to provide the best possible care for you today. As we discussed, please take note of the following:  - Have your labs completed as soon as possible so we can look at medications for weight loss. -  your medications from the pharmacy and take as directed. - Remember to complete your mammogram and CT lung screening.  - Return in 3 months for follow-up, or sooner as needed. Call the office at [phone] with any questions or concerns.

## 2023-12-06 ENCOUNTER — TELEPHONE (OUTPATIENT)
Age: 58
End: 2023-12-06

## 2023-12-06 DIAGNOSIS — E66.01 MORBID OBESITY WITH BMI OF 40.0-44.9, ADULT (HCC): ICD-10-CM

## 2023-12-06 DIAGNOSIS — R73.03 PREDIABETES: ICD-10-CM

## 2023-12-06 DIAGNOSIS — R73.03 PREDIABETES: Primary | ICD-10-CM

## 2023-12-06 LAB
EST. AVERAGE GLUCOSE BLD GHB EST-MCNC: 126 MG/DL
HBA1C MFR BLD: 6 % (ref 4–6)

## 2023-12-06 RX ORDER — SEMAGLUTIDE 1.34 MG/ML
0.25 INJECTION, SOLUTION SUBCUTANEOUS WEEKLY
Qty: 1.5 ML | Refills: 0 | Status: SHIPPED | OUTPATIENT
Start: 2023-12-06

## 2023-12-06 NOTE — TELEPHONE ENCOUNTER
Called patient to update her on results of HbA1c from yesterday - 6.0%. Discussed prediabetes with patient and that we are starting her on semaglutide and metformin along with benefits, risks, side effects of medication. Patient voices understanding and states that the [address] [place] [address] Avenue did go through her insurance and she is just waiting for the metformin rx to be filled. Advised patient to call for appointment in 1 month for follow-up.

## 2023-12-06 NOTE — PROGRESS NOTES
HbA1c 6.0%, prediabetic range. Would be appropriate candidate for GLP-1-class medication given this and weight. Will order and also start metformin.

## 2023-12-15 DIAGNOSIS — M25.561 CHRONIC PAIN OF BOTH KNEES: ICD-10-CM

## 2023-12-15 DIAGNOSIS — M25.562 CHRONIC PAIN OF BOTH KNEES: ICD-10-CM

## 2023-12-15 DIAGNOSIS — G89.29 CHRONIC PAIN OF BOTH KNEES: ICD-10-CM

## 2023-12-27 ENCOUNTER — HOSPITAL ENCOUNTER (OUTPATIENT)
Dept: CT IMAGING | Age: 58
Discharge: HOME | End: 2023-12-29
Payer: MEDICARE

## 2023-12-27 DIAGNOSIS — Z87.891 PERSONAL HISTORY OF TOBACCO USE: ICD-10-CM

## 2023-12-27 PROCEDURE — 71271 CT THORAX LUNG CANCER SCR C-: CPT

## 2024-01-02 ENCOUNTER — TELEPHONE (OUTPATIENT)
Age: 59
End: 2024-01-02

## 2024-01-02 NOTE — TELEPHONE ENCOUNTER
Patient is taking Ozempic and is following instructions and taking .25 mg for 4 weeks. After those 4 weeks, patient stated that instructions say to increase to .5 mg. Patient wants to know is she should increase her dosage before seeing you in office on   (1/9/24) or if she should go ahead and up her dosage before her visit.

## 2024-01-09 ENCOUNTER — HOSPITAL ENCOUNTER (OUTPATIENT)
Age: 59
Setting detail: SPECIMEN
Discharge: HOME | End: 2024-01-09

## 2024-01-09 ENCOUNTER — OFFICE VISIT (OUTPATIENT)
Age: 59
End: 2024-01-09
Payer: MEDICARE

## 2024-01-09 VITALS
TEMPERATURE: 97.8 F | SYSTOLIC BLOOD PRESSURE: 143 MMHG | RESPIRATION RATE: 16 BRPM | DIASTOLIC BLOOD PRESSURE: 73 MMHG | HEIGHT: 62 IN | HEART RATE: 79 BPM | WEIGHT: 293 LBS | BODY MASS INDEX: 53.92 KG/M2

## 2024-01-09 DIAGNOSIS — E66.01 MORBID OBESITY WITH BMI OF 40.0-44.9, ADULT (HCC): Primary | ICD-10-CM

## 2024-01-09 DIAGNOSIS — R73.03 PREDIABETES: ICD-10-CM

## 2024-01-09 DIAGNOSIS — E66.01 MORBID OBESITY WITH BMI OF 40.0-44.9, ADULT (HCC): ICD-10-CM

## 2024-01-09 DIAGNOSIS — R73.01 IMPAIRED FASTING GLUCOSE: ICD-10-CM

## 2024-01-09 LAB
ANION GAP SERPL CALCULATED.3IONS-SCNC: 10 MMOL/L (ref 9–17)
BUN SERPL-MCNC: 13 MG/DL (ref 6–20)
CALCIUM SERPL-MCNC: 9.6 MG/DL (ref 8.6–10.4)
CHLORIDE SERPL-SCNC: 99 MMOL/L (ref 98–107)
CO2 SERPL-SCNC: 28 MMOL/L (ref 20–31)
CREAT SERPL-MCNC: 0.8 MG/DL (ref 0.5–0.9)
GFR SERPL CREATININE-BSD FRML MDRD: >60 ML/MIN/1.73M2
GLUCOSE SERPL-MCNC: 101 MG/DL (ref 70–99)
POTASSIUM SERPL-SCNC: 4.3 MMOL/L (ref 3.7–5.3)
SODIUM SERPL-SCNC: 137 MMOL/L (ref 135–144)

## 2024-01-09 PROCEDURE — 99213 OFFICE O/P EST LOW 20 MIN: CPT | Performed by: FAMILY MEDICINE

## 2024-01-09 PROCEDURE — G8417 CALC BMI ABV UP PARAM F/U: HCPCS | Performed by: FAMILY MEDICINE

## 2024-01-09 PROCEDURE — 1036F TOBACCO NON-USER: CPT | Performed by: FAMILY MEDICINE

## 2024-01-09 PROCEDURE — G8482 FLU IMMUNIZE ORDER/ADMIN: HCPCS | Performed by: FAMILY MEDICINE

## 2024-01-09 PROCEDURE — 3017F COLORECTAL CA SCREEN DOC REV: CPT | Performed by: FAMILY MEDICINE

## 2024-01-09 PROCEDURE — G8427 DOCREV CUR MEDS BY ELIG CLIN: HCPCS | Performed by: FAMILY MEDICINE

## 2024-01-09 RX ORDER — SEMAGLUTIDE 1.34 MG/ML
INJECTION, SOLUTION SUBCUTANEOUS
Qty: 3 ADJUSTABLE DOSE PRE-FILLED PEN SYRINGE | Refills: 0 | Status: SHIPPED | OUTPATIENT
Start: 2024-01-09

## 2024-01-09 RX ORDER — PEN NEEDLE, DIABETIC 30 GX3/16"
1 NEEDLE, DISPOSABLE MISCELLANEOUS DAILY
Qty: 100 EACH | Refills: 2 | Status: CANCELLED | OUTPATIENT
Start: 2024-01-09

## 2024-01-09 RX ORDER — PEN NEEDLE, DIABETIC 32 GX 1/4"
1 NEEDLE, DISPOSABLE MISCELLANEOUS WEEKLY
Qty: 50 EACH | Refills: 2 | Status: SHIPPED | OUTPATIENT
Start: 2024-01-09

## 2024-01-09 ASSESSMENT — ENCOUNTER SYMPTOMS
VOMITING: 0
ABDOMINAL PAIN: 0
GASTROINTESTINAL NEGATIVE: 1
RESPIRATORY NEGATIVE: 1
NAUSEA: 0
SHORTNESS OF BREATH: 0
DIARRHEA: 0

## 2024-01-09 ASSESSMENT — PATIENT HEALTH QUESTIONNAIRE - PHQ9
SUM OF ALL RESPONSES TO PHQ QUESTIONS 1-9: 8
SUM OF ALL RESPONSES TO PHQ QUESTIONS 1-9: 8
5. POOR APPETITE OR OVEREATING: 0
4. FEELING TIRED OR HAVING LITTLE ENERGY: 3
7. TROUBLE CONCENTRATING ON THINGS, SUCH AS READING THE NEWSPAPER OR WATCHING TELEVISION: 0
3. TROUBLE FALLING OR STAYING ASLEEP: 3
1. LITTLE INTEREST OR PLEASURE IN DOING THINGS: 2
10. IF YOU CHECKED OFF ANY PROBLEMS, HOW DIFFICULT HAVE THESE PROBLEMS MADE IT FOR YOU TO DO YOUR WORK, TAKE CARE OF THINGS AT HOME, OR GET ALONG WITH OTHER PEOPLE: 0
SUM OF ALL RESPONSES TO PHQ QUESTIONS 1-9: 8
9. THOUGHTS THAT YOU WOULD BE BETTER OFF DEAD, OR OF HURTING YOURSELF: 0
8. MOVING OR SPEAKING SO SLOWLY THAT OTHER PEOPLE COULD HAVE NOTICED. OR THE OPPOSITE, BEING SO FIGETY OR RESTLESS THAT YOU HAVE BEEN MOVING AROUND A LOT MORE THAN USUAL: 0
SUM OF ALL RESPONSES TO PHQ QUESTIONS 1-9: 8
6. FEELING BAD ABOUT YOURSELF - OR THAT YOU ARE A FAILURE OR HAVE LET YOURSELF OR YOUR FAMILY DOWN: 0
SUM OF ALL RESPONSES TO PHQ9 QUESTIONS 1 & 2: 2
2. FEELING DOWN, DEPRESSED OR HOPELESS: 0

## 2024-01-09 ASSESSMENT — VISUAL ACUITY: OU: 1

## 2024-01-09 NOTE — PROGRESS NOTES
[facility]  [address] [place] [address]  Phone: [phone]  Fax: [phone]      Date of Visit:  2024  Patient Name: Breana Hanson   Patient :  1965     HPI:     Chief Complaint   Patient presents with    Diabetes     FBS at home yesterday 103                 Breana Hanson is a 58 y.o. female who presents today to discuss the above.    Patient reports doing well overall since being started on semaglutide for weight loss in 2023. She had some mild nausea after the first dose but no other side effects such as vomiting or diarrhea. She has noticed that she eats less and reports that her weight has dropped below 300 lbs. She does need a refill for more pens as well as a prescription for additional pen needles.    Hemoglobin A1C (%)   Date Value   2023 6.0     I personally reviewed the patient's past medical history, current medications, allergies, surgical history, family history and social history.  Updates were made as necessary.    REVIEW OF SYSTEMS      Review of Systems   Constitutional:  Positive for appetite change (decreased). Negative for chills and fever.   Respiratory: Negative.  Negative for shortness of breath.    Cardiovascular: Negative.  Negative for chest pain and leg swelling.   Gastrointestinal: Negative.  Negative for abdominal pain, diarrhea, nausea and vomiting.   Genitourinary:  Positive for frequency (chronic, unchanged; patient attributes to history of hysterectomy). Negative for difficulty urinating, dysuria and hematuria.   Neurological: Negative.  Negative for weakness.   All other systems reviewed and are negative.      REVIEWED INFORMATION      Allergies   Allergen Reactions    Fenofibrate Nausea And Vomiting       Current Outpatient Medications   Medication Sig Dispense Refill    diclofenac sodium (VOLTAREN) 1 % GEL APPLY 4 GRAMS TOPICALLY TO THE AFFECTED AREA FOUR TIMES DAILY AS  Pt has had respiridol decreased from 2 to 1.70- 2weeks ago since then pt is having suicidal thoughts

## 2024-01-09 NOTE — ASSESSMENT & PLAN NOTE
Continue semaglutide subQ. Currently on 0.5 mg dose. Tolerating well without any significant side effects, and reports mild decrease in appetite as well as weight loss. Now at 298 lbs compared to 309 lbs in December. Plan to gradually increase dose per protocol. Refill prescription for pens and needles provided. Will recheck BMP/renal function per guidelines.

## 2024-01-09 NOTE — PATIENT INSTRUCTIONS
Thank you for your visit today to the [facility]. It was our pleasure to provide the best possible care for you today.    As we discussed, please take note of the following:  - Have your labs drawn within the next 4 weeks.  -  your medications from the pharmacy and take as directed.  - Return in 6 months for follow-up.    Call the office at [phone] with any questions or concerns.

## 2024-01-10 DIAGNOSIS — M25.562 CHRONIC PAIN OF BOTH KNEES: ICD-10-CM

## 2024-01-10 DIAGNOSIS — G89.29 CHRONIC PAIN OF BOTH KNEES: ICD-10-CM

## 2024-01-10 DIAGNOSIS — M25.561 CHRONIC PAIN OF BOTH KNEES: ICD-10-CM

## 2024-01-29 DIAGNOSIS — E66.01 MORBID OBESITY WITH BMI OF 40.0-44.9, ADULT (HCC): ICD-10-CM

## 2024-01-29 DIAGNOSIS — R73.03 PREDIABETES: ICD-10-CM

## 2024-01-29 RX ORDER — SEMAGLUTIDE 0.68 MG/ML
1 INJECTION, SOLUTION SUBCUTANEOUS WEEKLY
Qty: 6 ML | Refills: 0 | Status: SHIPPED | OUTPATIENT
Start: 2024-01-29 | End: 2024-01-29

## 2024-01-29 RX ORDER — SEMAGLUTIDE 0.68 MG/ML
1 INJECTION, SOLUTION SUBCUTANEOUS WEEKLY
Qty: 6 ML | Refills: 0 | Status: SHIPPED | OUTPATIENT
Start: 2024-01-29 | End: 2024-01-30 | Stop reason: DRUGHIGH

## 2024-01-30 DIAGNOSIS — R73.03 PREDIABETES: ICD-10-CM

## 2024-01-30 DIAGNOSIS — E66.01 MORBID OBESITY WITH BMI OF 40.0-44.9, ADULT (HCC): ICD-10-CM

## 2024-02-12 ENCOUNTER — TELEPHONE (OUTPATIENT)
Age: 59
End: 2024-02-12

## 2024-02-12 ENCOUNTER — OFFICE VISIT (OUTPATIENT)
Age: 59
End: 2024-02-12
Payer: MEDICARE

## 2024-02-12 VITALS
BODY MASS INDEX: 48.82 KG/M2 | WEIGHT: 293 LBS | HEART RATE: 93 BPM | HEIGHT: 65 IN | DIASTOLIC BLOOD PRESSURE: 52 MMHG | RESPIRATION RATE: 16 BRPM | TEMPERATURE: 97.7 F | SYSTOLIC BLOOD PRESSURE: 135 MMHG

## 2024-02-12 DIAGNOSIS — Z00.00 INITIAL MEDICARE ANNUAL WELLNESS VISIT: ICD-10-CM

## 2024-02-12 DIAGNOSIS — Z00.00 MEDICARE ANNUAL WELLNESS VISIT, SUBSEQUENT: ICD-10-CM

## 2024-02-12 DIAGNOSIS — E66.01 MORBID OBESITY WITH BMI OF 40.0-44.9, ADULT (HCC): ICD-10-CM

## 2024-02-12 DIAGNOSIS — Z23 NEED FOR PNEUMOCOCCAL VACCINE: Primary | ICD-10-CM

## 2024-02-12 PROCEDURE — G0439 PPPS, SUBSEQ VISIT: HCPCS | Performed by: PHARMACIST

## 2024-02-12 PROCEDURE — G8482 FLU IMMUNIZE ORDER/ADMIN: HCPCS | Performed by: PHARMACIST

## 2024-02-12 PROCEDURE — 90677 PCV20 VACCINE IM: CPT | Performed by: FAMILY MEDICINE

## 2024-02-12 PROCEDURE — 3017F COLORECTAL CA SCREEN DOC REV: CPT | Performed by: PHARMACIST

## 2024-02-12 RX ORDER — BLOOD-GLUCOSE METER
1 KIT MISCELLANEOUS DAILY
Qty: 1 KIT | Refills: 0 | Status: SHIPPED | OUTPATIENT
Start: 2024-02-12

## 2024-02-12 RX ORDER — GLUCOSAMINE HCL/CHONDROITIN SU 500-400 MG
CAPSULE ORAL
Qty: 100 STRIP | Refills: 2 | Status: SHIPPED | OUTPATIENT
Start: 2024-02-12

## 2024-02-12 RX ORDER — PEN NEEDLE, DIABETIC 32 GX 1/4"
1 NEEDLE, DISPOSABLE MISCELLANEOUS WEEKLY
Qty: 50 EACH | Refills: 2 | Status: SHIPPED | OUTPATIENT
Start: 2024-02-12

## 2024-02-12 NOTE — TELEPHONE ENCOUNTER
Hi Dr. Pagan! I met with Jayleen and she is interested in a rolling walker with a seat.  Please evaluate for this at your upcoming visit on 2/20/24.  She is also interested in an over the bed table.  Unsure on whether this would be covered or not. She also needs a meter, test strips, and lancets sent to [facility].  Thanks!

## 2024-02-12 NOTE — PROGRESS NOTES
were made and/or referrals ordered.    Positive Risk Factor Screenings with Interventions:    Fall Risk:  Do you feel unsteady or are you worried about falling? : no  2 or more falls in past year?: no  Fall with injury in past year?: no  Timed Up and Go Test > 12 seconds?: (!) yes (21 seconds)  Interventions:    See AVS for additional education material, Patient stated that she would be interested in a rolling sitting walker and an over the bed table. Will message Dr. Pagan about this.      Depression:  PHQ-2 Score: 4  PHQ-9 Total Score: 12    Interpretation:  5-9 mild   10-14 moderate   15-19 moderately severe   20-27 severe     Interventions:  Patient comments: Patient previously saw the psychiatrist and commented that she was \"over-medicated\".  She declines discussion about medications.  Patient declines counseling.             Activity, Diet, and Weight:  On average, how many days per week do you engage in moderate to strenuous exercise (like a brisk walk)?: 0 days  On average, how many minutes do you engage in exercise at this level?: 20 min    Do you eat balanced/healthy meals regularly?: (!) No    Body mass index is 48.92 kg/m². (!) Abnormal      Inactivity Interventions:  Patient comments: Patient fell and injured her shoulder.  She is healing and is using diclofenac.  She cannot exercise to a great deal because of her shoulder and back.   Do you eat balanced/healthy meals regularly Interventions:  Patient comments: Patient has varying appetite.  Obesity Interventions:  Patient comments: Patient is on Ozempic and actively losing weight.               Vision Screen:  Do you have difficulty driving, watching TV, or doing any of your daily activities because of your eyesight?: No  Have you had an eye exam within the past year?: (!) No  No results found.    Interventions:   Patient comments: Patient will be making an appointment to get her eyes checked at [facility].     ADL's:   Patient reports needing help

## 2024-02-12 NOTE — PATIENT INSTRUCTIONS
Thank you for coming in today and allowing me to be part of your care team.    Please call Dave our  if you have any questions about your advanced directives. [phone].  I messaged Dr. Pagan about the walker and the bed table.  Medicare Service Recommended Frequency Last Done Due next   Pneumonia vaccine After 65, Prevnar 20 ONCE You received the Prenvar 13 on 9/8/15 We will give you the Prevnar 20 today.   Influenza vaccine Yearly 11/7/23 Keep up the good work with yearly flu vaccines   Zoster/Shingles Shingrix vaccine:  2 shots 2-6 months apart  You got one Shingrix vaccine on 1/12/24 Please make sure to get your next vaccine 2-6 months from the first one   COVID Variable 1/12/24 You are up-to-date   Tetanus vaccine (Td or Tdap) Every 10 years  You got a tetanus vaccine on 1/9/15 You will be due next year for this.   Mammogram Every 1-2 years (ages ~40-75) You had a mammogram on 12/20/23 which was normal You will be due this December.   Colorectal Cancer Screening FIT test yearly, Cologuard every 3 years, Colonoscopy every 10 years *unless otherwise indicated* You noted that you had a colonoscopy with Dr. Cantu.   We will get records.  It looks like you will be due again in April of 2026   Cardiovascular/cholesterol screening As determined by your physician 11/7/23  Total cholesterol: 143  Triglycerides: 182  HDL (good cholesterol): 38  LDL (bad cholesterol): 69 As determined by your physician   Diabetes screening   As determined by your physician 12/5/23  A1c/glucose: 6.0% As determined by your physician   Osteoporosis screening   DEXA every 2 years for females (ages 65-85) You do not qualify for this yet due to age    Low Dose CT/Lung Cancer  Annual ages 50 to 77 (80) years who have a 20 pack-year smoking history and currently smoke or have quit within the past 15 years You had a lung cancer screening on 12/27/23 You will be due again this December.    Glaucoma screening       Covered yearly

## 2024-02-20 ENCOUNTER — TELEPHONE (OUTPATIENT)
Age: 59
End: 2024-02-20

## 2024-02-20 RX ORDER — MELOXICAM 15 MG/1
15 TABLET ORAL DAILY
Qty: 30 TABLET | Refills: 3 | Status: CANCELLED | OUTPATIENT
Start: 2024-02-20

## 2024-02-20 RX ORDER — LANCETS 30 GAUGE
1 EACH MISCELLANEOUS DAILY
Qty: 100 EACH | Refills: 5 | Status: SHIPPED | OUTPATIENT
Start: 2024-02-20

## 2024-02-21 NOTE — TELEPHONE ENCOUNTER
Patient states that she already scheduled with Dr. Cortes on 03.19.2024 along with her son being seen same day. Message completed.

## 2024-03-02 DIAGNOSIS — M25.561 CHRONIC PAIN OF BOTH KNEES: ICD-10-CM

## 2024-03-02 DIAGNOSIS — M25.562 CHRONIC PAIN OF BOTH KNEES: ICD-10-CM

## 2024-03-02 DIAGNOSIS — G89.29 CHRONIC PAIN OF BOTH KNEES: ICD-10-CM

## 2024-03-03 DIAGNOSIS — R73.03 PREDIABETES: ICD-10-CM

## 2024-03-04 DIAGNOSIS — E66.01 MORBID OBESITY WITH BMI OF 40.0-44.9, ADULT (HCC): ICD-10-CM

## 2024-03-04 RX ORDER — SEMAGLUTIDE 1.34 MG/ML
INJECTION, SOLUTION SUBCUTANEOUS
Qty: 3 ML | Refills: 0 | Status: SHIPPED | OUTPATIENT
Start: 2024-03-04

## 2024-03-05 ENCOUNTER — TELEPHONE (OUTPATIENT)
Age: 59
End: 2024-03-05

## 2024-03-05 DIAGNOSIS — D12.6 TUBULAR ADENOMA OF COLON: Primary | ICD-10-CM

## 2024-03-05 DIAGNOSIS — Z12.11 ENCOUNTER FOR SCREENING COLONOSCOPY: ICD-10-CM

## 2024-03-05 NOTE — TELEPHONE ENCOUNTER
Called patient to discuss pathology report from 2021 colonoscopy, which shows dx of a tubular adenoma. This would change her recommended repeat colonoscopy interval to 3 years as opposed to 5 years as originally recommended, which would make her due for another colonoscopy this year. This was discussed with the patient who is agreeable. Will place referral/order for colonoscopy.

## 2024-03-19 ENCOUNTER — OFFICE VISIT (OUTPATIENT)
Age: 59
End: 2024-03-19

## 2024-03-19 ENCOUNTER — TELEPHONE (OUTPATIENT)
Age: 59
End: 2024-03-19

## 2024-03-19 ENCOUNTER — HOSPITAL ENCOUNTER (OUTPATIENT)
Age: 59
Setting detail: SPECIMEN
Discharge: HOME | End: 2024-03-19

## 2024-03-19 VITALS
RESPIRATION RATE: 16 BRPM | WEIGHT: 292.2 LBS | TEMPERATURE: 98 F | HEART RATE: 89 BPM | HEIGHT: 65 IN | DIASTOLIC BLOOD PRESSURE: 76 MMHG | SYSTOLIC BLOOD PRESSURE: 122 MMHG | BODY MASS INDEX: 48.68 KG/M2

## 2024-03-19 DIAGNOSIS — M25.562 CHRONIC PAIN OF BOTH KNEES: ICD-10-CM

## 2024-03-19 DIAGNOSIS — M25.551 CHRONIC PAIN OF BOTH HIPS: ICD-10-CM

## 2024-03-19 DIAGNOSIS — E66.01 MORBID OBESITY WITH BMI OF 40.0-44.9, ADULT (HCC): ICD-10-CM

## 2024-03-19 DIAGNOSIS — G89.29 CHRONIC PAIN OF BOTH HIPS: ICD-10-CM

## 2024-03-19 DIAGNOSIS — R73.03 PREDIABETES: ICD-10-CM

## 2024-03-19 DIAGNOSIS — M25.561 CHRONIC PAIN OF BOTH KNEES: Primary | ICD-10-CM

## 2024-03-19 DIAGNOSIS — G89.29 CHRONIC PAIN OF BOTH KNEES: ICD-10-CM

## 2024-03-19 DIAGNOSIS — M25.561 CHRONIC PAIN OF BOTH KNEES: ICD-10-CM

## 2024-03-19 DIAGNOSIS — G89.29 CHRONIC PAIN OF BOTH KNEES: Primary | ICD-10-CM

## 2024-03-19 DIAGNOSIS — M16.12 PRIMARY OSTEOARTHRITIS OF LEFT HIP: ICD-10-CM

## 2024-03-19 DIAGNOSIS — M25.552 CHRONIC PAIN OF BOTH HIPS: ICD-10-CM

## 2024-03-19 DIAGNOSIS — R73.03 PREDIABETES: Primary | ICD-10-CM

## 2024-03-19 DIAGNOSIS — M25.562 CHRONIC PAIN OF BOTH KNEES: Primary | ICD-10-CM

## 2024-03-19 LAB
ALBUMIN SERPL-MCNC: 4.3 G/DL (ref 3.5–5.2)
ALBUMIN/GLOB SERPL: 1 {RATIO} (ref 1–2.5)
ALP SERPL-CCNC: 94 U/L (ref 35–104)
ALT SERPL-CCNC: 23 U/L (ref 10–35)
ANION GAP SERPL CALCULATED.3IONS-SCNC: 13 MMOL/L (ref 9–16)
AST SERPL-CCNC: 26 U/L (ref 10–35)
BILIRUB SERPL-MCNC: 0.3 MG/DL (ref 0–1.2)
BUN SERPL-MCNC: 11 MG/DL (ref 6–20)
CALCIUM SERPL-MCNC: 9.1 MG/DL (ref 8.6–10.4)
CHLORIDE SERPL-SCNC: 102 MMOL/L (ref 98–107)
CO2 SERPL-SCNC: 23 MMOL/L (ref 20–31)
CREAT SERPL-MCNC: 0.8 MG/DL (ref 0.5–0.9)
EST. AVERAGE GLUCOSE BLD GHB EST-MCNC: 103 MG/DL
GFR SERPL CREATININE-BSD FRML MDRD: >60 ML/MIN/1.73M2
GLUCOSE SERPL-MCNC: 97 MG/DL (ref 74–99)
HBA1C MFR BLD: 5.2 % (ref 4–6)
POTASSIUM SERPL-SCNC: 4.3 MMOL/L (ref 3.7–5.3)
PROT SERPL-MCNC: 7.4 G/DL (ref 6.6–8.7)
SODIUM SERPL-SCNC: 138 MMOL/L (ref 136–145)

## 2024-03-19 NOTE — TELEPHONE ENCOUNTER
Please order standing films of bilateral knees to assess for osteoarthritis. Patient rescheduled to a later date for US-guided left hip injection. Would be ideal if we could get imaging of the knees before the next appt. Thanks in advance.

## 2024-03-19 NOTE — PROGRESS NOTES
sounds: Normal heart sounds. No murmur heard.     No gallop.   Pulmonary:      Effort: Pulmonary effort is normal. No respiratory distress.   Abdominal:      General: There is no distension.      Palpations: Abdomen is soft. There is no mass.      Tenderness: There is no abdominal tenderness. There is no guarding or rebound.      Hernia: No hernia is present.   Musculoskeletal:      Right hip: No tenderness or bony tenderness.      Left hip: Tenderness present. No bony tenderness.      Right knee: No bony tenderness. Normal range of motion. No tenderness.      Left knee: No bony tenderness. Normal range of motion. No tenderness.   Skin:     General: Skin is warm and dry.      Capillary Refill: Capillary refill takes less than 2 seconds.      Findings: No rash.   Neurological:      General: No focal deficit present.      Mental Status: She is alert.   Psychiatric:         Mood and Affect: Mood normal.         Behavior: Behavior normal.         ASSESSMENT/PLAN     1. Prediabetes  Assessment & Plan:  Last HbA1c was performed in December 2023 - prediabetic range at 6.0. Will recheck. Patient is doing well on Ozempic 1 mg dose without major side effects and has lost ~15 lbs since December. Consideration for future dosage increase if clinically indicated.  Orders:  -     Hemoglobin A1C; Future  2. Morbid obesity with BMI of 40.0-44.9, adult (HCC)  -     Comprehensive Metabolic Panel; Future  -     Hemoglobin A1C; Future  3. Primary osteoarthritis of left hip  Assessment & Plan:  Known from  in 2021 showing severe degenerative changes. Based on patient's body habitus and difficulty ambulating long distances due to combination of weight and pain from osteoarthritis, PT is likely of limited benefit. Discussed continuing use of Voltaren gel which she finds more helpful than systemic meloxicam (which she is no longer taking), as well as adjunctive modalities for management including steroid injections as well as

## 2024-03-19 NOTE — PATIENT INSTRUCTIONS
Thank you for your visit today to the [facility]. It was our pleasure to provide the best possible care for you today.    As we discussed, please take note of the following:  - Have your labs drawn as soon as possible.  -  your medications from the pharmacy and take as directed.  - Call the office and request an appointment to see if a steroid injection for your hips/knees can be scheduled.  - Return in 3 months for routine follow-up, or sooner as needed.    Call the office at [phone] with any questions or concerns.

## 2024-03-21 NOTE — ASSESSMENT & PLAN NOTE
Known from XR in 2021 showing severe degenerative changes. Based on patient's body habitus and difficulty ambulating long distances due to combination of weight and pain from osteoarthritis, PT is likely of limited benefit. Discussed continuing use of Voltaren gel which she finds more helpful than systemic meloxicam (which she is no longer taking), as well as adjunctive modalities for management including steroid injections as well as consideration for surgical joint replacement. Patient would like to avoid a surgical approach for now. She was referred to our procedure clinic but was advised that she will need updated XR imaging first. Orders have been placed for bilateral XR of the hips and knees.

## 2024-03-21 NOTE — ASSESSMENT & PLAN NOTE
Last HbA1c was performed in December 2023 - prediabetic range at 6.0. Will recheck. Patient is doing well on Ozempic 1 mg dose without major side effects and has lost ~15 lbs since December. Consideration for future dosage increase if clinically indicated.

## 2024-03-28 ENCOUNTER — HOSPITAL ENCOUNTER (OUTPATIENT)
Dept: GENERAL RADIOLOGY | Age: 59
Discharge: HOME | End: 2024-03-30
Payer: MEDICARE

## 2024-03-28 ENCOUNTER — HOSPITAL ENCOUNTER (OUTPATIENT)
Age: 59
Discharge: HOME | End: 2024-03-30
Payer: MEDICARE

## 2024-03-28 DIAGNOSIS — G89.29 CHRONIC PAIN OF BOTH KNEES: ICD-10-CM

## 2024-03-28 DIAGNOSIS — M25.561 CHRONIC PAIN OF BOTH KNEES: ICD-10-CM

## 2024-03-28 DIAGNOSIS — M25.562 CHRONIC PAIN OF BOTH KNEES: ICD-10-CM

## 2024-03-28 DIAGNOSIS — M25.551 CHRONIC PAIN OF BOTH HIPS: ICD-10-CM

## 2024-03-28 DIAGNOSIS — M25.552 CHRONIC PAIN OF BOTH HIPS: ICD-10-CM

## 2024-03-28 DIAGNOSIS — G89.29 CHRONIC PAIN OF BOTH HIPS: ICD-10-CM

## 2024-03-28 PROCEDURE — 73562 X-RAY EXAM OF KNEE 3: CPT

## 2024-03-28 PROCEDURE — 73522 X-RAY EXAM HIPS BI 3-4 VIEWS: CPT

## 2024-04-09 ENCOUNTER — PROCEDURE VISIT (OUTPATIENT)
Age: 59
End: 2024-04-09
Payer: MEDICARE

## 2024-04-09 VITALS
DIASTOLIC BLOOD PRESSURE: 75 MMHG | TEMPERATURE: 98.1 F | WEIGHT: 288.3 LBS | HEART RATE: 97 BPM | BODY MASS INDEX: 47.98 KG/M2 | SYSTOLIC BLOOD PRESSURE: 140 MMHG

## 2024-04-09 DIAGNOSIS — G89.29 CHRONIC PAIN OF BOTH KNEES: Primary | ICD-10-CM

## 2024-04-09 DIAGNOSIS — M16.12 PRIMARY OSTEOARTHRITIS OF LEFT HIP: ICD-10-CM

## 2024-04-09 DIAGNOSIS — M25.561 CHRONIC PAIN OF BOTH KNEES: Primary | ICD-10-CM

## 2024-04-09 DIAGNOSIS — M25.562 CHRONIC PAIN OF BOTH KNEES: Primary | ICD-10-CM

## 2024-04-09 PROCEDURE — 20610 DRAIN/INJ JOINT/BURSA W/O US: CPT

## 2024-04-09 RX ORDER — MELOXICAM 15 MG/1
TABLET ORAL
COMMUNITY
Start: 2024-04-01

## 2024-04-09 NOTE — PATIENT INSTRUCTIONS
Patient Instructions  Thank you for coming in today. It may take 3-4 days for the steroid to take effect. Please avoid strenuous activity for the next 24-48 hours and to use ice, NSAIDs, or Tylenol for pain as needed. He will follow-up with Dr. Pagan

## 2024-04-09 NOTE — PROGRESS NOTES
INJECTION PROCEDURE NOTE:  Risks, benefits, and potential complications were discussed with the patient regarding intra-articular cortisone injections. I also informed the patient of the possibility of cortisone response including but not limited to flushing, insomnia, hyperglycemia, and increased appetite. I also instructed the patient to closely monitor their glucose levels over the next several days if they are diabetic. The patient voiced understanding and has elected to proceed at this time.  -----    After verbal consent was obtained, the skin was made sterile using Betadine and alcohol swabs. The patient was injected with 1 cc Depo-Medrol and 5 cc of 1% lidocaine using a 25 gauge needle into Right knee intra-articularly.  It was completed without complication, and a sterile bandage was applied. The patient tolerated the procedure well. The patient was instructed to avoid strenuous activity for the next 24-48 hours and to use ice, NSAIDs, or Tylenol for pain as needed.    INJECTION PROCEDURE NOTE:  Risks, benefits, and potential complications were discussed with the patient regarding intra-articular cortisone injections. I also informed the patient of the possibility of cortisone response including but not limited to flushing, insomnia, hyperglycemia, and increased appetite. I also instructed the patient to closely monitor their glucose levels over the next several days if they are diabetic. The patient voiced understanding and has elected to proceed at this time.  -----    After verbal consent was obtained, the skin was made sterile using Betadine and alcohol swabs. The patient was injected with 1 cc Depo-Medrol and 4 cc of 1% lidocaine using a 25 gauge needle into left knee intra-articularly.  It was completed without complication, and a sterile bandage was applied. The patient tolerated the procedure well. The patient was instructed to avoid strenuous activity for the next 24-48 hours and to use ice,

## 2024-04-10 ENCOUNTER — TELEPHONE (OUTPATIENT)
Age: 59
End: 2024-04-10

## 2024-04-10 DIAGNOSIS — R73.03 PREDIABETES: Primary | ICD-10-CM

## 2024-04-10 DIAGNOSIS — M25.562 CHRONIC PAIN OF BOTH KNEES: ICD-10-CM

## 2024-04-10 DIAGNOSIS — G89.29 CHRONIC PAIN OF BOTH KNEES: ICD-10-CM

## 2024-04-10 DIAGNOSIS — M25.561 CHRONIC PAIN OF BOTH KNEES: ICD-10-CM

## 2024-04-10 RX ORDER — LANCETS 30 GAUGE
1 EACH MISCELLANEOUS DAILY
Qty: 100 EACH | Refills: 5 | Status: SHIPPED | OUTPATIENT
Start: 2024-04-10

## 2024-04-10 RX ORDER — CALCIUM CARBONATE 160(400)MG
1 TABLET,CHEWABLE ORAL ONCE
Qty: 1 EACH | Refills: 0 | Status: SHIPPED | OUTPATIENT
Start: 2024-04-10 | End: 2024-04-10

## 2024-04-10 NOTE — PROGRESS NOTES
This is an addition to progress note from 3/25/2024    Breana Hanson was evaluated on 3/25/2024 and a DME order will be entered for a Rollator walker because she requires this to successfully complete daily living tasks of ambulating.  A standard walker is necessary due to the patient's impaired ambulation and mobility restrictions secondary to her bilateral chronic knee pain and left hip osteoarthritis, and she can ambulate only by using a walker instead of a lesser assistive device, such as a cane or crutch.  The need for this equipment was discussed with the patient and she understands and is in agreement.

## 2024-04-10 NOTE — TELEPHONE ENCOUNTER
Dr Pagan,     Patient was here yesterday and mentioned she needed a refill for lancets.  Can you please send refill to [facility] listed in chart?      Also, said she mentioned this to you before and you did evaluate her for it already.  She is requesting for a Rollator Walker With Seat.  Patient does have Medicare so a \"face to face\" does need to be completed since this is considered a Durable medical equipment (DME).  I did tell patient and she said you did do one, if so the appt that you did that will need to say \"face to face\" completed for .....    Please generate a script for it and she will take it to a medical supply store.      Thank you.

## 2024-04-16 ENCOUNTER — TELEPHONE (OUTPATIENT)
Age: 59
End: 2024-04-16

## 2024-04-16 DIAGNOSIS — R73.03 PREDIABETES: ICD-10-CM

## 2024-04-16 RX ORDER — LANCETS 30 GAUGE
EACH MISCELLANEOUS
Qty: 180 EACH | Refills: 2 | Status: SHIPPED | OUTPATIENT
Start: 2024-04-16

## 2024-04-16 NOTE — TELEPHONE ENCOUNTER
Patient states that Dr Pagan increased checking BS to 2 times a day and needs enough lancets to last. Patient will run out because she only has enough to check for 1 time aday now she is supposed to check BS's 2 times a day. If possible please send 90 day supply of 180 lancets.    Tita helms

## 2024-04-23 ENCOUNTER — PROCEDURE VISIT (OUTPATIENT)
Age: 59
End: 2024-04-23
Payer: MEDICARE

## 2024-04-23 VITALS
BODY MASS INDEX: 47.91 KG/M2 | HEART RATE: 89 BPM | DIASTOLIC BLOOD PRESSURE: 71 MMHG | TEMPERATURE: 97.6 F | RESPIRATION RATE: 18 BRPM | WEIGHT: 287.9 LBS | SYSTOLIC BLOOD PRESSURE: 153 MMHG

## 2024-04-23 DIAGNOSIS — M16.12 PRIMARY OSTEOARTHRITIS OF LEFT HIP: Primary | ICD-10-CM

## 2024-04-23 PROCEDURE — 20611 DRAIN/INJ JOINT/BURSA W/US: CPT | Performed by: FAMILY MEDICINE

## 2024-04-23 PROCEDURE — 90000 NO LOS: CPT | Performed by: FAMILY MEDICINE

## 2024-04-23 PROCEDURE — 20611 DRAIN/INJ JOINT/BURSA W/US: CPT

## 2024-04-23 RX ORDER — METHYLPREDNISOLONE ACETATE 80 MG/ML
80 INJECTION, SUSPENSION INTRA-ARTICULAR; INTRALESIONAL; INTRAMUSCULAR; SOFT TISSUE ONCE
Status: COMPLETED | OUTPATIENT
Start: 2024-04-23 | End: 2024-04-23

## 2024-04-23 RX ADMIN — METHYLPREDNISOLONE ACETATE 80 MG: 80 INJECTION, SUSPENSION INTRA-ARTICULAR; INTRALESIONAL; INTRAMUSCULAR; SOFT TISSUE at 13:51

## 2024-04-23 NOTE — PATIENT INSTRUCTIONS
Patient Instructions  Thank you for coming in today. It may take 3-5 days for the steroid to take effect. Please avoid strenuous activity for the next 24-48 hours and to use ice, NSAIDs, or Tylenol for pain as needed. She will follow-up with Dr Pagan

## 2024-04-23 NOTE — PROGRESS NOTES
US Guided Injection/Aspiration           Procedure - Ultrasound guided injection of the Left Hip joint space.         Procedure performed by - Dr. Godoy/nirav         Equipment - Sonosite X Porte US         Indications - Primary osteoarthritis .         Indications for the use of musculoskeletal ultrasound - Ultrasound was utilized to improve needle visualization, injection accuracy, and anatomic localization as supported in the literature by numerous studies, and within Medicare guidelines. See saved US images.         Consent - The risks, benefits, and alternatives (including no treatment) to the procedure were discussed in detail with the patient. Risks include but are not limited to: bleeding, infection, damage to nerves, arteries and other soft tissues, lightening of the skin, skin atrophy and thinning, continued pain, swelling, allergic reaction, failure of the procedure to relieve symptoms, worsening of symptoms, elevated blood glucose (if cortisone used in diabetic) and future arthritis. The patient voiced an understanding and wishes to proceed. Verbal and written informed consent were obtained prior to beginning the procedure.          Time out - Prior to beginning the procedure, time out was performed to confirm correct patient, procedure, site, and laterality. Patients medical history, allergies, and medications were reviewed.          Procedure details - The procedure was performed using normal sterile technique. Ultrasound was used to identify relevant anatomy, pathology, and vital neurovascular structures in the area of interest with patient seated with the affected left hip exposed. The skin overlying the target structures was then cleaned with betadine and alcohol swab. Under ultrasound guidance and using a 25 gauge, 3.5in needle, 4cc of lidocaine 1% was injected to find the joint space once found syringe needle was clamped and syringe was swapped out for 4cc of lidocaine 1% and 1 cc of 80mg of

## 2024-04-24 ENCOUNTER — TELEPHONE (OUTPATIENT)
Age: 59
End: 2024-04-24

## 2024-04-24 NOTE — TELEPHONE ENCOUNTER
Patient requested a script for a Rolator walker with a seat which I do see the order in her chart, but in order for her insurance to cover it she has to have a face to face discussing the need of the walker. Patient stated that she sis discuss with you at her last visit but it was not documented, you can either addend the note or have the patient come in for an appointment.

## 2024-05-06 ENCOUNTER — OFFICE VISIT (OUTPATIENT)
Age: 59
End: 2024-05-06
Payer: MEDICARE

## 2024-05-06 VITALS
TEMPERATURE: 97.4 F | SYSTOLIC BLOOD PRESSURE: 146 MMHG | BODY MASS INDEX: 47.34 KG/M2 | HEART RATE: 71 BPM | RESPIRATION RATE: 18 BRPM | WEIGHT: 284.5 LBS | DIASTOLIC BLOOD PRESSURE: 61 MMHG

## 2024-05-06 DIAGNOSIS — Z74.09 LIMITED MOBILITY: ICD-10-CM

## 2024-05-06 DIAGNOSIS — R73.03 PREDIABETES: ICD-10-CM

## 2024-05-06 DIAGNOSIS — M75.02 ADHESIVE CAPSULITIS OF LEFT SHOULDER: Primary | ICD-10-CM

## 2024-05-06 DIAGNOSIS — E78.1 HIGH TRIGLYCERIDES: ICD-10-CM

## 2024-05-06 DIAGNOSIS — E66.01 MORBID OBESITY WITH BODY MASS INDEX (BMI) OF 45.0 TO 49.9 IN ADULT (HCC): ICD-10-CM

## 2024-05-06 DIAGNOSIS — M70.51 PES ANSERINUS BURSITIS OF RIGHT KNEE: ICD-10-CM

## 2024-05-06 PROCEDURE — G8417 CALC BMI ABV UP PARAM F/U: HCPCS | Performed by: FAMILY MEDICINE

## 2024-05-06 PROCEDURE — 99214 OFFICE O/P EST MOD 30 MIN: CPT | Performed by: FAMILY MEDICINE

## 2024-05-06 PROCEDURE — 1036F TOBACCO NON-USER: CPT | Performed by: FAMILY MEDICINE

## 2024-05-06 PROCEDURE — G8427 DOCREV CUR MEDS BY ELIG CLIN: HCPCS | Performed by: FAMILY MEDICINE

## 2024-05-06 PROCEDURE — 99213 OFFICE O/P EST LOW 20 MIN: CPT | Performed by: FAMILY MEDICINE

## 2024-05-06 PROCEDURE — 3017F COLORECTAL CA SCREEN DOC REV: CPT | Performed by: FAMILY MEDICINE

## 2024-05-06 RX ORDER — SEMAGLUTIDE 2.68 MG/ML
2 INJECTION, SOLUTION SUBCUTANEOUS
Qty: 3 ML | Refills: 2 | Status: SHIPPED | OUTPATIENT
Start: 2024-05-06 | End: 2024-07-23

## 2024-05-06 RX ORDER — CALCIUM CARBONATE 160(400)MG
1 TABLET,CHEWABLE ORAL ONCE
Qty: 1 EACH | Refills: 0 | Status: SHIPPED | OUTPATIENT
Start: 2024-05-06 | End: 2024-05-06 | Stop reason: CLARIF

## 2024-05-06 NOTE — PATIENT INSTRUCTIONS
Thank you for your visit today to the [facility]. It was our pleasure to provide the best possible care for you today.    As we discussed, please take note of the following:  -  your medications from the pharmacy and take as directed.  - Your Ozempic/semaglutide prescription has been increased to 2 mg. Call us if you experience any side effects or have any concerns about the dosage increase.  - Return in June as scheduled for your follow-up appointment.    Call the office at [phone] with any questions or concerns.

## 2024-05-06 NOTE — PROGRESS NOTES
09/12/2019    EPIDURAL STEROID INJECTION performed by Stiven Underwood MD at [facility] OR    HC INJECT OTHER PERPHRL NERV Left 09/12/2019    INJECTION MEDICATION LEFT HIP performed by Stiven Underwood MD at [facility] OR    HYSTERECTOMY (CERVIX STATUS UNKNOWN)      HYSTERECTOMY, TOTAL ABDOMINAL (CERVIX REMOVED)  9/2001    LAMINECTOMY  04/23/2021    HEMILAMINECTOMY L2-5 WITH CONTRALATERAL DECOMPRESSION. L3 FORAMINOTOMY (Left Spine Lumbar)    LITHOTRIPSY      x2    LUMBAR SPINE SURGERY  04/23/2021    HEMILAMINECTOMY L2-5 WITH CONTRALATERAL DECOMPRESSION. L3 FORAMINOTOMY     LUMBAR SPINE SURGERY Left 04/23/2021    HEMILAMINECTOMY L2-5 WITH CONTRALATERAL DECOMPRESSION. L3 FORAMINOTOMY performed by Iris Simmons DO at [facility] OR    NERVE BLOCK  08/01/2016    duramorph 1mg  celestone 9mg    NERVE BLOCK  10/31/2016    duramorph epidural steroid block decadron 7.5 mg durmoprh 1.5 mg    NERVE BLOCK Left     NO STEROID, LEFT MBNB# 1    NERVE BLOCK Left 01/16/2017    LT MBNB #2   celestone 6mg    NERVE BLOCK Left 02/06/2017    LT lumbar RF    kenalog 40    NERVE BLOCK Right 06/19/2017    right lumbar mbnb #1    NERVE BLOCK  07/07/2017    right radiofrequency kenolog 40mg    NERVE BLOCK  09/11/2017    duramorph 1mg & celestone 9 mg    NERVE BLOCK Left 10/02/2018    LEFT LUMBAR EPIDURAL STEROID BLOCK DECADRON 10MG    NERVE BLOCK  10/16/2018    blanca mbnb, marcaine and xylocaine    NERVE BLOCK Left 10/30/2018    left lumbar rf- no steroid    NERVE BLOCK Right 11/13/2018    RT lumbar RFA no steroid    NERVE BLOCK  03/26/2019    left hip decadron 10mg, marcaine .5 %    PAIN MANAGEMENT PROCEDURE Left 06/18/2020    EPIDURAL STEROID INJECTION LEFT L4,L5 performed by Stiven Underwood MD at [facility] OR    PAIN MANAGEMENT PROCEDURE Right 07/27/2020    RIGHT MEDIAL BRANCH NERVE RADIOFREQUENCY ABLATION L2 - L5 performed by Stiven Underwood MD at [facility] OR    PAIN MANAGEMENT PROCEDURE Right 08/24/2020    RIGHT SIDED MEDIAL BRANCH NERVE RADIOFREQUENCY ABLATION   L 2 - L

## 2024-05-07 ENCOUNTER — PROCEDURE VISIT (OUTPATIENT)
Age: 59
End: 2024-05-07
Payer: MEDICARE

## 2024-05-07 VITALS
SYSTOLIC BLOOD PRESSURE: 136 MMHG | BODY MASS INDEX: 47.26 KG/M2 | RESPIRATION RATE: 16 BRPM | TEMPERATURE: 97.3 F | HEART RATE: 83 BPM | WEIGHT: 284 LBS | DIASTOLIC BLOOD PRESSURE: 67 MMHG

## 2024-05-07 DIAGNOSIS — M75.02 ADHESIVE CAPSULITIS OF LEFT SHOULDER: Primary | ICD-10-CM

## 2024-05-07 PROBLEM — M25.512 CHRONIC LEFT SHOULDER PAIN: Status: ACTIVE | Noted: 2024-05-07

## 2024-05-07 PROBLEM — G89.29 CHRONIC LEFT SHOULDER PAIN: Status: ACTIVE | Noted: 2024-05-07

## 2024-05-07 PROBLEM — E78.1 HIGH TRIGLYCERIDES: Status: ACTIVE | Noted: 2024-05-07

## 2024-05-07 PROBLEM — M70.51 PES ANSERINUS BURSITIS OF RIGHT KNEE: Status: ACTIVE | Noted: 2024-05-07

## 2024-05-07 PROBLEM — Z74.09 LIMITED MOBILITY: Status: ACTIVE | Noted: 2024-05-07

## 2024-05-07 PROCEDURE — 90000 NO LOS: CPT | Performed by: STUDENT IN AN ORGANIZED HEALTH CARE EDUCATION/TRAINING PROGRAM

## 2024-05-07 PROCEDURE — 20610 DRAIN/INJ JOINT/BURSA W/O US: CPT | Performed by: FAMILY MEDICINE

## 2024-05-07 PROCEDURE — 20610 DRAIN/INJ JOINT/BURSA W/O US: CPT | Performed by: STUDENT IN AN ORGANIZED HEALTH CARE EDUCATION/TRAINING PROGRAM

## 2024-05-07 RX ORDER — METHYLPREDNISOLONE ACETATE 40 MG/ML
40 INJECTION, SUSPENSION INTRA-ARTICULAR; INTRALESIONAL; INTRAMUSCULAR; SOFT TISSUE ONCE
Status: COMPLETED | OUTPATIENT
Start: 2024-05-07 | End: 2024-05-07

## 2024-05-07 RX ADMIN — METHYLPREDNISOLONE ACETATE 40 MG: 40 INJECTION, SUSPENSION INTRA-ARTICULAR; INTRALESIONAL; INTRAMUSCULAR; SOFT TISSUE at 14:32

## 2024-05-07 NOTE — ASSESSMENT & PLAN NOTE
Last HbA1c was performed in March 2024 - no longer prediabetic range at 5.2. Will recheck basic labs but next A1c can be deferred until September 2024.

## 2024-05-07 NOTE — ASSESSMENT & PLAN NOTE
Tenderness of medial aspect of the right knee on exam with largely preserved range of motion.  Etiology is likely multifactorial given patient's weight and presence of osteoarthritis.  Will refer to physical therapy.  Too soon to repeat corticosteroid injections as last injection was last month.

## 2024-05-07 NOTE — PATIENT INSTRUCTIONS
Thank you for your visit today to the [facility]. It was our pleasure to provide the best possible care for you today.    As we discussed, please take note of the following:  - Return in 2 weeks or sooner as needed if your symptoms worsen or do not improve. You have an appointment scheduled for June 25, 2024.    Call the office at [phone] with any questions or concerns.

## 2024-05-07 NOTE — ASSESSMENT & PLAN NOTE
Due to presence of osteoarthritis and multiple lower extremity joints, including hips and knees.  Prescription has been updated to specifically request a bariatric rollator.  Face-to-face documentation as per below.

## 2024-05-07 NOTE — PROGRESS NOTES
Procedure Note  Patient Information:  Breana Hanson  1965  Procedure: Left Subacromial Shoulder Injection  Indication: The patient has been diagnosed with left subacromial impingement. Conservative management options have been exhausted with inadequate relief. The decision was made to proceed with a left subacromial shoulder injection to reduce pain and improve range of motion.  Pre-Procedure Evaluation: The patient was evaluated prior to the procedure. The risks, benefits, and alternatives were discussed at length. The patient gave informed consent for the procedure.  Procedure Details: The patient was positioned in a seated position with the left shoulder exposed, and draped in a sterile fashion. The skin over the left shoulder was cleaned with a betadine solution and alcohol swabs. Cold spray was applied to the left shoulder region.  Using aseptic technique and landmark guidance using the coracoid process as a target, a 25-gauge needle was advanced into the subacromial bursa. A mixture of 1 mL of methylprednisolone (40 mg/mL) and 5 mL of 1% lidocaine was injected into the joint space without difficulty.  Post-procedure: The patient tolerated the procedure well without immediate complications. The patient was monitored post-procedure until stable for discharge. Instructions were given to the patient to limit activities for the rest of the day, apply ice to the injection site, and gradually resume normal activities over the next few days.  Plan: The patient will follow up in the clinic for evaluation of response to the procedure. She already has an appointment scheduled for June 25, 2024. The patient was advised to contact the clinic if there are any concerns or if symptoms worsen.  Esvin Pagan MD  12:02 PM    Attending Physician Statement  I have seen and discussed the care of Breana Hanson  including pertinent history and exam findings, with the resident. I have reviewed the key elements of all parts

## 2024-05-07 NOTE — ASSESSMENT & PLAN NOTE
Tolerating semaglutide subQ well without any significant side effects, and reports mild decrease in appetite as well as weight loss. Now at 284 lbs compared to 309 lbs in December. Plan to gradually increase dose to 2 mg weekly per protocol and patient request. Will recheck CMP/renal function per guidelines.

## 2024-05-07 NOTE — ASSESSMENT & PLAN NOTE
History and exam likely attributable to adhesive capsulitis/frozen shoulder. Patient did undergo left shoulder MRI in November 2023 showing advanced glenohumeral joint osteoarthritis with bony remodeling.  No evidence of rotator cuff tear at that time. Will arrange for patient to receive steroid injection in procedure clinic.  Physical therapy can also be considered in the future.   Ice to affected area for approximately 10-15 minutes, 4 times a day for 2 days.  After 2 days use heat for 10-15 minutes 4 times a day.  If heat makes things worse, go back to ice.  Range of motion exercises as tolerated.  Take medications as directed.  GI (Gastrointestinal) side effects with anti-inflammatories discussed.  Take daily for the next 3-5 days then as needed. Naproxen.  Follow-up in a week's time if not improving or seek medical attention earlier if worsens.  Continue using ankle brace for comfort  Activity as tolerated/weight as tolerated

## 2024-05-08 DIAGNOSIS — M16.12 PRIMARY OSTEOARTHRITIS OF LEFT HIP: ICD-10-CM

## 2024-05-08 DIAGNOSIS — M25.562 CHRONIC PAIN OF BOTH KNEES: ICD-10-CM

## 2024-05-08 DIAGNOSIS — R73.03 PREDIABETES: ICD-10-CM

## 2024-05-08 DIAGNOSIS — G89.29 CHRONIC PAIN OF BOTH KNEES: ICD-10-CM

## 2024-05-08 DIAGNOSIS — M25.561 CHRONIC PAIN OF BOTH KNEES: ICD-10-CM

## 2024-05-15 RX ORDER — SEMAGLUTIDE 1.34 MG/ML
INJECTION, SOLUTION SUBCUTANEOUS
Qty: 3 ML | OUTPATIENT
Start: 2024-05-15

## 2024-06-03 ENCOUNTER — HOSPITAL ENCOUNTER (OUTPATIENT)
Age: 59
Setting detail: SPECIMEN
Discharge: HOME | End: 2024-06-03

## 2024-06-03 DIAGNOSIS — E78.1 HIGH TRIGLYCERIDES: ICD-10-CM

## 2024-06-03 DIAGNOSIS — R73.03 PREDIABETES: ICD-10-CM

## 2024-06-03 LAB
ALBUMIN SERPL-MCNC: 4.6 G/DL (ref 3.5–5.2)
ALBUMIN/GLOB SERPL: 2 {RATIO} (ref 1–2.5)
ALP SERPL-CCNC: 92 U/L (ref 35–104)
ALT SERPL-CCNC: 22 U/L (ref 10–35)
ANION GAP SERPL CALCULATED.3IONS-SCNC: 13 MMOL/L (ref 9–16)
AST SERPL-CCNC: 28 U/L (ref 10–35)
BASOPHILS # BLD: 0.07 K/UL (ref 0–0.2)
BASOPHILS NFR BLD: 1 % (ref 0–2)
BILIRUB SERPL-MCNC: 0.4 MG/DL (ref 0–1.2)
BUN SERPL-MCNC: 16 MG/DL (ref 6–20)
CALCIUM SERPL-MCNC: 9.2 MG/DL (ref 8.6–10.4)
CHLORIDE SERPL-SCNC: 103 MMOL/L (ref 98–107)
CHOLEST SERPL-MCNC: 145 MG/DL (ref 0–199)
CHOLESTEROL/HDL RATIO: 3
CO2 SERPL-SCNC: 23 MMOL/L (ref 20–31)
CREAT SERPL-MCNC: 0.9 MG/DL (ref 0.5–0.9)
EOSINOPHIL # BLD: 0.26 K/UL (ref 0–0.44)
EOSINOPHILS RELATIVE PERCENT: 2 % (ref 1–4)
ERYTHROCYTE [DISTWIDTH] IN BLOOD BY AUTOMATED COUNT: 15.7 % (ref 11.8–14.4)
GFR, ESTIMATED: 71 ML/MIN/1.73M2
GLUCOSE SERPL-MCNC: 95 MG/DL (ref 74–99)
HCT VFR BLD AUTO: 41.8 % (ref 36.3–47.1)
HDLC SERPL-MCNC: 44 MG/DL
HGB BLD-MCNC: 13.1 G/DL (ref 11.9–15.1)
IMM GRANULOCYTES # BLD AUTO: 0.04 K/UL (ref 0–0.3)
IMM GRANULOCYTES NFR BLD: 0 %
LDLC SERPL CALC-MCNC: 72 MG/DL (ref 0–100)
LYMPHOCYTES NFR BLD: 3.06 K/UL (ref 1.1–3.7)
LYMPHOCYTES RELATIVE PERCENT: 27 % (ref 24–43)
MCH RBC QN AUTO: 27.2 PG (ref 25.2–33.5)
MCHC RBC AUTO-ENTMCNC: 31.3 G/DL (ref 28.4–34.8)
MCV RBC AUTO: 86.7 FL (ref 82.6–102.9)
MONOCYTES NFR BLD: 0.86 K/UL (ref 0.1–1.2)
MONOCYTES NFR BLD: 8 % (ref 3–12)
NEUTROPHILS NFR BLD: 62 % (ref 36–65)
NEUTS SEG NFR BLD: 7.07 K/UL (ref 1.5–8.1)
NRBC BLD-RTO: 0 PER 100 WBC
PLATELET # BLD AUTO: 246 K/UL (ref 138–453)
PMV BLD AUTO: 10.6 FL (ref 8.1–13.5)
POTASSIUM SERPL-SCNC: 4.4 MMOL/L (ref 3.7–5.3)
PROT SERPL-MCNC: 7.6 G/DL (ref 6.6–8.7)
RBC # BLD AUTO: 4.82 M/UL (ref 3.95–5.11)
RBC # BLD: ABNORMAL 10*6/UL
SODIUM SERPL-SCNC: 139 MMOL/L (ref 136–145)
TRIGL SERPL-MCNC: 145 MG/DL
VLDLC SERPL CALC-MCNC: 29 MG/DL
WBC OTHER # BLD: 11.4 K/UL (ref 3.5–11.3)

## 2024-06-04 DIAGNOSIS — M25.561 CHRONIC PAIN OF BOTH KNEES: ICD-10-CM

## 2024-06-04 DIAGNOSIS — M25.562 CHRONIC PAIN OF BOTH KNEES: ICD-10-CM

## 2024-06-04 DIAGNOSIS — M16.12 PRIMARY OSTEOARTHRITIS OF LEFT HIP: ICD-10-CM

## 2024-06-04 DIAGNOSIS — G89.29 CHRONIC PAIN OF BOTH KNEES: ICD-10-CM

## 2024-06-18 RX ORDER — BLOOD SUGAR DIAGNOSTIC
STRIP MISCELLANEOUS
Qty: 100 STRIP | Refills: 2 | Status: SHIPPED | OUTPATIENT
Start: 2024-06-18

## 2024-06-20 ENCOUNTER — PATIENT MESSAGE (OUTPATIENT)
Age: 59
End: 2024-06-20

## 2024-06-20 DIAGNOSIS — K21.9 GASTROESOPHAGEAL REFLUX DISEASE, UNSPECIFIED WHETHER ESOPHAGITIS PRESENT: Primary | ICD-10-CM

## 2024-06-20 RX ORDER — OMEPRAZOLE 40 MG/1
40 CAPSULE, DELAYED RELEASE ORAL 2 TIMES DAILY
Qty: 180 CAPSULE | Refills: 1 | Status: SHIPPED | OUTPATIENT
Start: 2024-06-20 | End: 2024-06-21 | Stop reason: SDUPTHER

## 2024-06-21 RX ORDER — OMEPRAZOLE 40 MG/1
40 CAPSULE, DELAYED RELEASE ORAL 2 TIMES DAILY
Qty: 180 CAPSULE | Refills: 2 | Status: SHIPPED | OUTPATIENT
Start: 2024-06-21 | End: 2025-03-18

## 2024-06-21 NOTE — TELEPHONE ENCOUNTER
Jaimee Burrell MA 6/21/2024 8:38 AM EDT      ----- Message -----  From: Breana Hanson \"Jayleen\"  Sent: 6/20/2024 9:17 AM EDT  To: [facility] [address]  Clinical Staff  Subject: Medication     I need my Prilosec 40 mg x2 daily 90 day supply   That is how I’ve been getting them   I am on day 3 without any getting more stomach issues as days go   Send to [facility]   At [facility] in [facility]   As soon as possible please  Thank you   Breana Hanson

## 2024-06-25 ENCOUNTER — OFFICE VISIT (OUTPATIENT)
Age: 59
End: 2024-06-25
Payer: MEDICARE

## 2024-06-25 VITALS
DIASTOLIC BLOOD PRESSURE: 68 MMHG | RESPIRATION RATE: 12 BRPM | BODY MASS INDEX: 46.33 KG/M2 | WEIGHT: 278.4 LBS | SYSTOLIC BLOOD PRESSURE: 141 MMHG | HEART RATE: 77 BPM

## 2024-06-25 DIAGNOSIS — M16.12 PRIMARY OSTEOARTHRITIS OF LEFT HIP: Primary | ICD-10-CM

## 2024-06-25 DIAGNOSIS — M25.512 CHRONIC LEFT SHOULDER PAIN: ICD-10-CM

## 2024-06-25 DIAGNOSIS — E78.5 DYSLIPIDEMIA: ICD-10-CM

## 2024-06-25 DIAGNOSIS — R73.03 PREDIABETES: ICD-10-CM

## 2024-06-25 DIAGNOSIS — E66.01 MORBID OBESITY WITH BODY MASS INDEX (BMI) OF 45.0 TO 49.9 IN ADULT (HCC): ICD-10-CM

## 2024-06-25 DIAGNOSIS — G89.29 CHRONIC LEFT SHOULDER PAIN: ICD-10-CM

## 2024-06-25 PROCEDURE — 99211 OFF/OP EST MAY X REQ PHY/QHP: CPT | Performed by: FAMILY MEDICINE

## 2024-06-25 RX ORDER — ROSUVASTATIN CALCIUM 20 MG/1
20 TABLET, COATED ORAL DAILY
Qty: 90 TABLET | Refills: 2 | Status: SHIPPED | OUTPATIENT
Start: 2024-06-25

## 2024-06-25 SDOH — ECONOMIC STABILITY: INCOME INSECURITY: HOW HARD IS IT FOR YOU TO PAY FOR THE VERY BASICS LIKE FOOD, HOUSING, MEDICAL CARE, AND HEATING?: NOT VERY HARD

## 2024-06-25 SDOH — SOCIAL STABILITY: SOCIAL NETWORK: ARE YOU MARRIED, WIDOWED, DIVORCED, SEPARATED, NEVER MARRIED, OR LIVING WITH A PARTNER?: DIVORCED

## 2024-06-25 SDOH — SOCIAL STABILITY: SOCIAL INSECURITY: WITHIN THE LAST YEAR, HAVE YOU BEEN HUMILIATED OR EMOTIONALLY ABUSED IN OTHER WAYS BY YOUR PARTNER OR EX-PARTNER?: NO

## 2024-06-25 SDOH — HEALTH STABILITY: MENTAL HEALTH: HOW OFTEN DO YOU HAVE A DRINK CONTAINING ALCOHOL?: NEVER

## 2024-06-25 SDOH — ECONOMIC STABILITY: FOOD INSECURITY: WITHIN THE PAST 12 MONTHS, YOU WORRIED THAT YOUR FOOD WOULD RUN OUT BEFORE YOU GOT MONEY TO BUY MORE.: NEVER TRUE

## 2024-06-25 SDOH — ECONOMIC STABILITY: TRANSPORTATION INSECURITY
IN THE PAST 12 MONTHS, HAS THE LACK OF TRANSPORTATION KEPT YOU FROM MEDICAL APPOINTMENTS OR FROM GETTING MEDICATIONS?: NO

## 2024-06-25 SDOH — SOCIAL STABILITY: SOCIAL NETWORK
DO YOU BELONG TO ANY CLUBS OR ORGANIZATIONS SUCH AS CHURCH GROUPS UNIONS, FRATERNAL OR ATHLETIC GROUPS, OR SCHOOL GROUPS?: NO

## 2024-06-25 SDOH — SOCIAL STABILITY: SOCIAL INSECURITY
WITHIN THE LAST YEAR, HAVE TO BEEN RAPED OR FORCED TO HAVE ANY KIND OF SEXUAL ACTIVITY BY YOUR PARTNER OR EX-PARTNER?: NO

## 2024-06-25 SDOH — ECONOMIC STABILITY: INCOME INSECURITY: IN THE LAST 12 MONTHS, WAS THERE A TIME WHEN YOU WERE NOT ABLE TO PAY THE MORTGAGE OR RENT ON TIME?: YES

## 2024-06-25 SDOH — HEALTH STABILITY: MENTAL HEALTH: HOW MANY STANDARD DRINKS CONTAINING ALCOHOL DO YOU HAVE ON A TYPICAL DAY?: PATIENT DOES NOT DRINK

## 2024-06-25 SDOH — ECONOMIC STABILITY: FOOD INSECURITY: WITHIN THE PAST 12 MONTHS, THE FOOD YOU BOUGHT JUST DIDN'T LAST AND YOU DIDN'T HAVE MONEY TO GET MORE.: NEVER TRUE

## 2024-06-25 SDOH — SOCIAL STABILITY: SOCIAL INSECURITY
WITHIN THE LAST YEAR, HAVE YOU BEEN KICKED, HIT, SLAPPED, OR OTHERWISE PHYSICALLY HURT BY YOUR PARTNER OR EX-PARTNER?: NO

## 2024-06-25 SDOH — ECONOMIC STABILITY: HOUSING INSECURITY: IN THE LAST 12 MONTHS, HOW MANY PLACES HAVE YOU LIVED?: 1

## 2024-06-25 SDOH — ECONOMIC STABILITY: TRANSPORTATION INSECURITY
IN THE PAST 12 MONTHS, HAS LACK OF TRANSPORTATION KEPT YOU FROM MEETINGS, WORK, OR FROM GETTING THINGS NEEDED FOR DAILY LIVING?: NO

## 2024-06-25 SDOH — HEALTH STABILITY: MENTAL HEALTH
STRESS IS WHEN SOMEONE FEELS TENSE, NERVOUS, ANXIOUS, OR CAN'T SLEEP AT NIGHT BECAUSE THEIR MIND IS TROUBLED. HOW STRESSED ARE YOU?: VERY MUCH

## 2024-06-25 SDOH — SOCIAL STABILITY: SOCIAL NETWORK: IN A TYPICAL WEEK, HOW MANY TIMES DO YOU TALK ON THE PHONE WITH FAMILY, FRIENDS, OR NEIGHBORS?: NEVER

## 2024-06-25 SDOH — HEALTH STABILITY: PHYSICAL HEALTH: ON AVERAGE, HOW MANY MINUTES DO YOU ENGAGE IN EXERCISE AT THIS LEVEL?: 30 MIN

## 2024-06-25 SDOH — SOCIAL STABILITY: SOCIAL INSECURITY: WITHIN THE LAST YEAR, HAVE YOU BEEN AFRAID OF YOUR PARTNER OR EX-PARTNER?: NO

## 2024-06-25 SDOH — SOCIAL STABILITY: SOCIAL NETWORK: HOW OFTEN DO YOU ATTENT MEETINGS OF THE CLUB OR ORGANIZATION YOU BELONG TO?: NEVER

## 2024-06-25 SDOH — SOCIAL STABILITY: SOCIAL NETWORK: HOW OFTEN DO YOU ATTEND CHURCH OR RELIGIOUS SERVICES?: NEVER

## 2024-06-25 SDOH — HEALTH STABILITY: PHYSICAL HEALTH: ON AVERAGE, HOW MANY DAYS PER WEEK DO YOU ENGAGE IN MODERATE TO STRENUOUS EXERCISE (LIKE A BRISK WALK)?: 5 DAYS

## 2024-06-25 SDOH — SOCIAL STABILITY: SOCIAL NETWORK: HOW OFTEN DO YOU GET TOGETHER WITH FRIENDS OR RELATIVES?: MORE THAN THREE TIMES A WEEK

## 2024-06-25 NOTE — PATIENT INSTRUCTIONS
Thank you for your visit today to the [facility]. It was our pleasure to provide the best possible care for you today.    As we discussed, please take note of the following:  - Continue taking your medications as directed.  - A referral has been provided to Physical Therapy.  - Return in about 3 months (around 9/25/2024).    Call the office at [phone] with any questions or concerns.     [facility] Financial Resources*  (Call [employer]/[facility] if need more resources).       Area Office on Aging of [facility] ([place] and [facility]):  What they offer: Assisted Living Waiver Program, Medicare benefits counseling, and referral to community resources.  Phone Number: [phone]   [place] Community Action Partnership ([place] and [facility] to the east):  What they offer: Assistance with utility expenses.  Phone Number: 834.382.3000   [facility] Veterans Service Commission:  What they offer:  Assistance with rent or mortgage payments, utilities, auto payments or repair, food, medical prescriptions, transportation to VA medical facilities for veterans and their widows who qualify.  Phone Number: 312.539.6674   [facility] Community Action Commission ([place] and [facility] to the west):   What they offer: Assistance with utility expenses.  Phone Number: 344.512.8294  [place] Department of Job and Family Services (ODJ[employer]):  What they offer: Medicaid, SNAP (food stamps), TANF (cash assistance), and childcare assistance.  Phone Number: 307.434.6857  [employer] ([facility]):  What they offer: Assistance with utility expenses.  Phone Number: 831.455.5529 ext: x11   Community Action Commission of [place] [employer] [place] [employer]:  What they offer: Electric, gas and water payment service.  Phone: [phone]    [facility] Agency on Aging, District 5:    [place], [place], [place], [place], [place], [place], [place], [place], [facility]:  What they

## 2024-06-25 NOTE — PROGRESS NOTES
Stress: Stress Concern Present (6/25/2024)    [place] of [employer]ational [employer] - Occupational Stress Questionnaire     Feeling of Stress : Very much   Social Connections: Socially Isolated (6/25/2024)    Social Connection and Isolation Panel [NHANES]     Frequency of Communication with Friends and Family: Never     Frequency of Social Gatherings with Friends and Family: More than three times a week     Attends Tenriism Services: Never     Active Member of Clubs or Organizations: No     Attends Club or Organization Meetings: Never     Marital Status:    Intimate Partner Violence: Not At Risk (6/25/2024)    Humiliation, Afraid, Rape, and Kick questionnaire     Fear of Current or Ex-Partner: No     Emotionally Abused: No     Physically Abused: No     Sexually Abused: No   Housing Stability: High Risk (6/25/2024)    Housing Stability Vital Sign     Unable to Pay for Housing in the Last Year: Yes     Number of Places Lived in the Last Year: 1     Unstable Housing in the Last Year: No        PHYSICAL EXAM     BP (!) 141/68   Pulse 77   Resp 12   Wt 126.3 kg (278 lb 6.4 oz)   BMI 46.33 kg/m²     Physical Exam  Vitals and nursing note reviewed.   Constitutional:       General: She is not in acute distress.     Appearance: Normal appearance. She is normal weight. She is not ill-appearing or toxic-appearing.   HENT:      Head: Normocephalic and atraumatic.      Nose: Nose normal.   Eyes:      General: Vision grossly intact.   Cardiovascular:      Rate and Rhythm: Normal rate and regular rhythm.      Heart sounds: Normal heart sounds. No murmur heard.     No gallop.   Pulmonary:      Effort: Pulmonary effort is normal. No respiratory distress.   Abdominal:      General: There is no distension.      Palpations: Abdomen is soft. There is no mass.      Tenderness: There is no abdominal tenderness. There is no guarding or rebound.      Hernia: No hernia is present.   Musculoskeletal:      Right knee: No

## 2024-06-26 ENCOUNTER — TELEPHONE (OUTPATIENT)
Dept: GASTROENTEROLOGY | Age: 59
End: 2024-06-26

## 2024-06-26 NOTE — TELEPHONE ENCOUNTER
NP / Tubular adenoma of colon    - Encounter for screening colonoscopy [facility]    Patient's last colonoscopy was done by Dr. Cantu 4/19/21, polyps, and to repeat in 5 years.  Not due until 2026.  Left message for the patient advising.  Any questions to call the office.  First attempt and sending referral back to provider.

## 2024-06-27 DIAGNOSIS — E66.01 MORBID OBESITY WITH BODY MASS INDEX (BMI) OF 45.0 TO 49.9 IN ADULT (HCC): ICD-10-CM

## 2024-06-27 RX ORDER — SEMAGLUTIDE 2.68 MG/ML
2 INJECTION, SOLUTION SUBCUTANEOUS
Qty: 3 ML | Refills: 2 | Status: SHIPPED | OUTPATIENT
Start: 2024-06-27 | End: 2024-09-13

## 2024-06-27 ASSESSMENT — ENCOUNTER SYMPTOMS
VOMITING: 0
BACK PAIN: 1
SHORTNESS OF BREATH: 0
RESPIRATORY NEGATIVE: 1
NAUSEA: 0
ABDOMINAL PAIN: 0

## 2024-06-27 ASSESSMENT — VISUAL ACUITY: OU: 1

## 2024-07-11 ENCOUNTER — TELEPHONE (OUTPATIENT)
Dept: GASTROENTEROLOGY | Age: 59
End: 2024-07-11

## 2024-07-18 DIAGNOSIS — R73.03 PREDIABETES: ICD-10-CM

## 2024-07-24 ENCOUNTER — TELEPHONE (OUTPATIENT)
Dept: GASTROENTEROLOGY | Age: 59
End: 2024-07-24

## 2024-08-11 DIAGNOSIS — M16.12 PRIMARY OSTEOARTHRITIS OF LEFT HIP: ICD-10-CM

## 2024-08-11 DIAGNOSIS — G89.29 CHRONIC PAIN OF BOTH KNEES: ICD-10-CM

## 2024-08-11 DIAGNOSIS — M25.562 CHRONIC PAIN OF BOTH KNEES: ICD-10-CM

## 2024-08-11 DIAGNOSIS — M25.561 CHRONIC PAIN OF BOTH KNEES: ICD-10-CM

## 2024-09-11 DIAGNOSIS — R73.03 PREDIABETES: ICD-10-CM

## 2024-09-11 RX ORDER — LANCETS 33 GAUGE
EACH MISCELLANEOUS
Qty: 100 EACH | Refills: 2 | Status: SHIPPED | OUTPATIENT
Start: 2024-09-11

## 2024-09-25 RX ORDER — BLOOD SUGAR DIAGNOSTIC
STRIP MISCELLANEOUS
Qty: 100 STRIP | Refills: 2 | Status: SHIPPED | OUTPATIENT
Start: 2024-09-25

## 2024-10-19 DIAGNOSIS — E66.01 MORBID OBESITY WITH BODY MASS INDEX (BMI) OF 45.0 TO 49.9 IN ADULT: ICD-10-CM

## 2024-10-21 RX ORDER — SEMAGLUTIDE 2.68 MG/ML
INJECTION, SOLUTION SUBCUTANEOUS
Qty: 3 ML | Refills: 2 | Status: SHIPPED | OUTPATIENT
Start: 2024-10-21

## 2024-11-03 DIAGNOSIS — R73.03 PREDIABETES: ICD-10-CM

## 2024-11-09 DIAGNOSIS — R73.03 PREDIABETES: ICD-10-CM

## 2024-11-11 RX ORDER — LOSARTAN POTASSIUM 50 MG/1
50 TABLET ORAL DAILY
Qty: 90 TABLET | Refills: 1 | Status: SHIPPED | OUTPATIENT
Start: 2024-11-11

## 2025-01-02 RX ORDER — BLOOD SUGAR DIAGNOSTIC
STRIP MISCELLANEOUS
Qty: 100 STRIP | Refills: 2 | Status: SHIPPED | OUTPATIENT
Start: 2025-01-02

## 2025-01-02 NOTE — TELEPHONE ENCOUNTER
Pharmacy request   Headache Monitoring: I recommended monitoring the headaches for now. There is no evidence of increased intracranial pressure. They were instructed to call if the headaches are worsening.

## 2025-01-12 DIAGNOSIS — E66.01 MORBID OBESITY WITH BODY MASS INDEX (BMI) OF 45.0 TO 49.9 IN ADULT: ICD-10-CM

## 2025-01-13 RX ORDER — SEMAGLUTIDE 2.68 MG/ML
INJECTION, SOLUTION SUBCUTANEOUS
Qty: 3 ML | Refills: 2 | Status: SHIPPED | OUTPATIENT
Start: 2025-01-13

## 2025-01-16 DIAGNOSIS — R73.03 PREDIABETES: ICD-10-CM

## 2025-01-17 ENCOUNTER — TELEPHONE (OUTPATIENT)
Age: 60
End: 2025-01-17

## 2025-01-17 NOTE — TELEPHONE ENCOUNTER
Received a response from patient's insurance company that they have denied this patient's Ozempic medication.  You may read the reason why if you look under patient's referral tab for prior authorization or under the patient's media tab.

## 2025-01-23 DIAGNOSIS — R73.03 PREDIABETES: ICD-10-CM

## 2025-01-24 RX ORDER — LANCETS 33 GAUGE
EACH MISCELLANEOUS
Qty: 100 EACH | Refills: 2 | Status: SHIPPED | OUTPATIENT
Start: 2025-01-24

## 2025-02-02 DIAGNOSIS — R73.03 PREDIABETES: ICD-10-CM

## 2025-04-18 RX ORDER — BLOOD SUGAR DIAGNOSTIC
STRIP MISCELLANEOUS
Qty: 100 STRIP | Refills: 2 | Status: SHIPPED | OUTPATIENT
Start: 2025-04-18

## 2025-04-23 DIAGNOSIS — E66.01 MORBID OBESITY WITH BODY MASS INDEX (BMI) OF 45.0 TO 49.9 IN ADULT (HCC): ICD-10-CM

## 2025-04-23 RX ORDER — SEMAGLUTIDE 2.68 MG/ML
INJECTION, SOLUTION SUBCUTANEOUS
Qty: 3 ML | Refills: 2 | Status: SHIPPED | OUTPATIENT
Start: 2025-04-23

## 2025-04-23 NOTE — TELEPHONE ENCOUNTER
Problem: Pain - Adult  Goal: Verbalizes/displays adequate comfort level or baseline comfort level  10/25/2024 1005 by Lula Cisneros, RN  Outcome: Progressing  10/25/2024 0802 by Lula Cisneros, RN  Outcome: Progressing   The patient's goals for the shift include Maintain safety    The clinical goals for the shift include safety, work with therapy     Pharmacy requesting refill.   no no

## 2025-04-25 DIAGNOSIS — E78.5 DYSLIPIDEMIA: ICD-10-CM

## 2025-04-25 RX ORDER — ROSUVASTATIN CALCIUM 20 MG/1
20 TABLET, COATED ORAL DAILY
Qty: 90 TABLET | Refills: 2 | Status: SHIPPED | OUTPATIENT
Start: 2025-04-25

## 2025-05-08 RX ORDER — BLOOD-GLUCOSE METER
1 KIT MISCELLANEOUS DAILY
Qty: 1 KIT | Refills: 0 | Status: SHIPPED | OUTPATIENT
Start: 2025-05-08

## 2025-05-12 ENCOUNTER — TELEPHONE (OUTPATIENT)
Age: 60
End: 2025-05-12

## 2025-05-12 DIAGNOSIS — E66.01 MORBID OBESITY WITH BODY MASS INDEX (BMI) OF 45.0 TO 49.9 IN ADULT (HCC): Primary | ICD-10-CM

## 2025-05-12 DIAGNOSIS — R73.01 IMPAIRED FASTING GLUCOSE: ICD-10-CM

## 2025-05-12 NOTE — TELEPHONE ENCOUNTER
Ezio from [facility] called to stateTh the patient need the One Touch Delica Plus Lancet Device. Contact number [phone]/

## 2025-05-15 RX ORDER — LANCING DEVICE/LANCETS
1 KIT MISCELLANEOUS ONCE
Qty: 1 EACH | Refills: 0 | Status: SHIPPED | OUTPATIENT
Start: 2025-05-15 | End: 2025-05-15

## 2025-05-25 DIAGNOSIS — I10 PRIMARY HYPERTENSION: Primary | ICD-10-CM

## 2025-05-28 RX ORDER — LOSARTAN POTASSIUM 50 MG/1
50 TABLET ORAL DAILY
Qty: 90 TABLET | Refills: 0 | Status: SHIPPED | OUTPATIENT
Start: 2025-05-28

## 2025-06-11 DIAGNOSIS — R73.03 PREDIABETES: ICD-10-CM

## 2025-06-12 RX ORDER — LANCETS 33 GAUGE
EACH MISCELLANEOUS
Qty: 100 EACH | Refills: 2 | Status: SHIPPED | OUTPATIENT
Start: 2025-06-12

## 2025-06-14 DIAGNOSIS — I10 PRIMARY HYPERTENSION: ICD-10-CM

## 2025-06-16 RX ORDER — LOSARTAN POTASSIUM 50 MG/1
50 TABLET ORAL DAILY
Qty: 90 TABLET | Refills: 0 | Status: SHIPPED | OUTPATIENT
Start: 2025-06-16

## 2025-06-25 ENCOUNTER — HOSPITAL ENCOUNTER (OUTPATIENT)
Age: 60
Setting detail: SPECIMEN
Discharge: HOME | End: 2025-06-25

## 2025-06-25 DIAGNOSIS — I10 PRIMARY HYPERTENSION: ICD-10-CM

## 2025-06-25 LAB
BASOPHILS # BLD: 0.08 K/UL (ref 0–0.2)
BASOPHILS NFR BLD: 1 % (ref 0–2)
EOSINOPHIL # BLD: 0.3 K/UL (ref 0–0.44)
EOSINOPHILS RELATIVE PERCENT: 3 % (ref 1–4)
ERYTHROCYTE [DISTWIDTH] IN BLOOD BY AUTOMATED COUNT: 15.1 % (ref 11.8–14.4)
HCT VFR BLD AUTO: 43.1 % (ref 36.3–47.1)
HGB BLD-MCNC: 13.3 G/DL (ref 11.9–15.1)
IMM GRANULOCYTES # BLD AUTO: 0.03 K/UL (ref 0–0.3)
IMM GRANULOCYTES NFR BLD: 0 %
LYMPHOCYTES NFR BLD: 3.29 K/UL (ref 1.1–3.7)
LYMPHOCYTES RELATIVE PERCENT: 28 % (ref 24–43)
MCH RBC QN AUTO: 27.3 PG (ref 25.2–33.5)
MCHC RBC AUTO-ENTMCNC: 30.9 G/DL (ref 28.4–34.8)
MCV RBC AUTO: 88.5 FL (ref 82.6–102.9)
MONOCYTES NFR BLD: 0.73 K/UL (ref 0.1–1.2)
MONOCYTES NFR BLD: 6 % (ref 3–12)
NEUTROPHILS NFR BLD: 62 % (ref 36–65)
NEUTS SEG NFR BLD: 7.51 K/UL (ref 1.5–8.1)
NRBC BLD-RTO: 0 PER 100 WBC
PLATELET # BLD AUTO: 286 K/UL (ref 138–453)
PMV BLD AUTO: 9.9 FL (ref 8.1–13.5)
RBC # BLD AUTO: 4.87 M/UL (ref 3.95–5.11)
RBC # BLD: ABNORMAL 10*6/UL
WBC OTHER # BLD: 11.9 K/UL (ref 3.5–11.3)

## 2025-06-26 ENCOUNTER — RESULTS FOLLOW-UP (OUTPATIENT)
Age: 60
End: 2025-06-26

## 2025-06-26 LAB
ANION GAP SERPL CALCULATED.3IONS-SCNC: 14 MMOL/L (ref 9–16)
BUN SERPL-MCNC: 15 MG/DL (ref 8–23)
CALCIUM SERPL-MCNC: 10.2 MG/DL (ref 8.6–10.4)
CHLORIDE SERPL-SCNC: 101 MMOL/L (ref 98–107)
CO2 SERPL-SCNC: 25 MMOL/L (ref 20–31)
CREAT SERPL-MCNC: 0.9 MG/DL (ref 0.6–0.9)
CREAT UR-MCNC: 167 MG/DL (ref 28–217)
GFR, ESTIMATED: 73 ML/MIN/1.73M2
GLUCOSE SERPL-MCNC: 83 MG/DL (ref 74–99)
MICROALBUMIN UR-MCNC: <12 MG/L (ref 0–20)
MICROALBUMIN/CREAT UR-RTO: NORMAL MCG/MG CREAT (ref 0–25)
POTASSIUM SERPL-SCNC: 4.7 MMOL/L (ref 3.7–5.3)
SODIUM SERPL-SCNC: 140 MMOL/L (ref 136–145)

## 2025-06-28 SDOH — ECONOMIC STABILITY: INCOME INSECURITY: IN THE LAST 12 MONTHS, WAS THERE A TIME WHEN YOU WERE NOT ABLE TO PAY THE MORTGAGE OR RENT ON TIME?: YES

## 2025-06-28 SDOH — ECONOMIC STABILITY: FOOD INSECURITY: WITHIN THE PAST 12 MONTHS, THE FOOD YOU BOUGHT JUST DIDN'T LAST AND YOU DIDN'T HAVE MONEY TO GET MORE.: OFTEN TRUE

## 2025-06-28 SDOH — ECONOMIC STABILITY: FOOD INSECURITY: WITHIN THE PAST 12 MONTHS, YOU WORRIED THAT YOUR FOOD WOULD RUN OUT BEFORE YOU GOT MONEY TO BUY MORE.: SOMETIMES TRUE

## 2025-07-01 ENCOUNTER — OFFICE VISIT (OUTPATIENT)
Age: 60
End: 2025-07-01
Payer: MEDICARE

## 2025-07-01 VITALS
WEIGHT: 232.4 LBS | SYSTOLIC BLOOD PRESSURE: 119 MMHG | HEIGHT: 65 IN | BODY MASS INDEX: 38.72 KG/M2 | DIASTOLIC BLOOD PRESSURE: 74 MMHG | HEART RATE: 73 BPM | RESPIRATION RATE: 14 BRPM | TEMPERATURE: 97.7 F

## 2025-07-01 DIAGNOSIS — R73.03 PREDIABETES: Primary | ICD-10-CM

## 2025-07-01 DIAGNOSIS — Z12.31 BREAST CANCER SCREENING BY MAMMOGRAM: ICD-10-CM

## 2025-07-01 DIAGNOSIS — F32.89 OTHER DEPRESSION: ICD-10-CM

## 2025-07-01 DIAGNOSIS — M75.01 ADHESIVE CAPSULITIS OF RIGHT SHOULDER: ICD-10-CM

## 2025-07-01 DIAGNOSIS — Z87.891 FORMER SMOKER: ICD-10-CM

## 2025-07-01 DIAGNOSIS — F41.9 ANXIETY: ICD-10-CM

## 2025-07-01 PROCEDURE — 1036F TOBACCO NON-USER: CPT

## 2025-07-01 PROCEDURE — G8417 CALC BMI ABV UP PARAM F/U: HCPCS

## 2025-07-01 PROCEDURE — 3017F COLORECTAL CA SCREEN DOC REV: CPT

## 2025-07-01 PROCEDURE — G8427 DOCREV CUR MEDS BY ELIG CLIN: HCPCS

## 2025-07-01 PROCEDURE — 99214 OFFICE O/P EST MOD 30 MIN: CPT

## 2025-07-01 ASSESSMENT — PATIENT HEALTH QUESTIONNAIRE - PHQ9
5. POOR APPETITE OR OVEREATING: NOT AT ALL
6. FEELING BAD ABOUT YOURSELF - OR THAT YOU ARE A FAILURE OR HAVE LET YOURSELF OR YOUR FAMILY DOWN: NOT AT ALL
8. MOVING OR SPEAKING SO SLOWLY THAT OTHER PEOPLE COULD HAVE NOTICED. OR THE OPPOSITE, BEING SO FIGETY OR RESTLESS THAT YOU HAVE BEEN MOVING AROUND A LOT MORE THAN USUAL: NOT AT ALL
SUM OF ALL RESPONSES TO PHQ QUESTIONS 1-9: 13
4. FEELING TIRED OR HAVING LITTLE ENERGY: NEARLY EVERY DAY
SUM OF ALL RESPONSES TO PHQ QUESTIONS 1-9: 13
3. TROUBLE FALLING OR STAYING ASLEEP: NEARLY EVERY DAY
10. IF YOU CHECKED OFF ANY PROBLEMS, HOW DIFFICULT HAVE THESE PROBLEMS MADE IT FOR YOU TO DO YOUR WORK, TAKE CARE OF THINGS AT HOME, OR GET ALONG WITH OTHER PEOPLE: SOMEWHAT DIFFICULT
7. TROUBLE CONCENTRATING ON THINGS, SUCH AS READING THE NEWSPAPER OR WATCHING TELEVISION: NEARLY EVERY DAY
2. FEELING DOWN, DEPRESSED OR HOPELESS: SEVERAL DAYS
SUM OF ALL RESPONSES TO PHQ QUESTIONS 1-9: 13
1. LITTLE INTEREST OR PLEASURE IN DOING THINGS: NEARLY EVERY DAY
SUM OF ALL RESPONSES TO PHQ QUESTIONS 1-9: 13
9. THOUGHTS THAT YOU WOULD BE BETTER OFF DEAD, OR OF HURTING YOURSELF: NOT AT ALL
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
## 2025-07-11 SDOH — HEALTH STABILITY: PHYSICAL HEALTH: ON AVERAGE, HOW MANY DAYS PER WEEK DO YOU ENGAGE IN MODERATE TO STRENUOUS EXERCISE (LIKE A BRISK WALK)?: 2 DAYS

## 2025-07-11 SDOH — HEALTH STABILITY: PHYSICAL HEALTH: ON AVERAGE, HOW MANY MINUTES DO YOU ENGAGE IN EXERCISE AT THIS LEVEL?: 10 MIN

## 2025-07-11 ASSESSMENT — PATIENT HEALTH QUESTIONNAIRE - PHQ9
8. MOVING OR SPEAKING SO SLOWLY THAT OTHER PEOPLE COULD HAVE NOTICED. OR THE OPPOSITE, BEING SO FIGETY OR RESTLESS THAT YOU HAVE BEEN MOVING AROUND A LOT MORE THAN USUAL: NOT AT ALL
1. LITTLE INTEREST OR PLEASURE IN DOING THINGS: NEARLY EVERY DAY
4. FEELING TIRED OR HAVING LITTLE ENERGY: NEARLY EVERY DAY
3. TROUBLE FALLING OR STAYING ASLEEP: NEARLY EVERY DAY
10. IF YOU CHECKED OFF ANY PROBLEMS, HOW DIFFICULT HAVE THESE PROBLEMS MADE IT FOR YOU TO DO YOUR WORK, TAKE CARE OF THINGS AT HOME, OR GET ALONG WITH OTHER PEOPLE: VERY DIFFICULT
SUM OF ALL RESPONSES TO PHQ QUESTIONS 1-9: 19
6. FEELING BAD ABOUT YOURSELF - OR THAT YOU ARE A FAILURE OR HAVE LET YOURSELF OR YOUR FAMILY DOWN: MORE THAN HALF THE DAYS
9. THOUGHTS THAT YOU WOULD BE BETTER OFF DEAD, OR OF HURTING YOURSELF: NOT AT ALL
2. FEELING DOWN, DEPRESSED OR HOPELESS: MORE THAN HALF THE DAYS
SUM OF ALL RESPONSES TO PHQ QUESTIONS 1-9: 19
5. POOR APPETITE OR OVEREATING: NEARLY EVERY DAY
7. TROUBLE CONCENTRATING ON THINGS, SUCH AS READING THE NEWSPAPER OR WATCHING TELEVISION: NEARLY EVERY DAY
SUM OF ALL RESPONSES TO PHQ QUESTIONS 1-9: 19
SUM OF ALL RESPONSES TO PHQ QUESTIONS 1-9: 19

## 2025-07-11 ASSESSMENT — LIFESTYLE VARIABLES
HOW MANY STANDARD DRINKS CONTAINING ALCOHOL DO YOU HAVE ON A TYPICAL DAY: PATIENT DOES NOT DRINK
HOW OFTEN DO YOU HAVE SIX OR MORE DRINKS ON ONE OCCASION: 1
HOW OFTEN DO YOU HAVE A DRINK CONTAINING ALCOHOL: NEVER
HOW MANY STANDARD DRINKS CONTAINING ALCOHOL DO YOU HAVE ON A TYPICAL DAY: 0
HOW OFTEN DO YOU HAVE A DRINK CONTAINING ALCOHOL: 1

## 2025-07-14 ENCOUNTER — HOSPITAL ENCOUNTER (OUTPATIENT)
Age: 60
Discharge: HOME | End: 2025-07-14
Payer: MEDICARE

## 2025-07-14 ENCOUNTER — TELEPHONE (OUTPATIENT)
Age: 60
End: 2025-07-14

## 2025-07-14 ENCOUNTER — OFFICE VISIT (OUTPATIENT)
Age: 60
End: 2025-07-14
Payer: MEDICARE

## 2025-07-14 VITALS
TEMPERATURE: 98 F | HEIGHT: 65 IN | SYSTOLIC BLOOD PRESSURE: 130 MMHG | WEIGHT: 231.6 LBS | HEART RATE: 75 BPM | BODY MASS INDEX: 38.59 KG/M2 | RESPIRATION RATE: 16 BRPM | DIASTOLIC BLOOD PRESSURE: 55 MMHG

## 2025-07-14 DIAGNOSIS — R73.03 PREDIABETES: Primary | ICD-10-CM

## 2025-07-14 DIAGNOSIS — Z00.00 MEDICARE ANNUAL WELLNESS VISIT, SUBSEQUENT: Primary | ICD-10-CM

## 2025-07-14 DIAGNOSIS — R73.03 PREDIABETES: ICD-10-CM

## 2025-07-14 LAB
CHOLEST SERPL-MCNC: 160 MG/DL (ref 0–199)
CHOLESTEROL/HDL RATIO: 3.5
HDLC SERPL-MCNC: 46 MG/DL
LDLC SERPL CALC-MCNC: 77 MG/DL (ref 0–100)
TRIGL SERPL-MCNC: 183 MG/DL (ref 0–149)
VLDLC SERPL CALC-MCNC: 37 MG/DL (ref 1–30)

## 2025-07-14 PROCEDURE — 36415 COLL VENOUS BLD VENIPUNCTURE: CPT

## 2025-07-14 PROCEDURE — G0439 PPPS, SUBSEQ VISIT: HCPCS | Performed by: PHARMACIST

## 2025-07-14 PROCEDURE — 3017F COLORECTAL CA SCREEN DOC REV: CPT | Performed by: PHARMACIST

## 2025-07-14 PROCEDURE — 83036 HEMOGLOBIN GLYCOSYLATED A1C: CPT

## 2025-07-14 PROCEDURE — 80061 LIPID PANEL: CPT

## 2025-07-14 NOTE — TELEPHONE ENCOUNTER
Regarding patient's testing supplies, she will no longer be able to get the One Touch series anymore, she will need the Contour Plus meter and strips and lancets.  Can you please send to [facility]?  Can you put in the pharmacy comments to place on hold until patient calls that she needs it.

## 2025-07-14 NOTE — PATIENT INSTRUCTIONS
lipid panel   Diabetes screening   As determined by your physician 3/19/2024  A1c/glucose: 5.2% You have an order for an A1c   Osteoporosis screening   DEXA every 2 years for females (ages 65-85) You do not qualify for this yet due to age    Low Dose CT/Lung Cancer  Annual ages 50 to 77 (80) years who have a 20 pack-year smoking history and currently smoke or have quit within the past 15 years You had a lung cancer screening on 12/27/23 You have a current order for a lung cancer screening   Glaucoma screening       Covered yearly for those:  with diabetes mellitus  with a family history of glaucoma  -Americans aged 50 and older  -Americans aged 65 and older  Please make an eye appointment    HIV/Hepatitis C/STI testing STI/HIV:  Annually as necessary  Hepatitis C:  Once in a lifetime unless high risk behavior You had a negative hepatitis C screening on 3/28/22 You can get additional testing if you desire.       Preventing Falls: Care Instructions  Injuries and health problems such as trouble walking or poor eyesight can increase your risk of falling. So can some medicines. But there are things you can do to help prevent falls. You can exercise to get stronger. You can also arrange your home to make it safer.    Talk to your doctor about the medicines you take. Ask if any of them increase the risk of falls and whether they can be changed or stopped.   Try to exercise regularly. It can help improve your strength and balance. This can help lower your risk of falling.         Practice fall safety and prevention.   Wear low-heeled shoes that fit well and give your feet good support. Talk to your doctor if you have foot problems that make this hard.  Carry a cellphone or wear a medical alert device that you can use to call for help.  Use stepladders instead of chairs to reach high objects. Don't climb if you're at risk for falls. Ask for help, if needed.  Wear the correct eyeglasses, if you need them.

## 2025-07-14 NOTE — PROGRESS NOTES
Medicare Service Recommended Frequency Last Done Due next   Pneumonia vaccine After 65, Prevnar 20 ONCE You received the Prenvar 20 on 2/12/2024 You are up-to-date   Influenza vaccine Yearly 11/7/23 Please make sure to get your yearly flu shot   Zoster/Shingles Shingrix vaccine:  2 shots 2-6 months apart  You got one Shingrix vaccine on 1/12/24 and 4/18/2024 You are up-to-date and will not need any other shingles vaccine   COVID Variable 1/12/24 You could get the booster if you would like   Tetanus vaccine (Td or Tdap) Every 10 years  You got a tetanus vaccine on 1/9/15 You are due at this time, please consider getting this at the pharmacy.   RSV Vaccine Once We have no record of an RSV vaccine for you Please consider getting the vaccine at the pharmacy   Mammogram Every 1-2 years (ages ~40-75) You had a mammogram on 12/20/23 which was normal You have a current order for a mammogram   Colorectal Cancer Screening FIT test yearly, Cologuard every 3 years, Colonoscopy every 10 years *unless otherwise indicated* You noted that you had a colonoscopy with Dr. Cantu on 4/19/2021 by [facility] [place] It looks like you might be due in 3-5 years from the last colonoscopy.  Please follow up with Dr. Cantu   Cardiovascular/cholesterol screening As determined by your physician 6/3/2024  Total cholesterol: 145  Triglycerides: 145  HDL (good cholesterol): 44  LDL (bad cholesterol): 72 You have an order for a lipid panel   Diabetes screening   As determined by your physician 3/19/2024  A1c/glucose: 5.2% You have an order for an A1c   Osteoporosis screening   DEXA every 2 years for females (ages 65-85) You do not qualify for this yet due to age    Low Dose CT/Lung Cancer  Annual ages 50 to 77 (80) years who have a 20 pack-year smoking history and currently smoke or have quit within the past 15 years You had a lung cancer screening on 12/27/23 You have a current order for a lung cancer screening   Glaucoma

## 2025-07-15 LAB
EST. AVERAGE GLUCOSE BLD GHB EST-MCNC: 105 MG/DL
HBA1C MFR BLD: 5.3 % (ref 4–6)

## 2025-07-16 RX ORDER — BLOOD-GLUCOSE METER
EACH MISCELLANEOUS
Status: CANCELLED | OUTPATIENT
Start: 2025-07-16

## 2025-07-16 RX ORDER — BLOOD SUGAR DIAGNOSTIC
1 STRIP MISCELLANEOUS DAILY
Qty: 100 EACH | Refills: 3 | Status: CANCELLED | OUTPATIENT
Start: 2025-07-16

## 2025-07-16 RX ORDER — SYRING-NEEDL,DISP,INSUL,0.3 ML 30 GX5/16"
1 SYRINGE, EMPTY DISPOSABLE MISCELLANEOUS ONCE
Qty: 100 EACH | Refills: 0 | Status: CANCELLED | OUTPATIENT
Start: 2025-07-16 | End: 2025-07-16

## 2025-07-17 RX ORDER — GLUCOSAMINE HCL/CHONDROITIN SU 500-400 MG
1 CAPSULE ORAL DAILY
Qty: 100 STRIP | Refills: 3 | Status: SHIPPED | OUTPATIENT
Start: 2025-07-17

## 2025-07-17 RX ORDER — AVOBENZONE, HOMOSALATE, OCTISALATE, OCTOCRYLENE 30; 40; 45; 26 MG/ML; MG/ML; MG/ML; MG/ML
1 CREAM TOPICAL DAILY
Qty: 100 EACH | Refills: 3 | Status: SHIPPED | OUTPATIENT
Start: 2025-07-17

## 2025-07-17 RX ORDER — BLOOD-GLUCOSE METER
1 KIT MISCELLANEOUS DAILY
Qty: 1 KIT | Refills: 3 | Status: SHIPPED | OUTPATIENT
Start: 2025-07-17

## 2025-07-21 DIAGNOSIS — E66.01 MORBID OBESITY WITH BODY MASS INDEX (BMI) OF 45.0 TO 49.9 IN ADULT (HCC): ICD-10-CM

## 2025-07-21 DIAGNOSIS — R73.03 PREDIABETES: ICD-10-CM

## 2025-07-21 RX ORDER — SEMAGLUTIDE 2.68 MG/ML
INJECTION, SOLUTION SUBCUTANEOUS
Qty: 3 ML | Refills: 2 | Status: CANCELLED | OUTPATIENT
Start: 2025-07-21

## 2025-07-22 DIAGNOSIS — R73.03 PREDIABETES: ICD-10-CM

## 2025-07-22 DIAGNOSIS — E66.01 MORBID OBESITY WITH BODY MASS INDEX (BMI) OF 45.0 TO 49.9 IN ADULT (HCC): ICD-10-CM

## 2025-07-22 RX ORDER — SEMAGLUTIDE 2.68 MG/ML
2 INJECTION, SOLUTION SUBCUTANEOUS
Qty: 3 ML | Refills: 2 | Status: SHIPPED | OUTPATIENT
Start: 2025-07-22

## 2025-08-03 RX ORDER — BLOOD SUGAR DIAGNOSTIC
STRIP MISCELLANEOUS
Qty: 100 STRIP | Refills: 2 | Status: SHIPPED | OUTPATIENT
Start: 2025-08-03

## 2025-08-26 DIAGNOSIS — I10 PRIMARY HYPERTENSION: ICD-10-CM

## 2025-08-26 RX ORDER — LOSARTAN POTASSIUM 50 MG/1
50 TABLET ORAL DAILY
Qty: 90 TABLET | Refills: 0 | Status: SHIPPED | OUTPATIENT
Start: 2025-08-26

## (undated) DEVICE — TOWEL,OR,DSP,ST,BLUE,STD,4/PK,20PK/CS: Brand: MEDLINE

## (undated) DEVICE — SUTURE VCRL SZ 0 L18IN ABSRB UD L36MM CT-1 1/2 CIR J840D

## (undated) DEVICE — SPONGE,NEURO,0.5"X0.5",XR,STRL,10/PK: Brand: MEDLINE

## (undated) DEVICE — ELECTRODE PT RET AD L9FT HI MOIST COND ADH HYDRGEL CORDED

## (undated) DEVICE — SHEET, T, LAPAROTOMY, STERILE: Brand: MEDLINE

## (undated) DEVICE — SYRINGE MED 10ML LUERLOCK TIP W/O SFTY DISP

## (undated) DEVICE — GLOVE SURG SZ 75 L12IN FNGR THK87MIL WHT LTX FREE

## (undated) DEVICE — STANDARD HYPODERMIC NEEDLE,ALUMINUM HUB: Brand: MONOJECT

## (undated) DEVICE — 1000 S-DRAPE TOWEL DRAPE 10/BX: Brand: STERI-DRAPE™

## (undated) DEVICE — 1010 S-DRAPE TOWEL DRAPE 10/BX: Brand: STERI-DRAPE™

## (undated) DEVICE — SINGLE SHOT EPIDURAL TRAY: Brand: MEDLINE INDUSTRIES, INC.

## (undated) DEVICE — SYRINGE IRRIG 60ML SFT PLIABLE BLB EZ TO GRP 1 HND USE W/

## (undated) DEVICE — SINGLE DOSE EPI TY

## (undated) DEVICE — DRAPE,REIN 53X77,STERILE: Brand: MEDLINE

## (undated) DEVICE — NEEDLE SPNL L4.5IN OD22GA QNCKE TYP SPINOCAN

## (undated) DEVICE — MARKER,SKIN,WI/RULER AND LABELS: Brand: MEDLINE

## (undated) DEVICE — COVER,MAYO STAND,STERILE: Brand: MEDLINE

## (undated) DEVICE — ADHESIVE SKIN CLSR 0.7ML TOP DERMBND ADV

## (undated) DEVICE — NEEDLE SPNL 25GA L4 11/16IN BLU HUB DISP

## (undated) DEVICE — BANDAGE GZ W2XL75IN ST RAYON POLY CNFRM STRTCH LTWT

## (undated) DEVICE — Z DISCONTINUED APPLICATOR SURG PREP 0.35OZ 2% CHG 70% ISO ALC W/ HI LT

## (undated) DEVICE — ZINACTIVE USE 2537982 PAD GRND FOR RF PAIN MGMT DISP

## (undated) DEVICE — GLOVE ORANGE PI 8 1/2   MSG9085

## (undated) DEVICE — BANDAGE ADH W1XL3IN UNIV PLAS NAT GEN USE STRP

## (undated) DEVICE — BLADE ES L6IN ELASTOMERIC COAT INSUL DURABLE BEND UPTO

## (undated) DEVICE — EXTENSION SET IV 12 IN 0.45 CC INFUSION MINIBOR TBNG CLMP

## (undated) DEVICE — TOTAL TRAY, 16FR 10ML SIL FOLEY, URN: Brand: MEDLINE

## (undated) DEVICE — COOLIEF* COOLED RADIOFREQUENCY PROBE KIT: Brand: AVANOS

## (undated) DEVICE — SPONGE,NEURO,1"X1",XR,STRL,LF,10/PK: Brand: MEDLINE

## (undated) DEVICE — SOLUTION IV IRRIG WATER 1000ML POUR BRL 2F7114

## (undated) DEVICE — SUTURE VCRL SZ 3-0 L18IN ABSRB UD L26MM SH 1/2 CIR J864D

## (undated) DEVICE — PAD ELECTROSURG HRVSTR CORD PATIENT

## (undated) DEVICE — Device

## (undated) DEVICE — NEEDLE EPI 20GA L6IN TUOHY CRV BLNT CUT TIP WNG LUERLOCK

## (undated) DEVICE — GLOVE ORANGE PI 8   MSG9080

## (undated) DEVICE — CURVED SHARP RF CANNULA, RADIOPAQUE MARKER: Brand: RADIOPAQUE RADIOFREQUENCY CANNULA

## (undated) DEVICE — GARMENT,MEDLINE,DVT,INT,CALF,MED, GEN2: Brand: MEDLINE

## (undated) DEVICE — ZINACTIVE USE 2539609 APPLICATOR MEDICATED 10.5 CC SOLUTION HI LT ORNG CHLORAPREP

## (undated) DEVICE — GLOVE ORANGE PI 7   MSG9070

## (undated) DEVICE — 1 ML INSULIN SYRINGE LUER-LOCK WITH TIP CAP: Brand: MONOJECT

## (undated) DEVICE — BLADE ES ELASTOMERIC COAT INSUL DURABLE BEND UPTO 90DEG

## (undated) DEVICE — C-ARM: Brand: UNBRANDED

## (undated) DEVICE — CONNECTOR TBNG WHT PLAS SUCT STR 5IN1 LTWT W/ M CONN

## (undated) DEVICE — GAUZE,SPONGE,FLUFF,6"X6.75",STRL,5/TRAY: Brand: MEDLINE

## (undated) DEVICE — KAIRISON TUBING SET PNEUMATIC, (3000 MM), STERILE, DISPOSABLE, TO BE USED WITH: FK898R, PACKAGE OF 10 PIECES: Brand: KAIRISON

## (undated) DEVICE — PACK PROCEDURE SURG LUMBAR SPINE SVMMC

## (undated) DEVICE — AGENT HEMOSTATIC SURGIFLOW MATRIX KIT W/THROMBIN

## (undated) DEVICE — PATIENT RETURN ELECTRODE, SINGLE-USE, CONTACT QUALITY MONITORING, ADULT, WITH 9FT CORD, FOR PATIENTS WEIGING OVER 33LBS. (15KG): Brand: MEGADYNE

## (undated) DEVICE — KIT EVAC 0.13IN RECT TB DIA10FR 400CC PVC 3 SPR Y CONN DRN

## (undated) DEVICE — GOWN,AURORA,NONREINFORCED,LARGE: Brand: MEDLINE

## (undated) DEVICE — GOWN,SIRUS,NONRNF,SETINSLV,XL,20/CS: Brand: MEDLINE

## (undated) DEVICE — SPONGE,NEURO,0.5"X3",XR,STRL,LF,10/PK: Brand: MEDLINE

## (undated) DEVICE — NEEDLE SPNL 18GA L3.5IN W/ QNCKE SHARPER BVL DURA CLICK

## (undated) DEVICE — APPLICATOR MEDICATED 26 CC SOLUTION HI LT ORNG CHLORAPREP

## (undated) DEVICE — INTENDED FOR TISSUE SEPARATION, AND OTHER PROCEDURES THAT REQUIRE A SHARP SURGICAL BLADE TO PUNCTURE OR CUT.: Brand: BARD-PARKER ® CARBON RIB-BACK BLADES

## (undated) DEVICE — SYRINGE MED 10ML TRNSLUC BRL PLUNG BLK MRK POLYPR CTRL

## (undated) DEVICE — NEEDLE HYPO 25GA L1.5IN BLU POLYPR HUB S STL REG BVL STR

## (undated) DEVICE — SUTURE MCRYL SZ 3-0 L18IN ABSRB UD L19MM PS-2 3/8 CIR PRIM Y497G

## (undated) DEVICE — SUTURE VCRL SZ 2-0 L18IN ABSRB VLT L26MM SH 1/2 CIR J775D

## (undated) DEVICE — GLOVE SURG SZ 75 CRM LTX FREE POLYISOPRENE POLYMER BEAD ANTI

## (undated) DEVICE — TAPE ADH W1INXL10YD WHT PAPR GENTLE BRTH FLX COMFORTABLE

## (undated) DEVICE — SUTURE VCRL SZ 2-0 L18IN ABSRB UD CT-1 L36MM 1/2 CIR J839D

## (undated) DEVICE — 3.0MM PRECISION NEURO (MATCH HEAD)

## (undated) DEVICE — DRAPE MICSCP W117XL305CM DIA65MM LENS W VARI LENS2 FOR LEICA

## (undated) DEVICE — NEEDLE SPINAL 22GA L3.5IN SPINOCAN

## (undated) DEVICE — 3M™ IOBAN™ 2 ANTIMICROBIAL INCISE DRAPE 6650EZ: Brand: IOBAN™ 2